# Patient Record
Sex: FEMALE | Race: WHITE | NOT HISPANIC OR LATINO | Employment: FULL TIME | URBAN - METROPOLITAN AREA
[De-identification: names, ages, dates, MRNs, and addresses within clinical notes are randomized per-mention and may not be internally consistent; named-entity substitution may affect disease eponyms.]

---

## 2023-01-16 ENCOUNTER — LAB REQUISITION (OUTPATIENT)
Dept: LAB | Facility: CLINIC | Age: 33
End: 2023-01-16

## 2023-01-16 DIAGNOSIS — E55.9 VITAMIN D DEFICIENCY, UNSPECIFIED: ICD-10-CM

## 2023-01-16 PROCEDURE — 82306 VITAMIN D 25 HYDROXY: CPT | Performed by: NURSE PRACTITIONER

## 2023-01-17 LAB — DEPRECATED CALCIDIOL+CALCIFEROL SERPL-MC: 35 UG/L (ref 20–75)

## 2023-07-17 ASSESSMENT — SLEEP AND FATIGUE QUESTIONNAIRES
HOW LIKELY ARE YOU TO NOD OFF OR FALL ASLEEP WHEN YOU ARE A PASSENGER IN A CAR FOR AN HOUR WITHOUT A BREAK: WOULD NEVER DOZE
HOW LIKELY ARE YOU TO NOD OFF OR FALL ASLEEP WHILE LYING DOWN TO REST IN THE AFTERNOON WHEN CIRCUMSTANCES PERMIT: WOULD NEVER DOZE
HOW LIKELY ARE YOU TO NOD OFF OR FALL ASLEEP WHILE SITTING QUIETLY AFTER LUNCH WITHOUT ALCOHOL: WOULD NEVER DOZE
HOW LIKELY ARE YOU TO NOD OFF OR FALL ASLEEP WHILE SITTING INACTIVE IN A PUBLIC PLACE: WOULD NEVER DOZE
HOW LIKELY ARE YOU TO NOD OFF OR FALL ASLEEP WHILE SITTING AND READING: SLIGHT CHANCE OF DOZING
HOW LIKELY ARE YOU TO NOD OFF OR FALL ASLEEP WHILE WATCHING TV: WOULD NEVER DOZE
HOW LIKELY ARE YOU TO NOD OFF OR FALL ASLEEP IN A CAR, WHILE STOPPED FOR A FEW MINUTES IN TRAFFIC: WOULD NEVER DOZE
HOW LIKELY ARE YOU TO NOD OFF OR FALL ASLEEP WHILE SITTING AND TALKING TO SOMEONE: WOULD NEVER DOZE

## 2023-07-18 ENCOUNTER — OFFICE VISIT (OUTPATIENT)
Dept: SLEEP MEDICINE | Facility: CLINIC | Age: 33
End: 2023-07-18
Payer: COMMERCIAL

## 2023-07-18 VITALS
HEART RATE: 84 BPM | RESPIRATION RATE: 16 BRPM | DIASTOLIC BLOOD PRESSURE: 68 MMHG | OXYGEN SATURATION: 98 % | SYSTOLIC BLOOD PRESSURE: 109 MMHG | WEIGHT: 157 LBS | HEIGHT: 66 IN | BODY MASS INDEX: 25.23 KG/M2

## 2023-07-18 DIAGNOSIS — F51.04 CHRONIC INSOMNIA: ICD-10-CM

## 2023-07-18 DIAGNOSIS — F41.9 ANXIETY AND DEPRESSION: ICD-10-CM

## 2023-07-18 DIAGNOSIS — R53.83 FATIGUE, UNSPECIFIED TYPE: ICD-10-CM

## 2023-07-18 DIAGNOSIS — G47.9 SLEEP DISTURBANCE: Primary | ICD-10-CM

## 2023-07-18 DIAGNOSIS — F32.A ANXIETY AND DEPRESSION: ICD-10-CM

## 2023-07-18 DIAGNOSIS — G47.21 CIRCADIAN RHYTHM SLEEP DISORDER, DELAYED SLEEP PHASE TYPE: ICD-10-CM

## 2023-07-18 PROCEDURE — 99205 OFFICE O/P NEW HI 60 MIN: CPT | Performed by: INTERNAL MEDICINE

## 2023-07-18 NOTE — PROGRESS NOTES
Outpatient Sleep Medicine Consultation:      Name: Vibha Kingston MRN# [id number]   Age: 32 year old YOB: 1990     Date of Consultation: July 18, 2023  Consultation is requested by: No referring provider defined for this encounter. No ref. provider found  Primary care provider: No Ref-Primary, Physician       Reason for Sleep Consult:     Vibha Kingston is sent by No ref. provider found for a sleep consultation regarding difficulty with sleep and waking up multiple times through the night and difficulty waking up in the morning.    Patient s Reason for visit  Vibha Kingston main reason for visit: I have a very difficult time getting to sleep, i wake many times theoughout the night and then have a very hard time getting up in the morning  Patient states problem(s) started: The last 3-5 years, it has been getting worse  Vibha Kingston's goals for this visit: Figure out what is going on           Assessment and Plan:     Chronic Insomnia -multifactorial.  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  Sleep hygiene: Encouraged to follow good sleep hygiene measures including avoiding using phone or other electronic devices in bed and avoiding alcohol within 2-3 hours before bed.  Psychophysiologic insomnia: We discussed cognitive behavioral therapy for insomnia.  Patient was not interested in obtaining the referral to sleep psychology for CBT-I but was willing to consider online CBT-I and  Information regarding online CBT-I was provided as summary.  Anxiety/depression:  She denies suicidal ideation or thoughts of harming others. We discussed the association of insomnia with anxiety and depression. Recommended the patient to establish continuity of care with a  primary care provider and follow-up with PCP for  management of anxiety and  depression.  Delayed sleep phase: We arrived at a goal bedtime of 11:45PM and a goal wake up time of 7:45 AM. Recommended to follow regular sleep schedule, optimizing the duration and circadian timing of sleep and aim at obtaining at least 7 to 8 hours per night and avoid sleep deprivation.  We discussed minimizing exposure to bright lights at least a couple hours before the goal bedtime.    Patient was instructed to avoid mind stimulating activities/screen time or use of electronics at least an hour before bed.   Recommended over-the-counter melatonin 1 mg to be taken by mouth at 8 PM(pt's habitual sleep time reported as 1AM). Recommended exposure to bright light, using a  bright light box of 10,000 Lux intensity with exposure to bright light for 30 to 60 minutes soon after awakening.   She was instructed to communicate with me via MyChart or schedule follow-up if the insomnia does not improve.    Possible JOSH:  Even though the patient denies any reports of snoring or observed apneas during sleep, she  reports frequent nocturnal awakenings and occasional morning headaches with nonrestorative sleep and fatigue. HST/ Polysomnography reviewed.  Will obtain PSG to evaluate for possible JOSH.  Home sleep apnea testing has not been considered due to low probability for JOSH based on STOP-BANG score of 1 out of 8.  Obstructive sleep apnea and consequences of untreated sleep apnea were reviewed.  Information provided regarding treatment options for JOSH.  Plan to communicate the test results with patient via video visit in approximately 14 days after PSG is completed.    Encouraged to follow healthy diet and regular exercise.    Patient was strongly advised to avoid driving, operating any heavy machinery or other hazardous situations while drowsy or sleepy.  Patient was counseled on the importance of driving while alert, to pull over if drowsy, or nap before getting into the vehicle if sleepy.     Orders Placed This Encounter  "  Procedures     Comprehensive Sleep Study       Summary Counseling:    CBT-I Reviewed  Stimulus control measures Reviewed  Sleep Testing Reviewed  Obstructive Sleep Apnea Reviewed    Medical Decision-making:   Educational materials provided in instructions    CC: No ref. provider found     The above note was dictated using voice recognition software. Although reviewed after completion, some word and grammatical error may remain . Please contact the author for any clarifications.     \" Total time spent was 60 minutes for this appointment on this date of service which include time spent before, during and after the visit for chart review, patient care, counseling and coordination of care including documentation.\"      Daniel Toro MD  [facility]  [address], [address], [place] [address]            History of Present Illness:     Past Sleep Evaluations:none    Patient reports that she has had difficulty falling asleep and difficulty waking up in the morning for several years, but it has worsened in the last 3 years since she started working from home during the  pandemic.  Since she works from home, she has been getting out of bed about 15 minutes before the work start time, which is 8 AM.  Previously her work start time used to be 9AM,  but the work start time changed to 8AM about 2.5 years ago.  She reports  that her thinking has not been clear and that the lack of sleep has been affecting her mood. She gets frustrated easily, has been irritable, anxious and sad some times.  She denies suicidal ideations or thoughts of harming others.    SLEEP-WAKE SCHEDULE:     Work/School Days: Patient goes to school/work: Yes   Usually gets into bed at Between 10 and midnight  Takes patient about 1 to 2 hours. Sometimes longer if i cant get my mind to shut off to fall asleep. Sit in bed staring at the wall-anxiety/depression may also affect sleep  Has trouble falling " asleep Every night   Wakes up in the middle of the night 2 to 3 times  Wakes up due to External stimuli (bed partner, pets, noise, etc);Use the bathroom  She has trouble falling back asleep Not diffult actually.   It usually takes 20 minutes or so to get back to sleep  Patient is usually up at 7:45  Uses alarm: Yes set for 645 AM but she keeps snoozing until 745AM    Weekends/Non-work Days/All Other Days:  Usually gets into bed at Between 10 and midnight   Takes patient about Usually about 1-2 hours again to fall asleep  Patient is usually up at 9:30-10ish  Uses alarm: Yes. Alarm still set at 645AM but she turn  it off and wakes up spontaneously around 930-10AM  She reports her sleep is better when she is away from home.  She reports nonrestorative sleep and fatigue, but denies excessive daytime sleepiness  She feels a little more rested upon awakening from sleep on weekends compared to work days.      Sleep Need  Patient gets  6 mike hours sleep on average   Patient thinks she needs about 7 to 8 hour sleep    Vibha MADI MaryKingston prefers to sleep in this position(s): Side   Patient states they do the following activities in bed: Read;Use phone.    Naps  Patient takes a purposeful nap Never times a week and naps are usually Na in duration  She feels better after a nap: No  She dozes off unintentionally Never days per week  Patient has had a driving accident or near-miss due to sleepiness/drowsiness: No      SLEEP DISRUPTIONS:    Breathing/Snoring  Patient snores:No  Other people complain about her snoring: No  Patient has been told she stops breathing in her sleep:No   Gasping/Choking: No  She has issues with the following:  occasional morning headaches    Movement:  Patient gets pain, discomfort, with an urge to move:  No  It happens when she is resting:  No  It happens more at night:  No  Patient has been told she kicks her legs at night:  No     Behaviors in Sleep:  Vihba Kingston has experienced the following  behaviors while sleeping: Recurring Nightmares;See or hear things that are not really there upon awakening or just falling asleep   Nightmares: had in the past, but not recently  Reports visual hallucination occasionally as she is falling asleep  She has experienced sudden muscle weakness during the day: No   She denies sleep walking, sleep talking, sleep eating or dream enactment behavior.  She denies cataplexy/sleep paralysis    Is there anything else you would like your sleep provider to know:        CAFFEINE AND OTHER SUBSTANCES:    Patient consumes caffeinated beverages per day:  1 to 2 cups of coffee  Last caffeine use is usually: I try to make sure its before noon.  List of any prescribed or over the counter stimulants that patient takes:    List of any prescribed or over the counter sleep medication patient takes:    List of previous sleep medications that patient has tried:  melatonin not helpful  Patient drinks alcohol to help them sleep: No  Patient drinks alcohol near bedtime: Yes sometimes    Family History:  Patient has a family member been diagnosed with a sleep disorder: No          SCALES:    EPWORTH SLEEPINESS SCALE         7/17/2023     9:41 PM    Montgomery Sleepiness Scale ( GLORIA Singleton  2920-3027<br>ESS - [place]/English - Final version - 21 Nov 07 - [facility] [employer] [place].)   Sitting and reading Slight chance of dozing   Watching TV Would never doze   Sitting, inactive in a public place (e.g. a theatre or a meeting) Would never doze   As a passenger in a car for an hour without a break Would never doze   Lying down to rest in the afternoon when circumstances permit Would never doze   Sitting and talking to someone Would never doze   Sitting quietly after a lunch without alcohol Would never doze   In a car, while stopped for a few minutes in traffic Would never doze   Montgomery Score (MC) 1   Montgomery Score (Sleep) 1         INSOMNIA SEVERITY INDEX (THOMAS)          7/17/2023     9:22 PM   Insomnia Severity  "Index (THOMAS)   Difficulty falling asleep 3   Difficulty staying asleep 2   Problems waking up too early 0   How SATISFIED/DISSATISFIED are you with your CURRENT sleep pattern? 4   How NOTICEABLE to others do you think your sleep problem is in terms of impairing the quality of your life? 3   How WORRIED/DISTRESSED are you about your current sleep problem? 3   To what extent do you consider your sleep problem to INTERFERE with your daily functioning (e.g. daytime fatigue, mood, ability to function at work/daily chores, concentration, memory, mood, etc.) CURRENTLY? 4   THOMAS Total Score 19       Guidelines for Scoring/Interpretation:  Total score categories:  0-7 = No clinically significant insomnia   8-14 = Subthreshold insomnia   15-21 = Clinical insomnia (moderate severity)  22-28 = Clinical insomnia (severe)  Used via courtesy of www.Visionarityth.va.gov with permission from Ben Murguia PhD., [facility]      STOP BAN out of 8 (tiredness)        2023     7:57 AM   STOP BANG Questionnaire (  2008, the American Society of Anesthesiologists, Inc[employer]   Neck Cir (cm) Clinic: 33 cm   B/P Clinic: 109/68   BMI Clinic: 25.34         GAD7         No data to display                  CAGE-AID         No data to display                CAGE-AID reprinted with permission from the [place] Medical Journal, MOMO Mackenzie and JENNIFER Gaston, \"Conjoint screening questionnaires for alcohol and drug abuse\" [place] Medical Journal 94: 135-140, .      PATIENT HEALTH QUESTIONNAIRE-9 (PHQ - 9)         No data to display                Developed by Tom Zepeda, Yesika Murillo, Al Humphreys and colleagues, with an educational gt from [employer] No permission required to reproduce, translate, display or distribute.        Allergies:    No Known Allergies    Medications:    No current outpatient medications on file.       Problem List:  There are no problems to display for this " patient.       Past Medical/Surgical History:  No past medical history on file.  No past surgical history on file.    Social History:  Social History     Socioeconomic History     Marital status:      Spouse name: Not on file     Number of children: Not on file     Years of education: Not on file     Highest education level: Not on file   Occupational History     Not on file   Tobacco Use     Smoking status: Never     Smokeless tobacco: Never   Vaping Use     Vaping Use: Never used   Substance and Sexual Activity     Alcohol use: Not on file     Drug use: Not on file     Sexual activity: Not on file   Other Topics Concern     Not on file   Social History Narrative     Not on file     Social Determinants of Health     Financial Resource Strain: Not on file   Food Insecurity: Not on file   Transportation Needs: Not on file   Physical Activity: Not on file   Stress: Not on file   Social Connections: Not on file   Intimate Partner Violence: Not on file   Housing Stability: Not on file       Family History:  No family history on file.    Review of Systems:  A complete review of systems reviewed by me is negative with the exeption of what has been mentioned in the history of present illness.  In the last TWO WEEKS have you experienced any of the following symptoms?  Fevers: No  Night Sweats: Yes  Weight Gain: No  Pain at Night: No  Double Vision: No  Changes in Vision: No  Difficulty Breathing through Nose: No  Sore Throat in Morning: No  Dry Mouth in the Morning: No  Shortness of Breath Lying Flat: No  Shortness of Breath With Activity: No  Awakening with Shortness of Breath: No  Increased Cough: No  Heart Racing at Night: No  Swelling in Feet or Legs: No  Diarrhea at Night: No  Heartburn at Night: No  Urinating More than Once at Night: No  Losing Control of Urine at Night: No  Joint Pains at Night: Yes  Headaches in Morning: No  Weakness in Arms or Legs: No  Depressed Mood: Yes; denies suicidal ideation or  "thoughts of harming others  Anxiety: Yes      Physical Examination:  Vitals: /68   Pulse 84   Resp 16   Ht 1.676 m (5' 6\")   Wt 71.2 kg (157 lb)   SpO2 98%   BMI 25.34 kg/m    BMI= Body mass index is 25.34 kg/m .    Neck Cir (cm): 33 cm    General: No apparent distress, appropriately groomed  Head: Normocephalic, atraumatic  Nose, mouth, throat: Slightly hypertrophied inferior nasal turbinate in the right nostril, but patent airflow through both nares.  Oropharynx : Mallampati Class: III.  Tonsillar Stage: 2  visible at pillars.  Neck:Circumference: 33 cms  Chest: No cough, no audible wheezing, able to talk in full sentences.  Clear to auscultation bilaterally with no rales or rhonchi  Cardiovascular: regular S1, S2; no gallops or murmurs  Psych: coherent speech, normal rate and volume, able to articulate logical thoughts, able   to abstract reason, no tangential thoughts, no hallucinations   or delusions Her affect is normal  Neuro:  Mental status: Alert and  Oriented X 3  Speech: normal            Data: All pertinent previous laboratory data reviewed     No results for input(s): NA, POTASSIUM, CHLORIDE, CO2, ANIONGAP, GLC, BUN, CR, RACHAEL in the last 40088 hours.    No results for input(s): WBC, RBC, HGB, HCT, MCV, MCH, MCHC, RDW, PLT in the last 42373 hours.    No results for input(s): PROTTOTAL, ALBUMIN, BILITOTAL, ALKPHOS, AST, ALT, BILIDIRECT in the last 42682 hours.    No results found for: TSH    No results found for: UAMP, UBARB, BENZODIAZEUR, UCANN, UCOC, OPIT, UPCP    No results found for: IRONSAT, [id number], RICKI    No results found for: PH, PHARTERIAL, PO2, [id number], SAT, PCO2, HCO3, BASEEXCESS, MORGAN, BEB    Echocardiology: No results found for this or any previous visit (from the past 4320 hour(s)).    Chest x-ray: No results found for this or any previous visit from the past 365 days.      Chest CT: No results found for this or any previous visit from the past 365 days.      PFT: Most Recent " Breeze Pulmonary Function Testing: No results found        Daniel Toro MD 7/18/2023

## 2023-07-18 NOTE — PATIENT INSTRUCTIONS
Your BMI is Body mass index is 25.34 kg/m .    What is BMI?  Body mass index (BMI) is one way to tell whether you are at a healthy weight, overweight, or obese. It measures your weight in relation to your height.  A BMI of 18.5 to 24.9 is in the healthy range. A person with a BMI of 25 to 29.9 is considered overweight, and someone with a BMI of 30 or greater is considered obese.  Another way to find out if you are at risk for health problems caused by overweight and obesity is to measure your waist. If you are a woman and your waist is more than 35 inches, or if you are a man and your waist is more than 40 inches, your risk of disease may be higher.  More than two-thirds of American adults are considered overweight or obese. Being overweight or obese increases the risk for further weight gain.  Excess weight may lead to heart disease and diabetes. Creating and following plans for healthy eating and physical activity may help you improve your health.    Methods for maintaining or losing weight.  Weight control is part of healthy lifestyle and includes exercise, emotional health, and healthy eating habits.  Careful eating habits lifelong is the mainstay of weight control.  Though there are significant health benefits from weight loss, long-term weight loss with diet alone may be very difficult to achieve- studies show long-term success with dietary management in less than 10% of people. Attaining a healthy weight may be especially difficult to achieve in those with severe obesity. In some cases, medications, devices and surgical management might be considered.    What can you do?  If you are overweight or obese and are interested in methods for weight loss, you should discuss this with your provider. In addition, we recommend that you review healthy life styles and methods for weight loss available through the National Institutes of Health patient information sites:    http://win.niddk.nih.gov/publications/index.htm      Recommended following a regular sleep-wake pattern every day, arrived at a goal bedtime of 11:45 PM and a goal wake time of 7:45 AM every day of the week. We discussed minimizing exposure to bright lights at least a couple hours before the goal bedtime.  Patient was instructed to avoid mind stimulating activities/screen time or use of electronics at least an hour before bed. Recommended over-the-counter melatonin 1 mg to be taken by mouth at 8 PM. Recommended  exposure to bright light , using a  bright light box of 10,000 Lux intensity with exposure to bright light for 30 to 60 minutes soon after awakening.   Please do not read or use phone in bed.  Please avoid alcohol within 2-3 hours before bed  Please establish continuity of care with a  primary care provider(PCP) and follow up with PCP for management of anxiety/depression.      Insomnia and Behavioral Sleep Medicine Program    The [facility] Insomnia and Behavioral Sleep Medicine Program provides non-drug treatment for sleep problems including:    Cognitive-behavioral Therapies for Insomnia (CBT-I)  Management of Shift-work and Jet Lag  Management of Delayed, Advanced and Irregular Circadian Rhythm Sleep Disorders  Imagery Rehearsal Therapy (IRT) for Nightmare Disorder  PAP Therapy Desensitization    You have been referred for consultation with a sleep psychologist who specializes in behavioral sleep medicine and treatment of insomnia.  The [facility] Insomnia and Behavioral Sleep Medicine Program offers individualized telehealth services through our [facility] Sleep Centers and online CBT-I.    Preparing for your Consultation    You will need to keep a Sleep Diary for at least a week prior to your visit. Complete the sleep diary each day first thing after you get up by answering a few key questions about your sleep using our convenient mobile filomena or paper sleep diary.  Your answers  should be based on your recall of the past 24 hours.  Avoid watching the clock or recording data during the night.     Insomnia  Bc    The Insomnia  mobile bc  is a convenient way to keep track of your sleep prior to your sleep consultation.  Simply download the free bc on your Apple or Android phone and record your information each morning.  The bc includes training, self-assessment, and sleep schedule recommendations.  Prior to your consultation we recommend you use only the sleep diary function. You can e-mail yourself a copy of your sleep diary data by going to the Settings section and using the [place] User Data function.  During your consultation your provider will review the data with you.          Syncronex Sleep Diary    You can also track your sleep using the Sync[employer]x paper sleep diary.  You can upload your sleep diary and send it via a [employer] message, fax it to [phone], or have it with you at the time of your consultation.            CBT-I:  Frequently Asked Questions    What is CBT-I?    Cognitive Behavioral Therapy for Insomnia, also known as CBT-I, is a highly effective non-drug treatment for insomnia. The American College of Physicians recommends CBT-I as the first treatment for chronic insomnia.  Research has shown CBT-I to be safer and more effective long term than sleeping pills.    What does CBT-I involve?     CBT-I targets behaviors that lead to chronic insomnia:  Habits that weaken the bed as a cue for sleep  Habits that weaken your body's sleep drive and sleep/wake clock   Unhelpful sleep thoughts that increase sleep-related worry and arousal.    The process involves 3-6 telehealth visits that guide you to implement proven strategies to get a better night's sleep.    People often see improvement in their sleep within a few weeks. Research shows if you keep practicing the skills you learn your sleep is likely to continue to improve 6-12 months after  treatment.    Does this program prescribe or manage sleep medication?    No.  Your prescribing provider is responsible to assist you in managing your sleep medications.  Some people choose to stop using sleep medication prior to or during CBT-I.  Our program can work with your prescribing provider to help reduce or eliminate use of sleep medications.     Getting Started Today!    If you haven't already done so, we recommend you consider making the following changes to your sleep habits prior to your sleep consultation:     Reduce your consumption of caffeine and alcohol.  Both can disrupt sleep and make strengthening your sleep more difficult.  Specifically:    - Avoid caffeine within 6 hours of bedtime   - No more than 3 caffeinated beverages per day (e.g. 8 oz. cup coffee or 12 oz. cup soda)            - No alcohol within 3 hours of bedtime    Make sure your bedroom is quiet, comfortable and dark.  Noise, light and an uncomfortable sleep space can harm your sleep.      Keep the same sleep schedule 7 days a week.unless you do shift work.      Online CBT-I     If you want to get started today, research indicates that online CBT-I can be effective for some individuals. These programs requires comfort with filomena-based or online learning.  However, digital CBT-I programs are not for everyone.  Contraindications include:    Seizure disorders,   Bipolar disorder,   Unstable medical or mental health conditions,   Frailty or risk of falling  Pregnancy    You should consult a sleep specialist before using these resources if you have:    Sleep Apnea  Restless Leg Syndrome  Sleep Walking  REM behavior disorder  Night Terrors  Excessive Daytime Sleepiness  Are engaged in shift work  Use prescription sleep medication    Our Online CBT-I program    If your sleep provider recommends online CBT-I for you , the cost for an entire 6-week program is $40.    To get started, copy and paste the link below which will take you to the  landing page to register:                           [email or web address]               If you wish to complete the online CBT-I program but do not plan to follow-up with a sleep provider, you are set to begin the program.    If you are planning to work with an [facility] sleep provider, there are a couple of extra steps you can take to share your sleep data with your sleep provider.  To share sleep log data, go to the left side navigation and click on the  share sleep log  button:         You will be taken to the page below where you will enter  the provider code HSTYLE into the box.          Once you press the locate button, the information for  [employer] will pop up as below.  By pressing the Submit button your data will be sent to our  secure [facility] sleep program portal for review by your sleep provider. You will only need to do this step once.                                  Self-help Workbooks for Insomnia    If you have found self-help books useful in the past, you may want to consider reading one of the following books prior to your consultation:    Say Emilie to Insomnia: The Six-Week, Drug-Free Program Developed at [employer].  Jonatan Knight MD. Available in paperback, Sabas, and audiobook.    Overcoming Insomnia: A Cognitive-Behavioral Therapy Approach, Workbook.  Shon Babin, PhD  and Carmen Winston, PhD.  Available in paperback and Sabas.    Quiet Your Mind and Get to Sleep: Solutions to Insomnia for Those with Depression, Anxiety, or Chronic Pain.  Cony Purcell, PhD and Carmen Winston, PhD.  Available in paperback and Sabas              MY TREATMENT INFORMATION FOR SLEEP APNEA-  Vibha Kingston    DOCTOR : Daniel Toro MD    Am I having a sleep study at a sleep center?  --->Due to normal delays, you will be contacted within 2-4 weeks to schedule    Frequently asked questions:  1. What is Obstructive Sleep Apnea (JOSH)? JOSH is the  "most common type of sleep apnea. Apnea means, \"without breath.\"  Apnea is most often caused by narrowing or collapse of the upper airway as muscles relax during sleep.   Almost everyone has occasional apneas. Most people with sleep apnea have had brief interruptions at night frequently for many years.  The severity of sleep apnea is related to how frequent and severe the events are.   2. What are the consequences of JOSH? Symptoms include: feeling sleepy during the day, snoring loudly, gasping or stopping of breathing, trouble sleeping, and occasionally morning headaches or heartburn at night.  Sleepiness can be serious and even increase the risk of falling asleep while driving. Other health consequences may include development of high blood pressure and other cardiovascular disease in persons who are susceptible. Untreated JOSH  can contribute to heart disease, stroke and diabetes.   3. What are the treatment options? In most situations, sleep apnea is a lifelong disease that must be managed with daily therapy. Medications are not effective for sleep apnea and surgery is generally not considered until other therapies have been tried. Your treatment is your choice . Continuous Positive Airway (CPAP) works right away and is the therapy that is effective in nearly everyone. An oral device to hold your jaw forward is usually the next most reliable option. Other options include postioning devices (to keep you off your back), weight loss, and surgery including a tongue pacing device. There is more detail about some of these options below.  4. Are my sleep studies covered by insurance? Although we will request verification of coverage, we advise you also check in advance of the study to ensure there is coverage.    Important tips for those choosing CPAP and similar devices  For new devices, sign up for device FILOMENA to monitor your device for your followup visits  We encourage you to utilize the [employer] filomena or website " ([employer] ([email or web address].com) ) to monitor your therapy progress and share the data with your healthcare team when you discuss your sleep apnea.                                                    Know your equipment:  CPAP is continuous positive airway pressure that prevents obstructive sleep apnea by keeping the throat from collapsing while you are sleeping. In most cases, the device is  smart  and can slowly self-adjusts if your throat collapses and keeps a record every day of how well you are treated-this information is available to you and your care team.  BPAP is bilevel positive airway pressure that keeps your throat open and also assists each breath with a pressure boost to maintain adequate breathing.  Special kinds of BPAP are used in patients who have inadequate breathing from lung or heart disease. In most cases, the device is  smart  and can slowly self-adjusts to assist breathing. Like CPAP, the device keeps a record of how well you are treated.  Your mask is your connection to the device. You get to choose what feels most comfortable and the staff will help to make sure if fits. Here: are some examples of the different masks that are available:       Key points to remember on your journey with sleep apnea:  Sleep study.  PAP devices often need to be adjusted during a sleep study to show that they are effective and adjusted right.  Good tips to remember: Try wearing just the mask during a quiet time during the day so your body adapts to wearing it. A humidifier is recommended for comfort in most cases to prevent drying of your nose and throat. Allergy medication from your provider may help you if you are having nasal congestion.  Getting settled-in. It takes more than one night for most of us to get used to wearing a mask. Try wearing just the mask during a quiet time during the day so your body adapts to wearing it. A humidifier is recommended for comfort in most cases. Our team will work with you carefully  on the first day and will be in contact within 4 days and again at 2 and 4 weeks for advice and remote device adjustments. Your therapy is evaluated by the device each day.   Use it every night. The more you are able to sleep naturally for 7-8 hours, the more likely you will have good sleep and to prevent health risks or symptoms from sleep apnea. Even if you use it 4 hours it helps. Occasionally all of us are unable to use a medical therapy, in sleep apnea, it is not dangerous to miss one night.   Communicate. Call our skilled team on the number provided on the first day if your visit for problems that make it difficult to wear the device. Over 2 out of 3 patients can learn to wear the device long-term with help from our team. Remember to call our team or your sleep providers if you are unable to wear the device as we may have other solutions for those who cannot adapt to mask CPAP therapy. It is recommended that you sleep your sleep provider within the first 3 months and yearly after that if you are not having problems.   Use it for your health. We encourage use of CPAP masks during daytime quiet periods to allow your face and brain to adapt to the sensation of CPAP so that it will be a more natural sensation to awaken to at night or during naps. This can be very useful during the first few weeks or months of adapting to CPAP though it does not help medically to wear CPAP during wakefulness and  should not be used as a strategy just to meet guidelines.  Take care of your equipment. Make sure you clean your mask and tubing using directions every day and that your filter and mask are replaced as recommended or if they are not working.     BESIDES CPAP, WHAT OTHER THERAPIES ARE THERE?    Positioning Device  Positioning devices are generally used when sleep apnea is mild and only occurs on your back.This example shows a pillow that straps around the waist. It may be appropriate for those whose sleep study shows milder  sleep apnea that occurs primarily when lying flat on one's back. Preliminary studies have shown benefit but effectiveness at home may need to be verified by a home sleep test. These devices are generally not covered by medical insurance.  Examples of devices that maintain sleeping on the back to prevent snoring and mild sleep apnea.    Belt type body positioner  http://[employer].KOEZY/    Electronic reminder  http://nightshifttherapy.com/            Oral Appliance  What is oral appliance therapy?  An oral appliance device fits on your teeth at night like a retainer used after having braces. The device is made by a specialized dentist and requires several visits over 1-2 months before a manufactured device is made to fit your teeth and is adjusted to prevent your sleep apnea. Once an oral device is working properly, snoring should be improved. A home sleep test may be recommended at that time if to determine whether the sleep apnea is adequately treated.       Some things to remember:  -Oral devices are often, but not always, covered by your medical insurance. Be sure to check with your insurance provider.   -If you are referred for oral therapy, you will be given a list of specialized dentists to consider or you may choose to visit the Web site of the American Academy of Dental Sleep Medicine  -Oral devices are less likely to work if you have severe sleep apnea or are extremely overweight.     More detailed information  An oral appliance is a small acrylic device that fits over the upper and lower teeth  (similar to a retainer or a mouth guard). This device slightly moves jaw forward, which moves the base of the tongue forward, opens the airway, improves breathing for effective treat snoring and obstructive sleep apnea in perhaps 7 out of 10 people .  The best working devices are custom-made by a dental device  after a mold is made of the teeth 1, 2, 3.  When is an oral appliance indicated?  Oral appliance  therapy is recommended as a first-line treatment for patients with primary snoring, mild sleep apnea, and for patients with moderate sleep apnea who prefer appliance therapy to use of CPAP4, 5. Severity of sleep apnea is determined by sleep testing and is based on the number of respiratory events per hour of sleep.   How successful is oral appliance therapy?  The success rate of oral appliance therapy in patients with mild sleep apnea is 75-80% while in patients with moderate sleep apnea it is 50-70%. The chance of success in patients with severe sleep apnea is 40-50%. The research also shows that oral appliances have a beneficial effect on the cardiovascular health of JOSH patients at the same magnitude as CPAP therapy7.  Oral appliances should be a second-line treatment in cases of severe sleep apnea, but if not completely successful then a combination therapy utilizing CPAP plus oral appliance therapy may be effective. Oral appliances tend to be effective in a broad range of patients although studies show that the patients who have the highest success are females, younger patients, those with milder disease, and less severe obesity. 3, 6.   Finding a dentist that practices dental sleep medicine  Specific training is available through the American Academy of Dental Sleep Medicine for dentists interested in working in the field of sleep. To find a dentist who is educated in the field of sleep and the use of oral appliances, near you, visit the Web site of the American Academy of Dental Sleep Medicine.    References  1. Luca et al. Objectively measured vs self-reported compliance during oral appliance therapy for sleep-disordered breathing. Chest 2013; 144(5): 7718-3912.  2. Shirley et al. Objective measurement of compliance during oral appliance therapy for sleep-disordered breathing. Thorax 2013; 68(1): 91-96.  3. Alfredo et al. Mandibular advancement devices in 620 men and women with JOSH and snoring:  tolerability and predictors of treatment success. Chest 2004; 125: 5770-2987.  4. Jose et al. Oral appliances for snoring and JOSH: a review. Sleep 2006; 29: 244-262.  5. Marco et al. Oral appliance treatment for JOSH: an update. J Clin Sleep Med 2014; 10(2): 215-227.  6. Arsen et al. Predictors of OSAH treatment outcome. J Dent Res 2007; 86: 0814-7864.      Weight Loss:    Weight loss is a long-term strategy that may improve sleep apnea in some patients.    Weight management is a personal decision and the decision should be based on your interest and the potential benefits.  If you are interested in exploring weight loss strategies, the following discussion covers the impact on weight loss on sleep apnea and the approaches that may be successful.    Being overweight does not necessarily mean you will have health consequences.  Those who have BMI over 35 or over 27 with existing medical conditions carries greater risk.   Weight loss decreases severity of sleep apnea in most people with obesity. For those with mild obesity who have developed snoring with weight gain, even 15-30 pound weight loss can improve and occasionally eliminate sleep apnea.  Structured and life-long dietary and health habits are necessary to lose weight and keep healthier weight levels.     Though there may be significant health benefits from weight loss, long-term weight loss is very difficult to achieve- studies show success with dietary management in less than 10% of people. In addition, substantial weight loss may require years of dietary control and may be difficult if patients have severe obesity. In these cases, surgical management may be considered.  Finally, older individuals who have tolerated obesity without health complications may be less likely to benefit from weight loss strategies.      [unfilled]    Surgery:    Surgery for obstructive sleep apnea is considered generally only when other therapies fail to work.  Surgery may be discussed with you if you are having a difficult time tolerating CPAP and or when there is an abnormal structure that requires surgical correction.  Nose and throat surgeries often enlarge the airway to prevent collapse.  Most of these surgeries create pain for 1-2 weeks and up to half of the most common surgeries are not effective throughout life.  You should carefully discuss the benefits and drawbacks to surgery with your sleep provider and surgeon to determine if it is the best solution for you.   More information  Surgery for JOSH is directed at areas that are responsible for narrowing or complete obstruction of the airway during sleep.  There are a wide range of procedures available to enlarge and/or stabilize the airway to prevent blockage of breathing in the three major areas where it can occur: the palate, tongue, and nasal regions.  Successful surgical treatment depends on the accurate identification of the factors responsible for obstructive sleep apnea in each person.  A personalized approach is required because there is no single treatment that works well for everyone.  Because of anatomic variation, consultation with an examination by a sleep surgeon is a critical first step in determining what surgical options are best for each patient.  In some cases, examination during sedation may be recommended in order to guide the selection of procedures.  Patients will be counseled about risks and benefits as well as the typical recovery course after surgery. Surgery is typically not a cure for a person s JOSH.  However, surgery will often significantly improve one s JOSH severity (termed  success rate ).  Even in the absence of a cure, surgery will decrease the cardiovascular risk associated with OSA7; improve overall quality of life8 (sleepiness, functionality, sleep quality, etc).      Palate Procedures:  Patients with JOSH often have narrowing of their airway in the region of their tonsils and  uvula.  The goals of palate procedures are to widen the airway in this region as well as to help the tissues resist collapse.  Modern palate procedure techniques focus on tissue conservation and soft tissue rearrangement, rather than tissue removal.  Often the uvula is preserved in this procedure. Residual sleep apnea is common in patient after pharyngoplasty with an average reduction in sleep apnea events of 33%2.      Tongue Procedures:  ExamWhile patients are awake, the muscles that surround the throat are active and keep this region open for breathing. These muscles relax during sleep, allowing the tongue and other structures to collapse and block breathing.  There are several different tongue procedures available.  Selection of a tongue base procedure depends on characteristics seen on physical exam.  Generally, procedures are aimed at removing bulky tissues in this area or preventing the back of the tongue from falling back during sleep.  Success rates for tongue surgery range from 50-62%3.    Hypoglossal Nerve Stimulation:  Hypoglossal nerve stimulation has recently received approval from the [place] Food and Drug Administration for the treatment of obstructive sleep apnea.  This is based on research showing that the system was safe and effective in treating sleep apnea6.  Results showed that the median AHI score decreased 68%, from 29.3 to 9.0. This therapy uses an implant system that senses breathing patterns and delivers mild stimulation to airway muscles, which keeps the airway open during sleep.  The system consists of three fully implanted components: a small generator (similar in size to a pacemaker), a breathing sensor, and a stimulation lead.  Using a small handheld remote, a patient turns the therapy on before bed and off upon awakening.    Candidates for this device must be greater than 18 years of age, have moderate to severe JOSH (AHI between 15-65), BMI less than 35, have tried CPAP/oral  appliance for at least 8 weeks without success, and have appropriate upper airway anatomy (determined by a sleep endoscopy performed by Dr. Christiano Vaca).    Hypoglossal Nerve Stimulation Pathway:    The sleep surgeon s office will work with the patient through the insurance prior-authorization process (including communications and appeals).    Nasal Procedures:  Nasal obstruction can interfere with nasal breathing during the day and night.  Studies have shown that relief of nasal obstruction can improve the ability of some patients to tolerate positive airway pressure therapy for obstructive sleep apnea1.  Treatment options include medications such as nasal saline, topical corticosteroid and antihistamine sprays, and oral medications such as antihistamines or decongestants. Non-surgical treatments can include external nasal dilators for selected patients. If these are not successful by themselves, surgery can improve the nasal airway either alone or in combination with these other options.      Combination Procedures:  Combination of surgical procedures and other treatments may be recommended, particularly if patients have more than one area of narrowing or persistent positional disease.  The success rate of combination surgery ranges from 66-80%2,3.    References  David SY. The Role of the Nose in Snoring and Obstructive Sleep Apnoea: An Update.  Eur Arch Otorhinolaryngol. 2011; 268: 1365-73.   Capmary ellen SM; Jackie JA; Niru JR; Pallanch JF; Britni MB; Song SG; Kyaw GREENE. Surgical modifications of the upper airway for obstructive sleep apnea in adults: a systematic review and meta-analysis. SLEEP 2010;33(10):5291-5484. [employer]. Hypopharyngeal surgery in obstructive sleep apnea: an evidence-based medicine review.  Arch Otolaryngol Head Neck Surg. 2006 Feb;132(2):206-13.  Ethan YH1, Lynn Y, Theron CRAIG. The efficacy of anatomically based multilevel surgery for obstructive sleep apnea. Otolaryngol Head Neck Surg.  2003 Oct;129(4):327-35.  Kezirian E, Goldberg A. Hypopharyngeal Surgery in Obstructive Sleep Apnea: An Evidence-Based Medicine Review. Arch Otolaryngol Head Neck Surg. 2006 Feb;132(2):206-13.  Lj ROSE et al. Upper-Airway Stimulation for Obstructive Sleep Apnea.  N Engl J Med. 2014 Jan 9;370(2):139-49.  Gabrielle Y et al. Increased Incidence of Cardiovascular Disease in Middle-aged Men with Obstructive Sleep Apnea. Am J Respir Crit Care Med; 2002 166: 159-165  Sahni EM et al. Studying Life Effects and Effectiveness of Palatopharyngoplasty (SLEEP) study: Subjective Outcomes of Isolated Uvulopalatopharyngoplasty. Otolaryngol Head Neck Surg. 2011; 144: 623-631.        WHAT IF I ONLY HAVE SNORING?    Mandibular advancement devices, lateral sleep positioning, long-term weight loss and treatment of nasal allergies have been shown to improve snoring.  Exercising tongue muscles with a game ([email or web address]://[email or web address]/soundly-[email or web address]-snoring/[email or web address]) or stimulating the tongue during the day with a device (https://doi.org/10.3390/wqk09583688) have improved snoring in some individuals.    Remember to Drive Safe... Drive Alive     Sleep health profoundly affects your health, mood, and your safety.  Thirty three percent of the population (one in three of us) is not getting enough sleep and many have a sleep disorder. Not getting enough sleep or having an untreated / undertreated sleep condition may make us sleepy without even knowing it. In fact, our driving could be dramatically impaired due to our sleep health. As your provider, here are some things I would like you to know about driving:     Here are some warning signs for impairment and dangerous drowsy driving:              -Having been awake more than 16 hours               -Looking tired               -Eyelid drooping              -Head nodding (it could be too late at this point)              -Driving for more than 30 minutes     Some things you could do to make  the driving safer if you are experiencing some drowsiness:              -Stop driving and rest              -Call for transportation              -Make sure your sleep disorder is adequately treated     Some things that have been shown NOT to work when experiencing drowsiness while driving:              -Turning on the radio              -Opening windows              -Eating any  distracting  /  entertaining  foods (e.g., sunflower seeds, candy, or any other)              -Talking on the phone      Your decision may not only impact your life, but also the life of others. Please, remember to drive safe for yourself and all of us.

## 2023-08-13 ENCOUNTER — HEALTH MAINTENANCE LETTER (OUTPATIENT)
Age: 33
End: 2023-08-13

## 2023-08-31 ASSESSMENT — SLEEP AND FATIGUE QUESTIONNAIRES
HOW LIKELY ARE YOU TO NOD OFF OR FALL ASLEEP WHILE LYING DOWN TO REST IN THE AFTERNOON WHEN CIRCUMSTANCES PERMIT: WOULD NEVER DOZE
HOW LIKELY ARE YOU TO NOD OFF OR FALL ASLEEP IN A CAR, WHILE STOPPED FOR A FEW MINUTES IN TRAFFIC: WOULD NEVER DOZE
HOW LIKELY ARE YOU TO NOD OFF OR FALL ASLEEP WHEN YOU ARE A PASSENGER IN A CAR FOR AN HOUR WITHOUT A BREAK: WOULD NEVER DOZE
HOW LIKELY ARE YOU TO NOD OFF OR FALL ASLEEP WHILE SITTING AND TALKING TO SOMEONE: WOULD NEVER DOZE
HOW LIKELY ARE YOU TO NOD OFF OR FALL ASLEEP WHILE SITTING INACTIVE IN A PUBLIC PLACE: WOULD NEVER DOZE
HOW LIKELY ARE YOU TO NOD OFF OR FALL ASLEEP WHILE SITTING AND READING: SLIGHT CHANCE OF DOZING
HOW LIKELY ARE YOU TO NOD OFF OR FALL ASLEEP WHILE WATCHING TV: WOULD NEVER DOZE
HOW LIKELY ARE YOU TO NOD OFF OR FALL ASLEEP WHILE SITTING QUIETLY AFTER LUNCH WITHOUT ALCOHOL: WOULD NEVER DOZE

## 2023-09-07 ENCOUNTER — THERAPY VISIT (OUTPATIENT)
Dept: SLEEP MEDICINE | Facility: CLINIC | Age: 33
End: 2023-09-07
Payer: COMMERCIAL

## 2023-09-07 ENCOUNTER — DOCUMENTATION ONLY (OUTPATIENT)
Dept: SLEEP MEDICINE | Facility: CLINIC | Age: 33
End: 2023-09-07

## 2023-09-07 DIAGNOSIS — G47.9 SLEEP DISTURBANCE: ICD-10-CM

## 2023-09-07 PROCEDURE — 95810 POLYSOM 6/> YRS 4/> PARAM: CPT | Performed by: INTERNAL MEDICINE

## 2023-09-08 NOTE — PROGRESS NOTES
Diagnostic PSG completed per provider order.  Patient did not meet criteria for PAP therapy per provider order.

## 2023-09-08 NOTE — PATIENT INSTRUCTIONS
[facility]    1. Your sleep study will be reviewed by a sleep physician within the next few days.     2. Please follow up in the sleep clinic as scheduled, or, make an appointment with your sleep provider to be seen within two weeks to discuss the results of the sleep study.    3. If you have any questions or problems with your treatment plan, please contact your sleep clinic provider at [phone] to further manage your condition.    4. Please review your attached medication list, and, at your follow-up appointment advise your sleep clinic provider about any changes.    5. Go to [email or web address] for more information about your sleep problems.    KELLIE MCPHERSON, RPSGT  September 8, 2023

## 2023-09-17 NOTE — PROCEDURES
"         SLEEP STUDY INTERPRETATION  DIAGNOSTIC POLYSOMNOGRAPHY REPORT      Patient: EMILEE ORTA  YOB: 1990  Study Date: 9/7/2023  MRN: [id number]  Referring Provider: Self  Ordering Provider: Niko Toro MD    Indications for Polysomnography: The patient is a 33-year-old Female who is 5' 6\" and weighs 157.0 lbs. Her BMI is 25.5, Lookout sleepiness scale 1/24 and neck circumference is 33 cm. A diagnostic polysomnogram was performed to evaluate for sleep apnea /hypoxemia.    Polysomnogram Data: A full night polysomnogram recorded the standard physiologic parameters including EEG, EOG, EMG, ECG, nasal and oral airflow. Respiratory parameters of chest and abdominal movements were recorded with respiratory inductance plethysmography. Oxygen saturation was recorded by pulse oximetry. Hypopnea scoring rule used: 1B 4%.    Sleep Architecture: Sleep architecture was remarkable for prolonged initial sleep latency, increased wake safter sleep onset, and sleep fragmentation (due to spontaneous arousals) with reduced sleep efficiency.  All sleep stages were seen.   The total recording time of the polysomnogram was 536.5 minutes. The total sleep time was 416.0 minutes. Sleep latency was increased at 48.5 minutes without the use of a sleep aid.  REM latency was 79.5 minutes. Arousal index was increased at 35.5 arousals per hour. Sleep efficiency was decreased at 77.5%. Wake after sleep onset was 72.0 minutes. The patient spent 14.4% of total sleep time in Stage N1, 44.7% in Stage N2, 18.3% in Stage N3, and 22.6% in REM. Time in REM supine was 50.5 minutes.    Respiration: There was no evidence of clinically significant sleep related breathing disorder  Events ? The polysomnogram revealed a presence of - obstructive, 4 centrals, and - mixed apneas resulting in an apnea index of 0.6 events per hour. There were 3 obstructive hypopneas and - central hypopneas resulting in an obstructive hypopnea index of " 0.4 and central hypopnea index of - events per hour. The combined apnea/hypopnea index was 1.0 events per hour (central apnea/hypopnea index was 0.6 events per hour). The REM AHI was 0.6 events per hour. The supine AHI was 1.7 events per hour. The RERA index was 0.6 events per hour.  The RDI was 1.6 events per hour.  Snoring - was reported as absent.  Respiratory rate and pattern - was notable for normal respiratory rate and pattern.  Sustained Sleep Associated Hypoventilation - Transcutaneous carbon dioxide monitoring was not used, however significant hypoventilation was not suggested by oximetry.  Sleep Associated Hypoxemia - (Greater than 5 minutes O2 sat at or below 88%) was not present. Baseline oxygen saturation was 95.4%. Lowest oxygen saturation was 91.0%. Time spent less than or equal to 88% was 0 minutes. Time spent less than or equal to 89% was 0 minutes.    Movement Activity: There were no significant periodic limb movements or abnormal sleep-related behaviors during the study.  Periodic Limb Activity - There were 6 PLMs during the entire study. The PLM index was 0.9 movements per hour. The PLM Arousal Index was - per hour.  REM EMG Activity - Excessive transient/sustained muscle activity was not present.  Nocturnal Behavior - Abnormal sleep related behaviors were not noted during/arising out of NREM / REM sleep.   Bruxism - Not apparent.    Cardiac Summary: Normal sinus rhythm was noted.  The average pulse rate was 58.7 bpm. The minimum pulse rate was 46.0 bpm while the maximum pulse rate was 103.0 bpm.  Arrhythmias were not noted.    Assessment:   Sleep architecture was remarkable for prolonged initial sleep latency, increased wake safter sleep onset, and sleep fragmentation with reduced sleep efficiency.  All sleep stages were seen.   There was no evidence of clinically significant sleep related breathing disorder  There were no significant periodic limb movements or abnormal sleep-related behaviors  during the study.  Normal sinus rhythm was noted.    Recommendations:  Suggest obtaining cognitive behavioral therapy for insomnia.   Advice regarding the risks of drowsy driving.  Suggest optimizing sleep schedule and avoiding sleep deprivation.  Weight management (if BMI > 30).  Pharmacologic therapy should be used for management of restless legs syndrome only if present and clinically indicated and not based on the presence of periodic limb movements alone.    Diagnostic Codes:   Repetitive Intrusions Into Sleep F51.8    9/7/2023 [facility] Sleep Study (157.0 lbs) - AHI 1.0, RDI 1.6, Supine AHI 1.7, REM AHI 0.6, Low O2 91.0%, Time Spent ?88% 0 minutes / Time Spent ?89% 0 minutes.     _____________________________________   Electronically Signed By: (Yenni Toro MD), 9/17/23

## 2023-09-18 LAB — SLPCOMP: NORMAL

## 2023-10-05 ASSESSMENT — ENCOUNTER SYMPTOMS
DYSURIA: 0
FEVER: 0
SORE THROAT: 0
EYE PAIN: 0
PARESTHESIAS: 0
JOINT SWELLING: 0
DIARRHEA: 0
HEMATURIA: 0
ARTHRALGIAS: 0
FREQUENCY: 0
CHILLS: 0
DIZZINESS: 0
ABDOMINAL PAIN: 0
SHORTNESS OF BREATH: 0
HEMATOCHEZIA: 0
CONSTIPATION: 0
WEAKNESS: 0
NAUSEA: 0
PALPITATIONS: 0
HEADACHES: 1
NERVOUS/ANXIOUS: 1
HEARTBURN: 0
BREAST MASS: 0
COUGH: 0
MYALGIAS: 0

## 2023-10-09 ENCOUNTER — OFFICE VISIT (OUTPATIENT)
Dept: FAMILY MEDICINE | Facility: CLINIC | Age: 33
End: 2023-10-09
Payer: COMMERCIAL

## 2023-10-09 VITALS
WEIGHT: 159.2 LBS | HEIGHT: 66 IN | RESPIRATION RATE: 18 BRPM | OXYGEN SATURATION: 96 % | DIASTOLIC BLOOD PRESSURE: 80 MMHG | TEMPERATURE: 98.3 F | HEART RATE: 75 BPM | SYSTOLIC BLOOD PRESSURE: 100 MMHG | BODY MASS INDEX: 25.58 KG/M2

## 2023-10-09 DIAGNOSIS — Z00.00 ROUTINE GENERAL MEDICAL EXAMINATION AT A HEALTH CARE FACILITY: Primary | ICD-10-CM

## 2023-10-09 DIAGNOSIS — Z13.220 SCREENING FOR HYPERLIPIDEMIA: ICD-10-CM

## 2023-10-09 DIAGNOSIS — N94.9 CERVICAL MOTION TENDERNESS: ICD-10-CM

## 2023-10-09 DIAGNOSIS — B96.89 BV (BACTERIAL VAGINOSIS): ICD-10-CM

## 2023-10-09 DIAGNOSIS — N76.0 BV (BACTERIAL VAGINOSIS): ICD-10-CM

## 2023-10-09 DIAGNOSIS — B37.31 CANDIDIASIS OF VAGINA: ICD-10-CM

## 2023-10-09 DIAGNOSIS — Z12.4 CERVICAL CANCER SCREENING: ICD-10-CM

## 2023-10-09 LAB
CLUE CELLS: PRESENT
TRICHOMONAS, WET PREP: ABNORMAL
WBC'S/HIGH POWER FIELD, WET PREP: ABNORMAL
YEAST, WET PREP: PRESENT

## 2023-10-09 PROCEDURE — 87624 HPV HI-RISK TYP POOLED RSLT: CPT | Performed by: NURSE PRACTITIONER

## 2023-10-09 PROCEDURE — G0145 SCR C/V CYTO,THINLAYER,RESCR: HCPCS | Performed by: NURSE PRACTITIONER

## 2023-10-09 PROCEDURE — 99385 PREV VISIT NEW AGE 18-39: CPT | Performed by: NURSE PRACTITIONER

## 2023-10-09 PROCEDURE — 87210 SMEAR WET MOUNT SALINE/INK: CPT | Performed by: NURSE PRACTITIONER

## 2023-10-09 PROCEDURE — 99213 OFFICE O/P EST LOW 20 MIN: CPT | Mod: 25 | Performed by: NURSE PRACTITIONER

## 2023-10-09 ASSESSMENT — ENCOUNTER SYMPTOMS
DIZZINESS: 0
NAUSEA: 0
SHORTNESS OF BREATH: 0
PARESTHESIAS: 0
PALPITATIONS: 0
CHILLS: 0
DIARRHEA: 0
DYSURIA: 0
CONSTIPATION: 0
COUGH: 0
HEMATOCHEZIA: 0
BREAST MASS: 0
ARTHRALGIAS: 0
JOINT SWELLING: 0
MYALGIAS: 0
EYE PAIN: 0
NERVOUS/ANXIOUS: 1
ABDOMINAL PAIN: 0
WEAKNESS: 0
HEMATURIA: 0
HEADACHES: 1
SORE THROAT: 0
HEARTBURN: 0
FEVER: 0
FREQUENCY: 0

## 2023-10-09 NOTE — PROGRESS NOTES
SUBJECTIVE:   CC: Vibha is an 33 year old who presents for preventive health visit.       10/9/2023     4:43 PM   Additional Questions   Roomed by lc-lpn   Accompanied by shawn         10/9/2023     4:43 PM   Patient Reported Additional Medications   Patient reports taking the following new medications na       Healthy Habits:     Getting at least 3 servings of Calcium per day:  Yes    Bi-annual eye exam:  NO    Dental care twice a year:  NO    Sleep apnea or symptoms of sleep apnea:  None    Diet:  Regular (no restrictions)    Frequency of exercise:  None    Taking medications regularly:  Yes    Medication side effects:  Not applicable and None    Additional concerns today:  No    Gynecologist in [facility]  Did have an PAP in [employer] -  had a colopo - normal. Overdue and repeat PAP normal 2021.     Has had mole on back and skin tags removed - normal. (2-3 years ago)       Had a sleep study - ? Of insomnia    CBT - insomnia - referral in place.     DAD with prostate cancer and mom with Breast cancer in     Tired all the time - ? Insomnia - trying to get her into a rhythm - be in bed ONLY 8 hours - working hard on this - stay in bed longer than necessary - doesn't feel like she gets enough sleep. She feels like she wakes up a lot (woke up 9 times)     Stopped coffee.    Period irregular - taking a supplement OTC suggested by [facility].      - not currently sexually active  - has pain with coitus, minimal lubrication, denies hx of asexuality   Not on birth control currently.     Today's PHQ-2 Score:       10/8/2023     6:13 PM   PHQ-2 (  [employer])   Q1: Little interest or pleasure in doing things 1   Q2: Feeling down, depressed or hopeless 1   PHQ-2 Score 2   Q1: Little interest or pleasure in doing things Several days   Q2: Feeling down, depressed or hopeless Several days   PHQ-2 Score 2             Have you ever done Advance Care Planning? (For example, a Health Directive, POLST, or a discussion  with a medical provider or your loved ones about your wishes): No, advance care planning information given to patient to review.  Patient plans to discuss their wishes with loved ones or provider.      Social History     Tobacco Use    Smoking status: Never    Smokeless tobacco: Never   Substance Use Topics    Alcohol use: Yes             10/5/2023     7:50 PM   Alcohol Use   Prescreen: >3 drinks/day or >7 drinks/week? No     Reviewed orders with patient.  Reviewed health maintenance and updated orders accordingly - Yes      Breast Cancer Screening:    FHS-7:       10/5/2023     7:53 PM   Breast CA Risk Assessment (FHS-7)   Did any of your first-degree relatives have breast or ovarian cancer? Yes   Did any of your relatives have bilateral breast cancer? Unknown   Did any man in your family have breast cancer? No   Did any woman in your family have breast and ovarian cancer? Yes   Did any woman in your family have breast cancer before age 50 y? No   Do you have 2 or more relatives with breast and/or ovarian cancer? No   Do you have 2 or more relatives with breast and/or bowel cancer? No         Pertinent mammograms are reviewed under the imaging tab.    History of abnormal Pap smear: YES - CASS 2/3 on biopsy - PAP/HPV co-testing at 12, 24 months.  If two negative results repeat co-testing in 3 years, if negative then routine screening.     Reviewed and updated as needed this visit by clinical staff   Tobacco  Allergies  Meds  Problems  Med Hx  Surg Hx  Fam Hx          Reviewed and updated as needed this visit by Provider   Tobacco  Allergies  Meds  Problems  Med Hx  Surg Hx  Fam Hx             Review of Systems   Constitutional:  Negative for chills and fever.   HENT:  Negative for congestion, ear pain, hearing loss and sore throat.    Eyes:  Negative for pain and visual disturbance.   Respiratory:  Negative for cough and shortness of breath.    Cardiovascular:  Negative for chest pain, palpitations and  "peripheral edema.   Gastrointestinal:  Negative for abdominal pain, constipation, diarrhea, heartburn, hematochezia and nausea.   Breasts:  Negative for tenderness, breast mass and discharge.   Genitourinary:  Negative for dysuria, frequency, genital sores, hematuria, pelvic pain, urgency, vaginal bleeding and vaginal discharge.   Musculoskeletal:  Negative for arthralgias, joint swelling and myalgias.   Skin:  Negative for rash.   Neurological:  Positive for headaches. Negative for dizziness, weakness and paresthesias.   Psychiatric/Behavioral:  Positive for mood changes. The patient is nervous/anxious.           OBJECTIVE:   /80 (BP Location: Right arm, Patient Position: Sitting, Cuff Size: Adult Regular)   Pulse 75   Temp 98.3  F (36.8  C) (Oral)   Resp 18   Ht 1.676 m (5' 6\")   Wt 72.2 kg (159 lb 3.2 oz)   LMP 09/30/2023 (Approximate)   SpO2 96%   BMI 25.70 kg/m    Physical Exam  GENERAL: healthy, alert and no distress  EYES: Eyes grossly normal to inspection, PERRL and conjunctivae and sclerae normal  HENT: ear canals and TM's normal, nose and mouth without ulcers or lesions  NECK: no adenopathy, no asymmetry, masses, or scars and thyroid normal to palpation  RESP: lungs clear to auscultation - no rales, rhonchi or wheezes  BREAST: self breast exam taught   CV: regular rate and rhythm, normal S1 S2, no S3 or S4, no murmur, click or rub, no peripheral edema and peripheral pulses strong  ABDOMEN: soft, nontender, no hepatosplenomegaly, no masses and bowel sounds normal  MS: no gross musculoskeletal defects noted, no edema  SKIN: no suspicious lesions or rashes  NEURO: Normal strength and tone, mentation intact and speech normal  PSYCH: mentation appears normal, affect normal/bright  : unremarkable external genitalia, white thick discharge, cervical tenderness, malodorous,       ASSESSMENT/PLAN:   Vibha was seen today for physical.    Diagnoses and all orders for this visit:    Routine general " medical examination at a health care facility  -     Basic metabolic panel  (Ca, Cl, CO2, Creat, Gluc, K, Na, BUN); Future  We discussed healthy lifestyle, nutrition, cardiovascular risk reduction, self care, safety, sunscreen, and timing of cancer screening.  Health maintenance screening and immunizations reviewed with the patient.  Follow up yearly for the annual physical.    HIGHLY RECOMMEND PT parents get tested for BRCA gene given first degree fam hx of cancers associated with these genes.     Cervical cancer screening  -     Pap Screen with HPV - recommended age 30 - 65 years  Tolerated well - one normal pap seen on documentation from 11/2021 (cotesting), colpo was at age 22 years old at [facility]. No abnormal since this visit. If normal today repeat in 3 years and then 5 years.     Screening for hyperlipidemia  -     Lipid Profile (Chol, Trig, HDL, LDL calc); Future  -     Basic metabolic panel  (Ca, Cl, CO2, Creat, Gluc, K, Na, BUN); Future  Will get fasting labs another day since not fasting today    Cervical motion tenderness  -     Wet prep - Clinic Collect  -     metroNIDAZOLE (FLAGYL) 500 MG tablet; Take 1 tablet (500 mg) by mouth 2 times daily for 7 days  Pt addiment not high risk for STI  Will check wet prep for BV/candida.   Addendum: positive will do oral BV treatment first and candida treatment after eddy.   Info given on pt information  No ETOH discussed in Mychart while on abx.     BV (bacterial vaginosis)  -     metroNIDAZOLE (FLAGYL) 500 MG tablet; Take 1 tablet (500 mg) by mouth 2 times daily for 7 days  See above    Candidiasis of vagina  -     fluconazole (DIFLUCAN) 150 MG tablet; Take 1 tablet (150 mg) by mouth once for 1 dose  See above    Other orders  -     REVIEW OF HEALTH MAINTENANCE PROTOCOL ORDERS  -     Cancel: INFLUENZA VACCINE IM > 6 MONTHS VALENT IIV4 (AFLURIA/FLUZONE)  -     PRIMARY CARE FOLLOW-UP SCHEDULING; Future      Pt to follow up with me if continues to be fatigued -  "consider thyroid testing, anemia testing and Mental health screening.       Patient has been advised of split billing requirements and indicates understanding: Yes      COUNSELING:  Reviewed preventive health counseling, as reflected in patient instructions       Regular exercise       Healthy diet/nutrition       Contraception      BMI:   Estimated body mass index is 25.7 kg/m  as calculated from the following:    Height as of this encounter: 1.676 m (5' 6\").    Weight as of this encounter: 72.2 kg (159 lb 3.2 oz).   Weight management plan: Discussed healthy diet and exercise guidelines      She reports that she has never smoked. She has never used smokeless tobacco.          AYLA Randolph CNP  [facility]  "

## 2023-10-10 ENCOUNTER — TELEPHONE (OUTPATIENT)
Dept: OTHER | Age: 33
End: 2023-10-10

## 2023-10-10 RX ORDER — FLUCONAZOLE 150 MG/1
150 TABLET ORAL ONCE
Qty: 1 TABLET | Refills: 0 | Status: SHIPPED | OUTPATIENT
Start: 2023-10-10 | End: 2023-10-10

## 2023-10-10 RX ORDER — METRONIDAZOLE 500 MG/1
500 TABLET ORAL 2 TIMES DAILY
Qty: 14 TABLET | Refills: 0 | Status: SHIPPED | OUTPATIENT
Start: 2023-10-10 | End: 2023-10-17

## 2023-10-10 NOTE — TELEPHONE ENCOUNTER
TCB please relay to any available RN    ----- Message from AYLA Randolph CNP sent at 10/10/2023  1:41 AM CDT -----  Please call patient:   The wet prep sample does show clue cells, which confirms you have bacterial vaginosis. As we discussed, this is an overgrowth of the normal bacteria that live in the vaginal area; it is not a sexually transmitted infection.For this, I will send a prescription to your pharmacy for metronidazole 500mg twice daily for 7 days. Do not drink alcohol with this.    Once that has completed, two days later, please take 1 dose of diflucan to treat the current yeast infection.    These infections can be the cause of decreased lubrication and pain with sex.     Please reach out to me for a visit if your symptoms return or do not resolve after treatment.     Please let me know if you have any questions or concerns.    Thank you for trusting us with your care. It was a pleasure seeing you.  AYLA Randolph CNP on 10/10/2023 at 1:40 AM

## 2023-10-10 NOTE — RESULT ENCOUNTER NOTE
Please call patient:   The wet prep sample does show clue cells, which confirms you have bacterial vaginosis. As we discussed, this is an overgrowth of the normal bacteria that live in the vaginal area; it is not a sexually transmitted infection.For this, I will send a prescription to your pharmacy for metronidazole 500mg twice daily for 7 days. Do not drink alcohol with this.    Once that has completed, two days later, please take 1 dose of diflucan to treat the current yeast infection.    These infections can be the cause of decreased lubrication and pain with sex.     Please reach out to me for a visit if your symptoms return or do not resolve after treatment.     Please let me know if you have any questions or concerns.    Thank you for trusting us with your care. It was a pleasure seeing you.  AYLA Randolph CNP on 10/10/2023 at 1:40 AM

## 2023-10-11 LAB
BKR LAB AP GYN ADEQUACY: NORMAL
BKR LAB AP GYN INTERPRETATION: NORMAL
BKR LAB AP HPV REFLEX: NORMAL
BKR LAB AP LMP: NORMAL
BKR LAB AP PREVIOUS ABNORMAL: NORMAL
PATH REPORT.COMMENTS IMP SPEC: NORMAL
PATH REPORT.COMMENTS IMP SPEC: NORMAL
PATH REPORT.RELEVANT HX SPEC: NORMAL

## 2023-10-13 LAB
HUMAN PAPILLOMA VIRUS 16 DNA: NEGATIVE
HUMAN PAPILLOMA VIRUS 18 DNA: NEGATIVE
HUMAN PAPILLOMA VIRUS FINAL DIAGNOSIS: NORMAL
HUMAN PAPILLOMA VIRUS OTHER HR: NEGATIVE

## 2023-10-16 PROBLEM — R87.619 ABNORMAL PAP SMEAR OF CERVIX: Status: ACTIVE | Noted: 2023-10-16

## 2023-10-23 ENCOUNTER — MYC MEDICAL ADVICE (OUTPATIENT)
Dept: SLEEP MEDICINE | Facility: CLINIC | Age: 33
End: 2023-10-23

## 2023-10-23 ENCOUNTER — TELEPHONE (OUTPATIENT)
Dept: SLEEP MEDICINE | Facility: CLINIC | Age: 33
End: 2023-10-23

## 2023-10-23 DIAGNOSIS — F51.04 CHRONIC INSOMNIA: Primary | ICD-10-CM

## 2023-10-23 NOTE — TELEPHONE ENCOUNTER
Patient calling to schedule CBT. Referral needed. [facility] with requested pended referral sent 10/16/23. Patient is following up.     Routing to provider as FER MARQUEZ RN  [facility]

## 2023-10-23 NOTE — TELEPHONE ENCOUNTER
Reason for Call:  Other call back    Detailed comments: Pt trying to sched [facility] Therapy insomnia but no referral or order. Says Dr Pope would have placed the order around end of September. Previously called 10/13 but no info found.    Phone Number Patient can be reached at: Cell number on file:    Telephone Information:   Mobile [phone]       Best Time: ASAP    Can we leave a detailed message on this number? YES    Call taken on 10/23/2023 at 12:40 PM by Laura Kanavel

## 2023-11-01 ENCOUNTER — LAB (OUTPATIENT)
Dept: LAB | Facility: CLINIC | Age: 33
End: 2023-11-01
Payer: COMMERCIAL

## 2023-11-01 DIAGNOSIS — Z13.220 SCREENING FOR HYPERLIPIDEMIA: ICD-10-CM

## 2023-11-01 DIAGNOSIS — Z00.00 ROUTINE GENERAL MEDICAL EXAMINATION AT A HEALTH CARE FACILITY: ICD-10-CM

## 2023-11-01 LAB
ANION GAP SERPL CALCULATED.3IONS-SCNC: 8 MMOL/L (ref 7–15)
BUN SERPL-MCNC: 10.8 MG/DL (ref 6–20)
CALCIUM SERPL-MCNC: 9.7 MG/DL (ref 8.6–10)
CHLORIDE SERPL-SCNC: 102 MMOL/L (ref 98–107)
CHOLEST SERPL-MCNC: 193 MG/DL
CREAT SERPL-MCNC: 0.76 MG/DL (ref 0.51–0.95)
DEPRECATED HCO3 PLAS-SCNC: 28 MMOL/L (ref 22–29)
EGFRCR SERPLBLD CKD-EPI 2021: >90 ML/MIN/1.73M2
GLUCOSE SERPL-MCNC: 92 MG/DL (ref 70–99)
HDLC SERPL-MCNC: 63 MG/DL
LDLC SERPL CALC-MCNC: 120 MG/DL
NONHDLC SERPL-MCNC: 130 MG/DL
POTASSIUM SERPL-SCNC: 4.1 MMOL/L (ref 3.4–5.3)
SODIUM SERPL-SCNC: 138 MMOL/L (ref 135–145)
TRIGL SERPL-MCNC: 51 MG/DL

## 2023-11-01 PROCEDURE — 36415 COLL VENOUS BLD VENIPUNCTURE: CPT

## 2023-11-01 PROCEDURE — 80061 LIPID PANEL: CPT

## 2023-11-01 PROCEDURE — 80048 BASIC METABOLIC PNL TOTAL CA: CPT

## 2023-11-02 NOTE — RESULT ENCOUNTER NOTE
Your basic metabolic panel shows your kidney function and electrolytes (sodium and potassium) were normal.    Your cholesterol testing is mildly elevated though you are still at very low risk for a cardiovascular event. This is good news and means we do not need to consider a statin medication at this time. Cholesterol levels can be maintained with lifestyle modifications to lower cardiovascular disease risk. This includes eating a heart-healthy plant forward diet with more emphasis on vegetables, fruits & whole grains, regular aerobic exercises (30min-1hr daily), maintenance of desirable body weight and avoidance of tobacco products.

## 2023-12-21 NOTE — PROGRESS NOTES
"SUBJECTIVE:  Vibha Kingston is 34 yo  who presents for evaluation of breast lump. She is new to [facility] and [facility] care. The mass was first noted last year and has not changed over time, but she has been more aware since October. The mass is accompanied by pain, described as tender only with palpation; notes no cyclic change. First noticed over a year ago; was seen at [facility] and told to continue to monitor. PCP advised to follow-up if asymmetrical. No imaging done previously.     Reports:  No change in general appearance of breast. No asymmetry.  No skin changes, nipple inversion, nipple discharge  No trauma to the breast, no history of breast mass/biopsy.    Current contraception: abstinence    Full review of medical/surgical/OB/family history completed by this writer. Mother had breast cancer at age 60 and sister had ovarian cancer age 40. No cancer in extended family that she is aware of.    Has been trying to cut back on caffeine and using couple times per week. Minimal alcohol use. Does not use tobacco.     OBJECTIVE:  /72 (BP Location: Left arm, Patient Position: Sitting, Cuff Size: Adult Regular)   Pulse 76   Ht 1.676 m (5' 6\")   Wt 74.2 kg (163 lb 8 oz)   LMP 2023   Breastfeeding No   BMI 26.39 kg/m    Exam:  Constitutional: healthy, alert, and no distress  Head: Normocephalic. No masses, lesions, tenderness or abnormalities  Cardiovascular: No edema or JVD.  Respiratory: unlabored breathing  Breasts: breast examined both sitting and supine. No skin changes or axillary nodes, no discrete masses or tenderness. No visible asymmetry. Nipples everted and no discharge noted. Left breast significantly more fibrous than right.   : Deferred  Musculoskeletal: extremities normal- no gross deformities noted, gait normal, and normal muscle tone  Skin: no suspicious lesions or rashes  Psychiatric: mentation appears normal and affect normal/bright  Hematologic/Lymphatic/Immunologic: Normal " cervical lymph nodes      ASSESSMENT:  Encounter Diagnosis   Name Primary?    Painful lumpy left breast Yes         PLAN:  Reviewed that exam feels consistent with fibrous breat tissue. Due to asymmetry noted and family history of breast/ovarian cancer in first degree relatives, imaging is warranted at this time. Order placed for diagnostic mammo. Advised to limit caffeine.     Total time spent on the date of this encounter doing: chart review, review of test results, patient visit, the physical exam, education, counseling, developing this plan of care, and documenting =    35 minutes; new patient

## 2023-12-22 ENCOUNTER — OFFICE VISIT (OUTPATIENT)
Dept: MIDWIFE SERVICES | Facility: CLINIC | Age: 33
End: 2023-12-22
Payer: COMMERCIAL

## 2023-12-22 VITALS
BODY MASS INDEX: 26.28 KG/M2 | WEIGHT: 163.5 LBS | HEART RATE: 76 BPM | DIASTOLIC BLOOD PRESSURE: 72 MMHG | HEIGHT: 66 IN | SYSTOLIC BLOOD PRESSURE: 102 MMHG

## 2023-12-22 DIAGNOSIS — N64.4 PAINFUL LUMPY LEFT BREAST: Primary | ICD-10-CM

## 2023-12-22 DIAGNOSIS — N63.20 PAINFUL LUMPY LEFT BREAST: Primary | ICD-10-CM

## 2023-12-22 PROCEDURE — 99203 OFFICE O/P NEW LOW 30 MIN: CPT | Performed by: ADVANCED PRACTICE MIDWIFE

## 2024-01-09 ENCOUNTER — HOSPITAL ENCOUNTER (OUTPATIENT)
Dept: MAMMOGRAPHY | Facility: CLINIC | Age: 34
Discharge: HOME | End: 2024-01-09
Attending: ADVANCED PRACTICE MIDWIFE
Payer: COMMERCIAL

## 2024-01-09 DIAGNOSIS — N64.4 PAINFUL LUMPY LEFT BREAST: ICD-10-CM

## 2024-01-09 DIAGNOSIS — N63.20 PAINFUL LUMPY LEFT BREAST: ICD-10-CM

## 2024-01-09 PROCEDURE — 77062 BREAST TOMOSYNTHESIS BI: CPT

## 2024-01-09 PROCEDURE — 76642 ULTRASOUND BREAST LIMITED: CPT | Mod: LT

## 2024-01-09 NOTE — LETTER
Vibha Kingston  [address] [place] [address]            January 9, 2024  Date of Exam: 1/9/24    Dear Vibha:    Thank you for your recent visit.    Breast Imaging Result: Based on your recent breast imaging, you have a suspicious area that usually requires a biopsy, at which time a small tissue sample would be taken from your breast.      Breast Density: Your breast imaging shows that you have dense breast tissue. This means you have a slightly higher risk of getting breast cancer. It also means your breast imaging will be harder to read, but it doesn't mean that breast imaging isn't useful. In fact, yearly breast imaging is even more important for individuals at higher risk. Additional testing may be warranted depending on your overall risk.    If you have already made these arrangements, please disregard this letter.    A report of your breast imaging results was sent to: [facility]    Your breast imaging will become part of your medical file here at [facility] for at least 10 years. You are responsible for informing any new health care team or breast imaging facility of the date and location of this examination.    We appreciate the opportunity to participate in your health care.    Sincerely,  Bebe Albert MD   [facility] [place]

## 2024-01-09 NOTE — PROGRESS NOTES
Radiologist, Dr Bebe Albert, recommends left breast ultrasound guided needle biopsy.     While patient was at [facility], I scheduled pt for breast biopsy appt on Thursday, 1/11/24, arrival at 715am for 7:30am appt, at [facility], [address] , [address], [place], [place]. [phone].     Dr Albert also requested pt be scheduled with breast surgeon, so pt is scheduled 1/23/24 at 1:15pm for  consult with Dr Aleena Real, at [facility].      I reviewed the procedure and the written pre and post breast biopsy pt handouts with patient and gave patient the written pre and post biopsy patient handouts to take home.     Pt verbalizes understanding of procedure and appt location, date, and time. Calls welcomed.    Adelita Larson, RN, BSN, [facility]  [facility]

## 2024-01-10 ENCOUNTER — TELEPHONE (OUTPATIENT)
Dept: MIDWIFE SERVICES | Facility: CLINIC | Age: 34
End: 2024-01-10
Payer: COMMERCIAL

## 2024-01-10 NOTE — TELEPHONE ENCOUNTER
[facility] Call Center    Phone Message    May a detailed message be left on voicemail: no     Reason for Call: Other: Adelita RN at [facility], called to notify the pt's order for a breast biopsy needs to be signed before her appointment tomorrow 1/11/2024 at 7:15am. Please call Adelita at [phone] to discuss.     Action Taken: Other: [facility]    Travel Screening: Not Applicable

## 2024-01-11 ENCOUNTER — HOSPITAL ENCOUNTER (OUTPATIENT)
Dept: MAMMOGRAPHY | Facility: CLINIC | Age: 34
Discharge: HOME | End: 2024-01-11
Attending: ADVANCED PRACTICE MIDWIFE
Payer: COMMERCIAL

## 2024-01-11 DIAGNOSIS — N63.20 PAINFUL LUMPY LEFT BREAST: ICD-10-CM

## 2024-01-11 DIAGNOSIS — N64.4 PAINFUL LUMPY LEFT BREAST: ICD-10-CM

## 2024-01-11 PROCEDURE — 88305 TISSUE EXAM BY PATHOLOGIST: CPT | Mod: TC | Performed by: ADVANCED PRACTICE MIDWIFE

## 2024-01-11 PROCEDURE — 88305 TISSUE EXAM BY PATHOLOGIST: CPT | Mod: 26 | Performed by: PATHOLOGY

## 2024-01-11 PROCEDURE — 999N000065 MA POST PROCEDURE LEFT

## 2024-01-11 PROCEDURE — 250N000009 HC RX 250: Performed by: ADVANCED PRACTICE MIDWIFE

## 2024-01-11 PROCEDURE — 272N000713 US BREAST BIOPSY CORE NEEDLE LEFT

## 2024-01-11 RX ADMIN — LIDOCAINE HYDROCHLORIDE 10 ML: 10 INJECTION, SOLUTION INFILTRATION; PERINEURAL at 07:46

## 2024-01-11 NOTE — PROGRESS NOTES
Vibha arrived at [facility] accompanied by her . I escorted pt and  to the Breast [place] consult room. Pt was identified using full name and . Wristband is on pt wrist. Pt was able to state which procedure and correct side breast biopsy was occurring today. I reviewed the consent form with the pt and pt was given the consent form to review before signing.     I reviewed post breast biopsy care. Pt acknowledged understanding of post biopsy care. Pt was given written post breast biopsy care handout to take home.    I explained results are expected in 3-5 business days and Breast Center RN will call pt at number pt provided today. Pt has active MyChart, therefore, I explained pathology result alert may occur and reach pt before RN can call to discuss results, and it is up to pt to decide to view results or wait for RN call. Pt verbalizes understanding.    Pt and  had no questions or concerns.     I escorted pt to the changing room and pt changed into gown.  will wait in Breast Center lobby. Pt placed personal belongings in a locker and pt has the copeland. I escorted pt to US room and pt sat on chair. I offered pt the aroma therapy of Calming Blend oil and a warm blanket, and pt accepted both. I notified [employer], Theron Headley, pt was ready and waiting in US room.     Adelita Larson, RN, BSN, CBCN  [facility]

## 2024-01-15 ENCOUNTER — TELEPHONE (OUTPATIENT)
Dept: MAMMOGRAPHY | Facility: CLINIC | Age: 34
End: 2024-01-15
Payer: COMMERCIAL

## 2024-01-15 LAB
PATH REPORT.COMMENTS IMP SPEC: ABNORMAL
PATH REPORT.COMMENTS IMP SPEC: YES
PATH REPORT.FINAL DX SPEC: ABNORMAL
PATH REPORT.GROSS SPEC: ABNORMAL
PATH REPORT.MICROSCOPIC SPEC OTHER STN: ABNORMAL
PATH REPORT.RELEVANT HX SPEC: ABNORMAL
PHOTO IMAGE: ABNORMAL

## 2024-01-15 NOTE — TELEPHONE ENCOUNTER
I telephoned patient, confirming pt identity using name and , to inform patient of malignant pathology from 24, left breast biopsy performed at [facility]. We discussed breast anatomy, pathology findings, and next steps. Patient's questions were answered to the best of my ability.     Patient is scheduled for consult with Breast Surgeon, Dr Aleena Real on Tuesday, 24, arriving at 1:00pm for 1:15pm appt, at [facility]. Patient verbalized understanding of appointment details, location, and visitor policy of up to two people being allowed to accompany pt to appt.     Pt states she has bruising from breast biopsy and is tender if she presses on biopsy site, but states she is healing well and has no concerns.     I provided emotional support and encouragement. Pt verbalized understanding of information given and acceptance of plan. Calls welcomed.    I will CC this note to ordering provider, AYLA Uriostegui, and PCP, AYLA Randolph.    Adelita Larson, RN, BSN, CBCN  [facility]

## 2024-01-15 NOTE — TELEPHONE ENCOUNTER
Pt had left a message today at the Breast [place]  requesting I call her about getting an appt sooner with Dr Aleena Real than 1/23/24.     I called pt back and offered her an appt with Dr Real on 1/22/24 at [facility] or with Dr Arslan Patel on Thursday, 1/18/24, at [facility]. Pt declined those appts and stated she preferred to see Dr Real and at [facility] since the appt with Dr Real is only one day sooner.     No appt change was made from original appt date of 1/23/24 at 1:15pm at [facility].     Adelita Larson, RN, BSN, CBCN  [facility]

## 2024-01-15 NOTE — Clinical Note
Pt has been notified of malignant results and has breast surgical consult appt scheduled. See my telephone encounter for details. Thank you. ~JENNIFER Ureña

## 2024-01-22 NOTE — PROGRESS NOTES
History:  This is a 33 year old female who I'm asked to see by Dr. Elizondo for evaluation of a left breast cancer.  She presents with her , Keenan, and mother, Abby.  This was picked up by the patient about 1 year ago.  She had noticed some extra lumpiness to the medial aspect of her left breast.  She had been seen by a couple of different providers who gave her some reassurance.  More recently it was recommended that she undergo a diagnostic work-up.  A large area of microcalcifications was seen within the left medial breast.  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  Receptor status was deferred to surgical excision.    Past medical history:  Denies    Past surgical history:  None    Medications:  None    Allergies:  No Known Allergies    Social History:  Reports that she has never smoked. She has never been exposed to tobacco smoke. She has never used smokeless tobacco. She reports current alcohol use - occasional. She reports current drug use. Drug: Marijuana.    Family History:  She has a strong family history of cancer.  Mother breast cancer and kidney cancer.  Father had prostate cancer.  Sister cervical cancer.  Maternal grandfather bladder cancer.  Maternal grandmother kidney cancer.  Maternal uncle passed away of colon cancer at 55.  No one has had genetic testing.    Review of systems:  General ROS: No complaints or constitutional symptoms  Skin: No complaints or symptoms   Hematologic/Lymphatic: No symptoms or complaints  Psychiatric: No symptoms or complaints  Endocrine: No excessive fatigue, no hypermetabolic symptoms reported  Respiratory ROS: No cough, shortness of breath, or wheezing  Cardiovascular ROS: No chest pain or dyspnea on exertion  Breast ROS: Lumpiness to left breast.  Denies skin changes or nipple discharge.  Gastrointestinal ROS: No abdominal pain, nausea, diarrhea, or constipation  Musculoskeletal ROS: No recent injuries reported  Neurological  "ROS: No focal neurologic defects reported.      Physical exam:  Ht 1.664 m (5' 5.5\")   Wt 73.1 kg (161 lb 3.2 oz)   LMP 12/05/2023   BMI 26.42 kg/m    General: Alert, cooperative, appears stated age   Skin: Skin color, texture, turgor normal, no rashes or lesions   Lymphatic: No obvious adenopathy, no swelling   Eyes: No scleral icterus, pupils equal  HENT: No traumatic injury to the head or face, no gross abnormalities  Lungs: Normal respiratory effort, breath sounds equal bilaterally  Heart: Regular rate and rhythm  Breasts: Ecchymosis to the upper inner breast.  There is a palpable well-defined difference between the left upper inner breast and the right side.  This palpable fullness encompasses the entire upper inner quadrant and measures about 6 cm.  Abdomen: Soft, non-distended and non-tender to palpation  Neurologic: Grossly intact    Imaging:  Pertinent images personally reviewed by myself and discussed with the patient.    Radiology reports:  EXAM: MA DIAGNOSTIC BILATERAL W/ ISAAC, ULTRASOUND BREAST LEFT LIMITED 1-3 QUADRANTS  LOCATION: [facility]  DATE: 1/9/2024     INDICATION: 33-year-old woman with left medial breast lump  COMPARISON: None. Baseline.     MAMMOGRAPHIC FINDINGS: Bilateral full-field digital diagnostic mammograms performed. The breasts are extremely dense, which lowers the sensitivity of mammography. Images evaluated with the assistance of CAD. Breast tomosynthesis was used in interpretation. There is a large segmental area of pleomorphic and amorphous calcifications left 9:00 breast from posterior to subareolar depth, with some calcifications also present within the nipple, area measuring 10 x 5 cm. There is associated focal asymmetry. There is no radiographic evidence of right breast malignancy.     ULTRASOUND FINDINGS: Ultrasound targeted to the palpable lump indicated by the patient and guided by manual palpation reveals a large ill-defined hypoechoic area " with associated calcifications, discrete measurable mass centered at 9:00 3 cm from nipple measuring approximately 3.4 x 2.2 x 2.0 cm, extent of involvement better demonstrated by mammography. Sonographic survey of the left axilla is within normal limits.                                                                    IMPRESSION:   Highly suspicious left breast calcifications and associated mass for which ultrasound-guided biopsy is recommended.     ACR BI-RADS Category 5: Highly Suggestive of Malignancy.    My interpretation:  Large area of microcalcifications within the left breast    Pathology:  BREAST, LEFT, 9:00, 3 CM FN, ULTRASOUND-GUIDED CORE BIOPSIES OF MASS:  DUCTAL CARCINOMA IN SITU (DCIS):  NUCLEAR thGthRthAthDthEth:th th4th PATTERNS: CRIBRIFORM AND PAPILLARY WITH COMEDONECROSIS  ADDITIONAL FINDINGS: STROMAL FIBROSIS AND MILD CHRONIC INFLAMMATION  ER/AL IMMUNOHISTOCHEMISTRY IS DEFERRED TO EXCISIONAL SPECIMEN    IMPRESSION:  Left breast ductal carcinoma in situ    PLAN:   Discussed the surgical options for treatment of breast cancer which generally are a lumpectomy (partial mastectomy) combined with radiation versus a mastectomy.  Explained that the survival benefit is the same for both.  The difference is in local recurrence risk.  The patient is a poor candidate for a lumpectomy.  The cancer pretty much takes up the entire inner aspect of her left breast.  Discussed SLN biopsy.  The procedure and rationale were explained.  I would recommend doing this with a large grade 3 area of DCIS.  Discussed that at this point we do not know yet whether or not she will need chemotherapy and we may not know until we get all of the results of surgery back.  Sometimes the need for chemotherapy is dependent upon an Oncotype score.  If the tumor is estrogen receptor positive, she will be a candidate for endocrine therapy.    After our discussion, all questions were answered to satisfaction.  With her young age of cancer diagnosis, she  would qualify for genetic testing.  She is interested in pursuing this.  The genetic testing would help her to make a surgical decision.  Therefore an order has been placed for the breast actionable panel and consent has been obtained.  I will give her a call once I have the genetic testing results.  In the meantime I would also like to obtain a breast MRI.  This can further evaluate the right breast and ensure that there are no issues.  She is currently contemplating and hoping for a unilateral mastectomy.  If any of her workup comes back positive for further abnormalities, then she would prefer a double mastectomy.  In order to obtain a placeholder.  Preoperative orders have been placed for a bilateral mastectomy with left sentinel lymph node biopsy under general anesthesia.  If needed, this could be changed to a left mastectomy with sentinel lymph node biopsy.  She understands that she would be discharged home the same day with a drain.  The risks and benefits of surgery were explained.  Also talked about expected recovery time.  She would then follow-up with myself about 2 weeks after surgery to review final pathology and next steps.    I will call her with every step of our workup.  We will ultimately plan on mastectomy once the genetic testing and breast MRI results are in.    60 minutes was spent in chart prep, discussion, coordination of care and documentation.    Aleena Real DO  General Surgeon  [facility]  Breast Center [address] [address]  Office: [phone]  Employed by - [facility]

## 2024-01-23 ENCOUNTER — OFFICE VISIT (OUTPATIENT)
Dept: SURGERY | Facility: HOSPITAL | Age: 34
End: 2024-01-23
Attending: NURSE PRACTITIONER
Payer: COMMERCIAL

## 2024-01-23 ENCOUNTER — PATIENT OUTREACH (OUTPATIENT)
Dept: ONCOLOGY | Facility: CLINIC | Age: 34
End: 2024-01-23

## 2024-01-23 ENCOUNTER — LAB (OUTPATIENT)
Dept: INFUSION THERAPY | Facility: HOSPITAL | Age: 34
End: 2024-01-23
Attending: INTERNAL MEDICINE
Payer: COMMERCIAL

## 2024-01-23 VITALS — WEIGHT: 161.2 LBS | BODY MASS INDEX: 25.91 KG/M2 | HEIGHT: 66 IN

## 2024-01-23 DIAGNOSIS — D05.12 NEOPLASM OF LEFT BREAST, PRIMARY TUMOR STAGING CATEGORY TIS: DUCTAL CARCINOMA IN SITU (DCIS): Primary | ICD-10-CM

## 2024-01-23 DIAGNOSIS — D05.12 NEOPLASM OF LEFT BREAST, PRIMARY TUMOR STAGING CATEGORY TIS: DUCTAL CARCINOMA IN SITU (DCIS): ICD-10-CM

## 2024-01-23 LAB
INTERPRETATION: NORMAL
INTERPRETATION: NORMAL
LAB PDF RESULT: NORMAL
LAB PDF RESULT: NORMAL
SIGNIFICANT RESULTS: NORMAL
SIGNIFICANT RESULTS: NORMAL
SPECIMEN DESCRIPTION: NORMAL
SPECIMEN DESCRIPTION: NORMAL
TEST DETAILS, MDL: NORMAL
TEST DETAILS, MDL: NORMAL

## 2024-01-23 PROCEDURE — 99205 OFFICE O/P NEW HI 60 MIN: CPT | Performed by: SURGERY

## 2024-01-23 PROCEDURE — 99213 OFFICE O/P EST LOW 20 MIN: CPT | Performed by: SURGERY

## 2024-01-23 RX ORDER — LORAZEPAM 1 MG/1
1 TABLET ORAL
Qty: 2 TABLET | Refills: 0 | Status: SHIPPED | OUTPATIENT
Start: 2024-01-23 | End: 2024-04-01

## 2024-01-23 RX ORDER — CEFAZOLIN SODIUM/WATER 2 G/20 ML
2 SYRINGE (ML) INTRAVENOUS
Status: CANCELLED | OUTPATIENT
Start: 2024-03-05

## 2024-01-23 RX ORDER — FLUCONAZOLE 150 MG/1
150 TABLET ORAL ONCE
COMMUNITY
Start: 2023-10-10 | End: 2024-01-23

## 2024-01-23 ASSESSMENT — PAIN SCALES - GENERAL: PAINLEVEL: NO PAIN (0)

## 2024-01-23 NOTE — PROGRESS NOTES
Writer received referral to Cancer Risk Management/Genetic Counseling.    Referred for:     breast cancer      Per referring note: The genetic testing would help her to make a surgical decision.  Therefore an order has been placed for the breast actionable panel and consent has been obtained.     Referral reviewed for appropriate plan, and sent to New Patient Scheduling ([phone]) for completion.    Rochelle Field, RN, BSN  Oncology New Patient Nurse Navigator   [facility]  [phone]

## 2024-01-23 NOTE — LETTER
1/23/2024         RE: Vibha Kingston  [address] [place] [address]        Dear Colleague,    Thank you for referring your patient, Vibha Kingston, to the [facility] [place]. Please see a copy of my visit note below.    History:  This is a 33 year old female who I'm asked to see by Dr. Elizondo for evaluation of a left breast cancer.  She presents with her , Keenan, and mother, Abby.  This was picked up by the patient about 1 year ago.  She had noticed some extra lumpiness to the medial aspect of her left breast.  She had been seen by a couple of different providers who gave her some reassurance.  More recently it was recommended that she undergo a diagnostic work-up.  A large area of microcalcifications was seen within the left medial breast.  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  Receptor status was deferred to surgical excision.    Past medical history:  Denies    Past surgical history:  None    Medications:  None    Allergies:  No Known Allergies    Social History:  Reports that she has never smoked. She has never been exposed to tobacco smoke. She has never used smokeless tobacco. She reports current alcohol use - occasional. She reports current drug use. Drug: Marijuana.    Family History:  She has a strong family history of cancer.  Mother breast cancer and kidney cancer.  Father had prostate cancer.  Sister cervical cancer.  Maternal grandfather bladder cancer.  Maternal grandmother kidney cancer.  Maternal uncle passed away of colon cancer at 55.  No one has had genetic testing.    Review of systems:  General ROS: No complaints or constitutional symptoms  Skin: No complaints or symptoms   Hematologic/Lymphatic: No symptoms or complaints  Psychiatric: No symptoms or complaints  Endocrine: No excessive fatigue, no hypermetabolic symptoms reported  Respiratory ROS: No cough, shortness of breath, or wheezing  Cardiovascular ROS: No  "chest pain or dyspnea on exertion  Breast ROS: Lumpiness to left breast.  Denies skin changes or nipple discharge.  Gastrointestinal ROS: No abdominal pain, nausea, diarrhea, or constipation  Musculoskeletal ROS: No recent injuries reported  Neurological ROS: No focal neurologic defects reported.      Physical exam:  Ht 1.664 m (5' 5.5\")   Wt 73.1 kg (161 lb 3.2 oz)   LMP 12/05/2023   BMI 26.42 kg/m    General: Alert, cooperative, appears stated age   Skin: Skin color, texture, turgor normal, no rashes or lesions   Lymphatic: No obvious adenopathy, no swelling   Eyes: No scleral icterus, pupils equal  HENT: No traumatic injury to the head or face, no gross abnormalities  Lungs: Normal respiratory effort, breath sounds equal bilaterally  Heart: Regular rate and rhythm  Breasts: Ecchymosis to the upper inner breast.  There is a palpable well-defined difference between the left upper inner breast and the right side.  This palpable fullness encompasses the entire upper inner quadrant and measures about 6 cm.  Abdomen: Soft, non-distended and non-tender to palpation  Neurologic: Grossly intact    Imaging:  Pertinent images personally reviewed by myself and discussed with the patient.    Radiology reports:  EXAM: MA DIAGNOSTIC BILATERAL W/ ISAAC, ULTRASOUND BREAST LEFT LIMITED 1-3 QUADRANTS  LOCATION: [facility]  DATE: 1/9/2024     INDICATION: 33-year-old woman with left medial breast lump  COMPARISON: None. Baseline.     MAMMOGRAPHIC FINDINGS: Bilateral full-field digital diagnostic mammograms performed. The breasts are extremely dense, which lowers the sensitivity of mammography. Images evaluated with the assistance of CAD. Breast tomosynthesis was used in interpretation. There is a large segmental area of pleomorphic and amorphous calcifications left 9:00 breast from posterior to subareolar depth, with some calcifications also present within the nipple, area measuring 10 x 5 cm. There is " associated focal asymmetry. There is no radiographic evidence of right breast malignancy.     ULTRASOUND FINDINGS: Ultrasound targeted to the palpable lump indicated by the patient and guided by manual palpation reveals a large ill-defined hypoechoic area with associated calcifications, discrete measurable mass centered at 9:00 3 cm from nipple measuring approximately 3.4 x 2.2 x 2.0 cm, extent of involvement better demonstrated by mammography. Sonographic survey of the left axilla is within normal limits.                                                                    IMPRESSION:   Highly suspicious left breast calcifications and associated mass for which ultrasound-guided biopsy is recommended.     ACR BI-RADS Category 5: Highly Suggestive of Malignancy.    My interpretation:  Large area of microcalcifications within the left breast    Pathology:  BREAST, LEFT, 9:00, 3 CM FN, ULTRASOUND-GUIDED CORE BIOPSIES OF MASS:  DUCTAL CARCINOMA IN SITU (DCIS):  NUCLEAR thGthRthAthDthEth:th th4th PATTERNS: CRIBRIFORM AND PAPILLARY WITH COMEDONECROSIS  ADDITIONAL FINDINGS: STROMAL FIBROSIS AND MILD CHRONIC INFLAMMATION  ER/MN IMMUNOHISTOCHEMISTRY IS DEFERRED TO EXCISIONAL SPECIMEN    IMPRESSION:  Left breast ductal carcinoma in situ    PLAN:   Discussed the surgical options for treatment of breast cancer which generally are a lumpectomy (partial mastectomy) combined with radiation versus a mastectomy.  Explained that the survival benefit is the same for both.  The difference is in local recurrence risk.  The patient is a poor candidate for a lumpectomy.  The cancer pretty much takes up the entire inner aspect of her left breast.  Discussed SLN biopsy.  The procedure and rationale were explained.  I would recommend doing this with a large grade 3 area of DCIS.  Discussed that at this point we do not know yet whether or not she will need chemotherapy and we may not know until we get all of the results of surgery back.  Sometimes the need for  chemotherapy is dependent upon an Oncotype score.  If the tumor is estrogen receptor positive, she will be a candidate for endocrine therapy.    After our discussion, all questions were answered to satisfaction.  With her young age of cancer diagnosis, she would qualify for genetic testing.  She is interested in pursuing this.  The genetic testing would help her to make a surgical decision.  Therefore an order has been placed for the breast actionable panel and consent has been obtained.  I will give her a call once I have the genetic testing results.  In the meantime I would also like to obtain a breast MRI.  This can further evaluate the right breast and ensure that there are no issues.  She is currently contemplating and hoping for a unilateral mastectomy.  If any of her workup comes back positive for further abnormalities, then she would prefer a double mastectomy.  In order to obtain a placeholder.  Preoperative orders have been placed for a bilateral mastectomy with left sentinel lymph node biopsy under general anesthesia.  If needed, this could be changed to a left mastectomy with sentinel lymph node biopsy.  She understands that she would be discharged home the same day with a drain.  The risks and benefits of surgery were explained.  Also talked about expected recovery time.  She would then follow-up with myself about 2 weeks after surgery to review final pathology and next steps.    I will call her with every step of our workup.  We will ultimately plan on mastectomy once the genetic testing and breast MRI results are in.    60 minutes was spent in chart prep, discussion, coordination of care and documentation.    Aleena Real DO  General Surgeon  [facility]  Breast [address] [address]  Office: [phone]  Employed by - [facility]        Again, thank you for allowing me to participate in the care of your  patient.        Sincerely,        Aleena Real, DO

## 2024-01-23 NOTE — NURSING NOTE
"Oncology Rooming Note    January 23, 2024 12:59 PM   Vibha Kingston is a 33 year old female who presents for:    Chief Complaint   Patient presents with    New Patient     Breast Cancer-  Neoplasm of L Breast      Initial Vitals: Ht 1.664 m (5' 5.5\")   Wt 73.1 kg (161 lb 3.2 oz)   LMP 12/05/2023   BMI 26.42 kg/m   Estimated body mass index is 26.42 kg/m  as calculated from the following:    Height as of this encounter: 1.664 m (5' 5.5\").    Weight as of this encounter: 73.1 kg (161 lb 3.2 oz). Body surface area is 1.84 meters squared.  No Pain (0) Comment: Data Unavailable   Patient's last menstrual period was 12/05/2023.  Allergies reviewed: Yes  Medications reviewed: Yes    Medications: Medication refills not needed today.  Pharmacy name entered into [employer]: [id number] - [place], [place] - [address]    Frailty Screening:   Is the patient here for a new oncology consult visit in cancer care? 1. Yes. Over the past month, have you experienced difficulty or required a caregiver to assist with:   1. Balance, walking or general mobility (including any falls)? NO  2. Completion of self-care tasks such as bathing, dressing, toileting, grooming/hygiene?  NO  3. Concentration or memory that affects your daily life?  NO       Clinical concerns: New PT Breast Cancer      Aria Cobb LPN             "

## 2024-01-24 ENCOUNTER — TELEPHONE (OUTPATIENT)
Dept: SURGERY | Facility: CLINIC | Age: 34
End: 2024-01-24
Payer: COMMERCIAL

## 2024-01-24 NOTE — TELEPHONE ENCOUNTER
I spoke with Vibha this morning and she's scheduled for surgery with Dr. Real on 03.19.24 at [facility]. We went over details and I let her know I will send a confirmation letter to her MyChart. She's in agreement with the plan.

## 2024-01-25 ENCOUNTER — LAB (OUTPATIENT)
Dept: LAB | Facility: CLINIC | Age: 34
End: 2024-01-25
Payer: COMMERCIAL

## 2024-01-25 DIAGNOSIS — D05.12 NEOPLASM OF LEFT BREAST, PRIMARY TUMOR STAGING CATEGORY TIS: DUCTAL CARCINOMA IN SITU (DCIS): Primary | ICD-10-CM

## 2024-01-25 LAB — INTERPRETATION: NORMAL

## 2024-01-25 PROCEDURE — 81162 BRCA1&2 GEN FULL SEQ DUP/DEL: CPT | Performed by: SURGERY

## 2024-01-25 PROCEDURE — 36415 COLL VENOUS BLD VENIPUNCTURE: CPT

## 2024-01-25 PROCEDURE — G0452 MOLECULAR PATHOLOGY INTERPR: HCPCS | Mod: 26 | Performed by: PATHOLOGY

## 2024-02-04 ENCOUNTER — HOSPITAL ENCOUNTER (OUTPATIENT)
Dept: MRI IMAGING | Facility: HOSPITAL | Age: 34
Discharge: HOME | End: 2024-02-04
Attending: SURGERY | Admitting: SURGERY
Payer: COMMERCIAL

## 2024-02-04 DIAGNOSIS — D05.12 NEOPLASM OF LEFT BREAST, PRIMARY TUMOR STAGING CATEGORY TIS: DUCTAL CARCINOMA IN SITU (DCIS): ICD-10-CM

## 2024-02-04 PROCEDURE — 255N000002 HC RX 255 OP 636: Performed by: SURGERY

## 2024-02-04 PROCEDURE — A9585 GADOBUTROL INJECTION: HCPCS | Performed by: SURGERY

## 2024-02-04 PROCEDURE — 77049 MRI BREAST C-+ W/CAD BI: CPT

## 2024-02-04 RX ORDER — GADOBUTROL 604.72 MG/ML
7 INJECTION INTRAVENOUS ONCE
Status: COMPLETED | OUTPATIENT
Start: 2024-02-04 | End: 2024-02-04

## 2024-02-04 RX ADMIN — GADOBUTROL 7 ML: 604.72 INJECTION INTRAVENOUS at 13:38

## 2024-02-16 ENCOUNTER — OFFICE VISIT (OUTPATIENT)
Dept: FAMILY MEDICINE | Facility: CLINIC | Age: 34
End: 2024-02-16
Payer: COMMERCIAL

## 2024-02-16 VITALS
DIASTOLIC BLOOD PRESSURE: 60 MMHG | OXYGEN SATURATION: 98 % | HEIGHT: 66 IN | HEART RATE: 85 BPM | TEMPERATURE: 98.6 F | SYSTOLIC BLOOD PRESSURE: 100 MMHG | RESPIRATION RATE: 18 BRPM | BODY MASS INDEX: 26.61 KG/M2 | WEIGHT: 165.6 LBS

## 2024-02-16 DIAGNOSIS — Z01.818 PREOP GENERAL PHYSICAL EXAM: Primary | ICD-10-CM

## 2024-02-16 DIAGNOSIS — D05.12 DUCTAL CARCINOMA IN SITU (DCIS) OF LEFT BREAST: ICD-10-CM

## 2024-02-16 LAB
ANION GAP SERPL CALCULATED.3IONS-SCNC: 7 MMOL/L (ref 7–15)
BUN SERPL-MCNC: 12.3 MG/DL (ref 6–20)
CALCIUM SERPL-MCNC: 9.8 MG/DL (ref 8.6–10)
CHLORIDE SERPL-SCNC: 102 MMOL/L (ref 98–107)
CREAT SERPL-MCNC: 0.81 MG/DL (ref 0.51–0.95)
DEPRECATED HCO3 PLAS-SCNC: 28 MMOL/L (ref 22–29)
EGFRCR SERPLBLD CKD-EPI 2021: >90 ML/MIN/1.73M2
ERYTHROCYTE [DISTWIDTH] IN BLOOD BY AUTOMATED COUNT: 11.4 % (ref 10–15)
GLUCOSE SERPL-MCNC: 85 MG/DL (ref 70–99)
HCG UR QL: NEGATIVE
HCT VFR BLD AUTO: 43 % (ref 35–47)
HGB BLD-MCNC: 14.9 G/DL (ref 11.7–15.7)
MCH RBC QN AUTO: 31 PG (ref 26.5–33)
MCHC RBC AUTO-ENTMCNC: 34.7 G/DL (ref 31.5–36.5)
MCV RBC AUTO: 89 FL (ref 78–100)
PLATELET # BLD AUTO: 229 10E3/UL (ref 150–450)
POTASSIUM SERPL-SCNC: 4.5 MMOL/L (ref 3.4–5.3)
RBC # BLD AUTO: 4.81 10E6/UL (ref 3.8–5.2)
SODIUM SERPL-SCNC: 137 MMOL/L (ref 135–145)
WBC # BLD AUTO: 4.8 10E3/UL (ref 4–11)

## 2024-02-16 PROCEDURE — 81025 URINE PREGNANCY TEST: CPT | Performed by: NURSE PRACTITIONER

## 2024-02-16 PROCEDURE — 80048 BASIC METABOLIC PNL TOTAL CA: CPT | Performed by: NURSE PRACTITIONER

## 2024-02-16 PROCEDURE — 85027 COMPLETE CBC AUTOMATED: CPT | Performed by: NURSE PRACTITIONER

## 2024-02-16 PROCEDURE — 99214 OFFICE O/P EST MOD 30 MIN: CPT | Performed by: NURSE PRACTITIONER

## 2024-02-16 PROCEDURE — 36415 COLL VENOUS BLD VENIPUNCTURE: CPT | Performed by: NURSE PRACTITIONER

## 2024-02-16 NOTE — PATIENT INSTRUCTIONS
Preparing for Your Surgery  Getting started  A nurse will call you to review your health history and instructions. They will give you an arrival time based on your scheduled surgery time. Please be ready to share:  Your doctor's clinic name and phone number  Your medical, surgical, and anesthesia history  A list of allergies and sensitivities  A list of medicines, including herbal treatments and over-the-counter drugs  Whether the patient has a legal guardian (ask how to send us the papers in advance)  Please tell us if you're pregnant--or if there's any chance you might be pregnant. Some surgeries may injure a fetus (unborn baby), so they require a pregnancy test. Surgeries that are safe for a fetus don't always need a test, and you can choose whether to have one.   If you have a child who's having surgery, please ask for a copy of Preparing for Your Child's Surgery.    Preparing for surgery  Within 10 to 30 days of surgery: Have a pre-op exam (sometimes called an H&P, or History and Physical). This can be done at a clinic or pre-operative center.  If you're having a , you may not need this exam. Talk to your care team.  At your pre-op exam, talk to your care team about all medicines you take. If you need to stop any medicines before surgery, ask when to start taking them again.  We do this for your safety. Many medicines can make you bleed too much during surgery. Some change how well surgery (anesthesia) drugs work.  Call your insurance company to let them know you're having surgery. (If you don't have insurance, call [phone].)  Call your clinic if there's any change in your health. This includes signs of a cold or flu (sore throat, runny nose, cough, rash, fever). It also includes a scrape or scratch near the surgery site.  If you have questions on the day of surgery, call your hospital or surgery center.  Eating and drinking guidelines  For your safety: Unless your surgeon tells you otherwise,  follow the guidelines below.  Eat and drink as usual until 8 hours before you arrive for surgery. After that, no food or milk.  Drink clear liquids until 2 hours before you arrive. These are liquids you can see through, like water, Gatorade, and Propel Water. They also include plain black coffee and tea (no cream or milk), candy, and breath mints. You can spit out gum when you arrive.  If you drink alcohol: Stop drinking it the night before surgery.  If your care team tells you to take medicine on the morning of surgery, it's okay to take it with a sip of water.  Preventing infection  Shower or bathe the night before and morning of your surgery. Follow the instructions your clinic gave you. (If no instructions, use regular soap.)  Don't shave or clip hair near your surgery site. We'll remove the hair if needed.  Don't smoke or vape the morning of surgery. You may chew nicotine gum up to 2 hours before surgery. A nicotine patch is okay.  Note: Some surgeries require you to completely quit smoking and nicotine. Check with your surgeon.  Your care team will make every effort to keep you safe from infection. We will:  Clean our hands often with soap and water (or an alcohol-based hand rub).  Clean the skin at your surgery site with a special soap that kills germs.  Give you a special gown to keep you warm. (Cold raises the risk of infection.)  Wear special hair covers, masks, gowns and gloves during surgery.  Give antibiotic medicine, if prescribed. Not all surgeries need antibiotics.  What to bring on the day of surgery  Photo ID and insurance card  Copy of your health care directive, if you have one  Glasses and hearing aids (bring cases)  You can't wear contacts during surgery  Inhaler and eye drops, if you use them (tell us about these when you arrive)  CPAP machine or breathing device, if you use them  A few personal items, if spending the night  If you have . . .  A pacemaker, ICD (cardiac defibrillator) or other  implant: Bring the ID card.  An implanted stimulator: Bring the remote control.  A legal guardian: Bring a copy of the certified (court-stamped) guardianship papers.  Please remove any jewelry, including body piercings. Leave jewelry and other valuables at home.  If you're going home the day of surgery  You must have a responsible adult drive you home. They should stay with you overnight as well.  If you don't have someone to stay with you, and you aren't safe to go home alone, we may keep you overnight. Insurance often won't pay for this.  After surgery  If it's hard to control your pain or you need more pain medicine, please call your surgeon's office.  Questions?   If you have any questions for your care team, list them here: _________________________________________________________________________________________________________________________________________________________________________ ____________________________________ ____________________________________ ____________________________________  For informational purposes only. Not to replace the advice of your health care provider. Copyright   2003, 2019 [facility]. All rights reserved. Clinically reviewed by Camilla Rodriguez MD. SMARTworks 404687 - REV 12/22.

## 2024-02-16 NOTE — H&P (VIEW-ONLY)
"Preoperative Evaluation  [facility]  [address] [address]  Phone: [phone]  Fax: [phone]  Primary Provider: Paolo Beck  Pre-op Performing Provider: PAOLO BECK  Feb 16, 2024       Vibha is a 33 year old, presenting for the following:  Pre-Op Exam (Pre-op physical on 03/05/2024 at [facility] for bilateral mastectomy.)        2/16/2024     8:34 AM   Additional Questions   Roomed by LC-LPN   Accompanied by N/A     Surgical Information  Surgery/Procedure: Baliteral mastectomy  Surgery Location: [facility]  Surgeon: Dr. Aleena Real  Surgery Date: 03/05/2024  Time of Surgery: 7\"20 AM  Where patient plans to recover: At home with family  Fax number for surgical facility: N/A    Assessment & Plan     The proposed surgical procedure is considered INTERMEDIATE risk.    Preop general physical exam  Ductal carcinoma in situ (DCIS) of left breast  This is a 33-year-old female with recent diagnosis of ductal carcinoma in situ of the left breast diagnosed January 2024, no other past medical history.  The cancer pretty much takes up the entire inner aspect of her left breast. surgeon to do this with a large grade 3 area of DCIS.  At this point is unknown if she would be a chemo candidate until after the procedure. if the tumor is estrogen receptor positive, she will be a candidate for endocrine therapy.   -Agree with recommendation for planned procedure.  Procedure is listed as bilateral mastectomy and the patient today does say she only wants the left side  -Briefly discussed self-care  -Patient to take melatonin as needed for sleep  -Patient to follow-up with me if any incidences of depression unit for season  -Discussed that she may take lorazepam on the day of the procedure if she is feeling anxiety    - CBC with platelets  - Basic metabolic panel  (Ca, Cl, CO2, Creat, Gluc, K, Na, BUN)  - HCG qualitative urine                - No identified additional " risk factors other than previously addressed    Antiplatelet or Anticoagulation Medication Instructions   - Patient is on no antiplatelet or anticoagulation medications.    Additional Medication Instructions  Patient is on no additional chronic medications    Recommendation  APPROVAL GIVEN to proceed with proposed procedure, without further diagnostic evaluation.          Subjective       HPI related to upcoming procedure:   This is a 33 year old female with no significant past medical history, presented to nurse midwife 12/22/24 due to breast changes in the left breast.   This was picked up by the patient about 1 year ago.  She had noticed some extra lumpiness to the medial aspect of her left breast.  She had been seen by a couple of different providers ([facility]) who gave her some reassurance.  More recently it was recommended that she undergo a diagnostic work-up.  A large area of microcalcifications was seen within the left medial breast.  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  Receptor status was deferred to surgical excision.     Only time under anesthesia was for a dental procedure - has no complications related to anesthesia.     Feeling will today.           2/9/2024     5:39 PM   Preop Questions   1. Have you ever had a heart attack or stroke? No   2. Have you ever had surgery on your heart or blood vessels, such as a stent placement, a coronary artery bypass, or surgery on an artery in your head, neck, heart, or legs? No   3. Do you have chest pain with activity? No   4. Do you have a history of  heart failure? No   5. Do you currently have a cold, bronchitis or symptoms of other infection? No   6. Do you have a cough, shortness of breath, or wheezing? No   7. Do you or anyone in your family have previous history of blood clots? No   8. Do you or does anyone in your family have a serious bleeding problem such as prolonged bleeding following surgeries or cuts? No  "  9. Have you ever had problems with anemia or been told to take iron pills? No   10. Have you had any abnormal blood loss such as black, tarry or bloody stools, or abnormal vaginal bleeding? No   11. Have you ever had a blood transfusion? No   12. Are you willing to have a blood transfusion if it is medically needed before, during, or after your surgery? Yes   13. Have you or any of your relatives ever had problems with anesthesia? No   14. Do you have sleep apnea, excessive snoring or daytime drowsiness? No   15. Do you have any artifical heart valves or other implanted medical devices like a pacemaker, defibrillator, or continuous glucose monitor? No   16. Do you have artificial joints? No   17. Are you allergic to latex? No   18. Is there any chance that you may be pregnant? No       Health Care Directive  Patient does not have a Health Care Directive or Living Will: Discussed advance care planning with patient; information given to patient to review.    Preoperative Review of    reviewed - controlled substances reflected in medication list.          Patient Active Problem List    Diagnosis Date Noted    Ductal carcinoma in situ (DCIS) of left breast 02/16/2024     Priority: Medium    Abnormal Pap smear of cervix 10/16/2023     Priority: Medium     10/09/23 NIL Pap, Neg HR HPV Plan cotest in 3 years per office visit note \"one normal pap seen on documentation from 11/2021 (cotesting), colpo was at age 22 years old at [facility]. No abnormal since this visit. If normal today repeat in 3 years and then 5 years.\"         Past Medical History:   Diagnosis Date    Cancer (H) 1/15/24    Breast cancer     Past Surgical History:   Procedure Laterality Date    BIOPSY      A mole a few years back and an abnormal pap in [facility]     Current Outpatient Medications   Medication Sig Dispense Refill    LORazepam (ATIVAN) 1 MG tablet Take 1 tablet (1 mg) by mouth once as needed for anxiety 2 tablet 0       No Known " "Allergies     Social History     Tobacco Use    Smoking status: Former     Packs/day: 0.00     Years: 0.00     Additional pack years: 0.00     Total pack years: 0.00     Types: Cigarettes, Cigars, Hookah     Passive exposure: Never    Smokeless tobacco: Never    Tobacco comments:     Social, never created a habit   Substance Use Topics    Alcohol use: Yes     Comment: 2 drinks per week       History   Drug Use    Types: Marijuana         Review of Systems    Review of Systems  Constitutional, neuro, ENT, endocrine, pulmonary, cardiac, gastrointestinal, genitourinary, musculoskeletal, integument and psychiatric systems are negative, except as otherwise noted.  Objective    /60 (BP Location: Right arm, Patient Position: Sitting, Cuff Size: Adult Regular)   Pulse 85   Temp 98.6  F (37  C) (Oral)   Resp 18   Ht 1.664 m (5' 5.5\")   Wt 75.1 kg (165 lb 9.6 oz)   LMP 01/03/2024 (Approximate)   SpO2 98%   BMI 27.14 kg/m     Estimated body mass index is 27.14 kg/m  as calculated from the following:    Height as of this encounter: 1.664 m (5' 5.5\").    Weight as of this encounter: 75.1 kg (165 lb 9.6 oz).  Physical Exam  GENERAL: alert and no distress  EYES: Eyes grossly normal to inspection, PERRL and conjunctivae and sclerae normal  HENT: ear canals and TM's normal, nose and mouth without ulcers or lesions  NECK: no adenopathy, no asymmetry, masses, or scars  RESP: lungs clear to auscultation - no rales, rhonchi or wheezes  CV: regular rate and rhythm, normal S1 S2, no S3 or S4, no murmur, click or rub, no peripheral edema  ABDOMEN: soft, nontender, no hepatosplenomegaly, no masses and bowel sounds normal  MS: no gross musculoskeletal defects noted, no edema  SKIN: no suspicious lesions or rashes  NEURO: Normal strength and tone, mentation intact and speech normal  PSYCH: mentation appears sad    Recent Labs   Lab Test 11/01/23  1620      POTASSIUM 4.1   CR 0.76        Diagnostics  Results for orders " placed or performed in visit on 02/16/24   CBC with platelets     Status: Normal   Result Value Ref Range    WBC Count 4.8 4.0 - 11.0 10e3/uL    RBC Count 4.81 3.80 - 5.20 10e6/uL    Hemoglobin 14.9 11.7 - 15.7 g/dL    Hematocrit 43.0 35.0 - 47.0 %    MCV 89 78 - 100 fL    MCH 31.0 26.5 - 33.0 pg    MCHC 34.7 31.5 - 36.5 g/dL    RDW 11.4 10.0 - 15.0 %    Platelet Count 229 150 - 450 10e3/uL   HCG qualitative urine     Status: Normal   Result Value Ref Range    hCG Urine Qualitative Negative Negative         Revised Cardiac Risk Index (RCRI)  The patient has the following serious cardiovascular risks for perioperative complications:   - No serious cardiac risks = 0 points     RCRI Interpretation: 0 points: Class I (very low risk - 0.4% complication rate)         Signed Electronically by: AYLA Randolph CNP  Copy of this evaluation report is provided to requesting physician.

## 2024-02-16 NOTE — PROGRESS NOTES
"Preoperative Evaluation  [facility]  [address] [address]  Phone: [phone]  Fax: [phone]  Primary Provider: Paolo Beck  Pre-op Performing Provider: PAOLO BECK  Feb 16, 2024       Vibha is a 33 year old, presenting for the following:  Pre-Op Exam (Pre-op physical on 03/05/2024 at [facility] for bilateral mastectomy.)        2/16/2024     8:34 AM   Additional Questions   Roomed by LC-LPN   Accompanied by N/A     Surgical Information  Surgery/Procedure: Baliteral mastectomy  Surgery Location: [facility]  Surgeon: Dr. Aleena Real  Surgery Date: 03/05/2024  Time of Surgery: 7\"20 AM  Where patient plans to recover: At home with family  Fax number for surgical facility: N/A    Assessment & Plan     The proposed surgical procedure is considered INTERMEDIATE risk.    Preop general physical exam  Ductal carcinoma in situ (DCIS) of left breast  This is a 33-year-old female with recent diagnosis of ductal carcinoma in situ of the left breast diagnosed January 2024, no other past medical history.  The cancer pretty much takes up the entire inner aspect of her left breast. surgeon to do this with a large grade 3 area of DCIS.  At this point is unknown if she would be a chemo candidate until after the procedure. if the tumor is estrogen receptor positive, she will be a candidate for endocrine therapy.   -Agree with recommendation for planned procedure.  Procedure is listed as bilateral mastectomy and the patient today does say she only wants the left side  -Briefly discussed self-care  -Patient to take melatonin as needed for sleep  -Patient to follow-up with me if any incidences of depression unit for season  -Discussed that she may take lorazepam on the day of the procedure if she is feeling anxiety    - CBC with platelets  - Basic metabolic panel  (Ca, Cl, CO2, Creat, Gluc, K, Na, BUN)  - HCG qualitative urine                - No identified additional " risk factors other than previously addressed    Antiplatelet or Anticoagulation Medication Instructions   - Patient is on no antiplatelet or anticoagulation medications.    Additional Medication Instructions  Patient is on no additional chronic medications    Recommendation  APPROVAL GIVEN to proceed with proposed procedure, without further diagnostic evaluation.          Subjective       HPI related to upcoming procedure:   This is a 33 year old female with no significant past medical history, presented to nurse midwife 12/22/24 due to breast changes in the left breast.   This was picked up by the patient about 1 year ago.  She had noticed some extra lumpiness to the medial aspect of her left breast.  She had been seen by a couple of different providers ([facility]) who gave her some reassurance.  More recently it was recommended that she undergo a diagnostic work-up.  A large area of microcalcifications was seen within the left medial breast.  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  Receptor status was deferred to surgical excision.     Only time under anesthesia was for a dental procedure - has no complications related to anesthesia.     Feeling will today.           2/9/2024     5:39 PM   Preop Questions   1. Have you ever had a heart attack or stroke? No   2. Have you ever had surgery on your heart or blood vessels, such as a stent placement, a coronary artery bypass, or surgery on an artery in your head, neck, heart, or legs? No   3. Do you have chest pain with activity? No   4. Do you have a history of  heart failure? No   5. Do you currently have a cold, bronchitis or symptoms of other infection? No   6. Do you have a cough, shortness of breath, or wheezing? No   7. Do you or anyone in your family have previous history of blood clots? No   8. Do you or does anyone in your family have a serious bleeding problem such as prolonged bleeding following surgeries or cuts? No  "  9. Have you ever had problems with anemia or been told to take iron pills? No   10. Have you had any abnormal blood loss such as black, tarry or bloody stools, or abnormal vaginal bleeding? No   11. Have you ever had a blood transfusion? No   12. Are you willing to have a blood transfusion if it is medically needed before, during, or after your surgery? Yes   13. Have you or any of your relatives ever had problems with anesthesia? No   14. Do you have sleep apnea, excessive snoring or daytime drowsiness? No   15. Do you have any artifical heart valves or other implanted medical devices like a pacemaker, defibrillator, or continuous glucose monitor? No   16. Do you have artificial joints? No   17. Are you allergic to latex? No   18. Is there any chance that you may be pregnant? No       Health Care Directive  Patient does not have a Health Care Directive or Living Will: Discussed advance care planning with patient; information given to patient to review.    Preoperative Review of    reviewed - controlled substances reflected in medication list.          Patient Active Problem List    Diagnosis Date Noted    Ductal carcinoma in situ (DCIS) of left breast 02/16/2024     Priority: Medium    Abnormal Pap smear of cervix 10/16/2023     Priority: Medium     10/09/23 NIL Pap, Neg HR HPV Plan cotest in 3 years per office visit note \"one normal pap seen on documentation from 11/2021 (cotesting), colpo was at age 22 years old at [facility]. No abnormal since this visit. If normal today repeat in 3 years and then 5 years.\"         Past Medical History:   Diagnosis Date    Cancer (H) 1/15/24    Breast cancer     Past Surgical History:   Procedure Laterality Date    BIOPSY      A mole a few years back and an abnormal pap in [facility]     Current Outpatient Medications   Medication Sig Dispense Refill    LORazepam (ATIVAN) 1 MG tablet Take 1 tablet (1 mg) by mouth once as needed for anxiety 2 tablet 0       No Known " "Allergies     Social History     Tobacco Use    Smoking status: Former     Packs/day: 0.00     Years: 0.00     Additional pack years: 0.00     Total pack years: 0.00     Types: Cigarettes, Cigars, Hookah     Passive exposure: Never    Smokeless tobacco: Never    Tobacco comments:     Social, never created a habit   Substance Use Topics    Alcohol use: Yes     Comment: 2 drinks per week       History   Drug Use    Types: Marijuana         Review of Systems    Review of Systems  Constitutional, neuro, ENT, endocrine, pulmonary, cardiac, gastrointestinal, genitourinary, musculoskeletal, integument and psychiatric systems are negative, except as otherwise noted.  Objective    /60 (BP Location: Right arm, Patient Position: Sitting, Cuff Size: Adult Regular)   Pulse 85   Temp 98.6  F (37  C) (Oral)   Resp 18   Ht 1.664 m (5' 5.5\")   Wt 75.1 kg (165 lb 9.6 oz)   LMP 01/03/2024 (Approximate)   SpO2 98%   BMI 27.14 kg/m     Estimated body mass index is 27.14 kg/m  as calculated from the following:    Height as of this encounter: 1.664 m (5' 5.5\").    Weight as of this encounter: 75.1 kg (165 lb 9.6 oz).  Physical Exam  GENERAL: alert and no distress  EYES: Eyes grossly normal to inspection, PERRL and conjunctivae and sclerae normal  HENT: ear canals and TM's normal, nose and mouth without ulcers or lesions  NECK: no adenopathy, no asymmetry, masses, or scars  RESP: lungs clear to auscultation - no rales, rhonchi or wheezes  CV: regular rate and rhythm, normal S1 S2, no S3 or S4, no murmur, click or rub, no peripheral edema  ABDOMEN: soft, nontender, no hepatosplenomegaly, no masses and bowel sounds normal  MS: no gross musculoskeletal defects noted, no edema  SKIN: no suspicious lesions or rashes  NEURO: Normal strength and tone, mentation intact and speech normal  PSYCH: mentation appears sad    Recent Labs   Lab Test 11/01/23  1620      POTASSIUM 4.1   CR 0.76        Diagnostics  Results for orders " placed or performed in visit on 02/16/24   CBC with platelets     Status: Normal   Result Value Ref Range    WBC Count 4.8 4.0 - 11.0 10e3/uL    RBC Count 4.81 3.80 - 5.20 10e6/uL    Hemoglobin 14.9 11.7 - 15.7 g/dL    Hematocrit 43.0 35.0 - 47.0 %    MCV 89 78 - 100 fL    MCH 31.0 26.5 - 33.0 pg    MCHC 34.7 31.5 - 36.5 g/dL    RDW 11.4 10.0 - 15.0 %    Platelet Count 229 150 - 450 10e3/uL   HCG qualitative urine     Status: Normal   Result Value Ref Range    hCG Urine Qualitative Negative Negative         Revised Cardiac Risk Index (RCRI)  The patient has the following serious cardiovascular risks for perioperative complications:   - No serious cardiac risks = 0 points     RCRI Interpretation: 0 points: Class I (very low risk - 0.4% complication rate)         Signed Electronically by: AYLA Randolph CNP  Copy of this evaluation report is provided to requesting physician.

## 2024-02-20 ENCOUNTER — PREP FOR PROCEDURE (OUTPATIENT)
Dept: SURGERY | Facility: CLINIC | Age: 34
End: 2024-02-20
Payer: COMMERCIAL

## 2024-02-20 NOTE — RESULT ENCOUNTER NOTE
Your CBC (complete blood count) which looks at your hemoglobin and other blood cell types looks good- no concerns for anemia or infection.    Your basic metabolic panel shows your kidney function and electrolytes (sodium and potassium) were normal.    Not pregnant at this time    CLEARED FOR SURGICAL PROCEDURE

## 2024-02-22 ENCOUNTER — MYC MEDICAL ADVICE (OUTPATIENT)
Dept: FAMILY MEDICINE | Facility: CLINIC | Age: 34
End: 2024-02-22
Payer: COMMERCIAL

## 2024-02-22 DIAGNOSIS — F43.21 ADJUSTMENT DISORDER WITH DEPRESSED MOOD: Primary | ICD-10-CM

## 2024-02-26 PROBLEM — F43.21 ADJUSTMENT DISORDER WITH DEPRESSED MOOD: Status: ACTIVE | Noted: 2024-02-26

## 2024-02-26 ASSESSMENT — PATIENT HEALTH QUESTIONNAIRE - PHQ9
10. IF YOU CHECKED OFF ANY PROBLEMS, HOW DIFFICULT HAVE THESE PROBLEMS MADE IT FOR YOU TO DO YOUR WORK, TAKE CARE OF THINGS AT HOME, OR GET ALONG WITH OTHER PEOPLE: SOMEWHAT DIFFICULT
SUM OF ALL RESPONSES TO PHQ QUESTIONS 1-9: 15
SUM OF ALL RESPONSES TO PHQ QUESTIONS 1-9: 15

## 2024-02-26 ASSESSMENT — ANXIETY QUESTIONNAIRES
5. BEING SO RESTLESS THAT IT IS HARD TO SIT STILL: SEVERAL DAYS
6. BECOMING EASILY ANNOYED OR IRRITABLE: MORE THAN HALF THE DAYS
2. NOT BEING ABLE TO STOP OR CONTROL WORRYING: MORE THAN HALF THE DAYS
4. TROUBLE RELAXING: MORE THAN HALF THE DAYS
1. FEELING NERVOUS, ANXIOUS, OR ON EDGE: MORE THAN HALF THE DAYS
GAD7 TOTAL SCORE: 13
GAD7 TOTAL SCORE: 13
8. IF YOU CHECKED OFF ANY PROBLEMS, HOW DIFFICULT HAVE THESE MADE IT FOR YOU TO DO YOUR WORK, TAKE CARE OF THINGS AT HOME, OR GET ALONG WITH OTHER PEOPLE?: SOMEWHAT DIFFICULT
3. WORRYING TOO MUCH ABOUT DIFFERENT THINGS: MORE THAN HALF THE DAYS
7. FEELING AFRAID AS IF SOMETHING AWFUL MIGHT HAPPEN: MORE THAN HALF THE DAYS
GAD7 TOTAL SCORE: 13
IF YOU CHECKED OFF ANY PROBLEMS ON THIS QUESTIONNAIRE, HOW DIFFICULT HAVE THESE PROBLEMS MADE IT FOR YOU TO DO YOUR WORK, TAKE CARE OF THINGS AT HOME, OR GET ALONG WITH OTHER PEOPLE: SOMEWHAT DIFFICULT
7. FEELING AFRAID AS IF SOMETHING AWFUL MIGHT HAPPEN: MORE THAN HALF THE DAYS

## 2024-03-04 ENCOUNTER — ANESTHESIA EVENT (OUTPATIENT)
Dept: SURGERY | Facility: CLINIC | Age: 34
End: 2024-03-04
Payer: COMMERCIAL

## 2024-03-05 ENCOUNTER — ANESTHESIA (OUTPATIENT)
Dept: SURGERY | Facility: CLINIC | Age: 34
End: 2024-03-05
Payer: COMMERCIAL

## 2024-03-05 ENCOUNTER — HOSPITAL ENCOUNTER (OUTPATIENT)
Facility: CLINIC | Age: 34
Discharge: HOME | End: 2024-03-05
Attending: SURGERY | Admitting: SURGERY
Payer: COMMERCIAL

## 2024-03-05 ENCOUNTER — HOSPITAL ENCOUNTER (OUTPATIENT)
Dept: NUCLEAR MEDICINE | Facility: CLINIC | Age: 34
Discharge: HOME | End: 2024-03-05
Attending: SURGERY | Admitting: SURGERY
Payer: COMMERCIAL

## 2024-03-05 VITALS
TEMPERATURE: 97.4 F | HEART RATE: 63 BPM | DIASTOLIC BLOOD PRESSURE: 80 MMHG | BODY MASS INDEX: 26.66 KG/M2 | OXYGEN SATURATION: 95 % | SYSTOLIC BLOOD PRESSURE: 100 MMHG | HEIGHT: 66 IN | WEIGHT: 165.9 LBS | RESPIRATION RATE: 12 BRPM

## 2024-03-05 DIAGNOSIS — G89.18 POSTOPERATIVE PAIN: Primary | ICD-10-CM

## 2024-03-05 DIAGNOSIS — N64.89 ACQUIRED BREAST DEFORMITY: ICD-10-CM

## 2024-03-05 DIAGNOSIS — D05.12 NEOPLASM OF LEFT BREAST, PRIMARY TUMOR STAGING CATEGORY TIS: DUCTAL CARCINOMA IN SITU (DCIS): ICD-10-CM

## 2024-03-05 PROCEDURE — 272N000001 HC OR GENERAL SUPPLY STERILE: Performed by: SURGERY

## 2024-03-05 PROCEDURE — 250N000011 HC RX IP 250 OP 636: Performed by: ANESTHESIOLOGY

## 2024-03-05 PROCEDURE — 19303 MAST SIMPLE COMPLETE: CPT | Mod: LT | Performed by: SURGERY

## 2024-03-05 PROCEDURE — 710N000010 HC RECOVERY PHASE 1, LEVEL 2, PER MIN: Performed by: SURGERY

## 2024-03-05 PROCEDURE — 999N000141 HC STATISTIC PRE-PROCEDURE NURSING ASSESSMENT: Performed by: SURGERY

## 2024-03-05 PROCEDURE — 88360 TUMOR IMMUNOHISTOCHEM/MANUAL: CPT | Mod: 26 | Performed by: PATHOLOGY

## 2024-03-05 PROCEDURE — A9520 TC99 TILMANOCEPT DIAG 0.5MCI: HCPCS | Performed by: SURGERY

## 2024-03-05 PROCEDURE — 250N000013 HC RX MED GY IP 250 OP 250 PS 637: Performed by: ANESTHESIOLOGY

## 2024-03-05 PROCEDURE — 710N000012 HC RECOVERY PHASE 2, PER MINUTE: Performed by: SURGERY

## 2024-03-05 PROCEDURE — 343N000001 HC RX 343: Performed by: SURGERY

## 2024-03-05 PROCEDURE — 250N000009 HC RX 250: Performed by: ANESTHESIOLOGY

## 2024-03-05 PROCEDURE — 88341 IMHCHEM/IMCYTCHM EA ADD ANTB: CPT | Mod: 26 | Performed by: PATHOLOGY

## 2024-03-05 PROCEDURE — 250N000011 HC RX IP 250 OP 636: Performed by: SURGERY

## 2024-03-05 PROCEDURE — 250N000011 HC RX IP 250 OP 636: Performed by: NURSE ANESTHETIST, CERTIFIED REGISTERED

## 2024-03-05 PROCEDURE — 88342 IMHCHEM/IMCYTCHM 1ST ANTB: CPT | Mod: TC | Performed by: SURGERY

## 2024-03-05 PROCEDURE — 38525 BIOPSY/REMOVAL LYMPH NODES: CPT | Mod: 51 | Performed by: SURGERY

## 2024-03-05 PROCEDURE — 258N000003 HC RX IP 258 OP 636: Performed by: ANESTHESIOLOGY

## 2024-03-05 PROCEDURE — 360N000076 HC SURGERY LEVEL 3, PER MIN: Performed by: SURGERY

## 2024-03-05 PROCEDURE — 88342 IMHCHEM/IMCYTCHM 1ST ANTB: CPT | Mod: 26 | Performed by: PATHOLOGY

## 2024-03-05 PROCEDURE — 88307 TISSUE EXAM BY PATHOLOGIST: CPT | Mod: 26 | Performed by: PATHOLOGY

## 2024-03-05 PROCEDURE — 38792 RA TRACER ID OF SENTINL NODE: CPT

## 2024-03-05 PROCEDURE — 370N000017 HC ANESTHESIA TECHNICAL FEE, PER MIN: Performed by: SURGERY

## 2024-03-05 RX ORDER — ONDANSETRON 4 MG/1
4 TABLET, ORALLY DISINTEGRATING ORAL EVERY 30 MIN PRN
Status: DISCONTINUED | OUTPATIENT
Start: 2024-03-05 | End: 2024-03-05 | Stop reason: HOSPADM

## 2024-03-05 RX ORDER — FENTANYL CITRATE 50 UG/ML
25 INJECTION, SOLUTION INTRAMUSCULAR; INTRAVENOUS EVERY 5 MIN PRN
Status: DISCONTINUED | OUTPATIENT
Start: 2024-03-05 | End: 2024-03-05 | Stop reason: HOSPADM

## 2024-03-05 RX ORDER — CEFAZOLIN SODIUM/WATER 2 G/20 ML
2 SYRINGE (ML) INTRAVENOUS
Status: COMPLETED | OUTPATIENT
Start: 2024-03-05 | End: 2024-03-05

## 2024-03-05 RX ORDER — SCOLOPAMINE TRANSDERMAL SYSTEM 1 MG/1
1 PATCH, EXTENDED RELEASE TRANSDERMAL ONCE
Status: DISCONTINUED | OUTPATIENT
Start: 2024-03-05 | End: 2024-03-05 | Stop reason: HOSPADM

## 2024-03-05 RX ORDER — DEXAMETHASONE SODIUM PHOSPHATE 10 MG/ML
INJECTION, SOLUTION INTRAMUSCULAR; INTRAVENOUS PRN
Status: DISCONTINUED | OUTPATIENT
Start: 2024-03-05 | End: 2024-03-05

## 2024-03-05 RX ORDER — ACETAMINOPHEN 325 MG/1
975 TABLET ORAL ONCE
Status: COMPLETED | OUTPATIENT
Start: 2024-03-05 | End: 2024-03-05

## 2024-03-05 RX ORDER — FENTANYL CITRATE 50 UG/ML
INJECTION, SOLUTION INTRAMUSCULAR; INTRAVENOUS PRN
Status: DISCONTINUED | OUTPATIENT
Start: 2024-03-05 | End: 2024-03-05

## 2024-03-05 RX ORDER — HYDROMORPHONE HCL IN WATER/PF 6 MG/30 ML
0.4 PATIENT CONTROLLED ANALGESIA SYRINGE INTRAVENOUS EVERY 5 MIN PRN
Status: DISCONTINUED | OUTPATIENT
Start: 2024-03-05 | End: 2024-03-05 | Stop reason: HOSPADM

## 2024-03-05 RX ORDER — HYDROMORPHONE HCL IN WATER/PF 6 MG/30 ML
0.2 PATIENT CONTROLLED ANALGESIA SYRINGE INTRAVENOUS EVERY 5 MIN PRN
Status: DISCONTINUED | OUTPATIENT
Start: 2024-03-05 | End: 2024-03-05 | Stop reason: HOSPADM

## 2024-03-05 RX ORDER — OXYCODONE HYDROCHLORIDE 5 MG/1
10 TABLET ORAL EVERY 4 HOURS PRN
Status: DISCONTINUED | OUTPATIENT
Start: 2024-03-05 | End: 2024-03-05 | Stop reason: HOSPADM

## 2024-03-05 RX ORDER — ONDANSETRON 2 MG/ML
INJECTION INTRAMUSCULAR; INTRAVENOUS PRN
Status: DISCONTINUED | OUTPATIENT
Start: 2024-03-05 | End: 2024-03-05

## 2024-03-05 RX ORDER — OXYCODONE HYDROCHLORIDE 5 MG/1
5 TABLET ORAL EVERY 4 HOURS PRN
Status: DISCONTINUED | OUTPATIENT
Start: 2024-03-05 | End: 2024-03-05 | Stop reason: HOSPADM

## 2024-03-05 RX ORDER — NALOXONE HYDROCHLORIDE 0.4 MG/ML
0.1 INJECTION, SOLUTION INTRAMUSCULAR; INTRAVENOUS; SUBCUTANEOUS
Status: DISCONTINUED | OUTPATIENT
Start: 2024-03-05 | End: 2024-03-05 | Stop reason: HOSPADM

## 2024-03-05 RX ORDER — GUAIFENESIN/EPHEDRINE HCL 200-12.5MG
100 TABLET ORAL AT BEDTIME
COMMUNITY
End: 2024-09-12

## 2024-03-05 RX ORDER — FENTANYL CITRATE 50 UG/ML
50 INJECTION, SOLUTION INTRAMUSCULAR; INTRAVENOUS EVERY 5 MIN PRN
Status: DISCONTINUED | OUTPATIENT
Start: 2024-03-05 | End: 2024-03-05 | Stop reason: HOSPADM

## 2024-03-05 RX ORDER — SODIUM CHLORIDE, SODIUM LACTATE, POTASSIUM CHLORIDE, CALCIUM CHLORIDE 600; 310; 30; 20 MG/100ML; MG/100ML; MG/100ML; MG/100ML
INJECTION, SOLUTION INTRAVENOUS CONTINUOUS
Status: DISCONTINUED | OUTPATIENT
Start: 2024-03-05 | End: 2024-03-05 | Stop reason: HOSPADM

## 2024-03-05 RX ORDER — LANOLIN ALCOHOL/MO/W.PET/CERES
3 CREAM (GRAM) TOPICAL
COMMUNITY

## 2024-03-05 RX ORDER — PROPOFOL 10 MG/ML
INJECTION, EMULSION INTRAVENOUS CONTINUOUS PRN
Status: DISCONTINUED | OUTPATIENT
Start: 2024-03-05 | End: 2024-03-05

## 2024-03-05 RX ORDER — FENTANYL CITRATE 50 UG/ML
25 INJECTION, SOLUTION INTRAMUSCULAR; INTRAVENOUS
Status: DISCONTINUED | OUTPATIENT
Start: 2024-03-05 | End: 2024-03-05 | Stop reason: HOSPADM

## 2024-03-05 RX ORDER — MAGNESIUM SULFATE 4 G/50ML
4 INJECTION INTRAVENOUS ONCE
Status: COMPLETED | OUTPATIENT
Start: 2024-03-05 | End: 2024-03-05

## 2024-03-05 RX ORDER — ONDANSETRON 2 MG/ML
4 INJECTION INTRAMUSCULAR; INTRAVENOUS EVERY 30 MIN PRN
Status: DISCONTINUED | OUTPATIENT
Start: 2024-03-05 | End: 2024-03-05 | Stop reason: HOSPADM

## 2024-03-05 RX ORDER — LIDOCAINE HYDROCHLORIDE 10 MG/ML
INJECTION, SOLUTION INFILTRATION; PERINEURAL PRN
Status: DISCONTINUED | OUTPATIENT
Start: 2024-03-05 | End: 2024-03-05

## 2024-03-05 RX ORDER — LIDOCAINE 40 MG/G
CREAM TOPICAL
Status: DISCONTINUED | OUTPATIENT
Start: 2024-03-05 | End: 2024-03-05 | Stop reason: HOSPADM

## 2024-03-05 RX ORDER — PROPOFOL 10 MG/ML
INJECTION, EMULSION INTRAVENOUS
Status: DISCONTINUED
Start: 2024-03-05 | End: 2024-03-05 | Stop reason: HOSPADM

## 2024-03-05 RX ORDER — PROPOFOL 10 MG/ML
INJECTION, EMULSION INTRAVENOUS PRN
Status: DISCONTINUED | OUTPATIENT
Start: 2024-03-05 | End: 2024-03-05

## 2024-03-05 RX ORDER — TRAMADOL HYDROCHLORIDE 50 MG/1
50 TABLET ORAL EVERY 6 HOURS PRN
Qty: 10 TABLET | Refills: 0 | Status: SHIPPED | OUTPATIENT
Start: 2024-03-05 | End: 2024-03-08

## 2024-03-05 RX ADMIN — PROPOFOL 130 MG: 10 INJECTION, EMULSION INTRAVENOUS at 07:24

## 2024-03-05 RX ADMIN — FENTANYL CITRATE 50 MCG: 50 INJECTION INTRAMUSCULAR; INTRAVENOUS at 07:24

## 2024-03-05 RX ADMIN — SODIUM CHLORIDE, POTASSIUM CHLORIDE, SODIUM LACTATE AND CALCIUM CHLORIDE: 600; 310; 30; 20 INJECTION, SOLUTION INTRAVENOUS at 08:22

## 2024-03-05 RX ADMIN — MIDAZOLAM 2 MG: 1 INJECTION INTRAMUSCULAR; INTRAVENOUS at 07:16

## 2024-03-05 RX ADMIN — HYDROMORPHONE HYDROCHLORIDE 0.5 MG: 1 INJECTION, SOLUTION INTRAMUSCULAR; INTRAVENOUS; SUBCUTANEOUS at 08:30

## 2024-03-05 RX ADMIN — OXYCODONE 5 MG: 5 TABLET ORAL at 09:49

## 2024-03-05 RX ADMIN — DEXAMETHASONE SODIUM PHOSPHATE 10 MG: 10 INJECTION, SOLUTION INTRAMUSCULAR; INTRAVENOUS at 07:24

## 2024-03-05 RX ADMIN — FENTANYL CITRATE 50 MCG: 50 INJECTION INTRAMUSCULAR; INTRAVENOUS at 07:38

## 2024-03-05 RX ADMIN — MAGNESIUM SULFATE HEPTAHYDRATE 4 G: 4 INJECTION, SOLUTION INTRAVENOUS at 06:28

## 2024-03-05 RX ADMIN — HYDROMORPHONE HYDROCHLORIDE 0.25 MG: 1 INJECTION, SOLUTION INTRAMUSCULAR; INTRAVENOUS; SUBCUTANEOUS at 08:16

## 2024-03-05 RX ADMIN — PROPOFOL 70 MG: 10 INJECTION, EMULSION INTRAVENOUS at 07:38

## 2024-03-05 RX ADMIN — SCOPALAMINE 1 PATCH: 1 PATCH, EXTENDED RELEASE TRANSDERMAL at 06:42

## 2024-03-05 RX ADMIN — ONDANSETRON 4 MG: 2 INJECTION INTRAMUSCULAR; INTRAVENOUS at 07:40

## 2024-03-05 RX ADMIN — FENTANYL CITRATE 25 MCG: 50 INJECTION, SOLUTION INTRAMUSCULAR; INTRAVENOUS at 09:03

## 2024-03-05 RX ADMIN — LIDOCAINE HYDROCHLORIDE 40 MG: 10 INJECTION, SOLUTION INFILTRATION; PERINEURAL at 07:24

## 2024-03-05 RX ADMIN — ACETAMINOPHEN 975 MG: 325 TABLET ORAL at 06:16

## 2024-03-05 RX ADMIN — TILMANOCEPT 0.52 MILLICURIE: KIT at 06:36

## 2024-03-05 RX ADMIN — HYDROMORPHONE HYDROCHLORIDE 0.25 MG: 1 INJECTION, SOLUTION INTRAMUSCULAR; INTRAVENOUS; SUBCUTANEOUS at 08:20

## 2024-03-05 RX ADMIN — SODIUM CHLORIDE, POTASSIUM CHLORIDE, SODIUM LACTATE AND CALCIUM CHLORIDE 1000 ML: 600; 310; 30; 20 INJECTION, SOLUTION INTRAVENOUS at 06:29

## 2024-03-05 RX ADMIN — PROPOFOL 200 MCG/KG/MIN: 10 INJECTION, EMULSION INTRAVENOUS at 07:26

## 2024-03-05 RX ADMIN — Medication 2 G: at 07:15

## 2024-03-05 RX ADMIN — FENTANYL CITRATE 25 MCG: 50 INJECTION, SOLUTION INTRAMUSCULAR; INTRAVENOUS at 09:08

## 2024-03-05 ASSESSMENT — ACTIVITIES OF DAILY LIVING (ADL)
ADLS_ACUITY_SCORE: 20

## 2024-03-05 NOTE — PHARMACY-ADMISSION MEDICATION HISTORY
Pharmacist Admission Medication History    Admission medication history is complete. The information provided in this note is only as accurate as the sources available at the time of the update.    Information Source(s): Patient and CareEverywhere/SureScripts via in-person    Pertinent Information:         Allergies reviewed with patient and updates made in EHR: no    Medication History Completed By: Gina Sadler RPH 3/5/2024 7:37 AM    PTA Med List   Medication Sig Last Dose    5-Hydroxytryptophan (5-HTP) 100 MG CAPS Take 100 mg by mouth at bedtime 3/3/2024    LORazepam (ATIVAN) 1 MG tablet Take 1 tablet (1 mg) by mouth once as needed for anxiety 3/5/2024    melatonin 3 MG tablet Take 3 mg by mouth nightly as needed for sleep 3/3/2024

## 2024-03-05 NOTE — DISCHARGE INSTRUCTIONS
Surgical Drain Care: Care Instructions  Your Care Instructions     After a surgery, fluid may collect inside your body in the surgical area. This makes an infection or other problems more likely. A surgical drain allows the fluid to flow out.  The doctor puts a thin, flexible rubber tube into the area of your body where the fluid is likely to collect. The rubber tube carries the fluid outside your body.  The most common type of surgical drain carries the fluid into a collection bulb that you empty. This is called a Sundar-Mcrae (SINDI) drain. The drain uses suction created by the bulb to pull the fluid from your body into the bulb. The rubber tube will probably be held in place by one or two stitches in your skin. The bulb will probably be attached with a safety pin to your clothes or near the bandage so that it doesn't flip around or pull on the stitches.  Another type of drain is called a Penrose drain. This type of drain doesn't have a bulb. Instead, the end of the tube is open. That allows the fluid to drain onto a dressing taped to your skin. The drain may be kept in place next to your skin with a stitch or a safety pin in the tube.  When you first get the drain, the fluid will be bloody. It will change color from red to pink to a light yellow or clear as the wound heals and the fluid starts to go away.  Your doctor may give you information on when you no longer need the drain and when it will be removed.  Follow-up care is a key part of your treatment and safety. Be sure to make and go to all appointments, and call your doctor if you are having problems. It's also a good idea to know your test results and keep a list of the medicines you take.  How do you empty the bulb of a Sundar-Mcrae drain?  Follow any instructions your doctor gives you. How often you empty the bulb depends on how much fluid is draining. Empty the bulb when it is half full.  Wash your hands with soap and water.  Take the plug out of the  "bulb.  Empty the bulb.   If your doctor asks you to measure the fluid, empty the fluid into a measuring cup, and write down the color and how much you collected. Your doctor will want to know this information.  Clean the plug with alcohol.  Squeeze the bulb until it is flat.   This removes all the air from the bulb. You may need to put the bulb on a table or a counter to flatten it.  Keep the bulb flat, and put the plug in.   The bulb should stay flat after you put the plug back in. This creates the suction that pulls the fluid into the bulb.  Empty the fluid into the toilet.  Wash your hands.  How do you change the dressing around your surgical drain?  You may have a dressing (bandage). The dressing is often made of gauze pads held on with tape. Your doctor will tell you how often to change it.  Wash your hands with soap and water.  Take off the dressing from around the drain.  Clean the drain site and the skin around it with soap and water.   Use gauze or a cotton swab.  When the site is dry, put on a new dressing.   The way your dressing is put on depends on what kind of drain you have. You will get instructions for your type of drain.  Wash your hands again with soap and water.  Your doctor may ask you to keep track of your dressing changes. Write down the time of day and the amount and color of the fluid on the dressing.  How do you help prevent clogs in your surgical drain?  Squeezing or \"milking\" the tube of your surgical drain can help prevent clogs so that it drains correctly. Your doctor will tell you when you need to do this. In general, you do this when:  You see a clot in the tube that prevents fluid from draining. The clot may look like a dark, stringy lining.  You see fluid leaking around the tube where it goes into the skin.  Follow these steps for milking the tube.  Use one hand to hold and pinch the tube where it leaves the skin.  With the thumb and first finger of your other hand, pinch the tube " "just below where you're holding it.  Slowly and firmly push your thumb and first finger down the tubing toward the end of the tube.  Repeat this as many times as needed to move the clot.  If you have a Sundar-Mcrae (SINDI) drain, the clot should move down the tube and into the bulb. If you have a Penrose drain, the clot should move into the dressing.  When should you call for help?   Call your doctor now or seek immediate medical care if:    You have signs of infection, such as:  Increased pain, swelling, warmth, or redness around the area.  Red streaks leading from the area.  Pus draining from the area.  A fever.     You see a sudden change in the color or smell of the drainage.     The tube is coming loose where it leaves your skin.   Watch closely for changes in your health, and be sure to contact your doctor if:    You see a lot of fluid around the drain.     You cannot remove a clot from the tube by milking the tube.   Where can you learn more?  Go to https://[email or web address].[employer].net/patiented  Enter K117 in the search box to learn more about \"Surgical Drain Care: Care Instructions.\"  Current as of: November 29, 2022               Content Version: 13.8    5313-4938 [employer].   Care instructions adapted under license by your healthcare professional. If you have questions about a medical condition or this instruction, always ask your healthcare professional. [employer] disclaims any warranty or liability for your use of this information.      "

## 2024-03-05 NOTE — INTERVAL H&P NOTE
Patient seen in preop.  Denies new medical history since he was last seen.  [place] lymph node injection completed by nuclear medicine.  All questions answered regarding procedure.  Consent obtained.  To the OR for left mastectomy with sentinel lymph node biopsy.    Aleena Real DO  General Surgeon  [facility]  Breast [address] [address]  Office: [phone]  Employed by - [employer]

## 2024-03-05 NOTE — ANESTHESIA PROCEDURE NOTES
Airway       Patient location during procedure: OR  Staff -        Anesthesiologist:  Jabier Snyder MD       CRNA: Peyton Valdez APRN CRNA       Performed By: CRNA  Consent for Airway        Urgency: elective  Indications and Patient Condition       Indications for airway management: bernie-procedural       Induction type:intravenous       Mask difficulty assessment: 1 - vent by mask    Final Airway Details       Final airway type: supraglottic airway    Supraglottic Airway Details        Type: LMA       Brand: Ambu AuraGain       LMA size: 4    Post intubation assessment        Placement verified by: capnometry, equal breath sounds and chest rise        Number of attempts at approach: 1       Number of other approaches attempted: 0       Secured with: tape       Ease of procedure: easy       Dentition: Intact and Unchanged

## 2024-03-05 NOTE — ANESTHESIA POSTPROCEDURE EVALUATION
Patient: Vibha Kingston    Procedure: Procedure(s):  LEFT MASTECTOMY  WITH LEFT SENTINEL LYMPH NODE BIOPSY       Anesthesia Type:  General    Note:  Disposition: Outpatient   Postop Pain Control: Uneventful            Sign Out: Well controlled pain   PONV: No   Neuro/Psych: Uneventful            Sign Out: Acceptable/Baseline neuro status   Airway/Respiratory: Uneventful            Sign Out: Acceptable/Baseline resp. status   CV/Hemodynamics: Uneventful            Sign Out: Acceptable CV status; No obvious hypovolemia; No obvious fluid overload   Other NRE: NONE   DID A NON-ROUTINE EVENT OCCUR? No           Last vitals:  Vitals Value Taken Time   /65 03/05/24 0911   Temp 36.2  C (97.2  F) 03/05/24 0842   Pulse 81 03/05/24 0914   Resp 15 03/05/24 0914   SpO2 92 % 03/05/24 0914   Vitals shown include unfiled device data.    Electronically Signed By: GONZALES PINO MD  March 5, 2024  11:54 AM

## 2024-03-05 NOTE — OP NOTE
Name:  Vibha A Kingston  PCP:  Zully Bloom  Procedure Date:  3/5/2024      LEFT MASTECTOMY WITH SENTINEL LYMPH NODE BIOPSY       Pre-Procedure Diagnosis:  Neoplasm of left breast, primary tumor staging category Tis: ductal carcinoma in situ (DCIS)    Post-Procedure Diagnosis:    Neoplasm of left breast, primary tumor staging category Tis: ductal carcinoma in situ (DCIS)     Surgeon:  Aleena Real DO    Assist:  Cony Brooks PA-C    Anesthesia Type:    GET    Estimated Blood Loss:   20 mL    Specimens:    Left breast  Left breast sentinel lymph node       Drains:   15 F Danny    Complications:    None apparent    [place] Node Biopsy for Breast Cancer - Left  Operation performed with curative intent Yes   Tracer(s) used to identify sentinel nodes in the upfront surgery (non-neoadjuvant) setting Radioactive tracer   Tracer(s) used to identify sentinel nodes in the neoadjuvant setting N/A   All nodes (colored or non-colored) present at the end of a dye-filled lymphatic channel were removed N/A   All significantly radioactive nodes were removed Yes   All palpably suspicious nodes were removed N/A   Biopsy-proven positive nodes marked with clips prior to chemotherapy were identified and removed N/A       Indication for procedure:  This is a 33-year-old female who had noticed changes to her left breast over this last year.  She ultimately went for mammogram and ultrasound.  She was found to have a large area of microcalcifications.  These represented high-grade ductal carcinoma in situ.  Due to the size of her carcinoma, it was recommended that she undergo mastectomy.  She has elected for unilateral mastectomy without reconstruction.    Operative Report:    The patient was properly identified and brought to the operating suite where she was placed in the supine position.  General anesthesia and perioperative antibiotics were administered.  Preoperatively the patient was injected with Lymphoseek by nuclear medicine  for her sentinel lymph node identification.  The patient was prepped and draped in a sterile fashion.  An s-shaped incision encompassing the left nipple was made and skin flaps raised superiorly to the clavicle, posteriorly to the pectoralis major, medial to the sternum, inferior to the rectus sheath and laterally to the lateral chest wall.  The skin was removed to allow for a flat tension-free closure.  The breast tissue was swept from the chest wall using electrocautery.  It was marked superiorly for orientation.  Hemostasis was assured within the wound.  The clavipectoral fascia was incised and the axilla was palpated for abnormalities.  No abnormal lymph nodes were palpated.  Using the gamma probe I identified two sentinel lymph nodes, with a count of 2800.  These were removed with electrocautery and sent for permanent sectioning.  There was no significant remaining gamma activity.  A 15F Danny drain was brought out through a separate stab incision and secured to the skin with 2-0 Nylon.  The  incision was then closed with interrupted 2-0 Vicryl followed by a running subcuticular 4-0 Monocryl and skin glue.      Cony Brooks PA-C was present during the case to assist with retraction and exposure.    Disposition:  The patient tolerated the procedure well.  After drain teaching in the recovery room, it is anticipated that she will be discharged home.    Aleena Real DO  General Surgeon  [address] [address]  Office: [phone]  Employed by - [employer]

## 2024-03-05 NOTE — ANESTHESIA PREPROCEDURE EVALUATION
Anesthesia Pre-Procedure Evaluation    Patient: Vibha Kingston   MRN: [id number] : 1990        Procedure : Procedure(s):  LEFT MASTECTOMY  WITH LEFT SENTINEL LYMPH NODE BIOPSY          Past Medical History:   Diagnosis Date    Cancer (H) 1/15/24    Breast cancer      Past Surgical History:   Procedure Laterality Date    BIOPSY      A mole a few years back and an abnormal pap in college      No Known Allergies   Social History     Tobacco Use    Smoking status: Former     Packs/day: 0.00     Years: 0.00     Additional pack years: 0.00     Total pack years: 0.00     Types: Cigarettes, Cigars, Hookah     Passive exposure: Never    Smokeless tobacco: Never    Tobacco comments:     Social, never created a habit   Substance Use Topics    Alcohol use: Yes     Comment: 2 drinks per week      Wt Readings from Last 1 Encounters:   24 75.3 kg (165 lb 14.4 oz)        Anesthesia Evaluation   Pt has not had prior anesthetic         ROS/MED HX  ENT/Pulmonary:  - neg pulmonary ROS     Neurologic:  - neg neurologic ROS     Cardiovascular:  - neg cardiovascular ROS     METS/Exercise Tolerance:     Hematologic:       Musculoskeletal:  - neg musculoskeletal ROS     GI/Hepatic:  - neg GI/hepatic ROS     Renal/Genitourinary:  - neg Renal ROS     Endo:  - neg endo ROS     Psychiatric/Substance Use:  - neg psychiatric ROS     Infectious Disease:       Malignancy:   (+) Malignancy,     Other:            Physical Exam    Airway        Mallampati: II       Respiratory Devices and Support         Dental       (+) Minor Abnormalities - some fillings, tiny chips      Cardiovascular   cardiovascular exam normal          Pulmonary   pulmonary exam normal                OUTSIDE LABS:  CBC:   Lab Results   Component Value Date    WBC 4.8 2024    HGB 14.9 2024    HCT 43.0 2024     2024     BMP:   Lab Results   Component Value Date     2024     2023    POTASSIUM 4.5 2024     "POTASSIUM 4.1 11/01/2023    CHLORIDE 102 02/16/2024    CHLORIDE 102 11/01/2023    CO2 28 02/16/2024    CO2 28 11/01/2023    BUN 12.3 02/16/2024    BUN 10.8 11/01/2023    CR 0.81 02/16/2024    CR 0.76 11/01/2023    GLC 85 02/16/2024    GLC 92 11/01/2023     COAGS: No results found for: \"PTT\", \"INR\", \"FIBR\"  POC:   Lab Results   Component Value Date    HCG Negative 02/16/2024     HEPATIC: No results found for: \"ALBUMIN\", \"PROTTOTAL\", \"ALT\", \"AST\", \"GGT\", \"ALKPHOS\", \"BILITOTAL\", \"BILIDIRECT\", \"JOON\"  OTHER:   Lab Results   Component Value Date    RACHAEL 9.8 02/16/2024       Anesthesia Plan    ASA Status:  2    NPO Status:  NPO Appropriate    Anesthesia Type: General.     - Airway: LMA   Induction: Intravenous.           Consents    Anesthesia Plan(s) and associated risks, benefits, and realistic alternatives discussed. Questions answered and patient/representative(s) expressed understanding.     - Discussed:     - Discussed with:  Patient            Postoperative Care    Pain management: Multi-modal analgesia.   PONV prophylaxis: Ondansetron (or other 5HT-3), Scopolamine patch, Dexamethasone or Solumedrol     Comments:               GONZALES PINO MD    I have reviewed the pertinent notes and labs in the chart from the past 30 days and (re)examined the patient.  Any updates or changes from those notes are reflected in this note.              # Overweight: Estimated body mass index is 27.19 kg/m  as calculated from the following:    Height as of this encounter: 1.664 m (5' 5.5\").    Weight as of this encounter: 75.3 kg (165 lb 14.4 oz).      "

## 2024-03-05 NOTE — ANESTHESIA CARE TRANSFER NOTE
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    Anesthesia Type:   General     Note:    Oropharynx: oropharynx clear of all foreign objects and spontaneously breathing  Level of Consciousness: awake  Oxygen Supplementation: face mask  Level of Supplemental Oxygen (L/min / FiO2): 8  Independent Airway: airway patency satisfactory and stable  Dentition: dentition unchanged  Vital Signs Stable: post-procedure vital signs reviewed and stable  Report to RN Given: handoff report given  Patient transferred to: PACU    Handoff Report: Identifed the Patient, Identified the Reponsible Provider, Reviewed the pertinent medical history, Discussed the surgical course, Reviewed Intra-OP anesthesia mangement and issues during anesthesia, Set expectations for post-procedure period and Allowed opportunity for questions and acknowledgement of understanding      Vitals:  Vitals Value Taken Time   BP 97/55 03/05/24 0842   Temp 36.2  C (97.2  F) 03/05/24 0842   Pulse 65 03/05/24 0843   Resp 11 03/05/24 0843   SpO2 98 % 03/05/24 0843   Vitals shown include unfiled device data.    Electronically Signed By: AYLA Mead CRNA  March 5, 2024  8:44 AM

## 2024-03-06 ENCOUNTER — TELEPHONE (OUTPATIENT)
Dept: SURGERY | Facility: CLINIC | Age: 34
End: 2024-03-06
Payer: COMMERCIAL

## 2024-03-06 NOTE — TELEPHONE ENCOUNTER
Called Vibha to see how she is doing POD #1 unilateral mastectomy with Dr. Real.  Vibha said she is doing well.  She said she did sleep on and off last night.  Not having much pain.  Her drain is working well, had 40 ml out yesterday.  Asked for a RTW note for Monday, 3-11-24.  She works from home, only on the phone.  Wishes to go back for a full day as on Monday, 3-11-24.  Told her will create the RTW letter, but if it is too much, she should let me know and we can update the letter, pushing out the date.  Told her most people take 2-4 weeks off having had the surgery she did. Vibha has a post op appointment with Dr. Real. Told her if her drain is ready to come out sooner, to call and I can remove it for her.  Support provided, invited calls.

## 2024-03-07 DIAGNOSIS — C50.912 INVASIVE DUCTAL CARCINOMA OF BREAST, LEFT (H): Primary | ICD-10-CM

## 2024-03-08 ENCOUNTER — PATIENT OUTREACH (OUTPATIENT)
Dept: ONCOLOGY | Facility: CLINIC | Age: 34
End: 2024-03-08
Payer: COMMERCIAL

## 2024-03-08 NOTE — PROGRESS NOTES
New Patient Oncology Nurse Navigator Note     Referring provider: Dr. Aleena Real     Referring Clinic/Organization: [facility]     Referred to (specialty:) Medical Oncology and Radiation Oncology      Date Referral Received: March 7, 2024     Evaluation for:  C50.912 (ICD-10-CM) - Invasive ductal carcinoma of breast, left (H)     Clinical History (per Nurse review of records provided):      Vibha had bilateral diagnostic mammograms and left breast ultrasound on 1/9/24. This was her baseline breast imaging.   On mammogram there is a large segmental area of pleomorphic and amorphous calcifications left 9:00 breast from posterior to subareolar depth, with some calcifications also present within the nipple, area measuring 10 x 5 cm. There is associated focal   asymmetry. There is no radiographic evidence of right breast malignancy.  Ultrasound targeted to the palpable lump indicated by the patient and guided by manual palpation reveals a large ill-defined hypoechoic area with associated calcifications, discrete measurable mass centered at 9:00 3 cm from nipple   measuring approximately 3.4 x 2.2 x 2.0 cm, extent of involvement better demonstrated by mammography. Sonographic survey of the left axilla is within normal limits.    1/11/24 - [id number]  BREAST, LEFT, 9:00, 3 CM FN, ULTRASOUND-GUIDED CORE BIOPSIES OF MASS:  1. DUCTAL CARCINOMA IN SITU (DCIS):  a. NUCLEAR thGthRthAthDthEth:th th4th b. PATTERNS: CRIBRIFORM AND PAPILLARY WITH COMEDONECROSIS  2. ADDITIONAL FINDINGS: STROMAL FIBROSIS AND MILD CHRONIC INFLAMMATION  3. ER/NM IMMUNOHISTOCHEMISTRY IS DEFERRED TO EXCISIONAL SPECIMEN    She met with Dr. Real and proceeded unilateral mastectomy without reconstruction.    3/5/24 [id number]  A) LEFT BREAST SENTINEL LYMPH NODE:-  - 1 OF 2 LYMPH NODES POSITIVE FOR METASTATIC CARCINOMA  - LARGEST CONTINUOUS METASTATIC DEPOSIT IS 5 MM IN GREATEST DIMENSION  - NEGATIVE FOR EXTRANODAL TUMOR EXTENSION  - NORMAL  ADIPOSE TISSUE  B) LEFT BREAST, SIMPLE MASTECTOMY, WITH NEOPLASTIC LESION:  - SEE FULL SYNOPTIC REPORT BELOW  - SUMMARY OF SYNOPSIS:  - INVASIVE DUCTAL CARCINOMA  - HISTOLOGIC GRADE (MARISOL HISTOLOGIC SCORE):  - TUBULAR DIFFERENTIATION SCORE 3  - NUCLEAR PLEOMORPHISM SCORE 3  - MITOTIC RATE SCORE 2  - OVERALL GRADE 3 (TOTAL SCORE 8/9)  - INVASIVE CARCINOMA IS UNIFOCAL, AND 8 MM IN GREATEST DIMENSION  - DUCTAL CARCINOMA IN SITU (DCIS): EXTENSIVE INTRADUCTAL COMPONENT (EIC) PRESENT  (DCIS PRESENT IN 14 OF 25 BLOCKS)  - SOLID AND COMEDO PATTERNS  - HIGH NUCLEAR GRADE (GRADE 3)  - EXTENSIVE COMEDO NECROSIS  - FOCAL LYMPHOVASCULAR INVASION PRESENT (IN 1 BLOCK ONLY)  - MICROCALCIFICATIONS PRESENT IN DCIS  - ALL MARGINS NEGATIVE FOR INVASIVE CARCINOMA AND DCIS; CLOSEST MARGIN  IS POSTERIOR, 5 MM FROM INVASIVE CARCINOMA AND DCIS  - 1 OF 2 SENTINEL LYMPH NODES POSITIVE FOR METASTATIC CARCINOMA (SEE SPECIMEN A)  - ADDITIONAL FINDINGS: FLORID ADENOSIS; FIBROADENOMATOSIS; PREVIOUS CORE BIOPSY SITE  IDENTIFIED (NO BIOPSY CLIP RETRIEVED)  - TUMOR IS STAGE pT1b AND pN1a (sn)  - ADDITIONAL STUDIES:  - ESTROGEN RECEPTORS NEGATIVE IN INVASIVE CARCINOMA (NO NUCLEAR STAINING);  POSITIVE IN DCIS (80% OF TUMOR NUCLEI STAIN STRONGLY)  - PROGESTERONE RECEPTORS NEGATIVE IN INVASIVE CARCINOMA (NO NUCLEAR STAINING);  POSITIVE IN DCIS (30% OF TUMOR NUCLEI STAIN STRONGLY OR MODERATELY)  - HER2/JULIA RECEPTORS POSITIVE FOR OVEREXPRESSION (3+ MEMBRANE STAINING)    3/8 12:40 - Telephoned and spoke with Vibha. Explained my role and purpose for my call. We will proceed with medical oncology consult for now, but there will also be a radiation oncology at a later date. Writer received referral, reviewed for appropriate plan, and call warm transferred to New Patient Scheduling for completion.    3/20 - Patient has met with Dr. Adams who has recommended adjuvant chemotherapy with docetaxel/carboplatin/trastuzumab/pertuzumab every 3 weeks x 6 cycles  followed by adjuvant trastuzumab/pertuzumab to complete 1 year.     Records Location: See Bookmarked material

## 2024-03-11 ENCOUNTER — ALLIED HEALTH/NURSE VISIT (OUTPATIENT)
Dept: SURGERY | Facility: CLINIC | Age: 34
End: 2024-03-11
Attending: SURGERY
Payer: COMMERCIAL

## 2024-03-11 DIAGNOSIS — D05.12 NEOPLASM OF LEFT BREAST, PRIMARY TUMOR STAGING CATEGORY TIS: DUCTAL CARCINOMA IN SITU (DCIS): Primary | ICD-10-CM

## 2024-03-11 NOTE — NURSING NOTE
Vibha presents to [facility] [place] today for RN drain removal.  Patient states she has had 20 ml or less in a 24 hour period for at least 2 consecutive days.  RN assessment and EMR update.  Drain removed without difficulty.  Patient tolerated well.  Applied 4x4 . Told her to change as needed if it becomes wet, otherwise leave it on for 24 hours then shower as usual and cover as needed.  Reviewed ROM exercises.  Reviewed follow up plan and signs and symptoms of infection and seroma.  Support provided, invited calls.

## 2024-03-12 LAB
PATH REPORT.COMMENTS IMP SPEC: ABNORMAL
PATH REPORT.COMMENTS IMP SPEC: YES
PATH REPORT.FINAL DX SPEC: ABNORMAL
PATH REPORT.GROSS SPEC: ABNORMAL
PATH REPORT.MICROSCOPIC SPEC OTHER STN: ABNORMAL
PATH REPORT.RELEVANT HX SPEC: ABNORMAL
PATHOLOGY SYNOPTIC REPORT: ABNORMAL
PHOTO IMAGE: ABNORMAL

## 2024-03-13 DIAGNOSIS — C50.412 MALIGNANT NEOPLASM OF UPPER-OUTER QUADRANT OF LEFT BREAST IN FEMALE, ESTROGEN RECEPTOR NEGATIVE (H): Primary | ICD-10-CM

## 2024-03-13 DIAGNOSIS — Z17.1 MALIGNANT NEOPLASM OF UPPER-OUTER QUADRANT OF LEFT BREAST IN FEMALE, ESTROGEN RECEPTOR NEGATIVE (H): Primary | ICD-10-CM

## 2024-03-13 PROCEDURE — 99358 PROLONG SERVICE W/O CONTACT: CPT | Performed by: INTERNAL MEDICINE

## 2024-03-13 NOTE — PROGRESS NOTES
3/13/2024    35 minutes were spent non- face-to-face on date of service 3/13/2024 and extensive record review regarding the patient's complex medical history, Second Look pathology with our pathologist, updating final pathology report prior to the upcoming visit 3/14/2024 for management of her recent diagnosis of stage IIa invasive ductal carcinoma of the left breast, grade 3, ER negative, NV negative, HER2 positive.     Rosalind Adams MD

## 2024-03-14 ENCOUNTER — PATIENT OUTREACH (OUTPATIENT)
Dept: ONCOLOGY | Facility: HOSPITAL | Age: 34
End: 2024-03-14
Payer: COMMERCIAL

## 2024-03-14 ENCOUNTER — PRE VISIT (OUTPATIENT)
Dept: ONCOLOGY | Facility: HOSPITAL | Age: 34
End: 2024-03-14
Payer: COMMERCIAL

## 2024-03-14 ENCOUNTER — ONCOLOGY VISIT (OUTPATIENT)
Dept: ONCOLOGY | Facility: HOSPITAL | Age: 34
End: 2024-03-14
Attending: INTERNAL MEDICINE
Payer: COMMERCIAL

## 2024-03-14 VITALS
HEIGHT: 66 IN | OXYGEN SATURATION: 100 % | DIASTOLIC BLOOD PRESSURE: 73 MMHG | SYSTOLIC BLOOD PRESSURE: 105 MMHG | WEIGHT: 164 LBS | BODY MASS INDEX: 26.36 KG/M2 | HEART RATE: 67 BPM | RESPIRATION RATE: 18 BRPM | TEMPERATURE: 98 F

## 2024-03-14 DIAGNOSIS — C50.412 MALIGNANT NEOPLASM OF UPPER-OUTER QUADRANT OF LEFT BREAST IN FEMALE, ESTROGEN RECEPTOR NEGATIVE (H): Primary | ICD-10-CM

## 2024-03-14 DIAGNOSIS — C50.912 INVASIVE DUCTAL CARCINOMA OF BREAST, LEFT (H): ICD-10-CM

## 2024-03-14 DIAGNOSIS — Z79.899 ENCOUNTER FOR MONITORING CARDIOTOXIC DRUG THERAPY: ICD-10-CM

## 2024-03-14 DIAGNOSIS — Z17.1 MALIGNANT NEOPLASM OF UPPER-OUTER QUADRANT OF LEFT BREAST IN FEMALE, ESTROGEN RECEPTOR NEGATIVE (H): Primary | ICD-10-CM

## 2024-03-14 DIAGNOSIS — Z51.11 ENCOUNTER FOR ANTINEOPLASTIC CHEMOTHERAPY: ICD-10-CM

## 2024-03-14 DIAGNOSIS — Z51.81 ENCOUNTER FOR MONITORING CARDIOTOXIC DRUG THERAPY: ICD-10-CM

## 2024-03-14 DIAGNOSIS — Z31.84 ENCOUNTER FOR FERTILITY PRESERVATION PROCEDURE PRIOR TO CANCER THERAPY: ICD-10-CM

## 2024-03-14 PROCEDURE — 99213 OFFICE O/P EST LOW 20 MIN: CPT | Performed by: INTERNAL MEDICINE

## 2024-03-14 PROCEDURE — 99205 OFFICE O/P NEW HI 60 MIN: CPT | Performed by: INTERNAL MEDICINE

## 2024-03-14 PROCEDURE — G2211 COMPLEX E/M VISIT ADD ON: HCPCS | Performed by: INTERNAL MEDICINE

## 2024-03-14 PROCEDURE — 99417 PROLNG OP E/M EACH 15 MIN: CPT | Performed by: INTERNAL MEDICINE

## 2024-03-14 RX ORDER — PROCHLORPERAZINE MALEATE 10 MG
10 TABLET ORAL EVERY 6 HOURS PRN
Qty: 30 TABLET | Refills: 2 | Status: SHIPPED | OUTPATIENT
Start: 2024-03-20 | End: 2024-09-12

## 2024-03-14 RX ORDER — DEXAMETHASONE 4 MG/1
8 TABLET ORAL 2 TIMES DAILY WITH MEALS
Qty: 6 TABLET | Refills: 5 | Status: SHIPPED | OUTPATIENT
Start: 2024-03-20 | End: 2024-09-12

## 2024-03-14 RX ORDER — ONDANSETRON 8 MG/1
8 TABLET, FILM COATED ORAL EVERY 8 HOURS PRN
Qty: 30 TABLET | Refills: 2 | Status: SHIPPED | OUTPATIENT
Start: 2024-03-20 | End: 2024-09-19

## 2024-03-14 ASSESSMENT — PAIN SCALES - GENERAL: PAINLEVEL: MILD PAIN (3)

## 2024-03-14 NOTE — PROGRESS NOTES
[facility]: Cancer Care Initial Note                                    Discussion with Patient:                                                      Introduced self to patient and , Keenan, and described role as RNCC for Dr. Adams.     Assessment:                                                      Initial  Current living arrangement:: I live in a private home with spouse  Type of residence:: Private home - no stairs  Informal Support system:: Spouse;Family  Equipment Currently Used at Home: none  Bed or wheelchair confined:: No  Mobility Status: Independent  Transportation means:: Regular car;Family  Referrals Placed: None  Advanced Care Plans/Directives on file:: No    Patient had LEFT MASTECTOMY WITH SENTINEL LYMPH NODE BIOPSY  on 3/5/24. Drains removed on 3/11/24.    Intervention/Education provided during outreach:                                                       Provided patient with contact information for Cancer Care Triage and instructed how to call during clinic hours and after hours. Patient verbalized understanding.    Faxed referral for fertility preservation procedure prior to cancer therapy, face sheet, and copy of insurance cared to [facility] ([facility] to [phone] at 1501. Right Fax confirmed sent.    Patient to follow up as scheduled at next appt    Signature:  Christie Agee RN

## 2024-03-14 NOTE — LETTER
3/14/2024         RE: Vibha Kingston  [address] [place] [address]        Dear Colleague,    Thank you for referring your patient, Vibha Kingston, to the [facility]. Please see a copy of my visit note below.    [facility] Hematology and Oncology Consult Note    Patient: Vibha Kingston  MRN: [id number]  Date of Service: Mar 14, 2024       Reason for Visit    Chief Complaint   Patient presents with     Oncology Clinic Visit     New patient consult: Malignant neoplasm of upper-outer quadrant of left breast in female, estrogen receptor negative, Ductal carcinoma in situ (DCIS) of left breast         Assessment/Plan    ECOG Performance 0 - Independent    #. Stage IIA (pT1c pN1a M0) invasive ductal carcinoma of the left breast, grade 3, ER negative, SD negative, HER2 positive  #.  DCIS of the left breast, ER positive, SD positive     I reviewed her complex clinical course, her biopsy results and final pathology result of the left breast mastectomy.  Although initial biopsy only identified DCIS, she had more extensive disease at the time of surgery.  I discussed that with the current pathological staging and molecular features, I recommended adjuvant chemotherapy with docetaxel/carboplatin/trastuzumab/pertuzumab every 3 weeks x 6 cycles followed by adjuvant trastuzumab/pertuzumab to complete 1 year.  I reviewed the chemotherapy schedule and side effects.  She agreed to proceed.   Requested echocardiogram to obtain baseline cardiac assessment prior to chemotherapy.   I will consider adjuvant radiation therapy after completion of chemotherapy.   She will be a candidate for adjuvant endocrine therapy for ER positive DCIS of the left breast with remaining right breast.    She desires fertility preservation.  Gynecology referral was placed.   She will have a port placement by Dr. Real.   Follow-up labs and provider visit after port placement and completion of fertility  preservation, although excessive delay is discouraged..    The longitudinal plan of care for the diagnosis(es)/condition(s) as documented were addressed during this visit. Due to the added complexity in care, I will continue to support Vibha in the subsequent management and with ongoing continuity of care.    Encounter Diagnoses:    Problem List Items Addressed This Visit          Oncology Diagnoses    Malignant neoplasm of upper-outer quadrant of left breast in female, estrogen receptor negative (H) - Primary    Relevant Medications    prochlorperazine (COMPAZINE) 10 MG tablet (Start on 3/20/2024)    ondansetron (ZOFRAN) 8 MG tablet (Start on 3/20/2024)    dexAMETHasone (DECADRON) 4 MG tablet (Start on 3/20/2024)    Other Relevant Orders    Infusion Appointment Request       Other    Encounter for antineoplastic chemotherapy    Relevant Medications    prochlorperazine (COMPAZINE) 10 MG tablet (Start on 3/20/2024)    ondansetron (ZOFRAN) 8 MG tablet (Start on 3/20/2024)    dexAMETHasone (DECADRON) 4 MG tablet (Start on 3/20/2024)    Other Relevant Orders    Infusion Appointment Request     Other Visit Diagnoses       Invasive ductal carcinoma of breast, left (H)        Encounter for monitoring cardiotoxic drug therapy        Relevant Orders    Echocardiogram Complete    Encounter for fertility preservation procedure prior to cancer therapy        Relevant Orders    Ob/Gyn  Referral            ______________________________________________________________________________    Staging History   Cancer Staging   Malignant neoplasm of upper-outer quadrant of left breast in female, estrogen receptor negative (H)  Staging form: Breast, AJCC 8th Edition  - Pathologic stage from 3/13/2024: Stage IIA (pT1c, pN1a(sn), cM0, G3, ER-, [place]-, HER2+) - Signed by Rosalind Adams MD on 3/13/2024        History    Ms. Vibha Kingston is a very pleasant 33 year old female presented today accompanied by her  for further  management of recent diagnosis of     2024- self palpated left medial breast mass   - diagnostic mammogram and ultrasound showed 3.4 x 2.2 x 2 cm large ill-defined hypoechoic area with associated calcification at 9:00 3 cm from the nipple.  Sonographic survey of left axilla is within normal limits.    2024-ultrasound-guided core needle biopsy showed DCIS, grade 3, cribriform and papillary with comedonecrosis.  ER/NM was deferred to excisional specimen.    2024-breast actionable panel was negative including INGRID, BARD1, BRCA1, BRCA2, CDH1, CHEK2, NF1, PALB2, PTEN, STK11, TP53.    2024-MRI breast showed large segmental area of non-mass enhancement medial left breast measuring 10 cm from the posterior to subareolar depth corresponding to the known DCIS.  Mildly prominent bilateral internal mammary lymph node technically within normal limit.    3/5/2024-left breast simple mastectomy and left axillary sentinel lymph node biopsy   Invasive ductal carcinoma, grade 3   Multifocal, 4 foci vary from 1.3 mm to 11 mm in greatest dimension.   Margins were negative, closest margin less than 1 mm anterior/superior   DCIS, EIC present, solid and comedo pattern, grade 3, extensive comedonecrosis.  Focal lymphovascular invasion present.    sentinel lymph node was positive for metastatic carcinoma.   [id number]   ER negative in invasive carcinoma, positive and DCIS (80% of the tumor nuclei stain strongly)   NM negative and invasive cancer, positive in DCIS (30% of the tumor nuclei stain strongly of moderately)   HER2/aj positive for overexpression (3+ membrane staining)    She is recovering from surgery. No concerns today.    . LMP 3/2/2024, regular. Not on BCPs.  She works in customer service.   She does not smoke cigarettes, but uses marijuana once in a while. She rarely drinks alcohol.  Family history is significant for prostate cancer in father, pancreatic cancer in maternal uncle, kidney cancer and breast  cancer in mother, kidney cancer in maternal GM and colon cancer on maternal uncle.    Review of systems.  Apart from describing in history, the remainder of comprehensive ROS was negative.    Past History    Past Medical History:   Diagnosis Date     Cancer (H) 1/15/24    Breast cancer     Encounter for antineoplastic chemotherapy 3/14/2024     Past Surgical History:   Procedure Laterality Date     BIOPSY      A mole a few years back and an abnormal pap in college     BIOPSY NODE SENTINEL Left 3/5/2024    Procedure: WITH LEFT SENTINEL LYMPH NODE BIOPSY;  Surgeon: Aleena Real DO;  Location: [facility]     MASTECTOMY SIMPLE Left 3/5/2024    Procedure: LEFT MASTECTOMY;  Surgeon: Aleena Real DO;  Location: [facility]     Family History   Problem Relation Age of Onset     Hyperlipidemia Mother      Breast Cancer Mother 60        treated and in remission     Depression Mother      Hypertension Father      Hyperlipidemia Father      Prostate Cancer Father 65     Other Cancer Sister 40        ovarian cyst - cancerous - removed one ovary and hystro     Depression Sister      Anxiety Disorder Sister      Cervical Cancer Sister      Other Cancer Maternal Grandfather      Cerebrovascular Disease Other      Other Cancer Other      Other Cancer Maternal Grandmother      Social History     Socioeconomic History     Marital status:      Highest education level: Bachelor's degree (e.g., BA, AB, BS)   Tobacco Use     Smoking status: Former     Packs/day: 0.00     Years: 0.00     Additional pack years: 0.00     Total pack years: 0.00     Types: Cigarettes, Cigars, Hookah     Passive exposure: Never     Smokeless tobacco: Never     Tobacco comments:     Social, never created a habit   Vaping Use     Vaping Use: Never used   Substance and Sexual Activity     Alcohol use: Yes     Comment: 2 drinks per week     Drug use: Yes     Types: Marijuana     Sexual activity: Not Currently     Partners: Male     Birth  "control/protection: Abstinence   Other Topics Concern     Parent/sibling w/ CABG, MI or angioplasty before 65F 55M? No     Social Determinants of Health     Financial Resource Strain: Low Risk  (10/5/2023)    Financial Resource Strain      Within the past 12 months, have you or your family members you live with been unable to get utilities (heat, electricity) when it was really needed?: No   Food Insecurity: Low Risk  (10/5/2023)    Food Insecurity      Within the past 12 months, did you worry that your food would run out before you got money to buy more?: No      Within the past 12 months, did the food you bought just not last and you didn t have money to get more?: No   Transportation Needs: Low Risk  (10/5/2023)    Transportation Needs      Within the past 12 months, has lack of transportation kept you from medical appointments, getting your medicines, non-medical meetings or appointments, work, or from getting things that you need?: No   Interpersonal Safety: Low Risk  (12/22/2023)    Interpersonal Safety      Do you feel physically and emotionally safe where you currently live?: Yes      Within the past 12 months, have you been hit, slapped, kicked or otherwise physically hurt by someone?: No      Within the past 12 months, have you been humiliated or emotionally abused in other ways by your partner or ex-partner?: No   Housing Stability: Low Risk  (10/5/2023)    Housing Stability      Do you have housing? : Yes      Are you worried about losing your housing?: No       Allergies    No Known Allergies    Physical Exam    /73   Pulse 67   Temp 98  F (36.7  C) (Tympanic)   Resp 18   Ht 1.664 m (5' 5.51\")   Wt 74.4 kg (164 lb)   LMP 01/03/2024 (Approximate)   SpO2 100%   BMI 26.87 kg/m      General: alert, awake, not in acute distress  HEENT: Head: Normal, normocephalic, atraumatic.  Eye: Normal external eye, conjunctiva, lids cornea, GABRIELA.  Nose: Normal external nose, mucus membranes and " "septum.  Pharynx: Normal buccal mucosa. Normal pharynx.  Neck / Thyroid: Supple, no masses, nodes, nodules or enlargement.  Lymphatics: No abnormally enlarged lymph nodes.  Chest: Normal chest wall and respirations. Clear to auscultation.  Heart: S1 S2 RRR, no murmur.   Abdomen: abdomen is soft without significant tenderness, masses, organomegaly or guarding  Extremities: normal strength, tone, and muscle mass  Skin: normal. no rash or abnormalities  CNS: non focal.    Lab Results    No results found for this or any previous visit (from the past 168 hour(s)).    Imaging Results    NM Lymphoscintigraphy Injection only    Result Date: 3/5/2024  INDICATION: Breast Carcinoma. Pre-operative identification of the left sentinel lymph node. PROCEDURE: Informed consent was obtained from the patient. The skin was prepped with ChloraPrep. 519 uCi 99mTc Lymphoseek injected in Lt upper, outer, periareolar by Alda Mayfield at 636 am was injected subdermally along the upper outer aspect of the areolar margin. The patient tolerated this well.  The procedure was performed by MONA MAYFIELD, on 3/5/2024, at 6:30 AM.    IMPRESSION: [place] lymph node injection.      65 minutes spent on the date of the encounter doing chart review, history and exam, documentation, communication of the treatment plan with the care team and further activities as noted above.    Signed by: Rosalind Adams MD     Oncology Rooming Note    March 14, 2024 10:57 AM   Vibha Kingston is a 33 year old female who presents for:    Chief Complaint   Patient presents with     Oncology Clinic Visit     New patient consult: Malignant neoplasm of upper-outer quadrant of left breast in female, estrogen receptor negative, Ductal carcinoma in situ (DCIS) of left breast     Initial Vitals: /73   Pulse 67   Temp 98  F (36.7  C) (Tympanic)   Resp 18   Ht 1.664 m (5' 5.51\")   Wt 74.4 kg (164 lb)   LMP 01/03/2024 (Approximate)   SpO2 100%   BMI 26.87 kg/m   " "Estimated body mass index is 26.87 kg/m  as calculated from the following:    Height as of this encounter: 1.664 m (5' 5.51\").    Weight as of this encounter: 74.4 kg (164 lb). Body surface area is 1.85 meters squared.  Mild Pain (3) Comment: Data Unavailable   Patient's last menstrual period was 01/03/2024 (approximate).  Allergies reviewed: Yes  Medications reviewed: Yes    Medications: Medication refills not needed today.  Pharmacy name entered into [employer]: [facility] #[id number] - [place], [place] - [address]    Frailty Screening:   Is the patient here for a new oncology consult visit in cancer care? 1. Yes. Over the past month, have you experienced difficulty or required a caregiver to assist with:   1. Balance, walking or general mobility (including any falls)? NO  2. Completion of self-care tasks such as bathing, dressing, toileting, grooming/hygiene?  NO  3. Concentration or memory that affects your daily life?  NO       Clinical concerns:  NEW ONCOLOGY CONSULT.      Nyla Salazar MA                Again, thank you for allowing me to participate in the care of your patient.        Sincerely,        Rosalind Adams MD  "

## 2024-03-14 NOTE — PROGRESS NOTES
[facility] Hematology and Oncology Consult Note    Patient: Vibha Kingston  MRN: [id number]  Date of Service: Mar 14, 2024       Reason for Visit    Chief Complaint   Patient presents with    Oncology Clinic Visit     New patient consult: Malignant neoplasm of upper-outer quadrant of left breast in female, estrogen receptor negative, Ductal carcinoma in situ (DCIS) of left breast         Assessment/Plan    ECOG Performance 0 - Independent    #. Stage IIA (pT1c pN1a M0) invasive ductal carcinoma of the left breast, grade 3, ER negative, VA negative, HER2 positive  #.  DCIS of the left breast, ER positive, VA positive     I reviewed her complex clinical course, her biopsy results and final pathology result of the left breast mastectomy.  Although initial biopsy only identified DCIS, she had more extensive disease at the time of surgery.  I discussed that with the current pathological staging and molecular features, I recommended adjuvant chemotherapy with docetaxel/carboplatin/trastuzumab/pertuzumab every 3 weeks x 6 cycles followed by adjuvant trastuzumab/pertuzumab to complete 1 year.  I reviewed the chemotherapy schedule and side effects.  She agreed to proceed.   Requested echocardiogram to obtain baseline cardiac assessment prior to chemotherapy.   I will consider adjuvant radiation therapy after completion of chemotherapy.   She will be a candidate for adjuvant endocrine therapy for ER positive DCIS of the left breast with remaining right breast.    She desires fertility preservation.  Gynecology referral was placed.   She will have a port placement by Dr. Real.   Follow-up labs and provider visit after port placement and completion of fertility preservation, although excessive delay is discouraged..    The longitudinal plan of care for the diagnosis(es)/condition(s) as documented were addressed during this visit. Due to the added complexity in care, I will continue to support Vibha in the subsequent  management and with ongoing continuity of care.    Encounter Diagnoses:    Problem List Items Addressed This Visit          Oncology Diagnoses    Malignant neoplasm of upper-outer quadrant of left breast in female, estrogen receptor negative (H) - Primary    Relevant Medications    prochlorperazine (COMPAZINE) 10 MG tablet (Start on 3/20/2024)    ondansetron (ZOFRAN) 8 MG tablet (Start on 3/20/2024)    dexAMETHasone (DECADRON) 4 MG tablet (Start on 3/20/2024)    Other Relevant Orders    Infusion Appointment Request       Other    Encounter for antineoplastic chemotherapy    Relevant Medications    prochlorperazine (COMPAZINE) 10 MG tablet (Start on 3/20/2024)    ondansetron (ZOFRAN) 8 MG tablet (Start on 3/20/2024)    dexAMETHasone (DECADRON) 4 MG tablet (Start on 3/20/2024)    Other Relevant Orders    Infusion Appointment Request     Other Visit Diagnoses       Invasive ductal carcinoma of breast, left (H)        Encounter for monitoring cardiotoxic drug therapy        Relevant Orders    Echocardiogram Complete    Encounter for fertility preservation procedure prior to cancer therapy        Relevant Orders    Ob/Gyn  Referral            ______________________________________________________________________________    Staging History   Cancer Staging   Malignant neoplasm of upper-outer quadrant of left breast in female, estrogen receptor negative (H)  Staging form: Breast, AJCC 8th Edition  - Pathologic stage from 3/13/2024: Stage IIA (pT1c, pN1a(sn), cM0, G3, ER-, [place]-, HER2+) - Signed by Rosalind Adams MD on 3/13/2024        History    Ms. Vibha Kingston is a very pleasant 33 year old female presented today accompanied by her  for further management of recent diagnosis of     1/9/2024- self palpated left medial breast mass   - diagnostic mammogram and ultrasound showed 3.4 x 2.2 x 2 cm large ill-defined hypoechoic area with associated calcification at 9:00 3 cm from the nipple.  Sonographic  survey of left axilla is within normal limits.    2024-ultrasound-guided core needle biopsy showed DCIS, grade 3, cribriform and papillary with comedonecrosis.  ER/CA was deferred to excisional specimen.    2024-breast actionable panel was negative including INGRID, BARD1, BRCA1, BRCA2, CDH1, CHEK2, NF1, PALB2, PTEN, STK11, TP53.    2024-MRI breast showed large segmental area of non-mass enhancement medial left breast measuring 10 cm from the posterior to subareolar depth corresponding to the known DCIS.  Mildly prominent bilateral internal mammary lymph node technically within normal limit.    3/5/2024-left breast simple mastectomy and left axillary sentinel lymph node biopsy   Invasive ductal carcinoma, grade 3   Multifocal, 4 foci vary from 1.3 mm to 11 mm in greatest dimension.   Margins were negative, closest margin less than 1 mm anterior/superior   DCIS, EIC present, solid and comedo pattern, grade 3, extensive comedonecrosis.  Focal lymphovascular invasion present.    sentinel lymph node was positive for metastatic carcinoma.   [id number]   ER negative in invasive carcinoma, positive and DCIS (80% of the tumor nuclei stain strongly)   CA negative and invasive cancer, positive in DCIS (30% of the tumor nuclei stain strongly of moderately)   HER2/aj positive for overexpression (3+ membrane staining)    She is recovering from surgery. No concerns today.    . LMP 3/2/2024, regular. Not on BCPs.  She works in customer service.   She does not smoke cigarettes, but uses marijuana once in a while. She rarely drinks alcohol.  Family history is significant for prostate cancer in father, pancreatic cancer in maternal uncle, kidney cancer and breast cancer in mother, kidney cancer in maternal GM and colon cancer on maternal uncle.    Review of systems.  Apart from describing in history, the remainder of comprehensive ROS was negative.    Past History    Past Medical History:   Diagnosis Date    Cancer  (H) 1/15/24    Breast cancer    Encounter for antineoplastic chemotherapy 3/14/2024     Past Surgical History:   Procedure Laterality Date    BIOPSY      A mole a few years back and an abnormal pap in college    BIOPSY NODE SENTINEL Left 3/5/2024    Procedure: WITH LEFT SENTINEL LYMPH NODE BIOPSY;  Surgeon: Aleena Real DO;  Location: [facility]    MASTECTOMY SIMPLE Left 3/5/2024    Procedure: LEFT MASTECTOMY;  Surgeon: Aleena Real DO;  Location: [facility]     Family History   Problem Relation Age of Onset    Hyperlipidemia Mother     Breast Cancer Mother 60        treated and in remission    Depression Mother     Hypertension Father     Hyperlipidemia Father     Prostate Cancer Father 65    Other Cancer Sister 40        ovarian cyst - cancerous - removed one ovary and hystro    Depression Sister     Anxiety Disorder Sister     Cervical Cancer Sister     Other Cancer Maternal Grandfather     Cerebrovascular Disease Other     Other Cancer Other     Other Cancer Maternal Grandmother      Social History     Socioeconomic History    Marital status:     Highest education level: Bachelor's degree (e.g., BA, AB, BS)   Tobacco Use    Smoking status: Former     Packs/day: 0.00     Years: 0.00     Additional pack years: 0.00     Total pack years: 0.00     Types: Cigarettes, Cigars, Hookah     Passive exposure: Never    Smokeless tobacco: Never    Tobacco comments:     Social, never created a habit   Vaping Use    Vaping Use: Never used   Substance and Sexual Activity    Alcohol use: Yes     Comment: 2 drinks per week    Drug use: Yes     Types: Marijuana    Sexual activity: Not Currently     Partners: Male     Birth control/protection: Abstinence   Other Topics Concern    Parent/sibling w/ CABG, MI or angioplasty before 65F 55M? No     Social Determinants of Health     Financial Resource Strain: Low Risk  (10/5/2023)    Financial Resource Strain     Within the past 12 months, have you or your  "family members you live with been unable to get utilities (heat, electricity) when it was really needed?: No   Food Insecurity: Low Risk  (10/5/2023)    Food Insecurity     Within the past 12 months, did you worry that your food would run out before you got money to buy more?: No     Within the past 12 months, did the food you bought just not last and you didn t have money to get more?: No   Transportation Needs: Low Risk  (10/5/2023)    Transportation Needs     Within the past 12 months, has lack of transportation kept you from medical appointments, getting your medicines, non-medical meetings or appointments, work, or from getting things that you need?: No   Interpersonal Safety: Low Risk  (12/22/2023)    Interpersonal Safety     Do you feel physically and emotionally safe where you currently live?: Yes     Within the past 12 months, have you been hit, slapped, kicked or otherwise physically hurt by someone?: No     Within the past 12 months, have you been humiliated or emotionally abused in other ways by your partner or ex-partner?: No   Housing Stability: Low Risk  (10/5/2023)    Housing Stability     Do you have housing? : Yes     Are you worried about losing your housing?: No       Allergies    No Known Allergies    Physical Exam    /73   Pulse 67   Temp 98  F (36.7  C) (Tympanic)   Resp 18   Ht 1.664 m (5' 5.51\")   Wt 74.4 kg (164 lb)   LMP 01/03/2024 (Approximate)   SpO2 100%   BMI 26.87 kg/m      General: alert, awake, not in acute distress  HEENT: Head: Normal, normocephalic, atraumatic.  Eye: Normal external eye, conjunctiva, lids cornea, GABRIELA.  Nose: Normal external nose, mucus membranes and septum.  Pharynx: Normal buccal mucosa. Normal pharynx.  Neck / Thyroid: Supple, no masses, nodes, nodules or enlargement.  Lymphatics: No abnormally enlarged lymph nodes.  Chest: Normal chest wall and respirations. Clear to auscultation.  Heart: S1 S2 RRR, no murmur.   Abdomen: abdomen is soft without " significant tenderness, masses, organomegaly or guarding  Extremities: normal strength, tone, and muscle mass  Skin: normal. no rash or abnormalities  CNS: non focal.    Lab Results    No results found for this or any previous visit (from the past 168 hour(s)).    Imaging Results    NM Lymphoscintigraphy Injection only    Result Date: 3/5/2024  INDICATION: Breast Carcinoma. Pre-operative identification of the left sentinel lymph node. PROCEDURE: Informed consent was obtained from the patient. The skin was prepped with ChloraPrep. 519 uCi 99mTc Lymphoseek injected in Lt upper, outer, periareolar by Alda Mayfield at 636 am was injected subdermally along the upper outer aspect of the areolar margin. The patient tolerated this well.  The procedure was performed by MONA MAYFIELD, on 3/5/2024, at 6:30 AM.    IMPRESSION: [place] lymph node injection.      65 minutes spent on the date of the encounter doing chart review, history and exam, documentation, communication of the treatment plan with the care team and further activities as noted above.    Signed by: Rosalind Adams MD

## 2024-03-14 NOTE — PROGRESS NOTES
"Oncology Rooming Note    March 14, 2024 10:57 AM   Vibha Kingston is a 33 year old female who presents for:    Chief Complaint   Patient presents with    Oncology Clinic Visit     New patient consult: Malignant neoplasm of upper-outer quadrant of left breast in female, estrogen receptor negative, Ductal carcinoma in situ (DCIS) of left breast     Initial Vitals: /73   Pulse 67   Temp 98  F (36.7  C) (Tympanic)   Resp 18   Ht 1.664 m (5' 5.51\")   Wt 74.4 kg (164 lb)   LMP 01/03/2024 (Approximate)   SpO2 100%   BMI 26.87 kg/m   Estimated body mass index is 26.87 kg/m  as calculated from the following:    Height as of this encounter: 1.664 m (5' 5.51\").    Weight as of this encounter: 74.4 kg (164 lb). Body surface area is 1.85 meters squared.  Mild Pain (3) Comment: Data Unavailable   Patient's last menstrual period was 01/03/2024 (approximate).  Allergies reviewed: Yes  Medications reviewed: Yes    Medications: Medication refills not needed today.  Pharmacy name entered into [employer]: [id number] - [place], [place] - [address] [place] [address]    Frailty Screening:   Is the patient here for a new oncology consult visit in cancer care? 1. Yes. Over the past month, have you experienced difficulty or required a caregiver to assist with:   1. Balance, walking or general mobility (including any falls)? NO  2. Completion of self-care tasks such as bathing, dressing, toileting, grooming/hygiene?  NO  3. Concentration or memory that affects your daily life?  NO       Clinical concerns:  NEW ONCOLOGY CONSULT.      Nyla Salazar MA              "

## 2024-03-15 ENCOUNTER — PATIENT OUTREACH (OUTPATIENT)
Dept: ONCOLOGY | Facility: HOSPITAL | Age: 34
End: 2024-03-15
Payer: COMMERCIAL

## 2024-03-15 NOTE — PROGRESS NOTES
[facility]: Cancer Care                                                                                          Called [facility] ([facility] at [phone] option #5. Talked with Ibis and she confirmed referral received and she contacted patient to fill out new patient paperwork. She said she will watch for this to be completed.    Signature:  Christie Agee RN

## 2024-03-18 NOTE — PROGRESS NOTES
History:  Vibha Kingston is s/p left mastectomy with SLN biopsy on March 5.  She is doing ok.  Met with Dr. Adams last week to discuss systemic therapies.  Has an echo planned and needs to follow-up with [facility].  She feels more soreness in the left axilla.  She has pretty much only been taking Tylenol for discomfort.    Physical exam:  BREAST: Glue removed from the incision.  No signs of infection, swelling, or hematoma.    Pathology:  A) LEFT BREAST SENTINEL LYMPH NODE:-        -  1 OF 2 LYMPH NODES POSITIVE FOR METASTATIC CARCINOMA        -  LARGEST CONTINUOUS METASTATIC DEPOSIT IS 5 MM IN GREATEST DIMENSION        -  NEGATIVE FOR EXTRANODAL TUMOR EXTENSION        -  NORMAL ADIPOSE TISSUE     B) LEFT BREAST, SIMPLE MASTECTOMY, WITH NEOPLASTIC LESION:        -  SEE FULL SYNOPTIC REPORT BELOW        -  SUMMARY OF SYNOPSIS:              -  INVASIVE DUCTAL CARCINOMA              -  HISTOLOGIC GRADE (MARISOL HISTOLOGIC SCORE):                    -  TUBULAR DIFFERENTIATION SCORE 3                    -  NUCLEAR PLEOMORPHISM SCORE 3                    -  MITOTIC RATE SCORE 2                    -  OVERALL GRADE 3 (TOTAL SCORE 8/9)              -  INVASIVE CARCINOMA IS UNIFOCAL, AND 8 MM IN GREATEST DIMENSION              -  DUCTAL CARCINOMA IN SITU (DCIS): EXTENSIVE INTRADUCTAL COMPONENT (EIC) PRESENT                      (DCIS PRESENT IN 14 OF 25 BLOCKS)                    -  SOLID AND COMEDO PATTERNS                    -  HIGH NUCLEAR GRADE (GRADE 3)                    -  EXTENSIVE COMEDO NECROSIS              -  FOCAL LYMPHOVASCULAR INVASION PRESENT (IN 1 BLOCK ONLY)              -  MICROCALCIFICATIONS PRESENT IN DCIS              -  ALL MARGINS NEGATIVE FOR INVASIVE CARCINOMA AND DCIS; CLOSEST MARGIN                      IS POSTERIOR, 5 MM FROM INVASIVE CARCINOMA AND DCIS              -  1 OF 2 SENTINEL LYMPH NODES POSITIVE FOR METASTATIC CARCINOMA (SEE SPECIMEN A)              -  ADDITIONAL FINDINGS: FLORID ADENOSIS;  FIBROADENOMATOSIS; PREVIOUS CORE BIOPSY SITE                      IDENTIFIED (NO BIOPSY CLIP RETRIEVED)              -  TUMOR IS STAGE pT1b AND pN1a (sn)                                                -  ADDITIONAL STUDIES:                    -  ESTROGEN RECEPTORS NEGATIVE IN INVASIVE CARCINOMA (NO NUCLEAR STAINING);                            POSITIVE IN DCIS (80% OF TUMOR NUCLEI STAIN STRONGLY)                    -  PROGESTERONE RECEPTORS NEGATIVE IN INVASIVE CARCINOMA (NO NUCLEAR STAINING);                            POSITIVE IN DCIS (30% OF TUMOR NUCLEI STAIN STRONGLY OR MODERATELY)                    -  HER2/JULIA RECEPTORS POSITIVE FOR OVEREXPRESSION (3+ MEMBRANE STAINING)    B) LEFT BREAST, SIMPLE MASTECTOMY:        -  INVASIVE CARCINOMA IS MULTIFOCAL; FOUR FOCI VARY FROM 1.3 MM TO 11 MM                IN GREATEST INDIVIDUAL DIMENSIONS        -  CLOSEST SURGICAL MARGIN IS ANTERIOR/SUPERIOR, LESS THAN 1 MM                FROM INVASIVE CARCINOMA AND DCIS        -  TUMOR IS STAGE pT1c(m) AND pN1a(sn)    ASSESSMENT:  Vibha Kingston is s/p left mastectomy for what turned out to be a multifocal invasive ductal carcinoma    - Grade III, mT1, N1, ER/AK/HER2+  --> pathologic prognostic stage I    PLAN:  Michelle removed  Pathology was discussed  Referral have already been placed for medical and radiation oncology  A referral to prosthetics was placed on the day of surgery.  Start yearly mammograms on the right  150 minutes of aerobic exercise/week with 2 days of strength training is recommended     - This can improve survival and decrease recurrence risk  She plans on starting systemic therapy for her HER2 positivity.  Preoperative orders have been placed for port placement.  My  will work with her to pick a date.  Return to the breast surgery clinic in 1 year, or earlier as needed    Aleena Real DO  General Surgeon  [facility]  Breast [place] [address]   [place] [address]  Office: [phone]  Employed by [facility]

## 2024-03-19 ENCOUNTER — OFFICE VISIT (OUTPATIENT)
Dept: SURGERY | Facility: HOSPITAL | Age: 34
End: 2024-03-19
Attending: SURGERY
Payer: COMMERCIAL

## 2024-03-19 VITALS — BODY MASS INDEX: 26.23 KG/M2 | HEIGHT: 66 IN | WEIGHT: 163.2 LBS

## 2024-03-19 DIAGNOSIS — C50.912 INVASIVE DUCTAL CARCINOMA OF BREAST, FEMALE, LEFT (H): Primary | ICD-10-CM

## 2024-03-19 PROCEDURE — 99024 POSTOP FOLLOW-UP VISIT: CPT | Performed by: SURGERY

## 2024-03-19 PROCEDURE — 99213 OFFICE O/P EST LOW 20 MIN: CPT | Performed by: SURGERY

## 2024-03-19 RX ORDER — ACETAMINOPHEN 500 MG
1000 TABLET ORAL EVERY 8 HOURS PRN
COMMUNITY

## 2024-03-19 RX ORDER — CEFAZOLIN SODIUM 2 G/100ML
2 INJECTION, SOLUTION INTRAVENOUS
Status: CANCELLED | OUTPATIENT
Start: 2024-03-19

## 2024-03-19 ASSESSMENT — PAIN SCALES - GENERAL: PAINLEVEL: MILD PAIN (3)

## 2024-03-19 NOTE — LETTER
3/19/2024         RE: Vibha Kingston  [address] [place] [address]        Dear Colleague,    Thank you for referring your patient, Vibha Kingston, to the [facility]. Please see a copy of my visit note below.    History:  Vibha Kingston is s/p left mastectomy with SLN biopsy on March 5.  She is doing ok.  Met with Dr. Adams last week to discuss systemic therapies.  Has an echo planned and needs to follow-up with [facility].  She feels more soreness in the left axilla.  She has pretty much only been taking Tylenol for discomfort.    Physical exam:  BREAST: Glue removed from the incision.  No signs of infection, swelling, or hematoma.    Pathology:  A) LEFT BREAST SENTINEL LYMPH NODE:-        -  1 OF 2 LYMPH NODES POSITIVE FOR METASTATIC CARCINOMA        -  LARGEST CONTINUOUS METASTATIC DEPOSIT IS 5 MM IN GREATEST DIMENSION        -  NEGATIVE FOR EXTRANODAL TUMOR EXTENSION        -  NORMAL ADIPOSE TISSUE     B) LEFT BREAST, SIMPLE MASTECTOMY, WITH NEOPLASTIC LESION:        -  SEE FULL SYNOPTIC REPORT BELOW        -  SUMMARY OF SYNOPSIS:              -  INVASIVE DUCTAL CARCINOMA              -  HISTOLOGIC GRADE (MARISOL HISTOLOGIC SCORE):                    -  TUBULAR DIFFERENTIATION SCORE 3                    -  NUCLEAR PLEOMORPHISM SCORE 3                    -  MITOTIC RATE SCORE 2                    -  OVERALL GRADE 3 (TOTAL SCORE 8/9)              -  INVASIVE CARCINOMA IS UNIFOCAL, AND 8 MM IN GREATEST DIMENSION              -  DUCTAL CARCINOMA IN SITU (DCIS): EXTENSIVE INTRADUCTAL COMPONENT (EIC) PRESENT                      (DCIS PRESENT IN 14 OF 25 BLOCKS)                    -  SOLID AND COMEDO PATTERNS                    -  HIGH NUCLEAR GRADE (GRADE 3)                    -  EXTENSIVE COMEDO NECROSIS              -  FOCAL LYMPHOVASCULAR INVASION PRESENT (IN 1 BLOCK ONLY)              -  MICROCALCIFICATIONS PRESENT IN DCIS              -  ALL MARGINS NEGATIVE FOR INVASIVE  CARCINOMA AND DCIS; CLOSEST MARGIN                      IS POSTERIOR, 5 MM FROM INVASIVE CARCINOMA AND DCIS              -  1 OF 2 SENTINEL LYMPH NODES POSITIVE FOR METASTATIC CARCINOMA (SEE SPECIMEN A)              -  ADDITIONAL FINDINGS: FLORID ADENOSIS; FIBROADENOMATOSIS; PREVIOUS CORE BIOPSY SITE                      IDENTIFIED (NO BIOPSY CLIP RETRIEVED)              -  TUMOR IS STAGE pT1b AND pN1a (sn)                                                -  ADDITIONAL STUDIES:                    -  ESTROGEN RECEPTORS NEGATIVE IN INVASIVE CARCINOMA (NO NUCLEAR STAINING);                            POSITIVE IN DCIS (80% OF TUMOR NUCLEI STAIN STRONGLY)                    -  PROGESTERONE RECEPTORS NEGATIVE IN INVASIVE CARCINOMA (NO NUCLEAR STAINING);                            POSITIVE IN DCIS (30% OF TUMOR NUCLEI STAIN STRONGLY OR MODERATELY)                    -  HER2/JULIA RECEPTORS POSITIVE FOR OVEREXPRESSION (3+ MEMBRANE STAINING)    B) LEFT BREAST, SIMPLE MASTECTOMY:        -  INVASIVE CARCINOMA IS MULTIFOCAL; FOUR FOCI VARY FROM 1.3 MM TO 11 MM                IN GREATEST INDIVIDUAL DIMENSIONS        -  CLOSEST SURGICAL MARGIN IS ANTERIOR/SUPERIOR, LESS THAN 1 MM                FROM INVASIVE CARCINOMA AND DCIS        -  TUMOR IS STAGE pT1c(m) AND pN1a(sn)    ASSESSMENT:  Vibha Kingston is s/p left mastectomy for what turned out to be a multifocal invasive ductal carcinoma    - Grade III, mT1, N1, ER/MS/HER2+  --> pathologic prognostic stage I    PLAN:  Michelle removed  Pathology was discussed  Referral have already been placed for medical and radiation oncology  A referral to prosthetics was placed on the day of surgery.  Start yearly mammograms on the right  150 minutes of aerobic exercise/week with 2 days of strength training is recommended     - This can improve survival and decrease recurrence risk  She plans on starting systemic therapy for her HER2 positivity.  Preoperative orders have been placed for port  "placement.  My  will work with her to pick a date.  Return to the breast surgery clinic in 1 year, or earlier as needed    Aleena Real DO  General Surgeon  [facility]  Breast [address] [address]  Office: [phone]  Employed by - [employer]      Oncology Rooming Note    March 19, 2024 2:31 PM   Vibha Kingston is a 33 year old female who presents for:    Chief Complaint   Patient presents with     Consult     Invasive ductal carcinoma of breast, female, left     Initial Vitals: Ht 1.664 m (5' 5.51\")   Wt 74 kg (163 lb 3.2 oz)   LMP 01/03/2024 (Approximate)   BMI 26.74 kg/m   Estimated body mass index is 26.74 kg/m  as calculated from the following:    Height as of this encounter: 1.664 m (5' 5.51\").    Weight as of this encounter: 74 kg (163 lb 3.2 oz). Body surface area is 1.85 meters squared.  Mild Pain (3) Comment: Data Unavailable   Patient's last menstrual period was 01/03/2024 (approximate).  Allergies reviewed: Yes  Medications reviewed: Yes    Medications: Medication refills not needed today.  Pharmacy name entered into [employer]: [facility]/PH[facility]ACY #[id number] [facility] [address]    Frailty Screening:   Is the patient here for a new oncology consult visit in cancer care? 2. No      Clinical concerns: Left armpit tenderness and tightness per pt.       Dina Comer MA                Again, thank you for allowing me to participate in the care of your patient.        Sincerely,        Aleena Real DO  "

## 2024-03-19 NOTE — PROGRESS NOTES
"Oncology Rooming Note    March 19, 2024 2:31 PM   Vibha Kingston is a 33 year old female who presents for:    Chief Complaint   Patient presents with    Consult     Invasive ductal carcinoma of breast, female, left     Initial Vitals: Ht 1.664 m (5' 5.51\")   Wt 74 kg (163 lb 3.2 oz)   LMP 01/03/2024 (Approximate)   BMI 26.74 kg/m   Estimated body mass index is 26.74 kg/m  as calculated from the following:    Height as of this encounter: 1.664 m (5' 5.51\").    Weight as of this encounter: 74 kg (163 lb 3.2 oz). Body surface area is 1.85 meters squared.  Mild Pain (3) Comment: Data Unavailable   Patient's last menstrual period was 01/03/2024 (approximate).  Allergies reviewed: Yes  Medications reviewed: Yes    Medications: Medication refills not needed today.  Pharmacy name entered into [employer]: [id number] - [place], [place] - [address] [facility]    Frailty Screening:   Is the patient here for a new oncology consult visit in cancer care? 2. No      Clinical concerns: Left armpit tenderness and tightness per pt.       Dina Comer MA              "

## 2024-03-20 ENCOUNTER — PATIENT OUTREACH (OUTPATIENT)
Dept: ONCOLOGY | Facility: HOSPITAL | Age: 34
End: 2024-03-20
Payer: COMMERCIAL

## 2024-03-20 PROBLEM — C50.912 INVASIVE DUCTAL CARCINOMA OF BREAST, FEMALE, LEFT (H): Status: ACTIVE | Noted: 2024-03-19

## 2024-03-20 NOTE — PROGRESS NOTES
[facility]: Cancer Care                                                                                          Vibha called to request that office notes be faxed to [facility] ([facility]  for her upcoming consult  Office notes for Dr. Adams and Dr. Real faxed to [facility] , fax number [phone]  as continuation of care for referral that was placed for [facility]. Fax confirmed by fax right on 3/20/24 at 0004.    Signature:  Chikis Escalera RN

## 2024-03-22 ENCOUNTER — HOSPITAL ENCOUNTER (OUTPATIENT)
Dept: CARDIOLOGY | Facility: CLINIC | Age: 34
Discharge: HOME | End: 2024-03-22
Attending: INTERNAL MEDICINE | Admitting: INTERNAL MEDICINE
Payer: COMMERCIAL

## 2024-03-22 DIAGNOSIS — Z51.81 ENCOUNTER FOR MONITORING CARDIOTOXIC DRUG THERAPY: ICD-10-CM

## 2024-03-22 DIAGNOSIS — Z79.899 ENCOUNTER FOR MONITORING CARDIOTOXIC DRUG THERAPY: ICD-10-CM

## 2024-03-22 LAB — LVEF ECHO: NORMAL

## 2024-03-22 PROCEDURE — 93306 TTE W/DOPPLER COMPLETE: CPT

## 2024-03-22 PROCEDURE — 93306 TTE W/DOPPLER COMPLETE: CPT | Mod: 26 | Performed by: STUDENT IN AN ORGANIZED HEALTH CARE EDUCATION/TRAINING PROGRAM

## 2024-03-22 PROCEDURE — 93356 MYOCRD STRAIN IMG SPCKL TRCK: CPT | Performed by: STUDENT IN AN ORGANIZED HEALTH CARE EDUCATION/TRAINING PROGRAM

## 2024-03-29 ENCOUNTER — MYC MEDICAL ADVICE (OUTPATIENT)
Dept: ONCOLOGY | Facility: HOSPITAL | Age: 34
End: 2024-03-29
Payer: COMMERCIAL

## 2024-03-29 ENCOUNTER — PATIENT OUTREACH (OUTPATIENT)
Dept: ONCOLOGY | Facility: HOSPITAL | Age: 34
End: 2024-03-29
Payer: COMMERCIAL

## 2024-03-29 DIAGNOSIS — Z51.11 ENCOUNTER FOR ANTINEOPLASTIC CHEMOTHERAPY: Primary | ICD-10-CM

## 2024-03-29 DIAGNOSIS — Z17.1 MALIGNANT NEOPLASM OF UPPER-OUTER QUADRANT OF LEFT BREAST IN FEMALE, ESTROGEN RECEPTOR NEGATIVE (H): ICD-10-CM

## 2024-03-29 DIAGNOSIS — C50.412 MALIGNANT NEOPLASM OF UPPER-OUTER QUADRANT OF LEFT BREAST IN FEMALE, ESTROGEN RECEPTOR NEGATIVE (H): ICD-10-CM

## 2024-03-29 RX ORDER — LIDOCAINE/PRILOCAINE 2.5 %-2.5%
CREAM (GRAM) TOPICAL
Qty: 30 G | Refills: 2 | Status: SHIPPED | OUTPATIENT
Start: 2024-03-29

## 2024-03-29 NOTE — PROGRESS NOTES
[facility]: Cancer Care                                                                                          Patient called and left message on Cancer Care Triage line requesting call back.    Called patient back. Patient reports she has FMLA paperwork that needs to be completed.  Told her she can send in mychart attachment, fax, or drop off to us.  She said she will send as Mychart attachment.  She said we can fax paperwork to her employer or send back to her.  If faxed, send copy to patient in mail.      Patient reports is scheduled to start the process for egg preservation tonight.  She said they are planning on retrieval of the eggs 4/11, 4/12.   She said she will be freezing her eggs.    Patient scheduled for port placement on 4/19/24. Patient requesting EMLA Rx be sent to [facility]. Reviewed how and when to apply EMLA cream before port access. Patient verbalized understanding.  Patient reports she is scheduled for trip to [place] 5/29-6/5. She said they are flying.  She is wondering how she might be feeling.  Reviewed her treatment schedule with her. Told her she may not be feeling the greatest on her vacation, if her schedule is kept as easy.  Told her patient's tend to not feel good the week or so after treatment and tend to start feeling better the week before she is due for her next treatment.  Recommended she move up her first treatment by a week.  Patient in agreement to this.  Told patient will call her about a week before her first treatment to do chemo education.  Patient instructed to call sooner if has further questions, concerns, symptoms. Patient verbalized understanding.    Transferred call to Sandeep Willson. Patient rescheduled for labs, Dr. Adams, infusion to 4/26/24.    Follow-up call to patient in ~1 month.    Message sent to Dr. Adams for EMLA Rx.    Signature:  Christie Agee, RN

## 2024-04-01 ENCOUNTER — OFFICE VISIT (OUTPATIENT)
Dept: FAMILY MEDICINE | Facility: CLINIC | Age: 34
End: 2024-04-01
Payer: COMMERCIAL

## 2024-04-01 VITALS
WEIGHT: 163 LBS | SYSTOLIC BLOOD PRESSURE: 106 MMHG | HEIGHT: 65 IN | RESPIRATION RATE: 16 BRPM | BODY MASS INDEX: 27.16 KG/M2 | OXYGEN SATURATION: 97 % | HEART RATE: 73 BPM | DIASTOLIC BLOOD PRESSURE: 68 MMHG | TEMPERATURE: 97.8 F

## 2024-04-01 DIAGNOSIS — D05.12 DUCTAL CARCINOMA IN SITU (DCIS) OF LEFT BREAST: ICD-10-CM

## 2024-04-01 DIAGNOSIS — Z01.818 PREOP GENERAL PHYSICAL EXAM: Primary | ICD-10-CM

## 2024-04-01 DIAGNOSIS — Z31.62 VISIT FOR FERTILITY PRESERVATION COUNSELING PRIOR TO CANCER THERAPY: ICD-10-CM

## 2024-04-01 DIAGNOSIS — D05.12 NEOPLASM OF LEFT BREAST, PRIMARY TUMOR STAGING CATEGORY TIS: DUCTAL CARCINOMA IN SITU (DCIS): ICD-10-CM

## 2024-04-01 DIAGNOSIS — Z90.12 HISTORY OF UNILATERAL MODIFIED RADICAL MASTECTOMY, LEFT: ICD-10-CM

## 2024-04-01 PROCEDURE — 90677 PCV20 VACCINE IM: CPT | Performed by: NURSE PRACTITIONER

## 2024-04-01 PROCEDURE — 90471 IMMUNIZATION ADMIN: CPT | Performed by: NURSE PRACTITIONER

## 2024-04-01 PROCEDURE — 99214 OFFICE O/P EST MOD 30 MIN: CPT | Mod: 25 | Performed by: NURSE PRACTITIONER

## 2024-04-01 RX ORDER — LORAZEPAM 1 MG/1
1 TABLET ORAL
Qty: 10 TABLET | Refills: 0 | Status: SHIPPED | OUTPATIENT
Start: 2024-04-01 | End: 2024-06-28

## 2024-04-01 NOTE — TELEPHONE ENCOUNTER
[place] [place] forms are complete and faxed to [phone] with Right Fax confirmation. Patient will be informed via [employer].  Patient will start chemo end of Apirl and will get treatments every 21 days, she will be able to take time off for all appointments and if she does not fell well.  Danyell CHE CMA

## 2024-04-01 NOTE — H&P (VIEW-ONLY)
Preoperative Evaluation  [facility]  [address] [address]  Phone: [phone]  Fax: [phone]  Primary Provider: Zully Beck  Pre-op Performing Provider: ZULLY BECK  Apr 1, 2024       Vibha is a 33 year old, presenting for the following:  Pre-Op Exam (Pre-op has been change to 04/11 for freezing of patient eggs )        4/1/2024     8:39 AM   Additional Questions   Roomed by DAY-LPN   Accompanied by NONE     Surgical Information  Surgery/Procedure: FREEZING OF EGGS  [facility]  Surgeon: Linda Villa  Surgery Date: 04/11/  Time of Surgery: TBD  Where patient plans to recover: At home with family  Fax number for surgical facility: [phone]          Assessment & Plan     The proposed surgical procedure is considered LOW risk.    Preop general physical exam  Visit for fertility preservation counseling prior to cancer therapy  This is a 33-year-old female with past medical history significant for DCIS of the left breast status post left breast mastectomy.  She is here for preop physical exam clearance for egg retrieval planned for April through our [facility].  After today's visit physical exam and review of her medical record she is cleared for this procedure.  No labs need to be obtained.  Patient was instructed to not take any dexamethasone which is on her medication list until recommended by her chemo team before chemotherapy.  Fertility provider and anesthesiologist to be aware that patient is not actively taking this medication at the time of her egg retrieval.    See below visit note and attached snapshot of patient's full medical history, allergies, medication list    Anesthesiologist to be aware o f scope patch pre anesthesia to assist with prevention of N/V      Neoplasm of left breast, primary tumor staging category Tis: ductal carcinoma in situ (DCIS)  Ductal carcinoma in situ (DCIS) of  left breast  History of unilateral modified radical mastectomy, left  - LORazepam (ATIVAN) 1 MG tablet  Dispense: 10 tablet; Refill: 0     She occasionally does get anxiety before procedures and some imaging studies for which she does take Ativan as needed.  I will refill these today, patient instructed that she may take this before her procedure if needed for anxiety but should let anesthesiologist know     - No identified additional risk factors other than previously addressed    Antiplatelet or Anticoagulation Medication Instructions   - Patient is on no antiplatelet or anticoagulation medications.    Additional Medication Instructions  Patient is on no additional chronic medications    Recommendation  APPROVAL GIVEN to proceed with proposed procedure, without further diagnostic evaluation.          Subjective   HPI related to upcoming procedure:   Followed for fertility preservation before chemo by [facility] -She has been working quickly with them.    The egg retrieval will be on 4/11/24.  Has been followed with ultrasound up to this oint and taking medications as prescribes.     Denies current symptoms of chest pain, palpitations, shortness of breath, nausea vomiting diarrhea, dizziness, weakness, muscle aches, fatigue.      She does have some feelings of agitation and depression though these have been improving since taking 5-HT Daily and being followed by a therapist.  She occasionally does get anxiety before procedures and some imaging studies for which she does take Ativan as needed.  She has since run out of these    DCIS -   S/p Mastectomy L side 12/2023 - healing well - following up with surgeon  Plan to Chemo starting 4/26/24 - will start dexamethasone the day before. Lido cream will be just before the infusion.   She will have a prot/central line placed 4/19/24         3/28/2024     5:04 PM   Preop Questions   1. Have you ever had a heart attack or stroke? No   2. Have you ever had surgery on your heart or  blood vessels, such as a stent placement, a coronary artery bypass, or surgery on an artery in your head, neck, heart, or legs? No   3. Do you have chest pain with activity? No   4. Do you have a history of  heart failure? No   5. Do you currently have a cold, bronchitis or symptoms of other infection? No   6. Do you have a cough, shortness of breath, or wheezing? No   7. Do you or anyone in your family have previous history of blood clots? No   8. Do you or does anyone in your family have a serious bleeding problem such as prolonged bleeding following surgeries or cuts? No   9. Have you ever had problems with anemia or been told to take iron pills? No   10. Have you had any abnormal blood loss such as black, tarry or bloody stools, or abnormal vaginal bleeding? No   11. Have you ever had a blood transfusion? No   12. Are you willing to have a blood transfusion if it is medically needed before, during, or after your surgery? Yes   13. Have you or any of your relatives ever had problems with anesthesia? No   14. Do you have sleep apnea, excessive snoring or daytime drowsiness? No   15. Do you have any artifical heart valves or other implanted medical devices like a pacemaker, defibrillator, or continuous glucose monitor? No   16. Do you have artificial joints? No   17. Are you allergic to latex? No   18. Is there any chance that you may be pregnant? No       Health Care Directive  Patient does not have a Health Care Directive or Living Will: Discussed advance care planning with patient; information given to patient to review.  Filled it out before surgery.     Preoperative Review of    reviewed - controlled substances prescribed by other outside provider(s).  She is no longer taking tramadol.       03/05/2024 03/05/2024 1 Tramadol Hcl 50 Mg Tablet 10.00 2 As Zil [id number] Gra (8197) 0/0 50.00 [place]   01/23/2024 01/23/2024 1 Lorazepam 1 Mg Tablet 2.00 2 As Zil [id number] Gra (8                 Patient  "Active Problem List    Diagnosis Date Noted    History of unilateral modified radical mastectomy, left 04/01/2024     Priority: Medium    Visit for fertility preservation counseling prior to cancer therapy 04/01/2024     Priority: Medium    Invasive ductal carcinoma of breast, female, left (H) 03/19/2024     Priority: Medium    Encounter for antineoplastic chemotherapy 03/14/2024     Priority: Medium    Malignant neoplasm of upper-outer quadrant of left breast in female, estrogen receptor negative (H) 03/13/2024     Priority: Medium    Adjustment disorder with depressed mood 02/26/2024     Priority: Medium    Ductal carcinoma in situ (DCIS) of left breast 02/16/2024     Priority: Medium     Diagnosed January 2024  Left breast total mastectomy March 5, 2024      Abnormal Pap smear of cervix 10/16/2023     Priority: Medium     10/09/23 NIL Pap, Neg HR HPV Plan cotest in 3 years per office visit note \"one normal pap seen on documentation from 11/2021 (cotesting), colpo was at age 22 years old at [facility]. No abnormal since this visit. If normal today repeat in 3 years and then 5 years.\"         Past Medical History:   Diagnosis Date    Cancer (H) 1/15/24    Breast cancer    Encounter for antineoplastic chemotherapy 3/14/2024     Past Surgical History:   Procedure Laterality Date    BIOPSY      A mole a few years back and an abnormal pap in [facility]    BIOPSY NODE SENTINEL Left 3/5/2024    Procedure: WITH LEFT SENTINEL LYMPH NODE BIOPSY;  Surgeon: Aleena Real DO;  Location: [facility] Main OR    MASTECTOMY SIMPLE Left 3/5/2024    Procedure: LEFT MASTECTOMY;  Surgeon: Aleena Real DO;  Location: [facility] Main OR     Current Outpatient Medications   Medication Sig Dispense Refill    5-Hydroxytryptophan (5-HTP) 100 MG CAPS Take 100 mg by mouth at bedtime      acetaminophen (TYLENOL) 500 MG tablet Take 1,000 mg by mouth every 8 hours as needed for mild pain, other or pain      dexAMETHasone (DECADRON) 4 MG " "tablet Take 2 tablets (8 mg) by mouth 2 times daily (with meals) Start evening of Docetaxel infusion and continue for a total of 3 doses. 6 tablet 5    lidocaine-prilocaine (EMLA) 2.5-2.5 % external cream Apply to port site 30-60 minutes before port access 30 g 2    LORazepam (ATIVAN) 1 MG tablet Take 1 tablet (1 mg) by mouth once as needed for anxiety 10 tablet 0    melatonin 3 MG tablet Take 3 mg by mouth nightly as needed for sleep      ondansetron (ZOFRAN) 8 MG tablet Take 1 tablet (8 mg) by mouth every 8 hours as needed for nausea (vomiting) 30 tablet 2    prochlorperazine (COMPAZINE) 10 MG tablet Take 1 tablet (10 mg) by mouth every 6 hours as needed for nausea or vomiting 30 tablet 2       No Known Allergies     Social History     Tobacco Use    Smoking status: Former     Packs/day: 0.00     Years: 0.00     Additional pack years: 0.00     Total pack years: 0.00     Types: Cigarettes, Cigars, Hookah     Passive exposure: Never    Smokeless tobacco: Never    Tobacco comments:     Social, never created a habit   Substance Use Topics    Alcohol use: Yes     Comment: 2 drinks per week       History   Drug Use    Types: Marijuana         Review of Systems    Review of Systems  Constitutional, HEENT, cardiovascular, pulmonary, gi and gu systems are negative, except as otherwise noted.    Objective    /68 (BP Location: Right arm, Patient Position: Sitting, Cuff Size: Adult Small)   Pulse 73   Temp 97.8  F (36.6  C) (Oral)   Resp 16   Ht 1.651 m (5' 5\")   Wt 73.9 kg (163 lb)   LMP 04/01/2024 (Approximate)   SpO2 97%   BMI 27.12 kg/m     Estimated body mass index is 27.12 kg/m  as calculated from the following:    Height as of this encounter: 1.651 m (5' 5\").    Weight as of this encounter: 73.9 kg (163 lb).    Physical Exam  GENERAL: alert and no distress  EYES: Eyes grossly normal to inspection, PERRL and conjunctivae and sclerae normal  HENT: ear canals and TM's normal, nose and mouth without ulcers or " lesions  NECK: no adenopathy, no asymmetry, masses, or scars  RESP: lungs clear to auscultation - no rales, rhonchi or wheezes  CV: regular rate and rhythm, normal S1 S2, no S3 or S4, no murmur, click or rub, no peripheral edema  CHEST: s/p Left breast mastectomy - scar well approximated - healing well - no swelling, erythema, reduced sensation.   ABDOMEN: soft, nontender, no hepatosplenomegaly, no masses and bowel sounds normal  MS: no gross musculoskeletal defects noted, no edema  SKIN: no suspicious lesions or rashes  NEURO: Normal strength and tone, mentation intact and speech normal  PSYCH: mentation appears normal, affect normal/bright    Recent Labs   Lab Test 02/16/24  0947 11/01/23  1620   HGB 14.9  --      --     138   POTASSIUM 4.5 4.1   CR 0.81 0.76        Diagnostics  No labs were ordered during this visit.   No EKG required, no history of coronary heart disease, significant arrhythmia, peripheral arterial disease or other structural heart disease.    Revised Cardiac Risk Index (RCRI)  The patient has the following serious cardiovascular risks for perioperative complications:   - No serious cardiac risks = 0 points     RCRI Interpretation: 0 points: Class I (very low risk - 0.4% complication rate)         Signed Electronically by: AYLA Randolph CNP  Copy of this evaluation report is provided to requesting physician.

## 2024-04-01 NOTE — PROGRESS NOTES
Preoperative Evaluation  [facility]  [address] [address]  Phone: [phone]  Fax: [phone]  Primary Provider: Zully Beck  Pre-op Performing Provider: ZULLY BECK  Apr 1, 2024       Vibha is a 33 year old, presenting for the following:  Pre-Op Exam (Pre-op has been change to 04/11 for freezing of patient eggs )        4/1/2024     8:39 AM   Additional Questions   Roomed by DAY-LPN   Accompanied by NONE     Surgical Information  Surgery/Procedure: FREEZING OF EGGS  [facility]  Surgeon: Linda Villa  Surgery Date: 04/11/  Time of Surgery: TBD  Where patient plans to recover: At home with family  Fax number for surgical facility: [phone]          Assessment & Plan     The proposed surgical procedure is considered LOW risk.    Preop general physical exam  Visit for fertility preservation counseling prior to cancer therapy  This is a 33-year-old female with past medical history significant for DCIS of the left breast status post left breast mastectomy.  She is here for preop physical exam clearance for egg retrieval planned for April through our [facility].  After today's visit physical exam and review of her medical record she is cleared for this procedure.  No labs need to be obtained.  Patient was instructed to not take any dexamethasone which is on her medication list until recommended by her chemo team before chemotherapy.  Fertility provider and anesthesiologist to be aware that patient is not actively taking this medication at the time of her egg retrieval.    See below visit note and attached snapshot of patient's full medical history, allergies, medication list    Anesthesiologist to be aware o f scope patch pre anesthesia to assist with prevention of N/V      Neoplasm of left breast, primary tumor staging category Tis: ductal carcinoma in situ (DCIS)  Ductal carcinoma in situ (DCIS) of  left breast  History of unilateral modified radical mastectomy, left  - LORazepam (ATIVAN) 1 MG tablet  Dispense: 10 tablet; Refill: 0     She occasionally does get anxiety before procedures and some imaging studies for which she does take Ativan as needed.  I will refill these today, patient instructed that she may take this before her procedure if needed for anxiety but should let anesthesiologist know     - No identified additional risk factors other than previously addressed    Antiplatelet or Anticoagulation Medication Instructions   - Patient is on no antiplatelet or anticoagulation medications.    Additional Medication Instructions  Patient is on no additional chronic medications    Recommendation  APPROVAL GIVEN to proceed with proposed procedure, without further diagnostic evaluation.          Subjective   HPI related to upcoming procedure:   Followed for fertility preservation before chemo by [facility] -She has been working quickly with them.    The egg retrieval will be on 4/11/24.  Has been followed with ultrasound up to this oint and taking medications as prescribes.     Denies current symptoms of chest pain, palpitations, shortness of breath, nausea vomiting diarrhea, dizziness, weakness, muscle aches, fatigue.      She does have some feelings of agitation and depression though these have been improving since taking 5-HT Daily and being followed by a therapist.  She occasionally does get anxiety before procedures and some imaging studies for which she does take Ativan as needed.  She has since run out of these    DCIS -   S/p Mastectomy L side 12/2023 - healing well - following up with surgeon  Plan to Chemo starting 4/26/24 - will start dexamethasone the day before. Lido cream will be just before the infusion.   She will have a prot/central line placed 4/19/24         3/28/2024     5:04 PM   Preop Questions   1. Have you ever had a heart attack or stroke? No   2. Have you ever had surgery on your heart or  blood vessels, such as a stent placement, a coronary artery bypass, or surgery on an artery in your head, neck, heart, or legs? No   3. Do you have chest pain with activity? No   4. Do you have a history of  heart failure? No   5. Do you currently have a cold, bronchitis or symptoms of other infection? No   6. Do you have a cough, shortness of breath, or wheezing? No   7. Do you or anyone in your family have previous history of blood clots? No   8. Do you or does anyone in your family have a serious bleeding problem such as prolonged bleeding following surgeries or cuts? No   9. Have you ever had problems with anemia or been told to take iron pills? No   10. Have you had any abnormal blood loss such as black, tarry or bloody stools, or abnormal vaginal bleeding? No   11. Have you ever had a blood transfusion? No   12. Are you willing to have a blood transfusion if it is medically needed before, during, or after your surgery? Yes   13. Have you or any of your relatives ever had problems with anesthesia? No   14. Do you have sleep apnea, excessive snoring or daytime drowsiness? No   15. Do you have any artifical heart valves or other implanted medical devices like a pacemaker, defibrillator, or continuous glucose monitor? No   16. Do you have artificial joints? No   17. Are you allergic to latex? No   18. Is there any chance that you may be pregnant? No       Health Care Directive  Patient does not have a Health Care Directive or Living Will: Discussed advance care planning with patient; information given to patient to review.  Filled it out before surgery.     Preoperative Review of    reviewed - controlled substances prescribed by other outside provider(s).  She is no longer taking tramadol.       03/05/2024 03/05/2024 1 Tramadol Hcl 50 Mg Tablet 10.00 2 As Zil [id number] Gra (8197) 0/0 50.00 [place]   01/23/2024 01/23/2024 1 Lorazepam 1 Mg Tablet 2.00 2 As Zil [id number] Gra (8                 Patient  "Active Problem List    Diagnosis Date Noted    History of unilateral modified radical mastectomy, left 04/01/2024     Priority: Medium    Visit for fertility preservation counseling prior to cancer therapy 04/01/2024     Priority: Medium    Invasive ductal carcinoma of breast, female, left (H) 03/19/2024     Priority: Medium    Encounter for antineoplastic chemotherapy 03/14/2024     Priority: Medium    Malignant neoplasm of upper-outer quadrant of left breast in female, estrogen receptor negative (H) 03/13/2024     Priority: Medium    Adjustment disorder with depressed mood 02/26/2024     Priority: Medium    Ductal carcinoma in situ (DCIS) of left breast 02/16/2024     Priority: Medium     Diagnosed January 2024  Left breast total mastectomy March 5, 2024      Abnormal Pap smear of cervix 10/16/2023     Priority: Medium     10/09/23 NIL Pap, Neg HR HPV Plan cotest in 3 years per office visit note \"one normal pap seen on documentation from 11/2021 (cotesting), colpo was at age 22 years old at [facility]. No abnormal since this visit. If normal today repeat in 3 years and then 5 years.\"         Past Medical History:   Diagnosis Date    Cancer (H) 1/15/24    Breast cancer    Encounter for antineoplastic chemotherapy 3/14/2024     Past Surgical History:   Procedure Laterality Date    BIOPSY      A mole a few years back and an abnormal pap in [facility]    BIOPSY NODE SENTINEL Left 3/5/2024    Procedure: WITH LEFT SENTINEL LYMPH NODE BIOPSY;  Surgeon: Aleena Real DO;  Location: [facility] Main OR    MASTECTOMY SIMPLE Left 3/5/2024    Procedure: LEFT MASTECTOMY;  Surgeon: Aleena Real DO;  Location: [facility] Main OR     Current Outpatient Medications   Medication Sig Dispense Refill    5-Hydroxytryptophan (5-HTP) 100 MG CAPS Take 100 mg by mouth at bedtime      acetaminophen (TYLENOL) 500 MG tablet Take 1,000 mg by mouth every 8 hours as needed for mild pain, other or pain      dexAMETHasone (DECADRON) 4 MG " "tablet Take 2 tablets (8 mg) by mouth 2 times daily (with meals) Start evening of Docetaxel infusion and continue for a total of 3 doses. 6 tablet 5    lidocaine-prilocaine (EMLA) 2.5-2.5 % external cream Apply to port site 30-60 minutes before port access 30 g 2    LORazepam (ATIVAN) 1 MG tablet Take 1 tablet (1 mg) by mouth once as needed for anxiety 10 tablet 0    melatonin 3 MG tablet Take 3 mg by mouth nightly as needed for sleep      ondansetron (ZOFRAN) 8 MG tablet Take 1 tablet (8 mg) by mouth every 8 hours as needed for nausea (vomiting) 30 tablet 2    prochlorperazine (COMPAZINE) 10 MG tablet Take 1 tablet (10 mg) by mouth every 6 hours as needed for nausea or vomiting 30 tablet 2       No Known Allergies     Social History     Tobacco Use    Smoking status: Former     Packs/day: 0.00     Years: 0.00     Additional pack years: 0.00     Total pack years: 0.00     Types: Cigarettes, Cigars, Hookah     Passive exposure: Never    Smokeless tobacco: Never    Tobacco comments:     Social, never created a habit   Substance Use Topics    Alcohol use: Yes     Comment: 2 drinks per week       History   Drug Use    Types: Marijuana         Review of Systems    Review of Systems  Constitutional, HEENT, cardiovascular, pulmonary, gi and gu systems are negative, except as otherwise noted.    Objective    /68 (BP Location: Right arm, Patient Position: Sitting, Cuff Size: Adult Small)   Pulse 73   Temp 97.8  F (36.6  C) (Oral)   Resp 16   Ht 1.651 m (5' 5\")   Wt 73.9 kg (163 lb)   LMP 04/01/2024 (Approximate)   SpO2 97%   BMI 27.12 kg/m     Estimated body mass index is 27.12 kg/m  as calculated from the following:    Height as of this encounter: 1.651 m (5' 5\").    Weight as of this encounter: 73.9 kg (163 lb).    Physical Exam  GENERAL: alert and no distress  EYES: Eyes grossly normal to inspection, PERRL and conjunctivae and sclerae normal  HENT: ear canals and TM's normal, nose and mouth without ulcers or " lesions  NECK: no adenopathy, no asymmetry, masses, or scars  RESP: lungs clear to auscultation - no rales, rhonchi or wheezes  CV: regular rate and rhythm, normal S1 S2, no S3 or S4, no murmur, click or rub, no peripheral edema  CHEST: s/p Left breast mastectomy - scar well approximated - healing well - no swelling, erythema, reduced sensation.   ABDOMEN: soft, nontender, no hepatosplenomegaly, no masses and bowel sounds normal  MS: no gross musculoskeletal defects noted, no edema  SKIN: no suspicious lesions or rashes  NEURO: Normal strength and tone, mentation intact and speech normal  PSYCH: mentation appears normal, affect normal/bright    Recent Labs   Lab Test 02/16/24  0947 11/01/23  1620   HGB 14.9  --      --     138   POTASSIUM 4.5 4.1   CR 0.81 0.76        Diagnostics  No labs were ordered during this visit.   No EKG required, no history of coronary heart disease, significant arrhythmia, peripheral arterial disease or other structural heart disease.    Revised Cardiac Risk Index (RCRI)  The patient has the following serious cardiovascular risks for perioperative complications:   - No serious cardiac risks = 0 points     RCRI Interpretation: 0 points: Class I (very low risk - 0.4% complication rate)         Signed Electronically by: AYLA Randolph CNP  Copy of this evaluation report is provided to requesting physician.

## 2024-04-01 NOTE — PATIENT INSTRUCTIONS
Preparing for Your Surgery  Getting started  A nurse will call you to review your health history and instructions. They will give you an arrival time based on your scheduled surgery time. Please be ready to share:  Your doctor's clinic name and phone number  Your medical, surgical, and anesthesia history  A list of allergies and sensitivities  A list of medicines, including herbal treatments and over-the-counter drugs  Whether the patient has a legal guardian (ask how to send us the papers in advance)  Please tell us if you're pregnant--or if there's any chance you might be pregnant. Some surgeries may injure a fetus (unborn baby), so they require a pregnancy test. Surgeries that are safe for a fetus don't always need a test, and you can choose whether to have one.   If you have a child who's having surgery, please ask for a copy of Preparing for Your Child's Surgery.    Preparing for surgery  Within 10 to 30 days of surgery: Have a pre-op exam (sometimes called an H&P, or History and Physical). This can be done at a clinic or pre-operative center.  If you're having a , you may not need this exam. Talk to your care team.  At your pre-op exam, talk to your care team about all medicines you take. If you need to stop any medicines before surgery, ask when to start taking them again.  We do this for your safety. Many medicines can make you bleed too much during surgery. Some change how well surgery (anesthesia) drugs work.  Call your insurance company to let them know you're having surgery. (If you don't have insurance, call [phone].)  Call your clinic if there's any change in your health. This includes signs of a cold or flu (sore throat, runny nose, cough, rash, fever). It also includes a scrape or scratch near the surgery site.  If you have questions on the day of surgery, call your hospital or surgery center.  Eating and drinking guidelines  For your safety: Unless your surgeon tells you otherwise,  follow the guidelines below.  Eat and drink as usual until 8 hours before you arrive for surgery. After that, no food or milk.  Drink clear liquids until 2 hours before you arrive. These are liquids you can see through, like water, Gatorade, and Propel Water. They also include plain black coffee and tea (no cream or milk), candy, and breath mints. You can spit out gum when you arrive.  If you drink alcohol: Stop drinking it the night before surgery.  If your care team tells you to take medicine on the morning of surgery, it's okay to take it with a sip of water.  Preventing infection  Shower or bathe the night before and morning of your surgery. Follow the instructions your clinic gave you. (If no instructions, use regular soap.)  Don't shave or clip hair near your surgery site. We'll remove the hair if needed.  Don't smoke or vape the morning of surgery. You may chew nicotine gum up to 2 hours before surgery. A nicotine patch is okay.  Note: Some surgeries require you to completely quit smoking and nicotine. Check with your surgeon.  Your care team will make every effort to keep you safe from infection. We will:  Clean our hands often with soap and water (or an alcohol-based hand rub).  Clean the skin at your surgery site with a special soap that kills germs.  Give you a special gown to keep you warm. (Cold raises the risk of infection.)  Wear special hair covers, masks, gowns and gloves during surgery.  Give antibiotic medicine, if prescribed. Not all surgeries need antibiotics.  What to bring on the day of surgery  Photo ID and insurance card  Copy of your health care directive, if you have one  Glasses and hearing aids (bring cases)  You can't wear contacts during surgery  Inhaler and eye drops, if you use them (tell us about these when you arrive)  CPAP machine or breathing device, if you use them  A few personal items, if spending the night  If you have . . .  A pacemaker, ICD (cardiac defibrillator) or other  implant: Bring the ID card.  An implanted stimulator: Bring the remote control.  A legal guardian: Bring a copy of the certified (court-stamped) guardianship papers.  Please remove any jewelry, including body piercings. Leave jewelry and other valuables at home.  If you're going home the day of surgery  You must have a responsible adult drive you home. They should stay with you overnight as well.  If you don't have someone to stay with you, and you aren't safe to go home alone, we may keep you overnight. Insurance often won't pay for this.  After surgery  If it's hard to control your pain or you need more pain medicine, please call your surgeon's office.  Questions?   If you have any questions for your care team, list them here: _________________________________________________________________________________________________________________________________________________________________________ ____________________________________ ____________________________________ ____________________________________  For informational purposes only. Not to replace the advice of your health care provider. Copyright   2003, 2019 [facility]. All rights reserved. Clinically reviewed by Camilla Rodriguez MD. SMARTworks 958990 - REV 12/22.    How to Take Your Medication Before Surgery  Dexamethasone until after the egg retrieval

## 2024-04-15 RX ORDER — FAMOTIDINE 20 MG
TABLET ORAL
COMMUNITY

## 2024-04-18 ENCOUNTER — ANESTHESIA EVENT (OUTPATIENT)
Dept: SURGERY | Facility: AMBULATORY SURGERY CENTER | Age: 34
End: 2024-04-18
Payer: COMMERCIAL

## 2024-04-18 ENCOUNTER — PATIENT OUTREACH (OUTPATIENT)
Dept: ONCOLOGY | Facility: HOSPITAL | Age: 34
End: 2024-04-18
Payer: COMMERCIAL

## 2024-04-18 NOTE — PROGRESS NOTES
[facility]: Cancer Care Plan of Care Education Note                                    Discussion with Patient:                                                      Called patient patient and left message calling to review upcoming treatemnt and to call back [phone].     Patient returned call. Chemo education completed.     Reviewed dosing instructions and side effects for:  Antiemetics: prochlorperazine (Compazine), ondansetron (Zofran)  Steroid use/side effect: dexamethasone    Patient scheduled for port placement on 4/19/24.  Instructed how and when to apply EMLA cream.    Patient reports above medications picked up from her pharmacy.    Assessment:                                                      Assessment completed with:: Patient    Plan of Care Education   Yearly learning assessment completed?: Yes (see Education tab)  Diagnosis:: Breast Cancer  Does patient understand diagnosis?: Yes  Tx plan/regimen:: DOCEtaxel / CARBOplatin / TRASTuzumab / Pertuzumab (TCHP) then TRASTuzumab / Pertuzumab  Does patient understand treatment plan/regimen?: Yes  Preparing for treatment:: Reviewed treatment preparation information with patient (vascular access, day of chemo, visitor policy, what to bring, etc.)  Vascular access education provided for:: Port  Side effect education:: Diarrhea/Constipation;Lab value monitoring (anemia, neutropenia, thrombocytopenia);Sexual health;Skin changes;DVT/PE;Urinary;Endocrine effects;Fatigue;Mouth sores;Hair loss;Mylosuppression;Immune-mediated effects;Infection;Nausea/Vomiting;Neuropathy;Infertility  Supportive services education provided for:: Cancer rehab;Nutrition;Support group;Social work;Palliative care;Oncology behavioral health  Plan of Care:: Lab appointment;MD follow-up appointment;Treatment schedule  When to call provider:: Bleeding;Increased shortness of breath;New/worsening pain;Uncontrolled nausea/vomiting;Uncontrolled diarrhea/constipation;Shaking  chills;Temperature >100.4F  Reasons for deferring treatment reviewed with patient:: Yes  Additional education provided for: : Steroid therapy    Evaluation of Learning  Patient Education Provided: Yes  Readiness:: Acceptance  Method:: Explanation  Response:: Verbalizes understanding    Patient instructed she needs to have a  to her treatments as she will be receiving benadryl as part of her treatment plan. Told her this medication can cause drowsiness so we don't want her driving after taking this medication.  Patient verbalized understanding and said her  will be coming with her to her first treatment. Patient wondering how many people she can have with her. Reviewed visitor policy (2 visitors in clinic and 1 visitor in Infusion).    Patient reports had egg preservation last week and they were able to retrieve 19 of her eggs.    Intervention/Education provided during outreach:                                                       Told patient will check with her when she is here for treatment on 4/26/24 and provide her with dexamethasone calendar. Told patient will then call and check in on her the following Mon or Tues to see how she is doing after her first treatment. She was instructed to call sooner with questions, symptoms, concerns. Patient verbalized understanding.    Hair loss information sent via [employer].    Follow up call in 1-2 weeks  Patient to follow up as scheduled at next appt  Confirmed patient has clinic and triage numbers    Signature:  Christie Agee RN

## 2024-04-19 ENCOUNTER — HOSPITAL ENCOUNTER (OUTPATIENT)
Facility: AMBULATORY SURGERY CENTER | Age: 34
Discharge: HOME | End: 2024-04-19
Attending: SURGERY
Payer: COMMERCIAL

## 2024-04-19 ENCOUNTER — ANESTHESIA (OUTPATIENT)
Dept: SURGERY | Facility: AMBULATORY SURGERY CENTER | Age: 34
End: 2024-04-19
Payer: COMMERCIAL

## 2024-04-19 VITALS
OXYGEN SATURATION: 96 % | SYSTOLIC BLOOD PRESSURE: 125 MMHG | WEIGHT: 163 LBS | HEART RATE: 103 BPM | HEIGHT: 65 IN | BODY MASS INDEX: 27.16 KG/M2 | TEMPERATURE: 97.7 F | RESPIRATION RATE: 16 BRPM | DIASTOLIC BLOOD PRESSURE: 66 MMHG

## 2024-04-19 DIAGNOSIS — C50.912 INVASIVE DUCTAL CARCINOMA OF BREAST, FEMALE, LEFT (H): ICD-10-CM

## 2024-04-19 PROCEDURE — 76937 US GUIDE VASCULAR ACCESS: CPT | Mod: 26 | Performed by: SURGERY

## 2024-04-19 PROCEDURE — 36561 INSERT TUNNELED CV CATH: CPT | Performed by: SURGERY

## 2024-04-19 PROCEDURE — 77001 FLUOROGUIDE FOR VEIN DEVICE: CPT | Mod: 26 | Performed by: SURGERY

## 2024-04-19 DEVICE — PORT, BIOFLO VORTEX, LOW PROFILE SINGLE LUMEN, 8FR H787CT80LPBDV10: Type: IMPLANTABLE DEVICE | Site: CHEST | Status: FUNCTIONAL

## 2024-04-19 RX ORDER — EPHEDRINE SULFATE 50 MG/ML
INJECTION, SOLUTION INTRAMUSCULAR; INTRAVENOUS; SUBCUTANEOUS PRN
Status: DISCONTINUED | OUTPATIENT
Start: 2024-04-19 | End: 2024-04-19

## 2024-04-19 RX ORDER — GLYCOPYRROLATE 0.2 MG/ML
INJECTION, SOLUTION INTRAMUSCULAR; INTRAVENOUS PRN
Status: DISCONTINUED | OUTPATIENT
Start: 2024-04-19 | End: 2024-04-19

## 2024-04-19 RX ORDER — OXYCODONE HYDROCHLORIDE 5 MG/1
5 TABLET ORAL
Status: DISCONTINUED | OUTPATIENT
Start: 2024-04-19 | End: 2024-04-20 | Stop reason: HOSPADM

## 2024-04-19 RX ORDER — ONDANSETRON 2 MG/ML
INJECTION INTRAMUSCULAR; INTRAVENOUS PRN
Status: DISCONTINUED | OUTPATIENT
Start: 2024-04-19 | End: 2024-04-19

## 2024-04-19 RX ORDER — ACETAMINOPHEN 325 MG/1
975 TABLET ORAL ONCE
Status: COMPLETED | OUTPATIENT
Start: 2024-04-19 | End: 2024-04-19

## 2024-04-19 RX ORDER — ONDANSETRON 2 MG/ML
4 INJECTION INTRAMUSCULAR; INTRAVENOUS EVERY 30 MIN PRN
Status: DISCONTINUED | OUTPATIENT
Start: 2024-04-19 | End: 2024-04-20 | Stop reason: HOSPADM

## 2024-04-19 RX ORDER — PROPOFOL 10 MG/ML
INJECTION, EMULSION INTRAVENOUS PRN
Status: DISCONTINUED | OUTPATIENT
Start: 2024-04-19 | End: 2024-04-19

## 2024-04-19 RX ORDER — PROPOFOL 10 MG/ML
INJECTION, EMULSION INTRAVENOUS CONTINUOUS PRN
Status: DISCONTINUED | OUTPATIENT
Start: 2024-04-19 | End: 2024-04-19

## 2024-04-19 RX ORDER — CEFAZOLIN SODIUM 2 G/100ML
2 INJECTION, SOLUTION INTRAVENOUS
Status: COMPLETED | OUTPATIENT
Start: 2024-04-19 | End: 2024-04-19

## 2024-04-19 RX ORDER — SODIUM CHLORIDE, SODIUM LACTATE, POTASSIUM CHLORIDE, CALCIUM CHLORIDE 600; 310; 30; 20 MG/100ML; MG/100ML; MG/100ML; MG/100ML
INJECTION, SOLUTION INTRAVENOUS CONTINUOUS
Status: DISCONTINUED | OUTPATIENT
Start: 2024-04-19 | End: 2024-04-20 | Stop reason: HOSPADM

## 2024-04-19 RX ORDER — LIDOCAINE 40 MG/G
CREAM TOPICAL
Status: DISCONTINUED | OUTPATIENT
Start: 2024-04-19 | End: 2024-04-20 | Stop reason: HOSPADM

## 2024-04-19 RX ORDER — FENTANYL CITRATE 50 UG/ML
INJECTION, SOLUTION INTRAMUSCULAR; INTRAVENOUS PRN
Status: DISCONTINUED | OUTPATIENT
Start: 2024-04-19 | End: 2024-04-19

## 2024-04-19 RX ORDER — LIDOCAINE HYDROCHLORIDE 20 MG/ML
INJECTION, SOLUTION INFILTRATION; PERINEURAL PRN
Status: DISCONTINUED | OUTPATIENT
Start: 2024-04-19 | End: 2024-04-19

## 2024-04-19 RX ORDER — DEXAMETHASONE SODIUM PHOSPHATE 4 MG/ML
INJECTION, SOLUTION INTRA-ARTICULAR; INTRALESIONAL; INTRAMUSCULAR; INTRAVENOUS; SOFT TISSUE PRN
Status: DISCONTINUED | OUTPATIENT
Start: 2024-04-19 | End: 2024-04-19

## 2024-04-19 RX ORDER — ONDANSETRON 4 MG/1
4 TABLET, ORALLY DISINTEGRATING ORAL EVERY 30 MIN PRN
Status: DISCONTINUED | OUTPATIENT
Start: 2024-04-19 | End: 2024-04-20 | Stop reason: HOSPADM

## 2024-04-19 RX ORDER — NALOXONE HYDROCHLORIDE 0.4 MG/ML
0.1 INJECTION, SOLUTION INTRAMUSCULAR; INTRAVENOUS; SUBCUTANEOUS
Status: DISCONTINUED | OUTPATIENT
Start: 2024-04-19 | End: 2024-04-20 | Stop reason: HOSPADM

## 2024-04-19 RX ORDER — OXYCODONE HYDROCHLORIDE 10 MG/1
10 TABLET ORAL
Status: DISCONTINUED | OUTPATIENT
Start: 2024-04-19 | End: 2024-04-20 | Stop reason: HOSPADM

## 2024-04-19 RX ADMIN — LIDOCAINE HYDROCHLORIDE 50 MG: 20 INJECTION, SOLUTION INFILTRATION; PERINEURAL at 09:05

## 2024-04-19 RX ADMIN — PROPOFOL 200 MCG/KG/MIN: 10 INJECTION, EMULSION INTRAVENOUS at 09:07

## 2024-04-19 RX ADMIN — SODIUM CHLORIDE, SODIUM LACTATE, POTASSIUM CHLORIDE, CALCIUM CHLORIDE: 600; 310; 30; 20 INJECTION, SOLUTION INTRAVENOUS at 08:51

## 2024-04-19 RX ADMIN — DEXAMETHASONE SODIUM PHOSPHATE 4 MG: 4 INJECTION, SOLUTION INTRA-ARTICULAR; INTRALESIONAL; INTRAMUSCULAR; INTRAVENOUS; SOFT TISSUE at 09:05

## 2024-04-19 RX ADMIN — ONDANSETRON 4 MG: 2 INJECTION INTRAMUSCULAR; INTRAVENOUS at 09:05

## 2024-04-19 RX ADMIN — PROPOFOL 30 MG: 10 INJECTION, EMULSION INTRAVENOUS at 09:07

## 2024-04-19 RX ADMIN — FENTANYL CITRATE 25 MCG: 50 INJECTION, SOLUTION INTRAMUSCULAR; INTRAVENOUS at 09:14

## 2024-04-19 RX ADMIN — ACETAMINOPHEN 975 MG: 325 TABLET ORAL at 08:51

## 2024-04-19 RX ADMIN — CEFAZOLIN SODIUM 2 G: 2 INJECTION, SOLUTION INTRAVENOUS at 09:05

## 2024-04-19 RX ADMIN — GLYCOPYRROLATE 0.2 MG: 0.2 INJECTION, SOLUTION INTRAMUSCULAR; INTRAVENOUS at 09:05

## 2024-04-19 RX ADMIN — EPHEDRINE SULFATE 10 MG: 50 INJECTION, SOLUTION INTRAMUSCULAR; INTRAVENOUS; SUBCUTANEOUS at 09:25

## 2024-04-19 NOTE — ANESTHESIA CARE TRANSFER NOTE
Patient: Vibha Kingston    Procedure: Procedure(s):  INSERTION, VASCULAR ACCESS PORT UNDER ULTRASOUND AND FLUOROSCOPIC GUIDANCE       Diagnosis: Invasive ductal carcinoma of breast, female, left (H) [C50.912]  Diagnosis Additional Information: No value filed.    Anesthesia Type:   MAC     Note:    Oropharynx: oropharynx clear of all foreign objects  Level of Consciousness: drowsy  Oxygen Supplementation: face mask  Level of Supplemental Oxygen (L/min / FiO2): 8  Independent Airway: airway patency satisfactory and stable  Dentition: dentition unchanged  Vital Signs Stable: post-procedure vital signs reviewed and stable  Report to RN Given: handoff report given  Patient transferred to: Phase II    Handoff Report: Identifed the Patient, Identified the Reponsible Provider, Reviewed the pertinent medical history, Discussed the surgical course, Reviewed Intra-OP anesthesia mangement and issues during anesthesia, Set expectations for post-procedure period and Allowed opportunity for questions and acknowledgement of understanding      Vitals:  Vitals Value Taken Time   /54 04/19/24 0943   Temp 97.7  F (36.5  C) 04/19/24 0943   Pulse 68    Resp 14 04/19/24 0943   SpO2 97 % 04/19/24 0943       Electronically Signed By: AYLA Steen CRNA  April 19, 2024  9:45 AM

## 2024-04-19 NOTE — DISCHARGE INSTRUCTIONS
If you have any questions or concerns regarding your procedure, please contact Dr. Aleena Real, her office number is [phone].    You received 975 mg of acetaminophen (Tylenol) at 8:51. Please do not take an additional dose of Tylenol until after 2:51 pm.    Do not exceed 4,000 mg of acetaminophen in a 24 hour period, keep in mind that acetaminophen can also be found in many over-the-counter cold medications as well as narcotics that may be given for pain.      Same-Day Surgery - Adult Discharge Orders & Instructions     For 24 hours after surgery    Get plenty of rest.  A responsible adult must stay with you for at least 24 hours after you leave the hospital.     Do not drive or use heavy equipment.  If you have weakness or tingling, don't drive or use heavy equipment until this feeling goes away.    Do not drink alcohol.    Avoid strenuous or risky activities.  Ask for help when climbing stairs.     You may feel lightheaded.  If so, sit for a few minutes before standing.  Have someone help you get up.     You may have a slight fever. Call the doctor if your fever is over 100 F (37.7 C) (taken under the tongue) or lasts longer than 24 hours.    You may have a dry mouth, a sore throat, muscle aches or trouble sleeping.  These should go away after 24 hours.    Do not make important or legal decisions.    Based on the surgery/procedure that you had today, we do not expect that you will have any problems.  However, we want you to know what to do if you have pain, nausea, bleeding, or infection:    To control pain:  Take medicines your physician has prescribed or or over-the counter medicine he or she advises.  Ice packs and periods of rest are often helpful.  For surgery on an arm or leg, raise it on a pillow to ease swelling.  If your pain is not managed with the above methods, contact your physician.    Copyright [employer], Licensed under [employer]    To control nausea:  Take anti-nausea  medicine approved by your physician.  Drink clear liquids such as apple juice, ginger ale, broth or 7-Up. Be sure to drink enough fluids.  Move to a regular diet as you feel able.  Rest may also help.    Bleeding:  You may see a little blood on your dressing, about the size of a quarter in the first 24 hours.  If you see this, there is no reason to be alarmed.  However, if this continues to increase in size, apply pressure if able, and notify your physician.    Copyright [employer], Licensed under [employer] International    Infection: Please contact your physician if you have any of the following signs:  redness, swelling, heat, increasing pain or foul-smelling drainage at your surgery site, fever or chills.    Call your doctor for any of the followin.  It has been over 8 to 10 hours since surgery and you are still not able to urinate (pass water).    2.  Headache for over 24 hours.    3.  Numbness, tingling or weakness in your legs the day after surgery (if you had spinal anesthesia).

## 2024-04-19 NOTE — PROGRESS NOTES
Offered patient pregnancy test.  Explained there are risks associated with anesthesia and pregnancy.  Patient verbalizes understanding, denies possibility of pregnancy and declines pregnancy test.    Zully Fernando RN on 4/19/2024 at 8:27 AM

## 2024-04-19 NOTE — ANESTHESIA PREPROCEDURE EVALUATION
Anesthesia Pre-Procedure Evaluation    Patient: Vibha Kingston   MRN: [id number] : 1990        Procedure : Procedure(s):  INSERTION, VASCULAR ACCESS PORT UNDER ULTRASOUND AND FLUOROSCOPIC GUIDANCE          Past Medical History:   Diagnosis Date    Cancer (H) 1/15/24    Breast cancer    Encounter for antineoplastic chemotherapy 2024      Past Surgical History:   Procedure Laterality Date    BIOPSY      A mole a few years back and an abnormal pap in [facility]    BIOPSY NODE SENTINEL Left 2024    Procedure: WITH LEFT SENTINEL LYMPH NODE BIOPSY;  Surgeon: Aleena Real DO;  Location: [facility] Main OR    FERTILITY SURGERY  04/10/2024    Egg retrieval    MASTECTOMY SIMPLE Left 2024    Procedure: LEFT MASTECTOMY;  Surgeon: Aleena Real DO;  Location: [facility] OR      No Known Allergies   Social History     Tobacco Use    Smoking status: Former     Current packs/day: 0.00     Types: Cigarettes, Cigars, Hookah     Passive exposure: Never    Smokeless tobacco: Never    Tobacco comments:     Social, never created a habit   Substance Use Topics    Alcohol use: Not Currently     Comment: None since 2024      Wt Readings from Last 1 Encounters:   04/15/24 73.9 kg (163 lb)        Anesthesia Evaluation   Pt has had prior anesthetic.     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      Other:            Physical Exam    Airway        Mallampati: I   TM distance: > 3 FB   Neck ROM: full   Mouth  "opening: > 3 cm    Respiratory Devices and Support         Dental     Comment: Good        Cardiovascular   cardiovascular exam normal          Pulmonary   pulmonary exam normal            OUTSIDE LABS:  CBC:   Lab Results   Component Value Date    WBC 4.8 02/16/2024    HGB 14.9 02/16/2024    HCT 43.0 02/16/2024     02/16/2024     BMP:   Lab Results   Component Value Date     02/16/2024     11/01/2023    POTASSIUM 4.5 02/16/2024    POTASSIUM 4.1 11/01/2023    CHLORIDE 102 02/16/2024    CHLORIDE 102 11/01/2023    CO2 28 02/16/2024    CO2 28 11/01/2023    BUN 12.3 02/16/2024    BUN 10.8 11/01/2023    CR 0.81 02/16/2024    CR 0.76 11/01/2023    GLC 85 02/16/2024    GLC 92 11/01/2023     COAGS: No results found for: \"PTT\", \"INR\", \"FIBR\"  POC:   Lab Results   Component Value Date    HCG Negative 02/16/2024     HEPATIC: No results found for: \"ALBUMIN\", \"PROTTOTAL\", \"ALT\", \"AST\", \"GGT\", \"ALKPHOS\", \"BILITOTAL\", \"BILIDIRECT\", \"JOON\"  OTHER:   Lab Results   Component Value Date    RACHAEL 9.8 02/16/2024       Anesthesia Plan    ASA Status:  3    NPO Status:  NPO Appropriate    Anesthesia Type: MAC.              Consents    Anesthesia Plan(s) and associated risks, benefits, and realistic alternatives discussed. Questions answered and patient/representative(s) expressed understanding.     - Discussed: Risks, Benefits and Alternatives for BOTH SEDATION and the PROCEDURE were discussed     - Discussed with:  Patient       - Patient is DNR/DNI Status: No          Postoperative Care    Pain management: Multi-modal analgesia.   PONV prophylaxis: Ondansetron (or other 5HT-3), Dexamethasone or Solumedrol     Comments:    Other Comments: FiO2 less than 0.30 during cautery    Pt declined UPT           Monique Hidalgo MD    I have reviewed the pertinent notes and labs in the chart from the past 30 days and (re)examined the patient.  Any updates or changes from those notes are reflected in this note.              # " "Overweight: Estimated body mass index is 27.12 kg/m  as calculated from the following:    Height as of this encounter: 1.651 m (5' 5\").    Weight as of this encounter: 73.9 kg (163 lb).      "

## 2024-04-19 NOTE — OP NOTE
Name:  Vibha BARRAZA Thee  PCP:  Zully Bloom  Procedure Date:  4/19/2024      RIGHT INTERNAL JUGULAR VEIN PORT PLACEMENT UNDER ULTRASOUND AND FLUOROSCOPIC GUIDANCE      Pre-Procedure Diagnosis:  Invasive ductal carcinoma of breast, female, left     Post-Procedure Diagnosis:    Invasive ductal carcinoma of breast, female, left     Surgeon:  Aleena Real DO    Anesthesia Type:    MAC    Estimated Blood Loss:   5 mL    Complications:    None apparent    Indication for procedure:  This is a 33-year-old female who was found to have a HER2 positive invasive ductal carcinoma involving the left breast after a mastectomy.  The neck step in her treatment plan is adjuvant chemotherapy.  It was recommended that a port be placed for central venous access.    Operative Report:    After informed consent was obtained, and the risks and benefits of procedure were discussed, patient was brought back to the operative suite and placed in supine position.  MAC anesthesia was provided by the anesthesia department.  The neck and upper chest was prepped and draped in the usual sterile fashion.  Perioperative antibiotics were administered and a timeout was performed.  Ultrasound was utilized to visualize the right jugular vein.  It was easily compressible and without thrombus.  Patient was placed in Trendelenburg position.  Local anesthetic was administered prior to incisions.  Under ultrasound guidance, the right jugular vein was cannulated with a multipurpose needle.  There was return of dark red nonpulsatile blood.  A guidewire was then passed under fluoroscopic guidance to the SVC.  Attention was then directed to the right anterior chest wall.  An incision was made and a pocket was created.  A port with an 8 Yoruba single-lumen was tunneled subcutaneously up to the guidewire from the pocket.  Under fluoroscopic guidance, an introducer and dilator were placed over the guidewire.  The dilator and wire were then removed and exchanged  for the catheter, which was cut to a length of 16 cm.  The introducer sheath was then removed leaving the catheter in place.  The catheter was visualized in the proper location with the use of fluoroscopy.  The port was accessed and aspirated with ease, and then flushed with heparinized saline.  The subcutaneous tissue was then closed utilizing 3-0 Vicryl.  4-0 Monocryl was utilized to close the dermis at both sites.  Both incisions were then covered with Dermabond.    Disposition:  The patient tolerated the procedure well, and was transferred to the post-anesthesia care unit in stable condition.    Aleena Real DO  General Surgeon  [facility]  Surgery [address] [address]  Office: [phone]  Employed by - [employer]

## 2024-04-19 NOTE — ANESTHESIA POSTPROCEDURE EVALUATION
Patient: Vibha Kingston    Procedure: Procedure(s):  INSERTION, VASCULAR ACCESS PORT UNDER ULTRASOUND AND FLUOROSCOPIC GUIDANCE       Anesthesia Type:  MAC    Note:  Disposition: Outpatient   Postop Pain Control: Uneventful            Sign Out: Well controlled pain   PONV: No   Neuro/Psych: Uneventful            Sign Out: Acceptable/Baseline neuro status   Airway/Respiratory: Uneventful            Sign Out: Acceptable/Baseline resp. status   CV/Hemodynamics: Uneventful            Sign Out: Acceptable CV status; No obvious hypovolemia; No obvious fluid overload   Other NRE: NONE   DID A NON-ROUTINE EVENT OCCUR? No           Last vitals:  Vitals Value Taken Time   /58 04/19/24 1000   Temp 97.7  F (36.5  C) 04/19/24 0943   Pulse 109 04/19/24 1009   Resp 14 04/19/24 0943   SpO2 94 % 04/19/24 1009   Vitals shown include unfiled device data.    Electronically Signed By: Monique Hidalgo MD  April 19, 2024  10:12 AM

## 2024-04-19 NOTE — INTERVAL H&P NOTE
Patient seen in preop with her .  Plans on starting chemotherapy in 1 week.  All questions answered regarding procedure.  Consent obtained.  To the OR for port placement.    Aleena Real DO  General Surgeon  [facility]  Breast [place] - [address] [address]  Office: [phone]  Employed by - [employer]         RTC in 2 weeks

## 2024-04-26 ENCOUNTER — INFUSION THERAPY VISIT (OUTPATIENT)
Dept: INFUSION THERAPY | Facility: HOSPITAL | Age: 34
End: 2024-04-26
Attending: INTERNAL MEDICINE
Payer: COMMERCIAL

## 2024-04-26 ENCOUNTER — ONCOLOGY VISIT (OUTPATIENT)
Dept: ONCOLOGY | Facility: HOSPITAL | Age: 34
End: 2024-04-26
Attending: INTERNAL MEDICINE
Payer: COMMERCIAL

## 2024-04-26 ENCOUNTER — PATIENT OUTREACH (OUTPATIENT)
Dept: ONCOLOGY | Facility: HOSPITAL | Age: 34
End: 2024-04-26

## 2024-04-26 VITALS
HEIGHT: 65 IN | SYSTOLIC BLOOD PRESSURE: 94 MMHG | TEMPERATURE: 97.6 F | DIASTOLIC BLOOD PRESSURE: 77 MMHG | RESPIRATION RATE: 16 BRPM | WEIGHT: 166.2 LBS | BODY MASS INDEX: 27.69 KG/M2 | OXYGEN SATURATION: 97 % | HEART RATE: 80 BPM

## 2024-04-26 DIAGNOSIS — Z17.1 MALIGNANT NEOPLASM OF UPPER-OUTER QUADRANT OF LEFT BREAST IN FEMALE, ESTROGEN RECEPTOR NEGATIVE (H): ICD-10-CM

## 2024-04-26 DIAGNOSIS — Z17.1 MALIGNANT NEOPLASM OF UPPER-OUTER QUADRANT OF LEFT BREAST IN FEMALE, ESTROGEN RECEPTOR NEGATIVE (H): Primary | ICD-10-CM

## 2024-04-26 DIAGNOSIS — Z51.11 ENCOUNTER FOR ANTINEOPLASTIC CHEMOTHERAPY: Primary | ICD-10-CM

## 2024-04-26 DIAGNOSIS — C50.412 MALIGNANT NEOPLASM OF UPPER-OUTER QUADRANT OF LEFT BREAST IN FEMALE, ESTROGEN RECEPTOR NEGATIVE (H): Primary | ICD-10-CM

## 2024-04-26 DIAGNOSIS — C50.412 MALIGNANT NEOPLASM OF UPPER-OUTER QUADRANT OF LEFT BREAST IN FEMALE, ESTROGEN RECEPTOR NEGATIVE (H): ICD-10-CM

## 2024-04-26 LAB
ALBUMIN SERPL BCG-MCNC: 4.2 G/DL (ref 3.5–5.2)
ALP SERPL-CCNC: 46 U/L (ref 40–150)
ALT SERPL W P-5'-P-CCNC: 5 U/L (ref 0–50)
ANION GAP SERPL CALCULATED.3IONS-SCNC: 8 MMOL/L (ref 7–15)
AST SERPL W P-5'-P-CCNC: 14 U/L (ref 0–45)
BASOPHILS # BLD AUTO: 0 10E3/UL (ref 0–0.2)
BASOPHILS NFR BLD AUTO: 1 %
BILIRUB SERPL-MCNC: 0.3 MG/DL
BUN SERPL-MCNC: 17.6 MG/DL (ref 6–20)
CALCIUM SERPL-MCNC: 9.2 MG/DL (ref 8.6–10)
CHLORIDE SERPL-SCNC: 106 MMOL/L (ref 98–107)
CREAT SERPL-MCNC: 0.77 MG/DL (ref 0.51–0.95)
DEPRECATED HCO3 PLAS-SCNC: 27 MMOL/L (ref 22–29)
EGFRCR SERPLBLD CKD-EPI 2021: >90 ML/MIN/1.73M2
EOSINOPHIL # BLD AUTO: 0 10E3/UL (ref 0–0.7)
EOSINOPHIL NFR BLD AUTO: 1 %
ERYTHROCYTE [DISTWIDTH] IN BLOOD BY AUTOMATED COUNT: 12 % (ref 10–15)
GLUCOSE SERPL-MCNC: 85 MG/DL (ref 70–99)
HCT VFR BLD AUTO: 40.6 % (ref 35–47)
HGB BLD-MCNC: 13.9 G/DL (ref 11.7–15.7)
IMM GRANULOCYTES # BLD: 0 10E3/UL
IMM GRANULOCYTES NFR BLD: 0 %
LYMPHOCYTES # BLD AUTO: 1.1 10E3/UL (ref 0.8–5.3)
LYMPHOCYTES NFR BLD AUTO: 24 %
MCH RBC QN AUTO: 30.2 PG (ref 26.5–33)
MCHC RBC AUTO-ENTMCNC: 34.2 G/DL (ref 31.5–36.5)
MCV RBC AUTO: 88 FL (ref 78–100)
MONOCYTES # BLD AUTO: 0.4 10E3/UL (ref 0–1.3)
MONOCYTES NFR BLD AUTO: 10 %
NEUTROPHILS # BLD AUTO: 2.8 10E3/UL (ref 1.6–8.3)
NEUTROPHILS NFR BLD AUTO: 64 %
NRBC # BLD AUTO: 0 10E3/UL
NRBC BLD AUTO-RTO: 0 /100
PLATELET # BLD AUTO: 267 10E3/UL (ref 150–450)
POTASSIUM SERPL-SCNC: 4.1 MMOL/L (ref 3.4–5.3)
PROT SERPL-MCNC: 7.1 G/DL (ref 6.4–8.3)
RBC # BLD AUTO: 4.61 10E6/UL (ref 3.8–5.2)
SODIUM SERPL-SCNC: 141 MMOL/L (ref 135–145)
WBC # BLD AUTO: 4.4 10E3/UL (ref 4–11)

## 2024-04-26 PROCEDURE — 250N000013 HC RX MED GY IP 250 OP 250 PS 637: Performed by: INTERNAL MEDICINE

## 2024-04-26 PROCEDURE — 85004 AUTOMATED DIFF WBC COUNT: CPT | Performed by: INTERNAL MEDICINE

## 2024-04-26 PROCEDURE — 258N000003 HC RX IP 258 OP 636: Performed by: INTERNAL MEDICINE

## 2024-04-26 PROCEDURE — 250N000011 HC RX IP 250 OP 636: Mod: JZ | Performed by: INTERNAL MEDICINE

## 2024-04-26 PROCEDURE — 99215 OFFICE O/P EST HI 40 MIN: CPT | Performed by: INTERNAL MEDICINE

## 2024-04-26 PROCEDURE — 99213 OFFICE O/P EST LOW 20 MIN: CPT | Mod: 25 | Performed by: INTERNAL MEDICINE

## 2024-04-26 PROCEDURE — 96417 CHEMO IV INFUS EACH ADDL SEQ: CPT

## 2024-04-26 PROCEDURE — 96413 CHEMO IV INFUSION 1 HR: CPT

## 2024-04-26 PROCEDURE — 96415 CHEMO IV INFUSION ADDL HR: CPT

## 2024-04-26 PROCEDURE — 96367 TX/PROPH/DG ADDL SEQ IV INF: CPT

## 2024-04-26 PROCEDURE — 36591 DRAW BLOOD OFF VENOUS DEVICE: CPT | Performed by: INTERNAL MEDICINE

## 2024-04-26 PROCEDURE — 82040 ASSAY OF SERUM ALBUMIN: CPT | Performed by: INTERNAL MEDICINE

## 2024-04-26 PROCEDURE — 96375 TX/PRO/DX INJ NEW DRUG ADDON: CPT

## 2024-04-26 PROCEDURE — G2211 COMPLEX E/M VISIT ADD ON: HCPCS | Performed by: INTERNAL MEDICINE

## 2024-04-26 RX ORDER — ALBUTEROL SULFATE 90 UG/1
1-2 AEROSOL, METERED RESPIRATORY (INHALATION)
Status: DISCONTINUED | OUTPATIENT
Start: 2024-04-26 | End: 2024-04-26 | Stop reason: HOSPADM

## 2024-04-26 RX ORDER — EPINEPHRINE 1 MG/ML
0.3 INJECTION, SOLUTION INTRAMUSCULAR; SUBCUTANEOUS EVERY 5 MIN PRN
Status: CANCELLED | OUTPATIENT
Start: 2024-04-26

## 2024-04-26 RX ORDER — MEPERIDINE HYDROCHLORIDE 50 MG/ML
25 INJECTION INTRAMUSCULAR; INTRAVENOUS; SUBCUTANEOUS EVERY 30 MIN PRN
Status: CANCELLED | OUTPATIENT
Start: 2024-04-26

## 2024-04-26 RX ORDER — PALONOSETRON 0.05 MG/ML
0.25 INJECTION, SOLUTION INTRAVENOUS ONCE
Status: CANCELLED | OUTPATIENT
Start: 2024-04-26

## 2024-04-26 RX ORDER — EPINEPHRINE 1 MG/ML
0.3 INJECTION, SOLUTION INTRAMUSCULAR; SUBCUTANEOUS EVERY 5 MIN PRN
Status: DISCONTINUED | OUTPATIENT
Start: 2024-04-26 | End: 2024-04-26 | Stop reason: HOSPADM

## 2024-04-26 RX ORDER — ACETAMINOPHEN 325 MG/1
650 TABLET ORAL ONCE
Qty: 2 TABLET | Refills: 0 | Status: COMPLETED | OUTPATIENT
Start: 2024-04-26 | End: 2024-04-26

## 2024-04-26 RX ORDER — ACETAMINOPHEN 325 MG/1
650 TABLET ORAL ONCE
Status: CANCELLED | OUTPATIENT
Start: 2024-04-26

## 2024-04-26 RX ORDER — LORAZEPAM 2 MG/ML
0.5 INJECTION INTRAMUSCULAR EVERY 4 HOURS PRN
Status: CANCELLED | OUTPATIENT
Start: 2024-04-26

## 2024-04-26 RX ORDER — METHYLPREDNISOLONE SODIUM SUCCINATE 125 MG/2ML
125 INJECTION, POWDER, LYOPHILIZED, FOR SOLUTION INTRAMUSCULAR; INTRAVENOUS
Status: DISCONTINUED | OUTPATIENT
Start: 2024-04-26 | End: 2024-04-26 | Stop reason: HOSPADM

## 2024-04-26 RX ORDER — DIPHENHYDRAMINE HCL 50 MG
50 CAPSULE ORAL ONCE
Status: CANCELLED | OUTPATIENT
Start: 2024-04-26

## 2024-04-26 RX ORDER — PALONOSETRON 0.05 MG/ML
0.25 INJECTION, SOLUTION INTRAVENOUS ONCE
Qty: 5 ML | Refills: 0 | Status: COMPLETED | OUTPATIENT
Start: 2024-04-26 | End: 2024-04-26

## 2024-04-26 RX ORDER — HEPARIN SODIUM (PORCINE) LOCK FLUSH IV SOLN 100 UNIT/ML 100 UNIT/ML
5 SOLUTION INTRAVENOUS
Status: DISCONTINUED | OUTPATIENT
Start: 2024-04-26 | End: 2024-04-26 | Stop reason: HOSPADM

## 2024-04-26 RX ORDER — ALBUTEROL SULFATE 90 UG/1
1-2 AEROSOL, METERED RESPIRATORY (INHALATION)
Status: CANCELLED
Start: 2024-04-26

## 2024-04-26 RX ORDER — HEPARIN SODIUM,PORCINE 10 UNIT/ML
5-20 VIAL (ML) INTRAVENOUS DAILY PRN
Status: CANCELLED | OUTPATIENT
Start: 2024-04-26

## 2024-04-26 RX ORDER — HEPARIN SODIUM (PORCINE) LOCK FLUSH IV SOLN 100 UNIT/ML 100 UNIT/ML
5 SOLUTION INTRAVENOUS
Status: CANCELLED | OUTPATIENT
Start: 2024-04-26

## 2024-04-26 RX ORDER — DIPHENHYDRAMINE HYDROCHLORIDE 50 MG/ML
50 INJECTION INTRAMUSCULAR; INTRAVENOUS
Status: DISCONTINUED | OUTPATIENT
Start: 2024-04-26 | End: 2024-04-26 | Stop reason: HOSPADM

## 2024-04-26 RX ORDER — METHYLPREDNISOLONE SODIUM SUCCINATE 125 MG/2ML
125 INJECTION, POWDER, LYOPHILIZED, FOR SOLUTION INTRAMUSCULAR; INTRAVENOUS
Status: CANCELLED
Start: 2024-04-26

## 2024-04-26 RX ORDER — MEPERIDINE HYDROCHLORIDE 50 MG/ML
25 INJECTION INTRAMUSCULAR; INTRAVENOUS; SUBCUTANEOUS EVERY 30 MIN PRN
Status: DISCONTINUED | OUTPATIENT
Start: 2024-04-26 | End: 2024-04-26 | Stop reason: HOSPADM

## 2024-04-26 RX ORDER — ALBUTEROL SULFATE 0.83 MG/ML
2.5 SOLUTION RESPIRATORY (INHALATION)
Status: CANCELLED | OUTPATIENT
Start: 2024-04-26

## 2024-04-26 RX ORDER — DIPHENHYDRAMINE HYDROCHLORIDE 50 MG/ML
50 INJECTION INTRAMUSCULAR; INTRAVENOUS
Status: CANCELLED
Start: 2024-04-26

## 2024-04-26 RX ORDER — ALBUTEROL SULFATE 0.83 MG/ML
2.5 SOLUTION RESPIRATORY (INHALATION)
Status: DISCONTINUED | OUTPATIENT
Start: 2024-04-26 | End: 2024-04-26 | Stop reason: HOSPADM

## 2024-04-26 RX ADMIN — PERTUZUMAB 840 MG: 30 INJECTION, SOLUTION, CONCENTRATE INTRAVENOUS at 11:37

## 2024-04-26 RX ADMIN — ACETAMINOPHEN 650 MG: 325 TABLET ORAL at 10:32

## 2024-04-26 RX ADMIN — DIPHENHYDRAMINE HYDROCHLORIDE 50 MG: 50 INJECTION, SOLUTION INTRAMUSCULAR; INTRAVENOUS at 11:14

## 2024-04-26 RX ADMIN — SODIUM CHLORIDE 250 ML: 9 INJECTION, SOLUTION INTRAVENOUS at 10:28

## 2024-04-26 RX ADMIN — CARBOPLATIN 790 MG: 10 INJECTION, SOLUTION INTRAVENOUS at 15:22

## 2024-04-26 RX ADMIN — HEPARIN 5 ML: 100 SYRINGE at 16:10

## 2024-04-26 RX ADMIN — PALONOSETRON 0.25 MG: 0.05 INJECTION, SOLUTION INTRAVENOUS at 10:29

## 2024-04-26 RX ADMIN — SODIUM CHLORIDE 600 MG: 9 INJECTION, SOLUTION INTRAVENOUS at 12:43

## 2024-04-26 RX ADMIN — FOSAPREPITANT: 150 INJECTION, POWDER, LYOPHILIZED, FOR SOLUTION INTRAVENOUS at 10:45

## 2024-04-26 RX ADMIN — DOCETAXEL 138 MG: 20 INJECTION, SOLUTION INTRAVENOUS at 14:20

## 2024-04-26 ASSESSMENT — PAIN SCALES - GENERAL: PAINLEVEL: NO PAIN (0)

## 2024-04-26 NOTE — LETTER
"    4/26/2024         RE: Vibha Kingston  [address] [place] [address]        Dear Colleague,    Thank you for referring your patient, Vibha Kingston, to the [facility]. Please see a copy of my visit note below.    Oncology Rooming Note    April 26, 2024 9:07 AM   Vibha Kingston is a 33 year old female who presents for:    Chief Complaint   Patient presents with     Oncology Clinic Visit     Visit for fertility preservation counseling prior to cancer therapy  Invasive ductal carcinoma of breast, female, left (H)  Malignant neoplasm of upper-outer quadrant of left breast in female, estrogen receptor negative (H)  Ductal carcinoma in situ (DCIS) of left breast       Initial Vitals: BP 94/77   Pulse 80   Temp 97.6  F (36.4  C)   Resp 16   Ht 1.651 m (5' 5\")   Wt 75.4 kg (166 lb 3.2 oz)   LMP 04/01/2024 (Approximate)   SpO2 97%   BMI 27.66 kg/m   Estimated body mass index is 27.66 kg/m  as calculated from the following:    Height as of this encounter: 1.651 m (5' 5\").    Weight as of this encounter: 75.4 kg (166 lb 3.2 oz). Body surface area is 1.86 meters squared.  No Pain (0) Comment: Data Unavailable   Patient's last menstrual period was 04/01/2024 (approximate).  Allergies reviewed: Yes  Medications reviewed: Yes    Medications: Medication refills not needed today.  Pharmacy name entered into [employer]: [id number] - [place], [place] - [address]    Frailty Screening:   Is the patient here for a new oncology consult visit in cancer care? 2. No      Clinical concerns: None       Aria Cobb LPN               [facility] Hematology and Oncology Progress Note    Patient: Vibha Kingston  MRN: [id number]  Date of Service: Apr 26, 2024         Reason for Visit    Chief Complaint   Patient presents with     Oncology Clinic Visit     Visit for fertility preservation counseling prior to cancer therapy  Invasive ductal carcinoma of breast, female, left " (H)  Malignant neoplasm of upper-outer quadrant of left breast in female, estrogen receptor negative (H)  Ductal carcinoma in situ (DCIS) of left breast         Assessment and Plan     Cancer Staging   Malignant neoplasm of upper-outer quadrant of left breast in female, estrogen receptor negative (H)  Staging form: Breast, AJCC 8th Edition  - Pathologic stage from 3/13/2024: Stage IIA (pT1c, pN1a(sn), cM0, G3, ER-, OR-, HER2+) - Signed by Rosalind Aadms MD on 3/13/2024      ECOG Performance    0 - Independent     Pain  Pain Score: No Pain (0)      #. Stage IIA (pT1c pN1a M0) invasive ductal carcinoma of the left breast, grade 3, ER negative, OR negative, HER2 positive  #.  DCIS of the left breast, ER positive, OR positive     She looks well.  Her echocardiogram is normal.  She is feeling ready to start chemotherapy today.  I reviewed her labs with CBC and CMP.  They are unremarkable.  I reviewed the chemotherapy side effects and typical management of common side effects related to this chemotherapy.  She is advised to call us sooner with any concerns regarding side effects management.   Recommended to proceed with cycle #1 adjuvant TCHP.   Follow-up labs and provider visit in 3 weeks prior to cycle #2.  I told her that she can call to our clinic with any concerns specifically excessive diarrhea, inadequate oral intake, nerve and bone pain etc.    The longitudinal plan of care for the diagnosis(es)/condition(s) as documented were addressed during this visit. Due to the added complexity in care, I will continue to support Vibha in the subsequent management and with ongoing continuity of care.      Encounter Diagnoses:    Problem List Items Addressed This Visit          Oncology Diagnoses    Malignant neoplasm of upper-outer quadrant of left breast in female, estrogen receptor negative (H)    Relevant Orders    Infusion Appointment Request       Other    Encounter for antineoplastic chemotherapy - Primary    Relevant  Orders    Infusion Appointment Request        CC: AYLA Randolph CNP   ______________________________________________________________________________  Oncologic history  1/9/2024- self palpated left medial breast mass   - diagnostic mammogram and ultrasound showed 3.4 x 2.2 x 2 cm large ill-defined hypoechoic area with associated calcification at 9:00 3 cm from the nipple.  Sonographic survey of left axilla is within normal limits.    1/11/2024-ultrasound-guided core needle biopsy showed DCIS, grade 3, cribriform and papillary with comedonecrosis.  ER/OK was deferred to excisional specimen.    1/25/2024-breast actionable panel was negative including INGRID, BARD1, BRCA1, BRCA2, CDH1, CHEK2, NF1, PALB2, PTEN, STK11, TP53.    2/4/2024-MRI breast showed large segmental area of non-mass enhancement medial left breast measuring 10 cm from the posterior to subareolar depth corresponding to the known DCIS.  Mildly prominent bilateral internal mammary lymph node technically within normal limit.    3/5/2024-left breast simple mastectomy and left axillary sentinel lymph node biopsy   Invasive ductal carcinoma, grade 3   Multifocal, 4 foci vary from 1.3 mm to 11 mm in greatest dimension.   Margins were negative, closest margin less than 1 mm anterior/superior   DCIS, EIC present, solid and comedo pattern, grade 3, extensive comedonecrosis.  Focal lymphovascular invasion present.   1/2 sentinel lymph node was positive for metastatic carcinoma.   [id number]   ER negative in invasive carcinoma, positive and DCIS (80% of the tumor nuclei stain strongly)   OK negative and invasive cancer, positive in DCIS (30% of the tumor nuclei stain strongly of moderately)   HER2/aj positive for overexpression (3+ membrane staining)    4/26/2024- adjuvant TCHP    History of Present Illness    Ms. Vibha Kingston presented today accompanied by her  prior to cycle #1 adjuvant TCHP.  She reported she has slight headache today since she did  "not get a good sleep last night.  She is worried about potential side effects of chemotherapy.  She does not feel sick.  She does not have fever or infection symptoms.    She had a great success with preservation of oocytes.    Review of systems  Apart from describing in HPI, the remainder of comprehensive ROS was negative.    Past History    Past Medical History:   Diagnosis Date     Cancer (H) 1/15/24    Breast cancer     Encounter for antineoplastic chemotherapy 03/14/2024       Past Surgical History:   Procedure Laterality Date     BIOPSY      A mole a few years back and an abnormal pap in [facility]     BIOPSY NODE SENTINEL Left 03/05/2024    Procedure: WITH LEFT SENTINEL LYMPH NODE BIOPSY;  Surgeon: Aleena Real DO;  Location: [facility] OR     FERTILITY SURGERY  04/10/2024    Egg retrieval     INSERT PORT VASCULAR ACCESS N/A 4/19/2024    Procedure: INSERTION, VASCULAR ACCESS PORT UNDER ULTRASOUND AND FLUOROSCOPIC GUIDANCE;  Surgeon: Aleena Real DO;  Location: [facility] OR     MASTECTOMY SIMPLE Left 03/05/2024    Procedure: LEFT MASTECTOMY;  Surgeon: Aleena Real DO;  Location: [facility]       Physical Exam    BP 94/77   Pulse 80   Temp 97.6  F (36.4  C)   Resp 16   Ht 1.651 m (5' 5\")   Wt 75.4 kg (166 lb 3.2 oz)   LMP 04/01/2024 (Approximate)   SpO2 97%   BMI 27.66 kg/m      General: alert, awake, not in acute distress  HEENT: Head: Normal, normocephalic, atraumatic.  Eye: Normal external eye, conjunctiva, lids cornea, GABRIELA.  Nose: Normal external nose, mucus membranes and septum.  Pharynx: Normal buccal mucosa. Normal pharynx.  Neck / Thyroid: Supple, no masses, nodes, nodules or enlargement.  Lymphatics: No abnormally enlarged lymph nodes.  Chest: Normal chest wall and respirations. Clear to auscultation.  Heart: S1 S2 RRR, no murmur.   Abdomen: abdomen is soft without significant tenderness, masses, organomegaly or guarding  Extremities: normal strength, tone, and muscle " mass  Skin: normal. no rash or abnormalities  CNS: non focal.    Lab Results    Recent Results (from the past 240 hour(s))   Comprehensive metabolic panel    Collection Time: 04/26/24  8:50 AM   Result Value Ref Range    Sodium 141 135 - 145 mmol/L    Potassium 4.1 3.4 - 5.3 mmol/L    Carbon Dioxide (CO2) 27 22 - 29 mmol/L    Anion Gap 8 7 - 15 mmol/L    Urea Nitrogen 17.6 6.0 - 20.0 mg/dL    Creatinine 0.77 0.51 - 0.95 mg/dL    GFR Estimate >90 >60 mL/min/1.73m2    Calcium 9.2 8.6 - 10.0 mg/dL    Chloride 106 98 - 107 mmol/L    Glucose 85 70 - 99 mg/dL    Alkaline Phosphatase 46 40 - 150 U/L    AST 14 0 - 45 U/L    ALT 5 0 - 50 U/L    Protein Total 7.1 6.4 - 8.3 g/dL    Albumin 4.2 3.5 - 5.2 g/dL    Bilirubin Total 0.3 <=1.2 mg/dL   CBC with platelets and differential    Collection Time: 04/26/24  8:50 AM   Result Value Ref Range    WBC Count 4.4 4.0 - 11.0 10e3/uL    RBC Count 4.61 3.80 - 5.20 10e6/uL    Hemoglobin 13.9 11.7 - 15.7 g/dL    Hematocrit 40.6 35.0 - 47.0 %    MCV 88 78 - 100 fL    MCH 30.2 26.5 - 33.0 pg    MCHC 34.2 31.5 - 36.5 g/dL    RDW 12.0 10.0 - 15.0 %    Platelet Count 267 150 - 450 10e3/uL    % Neutrophils 64 %    % Lymphocytes 24 %    % Monocytes 10 %    % Eosinophils 1 %    % Basophils 1 %    % Immature Granulocytes 0 %    NRBCs per 100 WBC 0 <1 /100    Absolute Neutrophils 2.8 1.6 - 8.3 10e3/uL    Absolute Lymphocytes 1.1 0.8 - 5.3 10e3/uL    Absolute Monocytes 0.4 0.0 - 1.3 10e3/uL    Absolute Eosinophils 0.0 0.0 - 0.7 10e3/uL    Absolute Basophils 0.0 0.0 - 0.2 10e3/uL    Absolute Immature Granulocytes 0.0 <=0.4 10e3/uL    Absolute NRBCs 0.0 10e3/uL         Imaging    No results found.    40 minutes spent on the date of the encounter doing chart review, history and exam, documentation, communication of the treatment plan with the care team and further activities as noted above.    Signed by: Rosalind Adams MD      Again, thank you for allowing me to participate in the care of your  patient.        Sincerely,        Rosalind Adams MD

## 2024-04-26 NOTE — PROGRESS NOTES
"Infusion Nursing Note:  Vibha Kingston presents today for C1 D1 Perjeta, Trazimera, Docetaxel, Carboplatin.    Patient seen by provider today: Yes:     Note: Pt arrives ambulatory, with , to [facility] Infusion after visit with . Discussed process of today's visit, and signs of possible reaction and when to notify nursing staff. Pt reports that she took lorazepam at home due to anxiousness; pt reports \"I think it's helping\". Pt has Chemotherapy information folder. Pt reports understanding of dexamethasone schedule.    Premedication: tylenol, aloxi, emend/dex, IV benadryl.    Intravenous Access:  Implanted Port.    Treatment Conditions:  Lab Results   Component Value Date    HGB 13.9 04/26/2024    WBC 4.4 04/26/2024    ANEUTAUTO 2.8 04/26/2024     04/26/2024        Lab Results   Component Value Date     04/26/2024    POTASSIUM 4.1 04/26/2024    CR 0.77 04/26/2024    RACHAEL 9.2 04/26/2024    BILITOTAL 0.3 04/26/2024    ALBUMIN 4.2 04/26/2024    ALT 5 04/26/2024    AST 14 04/26/2024     Results reviewed, labs MET treatment parameters, ok to proceed with treatment.    Post Infusion Assessment:  Patient tolerated infusion without incident.    Site patent and intact, free from redness, edema or discomfort.  No evidence of extravasations.  Access discontinued per protocol.     Discharge Plan:   Pt encouraged to call clinic/after hours # if she has any questions or concerns over the weekend.  Patient discharged in stable condition accompanied by: .  Departure Mode: Ambulatory.      Stephanie Ambrose RN    "

## 2024-04-26 NOTE — PROGRESS NOTES
[facility] Hematology and Oncology Progress Note    Patient: Vibha Kingston  MRN: [id number]  Date of Service: Apr 26, 2024         Reason for Visit    Chief Complaint   Patient presents with    Oncology Clinic Visit     Visit for fertility preservation counseling prior to cancer therapy  Invasive ductal carcinoma of breast, female, left (H)  Malignant neoplasm of upper-outer quadrant of left breast in female, estrogen receptor negative (H)  Ductal carcinoma in situ (DCIS) of left breast         Assessment and Plan     Cancer Staging   Malignant neoplasm of upper-outer quadrant of left breast in female, estrogen receptor negative (H)  Staging form: Breast, AJCC 8th Edition  - Pathologic stage from 3/13/2024: Stage IIA (pT1c, pN1a(sn), cM0, G3, ER-, WV-, HER2+) - Signed by Rosalind Adams MD on 3/13/2024      ECOG Performance    0 - Independent     Pain  Pain Score: No Pain (0)      #. Stage IIA (pT1c pN1a M0) invasive ductal carcinoma of the left breast, grade 3, ER negative, WV negative, HER2 positive  #.  DCIS of the left breast, ER positive, WV positive     She looks well.  Her echocardiogram is normal.  She is feeling ready to start chemotherapy today.  I reviewed her labs with CBC and CMP.  They are unremarkable.  I reviewed the chemotherapy side effects and typical management of common side effects related to this chemotherapy.  She is advised to call us sooner with any concerns regarding side effects management.   Recommended to proceed with cycle #1 adjuvant TCHP.   Follow-up labs and provider visit in 3 weeks prior to cycle #2.  I told her that she can call to our clinic with any concerns specifically excessive diarrhea, inadequate oral intake, nerve and bone pain etc.    The longitudinal plan of care for the diagnosis(es)/condition(s) as documented were addressed during this visit. Due to the added complexity in care, I will continue to support Vibha in the subsequent management and with ongoing  continuity of care.      Encounter Diagnoses:    Problem List Items Addressed This Visit          Oncology Diagnoses    Malignant neoplasm of upper-outer quadrant of left breast in female, estrogen receptor negative (H)    Relevant Orders    Infusion Appointment Request       Other    Encounter for antineoplastic chemotherapy - Primary    Relevant Orders    Infusion Appointment Request        CC: Zully JacAYLA hernandez CNP   ______________________________________________________________________________  Oncologic history  1/9/2024- self palpated left medial breast mass   - diagnostic mammogram and ultrasound showed 3.4 x 2.2 x 2 cm large ill-defined hypoechoic area with associated calcification at 9:00 3 cm from the nipple.  Sonographic survey of left axilla is within normal limits.    1/11/2024-ultrasound-guided core needle biopsy showed DCIS, grade 3, cribriform and papillary with comedonecrosis.  ER/CO was deferred to excisional specimen.    1/25/2024-breast actionable panel was negative including INGRID, BARD1, BRCA1, BRCA2, CDH1, CHEK2, NF1, PALB2, PTEN, STK11, TP53.    2/4/2024-MRI breast showed large segmental area of non-mass enhancement medial left breast measuring 10 cm from the posterior to subareolar depth corresponding to the known DCIS.  Mildly prominent bilateral internal mammary lymph node technically within normal limit.    3/5/2024-left breast simple mastectomy and left axillary sentinel lymph node biopsy   Invasive ductal carcinoma, grade 3   Multifocal, 4 foci vary from 1.3 mm to 11 mm in greatest dimension.   Margins were negative, closest margin less than 1 mm anterior/superior   DCIS, EIC present, solid and comedo pattern, grade 3, extensive comedonecrosis.  Focal lymphovascular invasion present.   1/2 sentinel lymph node was positive for metastatic carcinoma.   [id number]   ER negative in invasive carcinoma, positive and DCIS (80% of the tumor nuclei stain strongly)   CO negative and invasive  "cancer, positive in DCIS (30% of the tumor nuclei stain strongly of moderately)   HER2/aj positive for overexpression (3+ membrane staining)    4/26/2024- adjuvant TCHP    History of Present Illness    Ms. Vibha Kingston presented today accompanied by her  prior to cycle #1 adjuvant TCHP.  She reported she has slight headache today since she did not get a good sleep last night.  She is worried about potential side effects of chemotherapy.  She does not feel sick.  She does not have fever or infection symptoms.    She had a great success with preservation of oocytes.    Review of systems  Apart from describing in HPI, the remainder of comprehensive ROS was negative.    Past History    Past Medical History:   Diagnosis Date    Cancer (H) 1/15/24    Breast cancer    Encounter for antineoplastic chemotherapy 03/14/2024       Past Surgical History:   Procedure Laterality Date    BIOPSY      A mole a few years back and an abnormal pap in [facility]    BIOPSY NODE SENTINEL Left 03/05/2024    Procedure: WITH LEFT SENTINEL LYMPH NODE BIOPSY;  Surgeon: Aleena Real DO;  Location: [facility] OR    FERTILITY SURGERY  04/10/2024    Egg retrieval    INSERT PORT VASCULAR ACCESS N/A 4/19/2024    Procedure: INSERTION, VASCULAR ACCESS PORT UNDER ULTRASOUND AND FLUOROSCOPIC GUIDANCE;  Surgeon: Aleena Real DO;  Location: [facility] OR    MASTECTOMY SIMPLE Left 03/05/2024    Procedure: LEFT MASTECTOMY;  Surgeon: Aleena Real DO;  Location: [facility]       Physical Exam    BP 94/77   Pulse 80   Temp 97.6  F (36.4  C)   Resp 16   Ht 1.651 m (5' 5\")   Wt 75.4 kg (166 lb 3.2 oz)   LMP 04/01/2024 (Approximate)   SpO2 97%   BMI 27.66 kg/m      General: alert, awake, not in acute distress  HEENT: Head: Normal, normocephalic, atraumatic.  Eye: Normal external eye, conjunctiva, lids cornea, GABRIELA.  Nose: Normal external nose, mucus membranes and septum.  Pharynx: Normal buccal mucosa. Normal pharynx.  Neck " / Thyroid: Supple, no masses, nodes, nodules or enlargement.  Lymphatics: No abnormally enlarged lymph nodes.  Chest: Normal chest wall and respirations. Clear to auscultation.  Heart: S1 S2 RRR, no murmur.   Abdomen: abdomen is soft without significant tenderness, masses, organomegaly or guarding  Extremities: normal strength, tone, and muscle mass  Skin: normal. no rash or abnormalities  CNS: non focal.    Lab Results    Recent Results (from the past 240 hour(s))   Comprehensive metabolic panel    Collection Time: 04/26/24  8:50 AM   Result Value Ref Range    Sodium 141 135 - 145 mmol/L    Potassium 4.1 3.4 - 5.3 mmol/L    Carbon Dioxide (CO2) 27 22 - 29 mmol/L    Anion Gap 8 7 - 15 mmol/L    Urea Nitrogen 17.6 6.0 - 20.0 mg/dL    Creatinine 0.77 0.51 - 0.95 mg/dL    GFR Estimate >90 >60 mL/min/1.73m2    Calcium 9.2 8.6 - 10.0 mg/dL    Chloride 106 98 - 107 mmol/L    Glucose 85 70 - 99 mg/dL    Alkaline Phosphatase 46 40 - 150 U/L    AST 14 0 - 45 U/L    ALT 5 0 - 50 U/L    Protein Total 7.1 6.4 - 8.3 g/dL    Albumin 4.2 3.5 - 5.2 g/dL    Bilirubin Total 0.3 <=1.2 mg/dL   CBC with platelets and differential    Collection Time: 04/26/24  8:50 AM   Result Value Ref Range    WBC Count 4.4 4.0 - 11.0 10e3/uL    RBC Count 4.61 3.80 - 5.20 10e6/uL    Hemoglobin 13.9 11.7 - 15.7 g/dL    Hematocrit 40.6 35.0 - 47.0 %    MCV 88 78 - 100 fL    MCH 30.2 26.5 - 33.0 pg    MCHC 34.2 31.5 - 36.5 g/dL    RDW 12.0 10.0 - 15.0 %    Platelet Count 267 150 - 450 10e3/uL    % Neutrophils 64 %    % Lymphocytes 24 %    % Monocytes 10 %    % Eosinophils 1 %    % Basophils 1 %    % Immature Granulocytes 0 %    NRBCs per 100 WBC 0 <1 /100    Absolute Neutrophils 2.8 1.6 - 8.3 10e3/uL    Absolute Lymphocytes 1.1 0.8 - 5.3 10e3/uL    Absolute Monocytes 0.4 0.0 - 1.3 10e3/uL    Absolute Eosinophils 0.0 0.0 - 0.7 10e3/uL    Absolute Basophils 0.0 0.0 - 0.2 10e3/uL    Absolute Immature Granulocytes 0.0 <=0.4 10e3/uL    Absolute NRBCs 0.0  10e3/uL         Imaging    No results found.    40 minutes spent on the date of the encounter doing chart review, history and exam, documentation, communication of the treatment plan with the care team and further activities as noted above.    Signed by: Rosalind Adams MD

## 2024-04-26 NOTE — PROGRESS NOTES
[facility]: Cancer Care Follow-Up Note                                    Discussion with Patient:                                                      Checked in with patient while here for clinic visit with Dr. Adams. Patient accompanied by her , Keenan.     Steroid use/side effect: Dexamethasone.    Discussed [employer]. Patient reports Dr. Real gave her the information but she hasn't looked into it any further yet.    Dates of Treatment:                                                      Infusion given in last 28 days       Administered MAR Action Medication Dose Rate Visit    04/26/2024 11:37 New Bag pertuzumab (PERJETA) 840 mg in sodium chloride 0.9 % 303 mL infusion 840 mg 303 mL/hr Infusion Therapy Visit on 04/26/2024 in [facility]            Assessment:                                                      Day 1 Cycle 1:OP ONC Breast Cancer - DOCEtaxel / CARBOplatin / TRASTuzumab / Pertuzumab (TCHP)     Patient reports HA and anxiety this morning. She reports she took lorazepam but didn't take Tylenol because she didn't know if she should.  Told her she will be given Tylenol, as one of her premeds this morning.      Advanced Care Plans/Directives on file:: No  Advanced Care Plan/Directive Status: Considering Options   Health Care Directive information given.        Intervention/Education provided during outreach:                                                       Chemo folder with chemo drug information given. Reviewed contents of folder.    ANDREA referral offered. Patient agreed. Order placed. Told patient ANDREA Paz, off on Fridays and she will reach out to her next week. Patient verbalized understanding.    Provided patient with dexamethasone medication calendar and reviewed dosing instructions.  Told patient will call her on Monday to see how she is doing after treatment but to call sooner with questions, symptoms, concerns. Reviewed how to call  during clinic hours and after hours.  Patient and Keenan verbalized understanding to this information.      Follow up call in 1-2 weeks  Patient to follow up as scheduled at next appt  Confirmed patient has clinic and triage numbers    Signature:  Christie Agee RN

## 2024-04-26 NOTE — PROGRESS NOTES
"Oncology Rooming Note    April 26, 2024 9:07 AM   Vibha Kingston is a 33 year old female who presents for:    Chief Complaint   Patient presents with    Oncology Clinic Visit     Visit for fertility preservation counseling prior to cancer therapy  Invasive ductal carcinoma of breast, female, left (H)  Malignant neoplasm of upper-outer quadrant of left breast in female, estrogen receptor negative (H)  Ductal carcinoma in situ (DCIS) of left breast       Initial Vitals: BP 94/77   Pulse 80   Temp 97.6  F (36.4  C)   Resp 16   Ht 1.651 m (5' 5\")   Wt 75.4 kg (166 lb 3.2 oz)   LMP 04/01/2024 (Approximate)   SpO2 97%   BMI 27.66 kg/m   Estimated body mass index is 27.66 kg/m  as calculated from the following:    Height as of this encounter: 1.651 m (5' 5\").    Weight as of this encounter: 75.4 kg (166 lb 3.2 oz). Body surface area is 1.86 meters squared.  No Pain (0) Comment: Data Unavailable   Patient's last menstrual period was 04/01/2024 (approximate).  Allergies reviewed: Yes  Medications reviewed: Yes    Medications: Medication refills not needed today.  Pharmacy name entered into [employer]: [id number] [place], [place] - [address]    Frailty Screening:   Is the patient here for a new oncology consult visit in cancer care? 2. No      Clinical concerns: None       Aria Cobb LPN             "

## 2024-04-29 ENCOUNTER — PATIENT OUTREACH (OUTPATIENT)
Dept: ONCOLOGY | Facility: HOSPITAL | Age: 34
End: 2024-04-29
Payer: COMMERCIAL

## 2024-04-29 NOTE — PROGRESS NOTES
"[facility]: Cancer Care Follow-Up Note                                    Discussion with Patient:                                                      Called patient to see how doing after new treatment start on 4/26/24. Left message for patient to return call.    Patient returned call.     See below.    Dates of Treatment:                                                      Infusion given in last 28 days       Administered MAR Action Medication Dose Rate Visit    04/26/2024 11:37 New Bag pertuzumab (PERJETA) 840 mg in sodium chloride 0.9 % 303 mL infusion 840 mg 303 mL/hr Infusion Therapy Visit on 04/26/2024 in [facility]    04/26/2024 12:43 New Bag trastuzumab-qyyp (TRAZIMERA) 600 mg in sodium chloride 0.9 % 303.57 mL infusion 600 mg 202.4 mL/hr Infusion Therapy Visit on 04/26/2024 in [facility]    04/26/2024 14:20 New Bag DOCEtaxel (TAXOTERE) 138 mg in sodium chloride 0.9% in non-PVC container 281.9 mL infusion 138 mg 281.9 mL/hr Infusion Therapy Visit on 04/26/2024 in [facility]    04/26/2024 15:22 New Bag CARBOplatin 790 mg in sodium chloride 0.9 % 629 mL infusion 790 mg 1,258 mL/hr Infusion Therapy Visit on 04/26/2024 in [facility]            Assessment:                                                      Day 1 Cycle 1:  DOCEtaxel / CARBOplatin / TRASTuzumab / Pertuzumab (TCHP) on 4/26/24.    Patient reports \"going ok\". She said she did have to call Dr. Adams on Saturday due to flush after steroids and for clarification on her nausea meds. She said she wasn't feeling the best in between taking her steroids so wanted to be sure it was ok to take her antiemetics. She reports she felt better after taking them.     Patient denies fever. She reports eating and drinking well. Drinking a lot of water and urinating a lot because of that.  She reports \"regular\" BM on Sat and Sun. "     Intervention/Education provided during outreach:                                                       Instructed patient to monitor for symptoms and to call Cancer Care with questions, symptoms, concerns. Patient verbalized understanding.    Patient to follow up as scheduled at next appt    Signature:  Christie Agee RN

## 2024-04-30 ENCOUNTER — PATIENT OUTREACH (OUTPATIENT)
Dept: CARE COORDINATION | Facility: CLINIC | Age: 34
End: 2024-04-30
Payer: COMMERCIAL

## 2024-04-30 NOTE — TELEPHONE ENCOUNTER
Social Work - Intervention  [facility]  Data/Intervention:    Patient Name: Vibha Kingston Goes By: Vibha    /Age: 1990 (33 year old)     Visit Type: telephone  Referral Source: Dr. Adams  Reason for Referral: Resources/New Dx     Psychosocial Information/Concerns:  Vibha is a 33 year old with a new diagnosis of breast cancer. She is receiving her cancer care at [facility]. Vibha is  and has good family support. She shared that she has her good and bad days, but overall has been coping well. She is working as she is able. She doesn't have any financial concerns at this time, but may want resources in the future. No questions or concerns at time of call. She appreciated the outreach and will call as needs arise.      Intervention/Education/Resources Provided:  Provided education re  role  Provided information re grants for breast cancer patients   Assessed coping and support system  Provided active listening, validation of feelings and emotional support      Assessment/Plan:  Provided patient with contact information and availability.    EDD Knapp, [facility]  Adult Oncology Clinics  [place] (M,W), [place] (T) & [place] (Th)  *I am off Friday  Office: [phone]

## 2024-05-02 ENCOUNTER — TELEPHONE (OUTPATIENT)
Dept: ONCOLOGY | Facility: HOSPITAL | Age: 34
End: 2024-05-02
Payer: COMMERCIAL

## 2024-05-02 NOTE — TELEPHONE ENCOUNTER
Return call placed to patient, message left for her to return call to triage line. Per Dr. Adams patient should monitor the rash on her chest and back. Use a mild cleanser and keep clean and dry. For the vaginal and rectal irritation patient can use baby wipes and the use a barrier cream like Desitin. She should be gentle in wiping. If any symptoms are not improving or worsening she should call the clinic. Alternately, if patient would like to be seen she could see an NP in clinic tomorrow or next week for a mid cycle check.    Shasha Peters RN

## 2024-05-02 NOTE — TELEPHONE ENCOUNTER
"Patient calls with concerns regarding side effects from D1C1 of Docetaxel, carboplatin, trastuzumab and pertuzumab on 4/26. She reports that she noticed a rash on her chest and upper back today. The rash is \"pimple like\" with a few spots that look like a fleming. The rash is not itchy or painful. Patient denies any fever, she does not have the rash elsewhere on her body. Patient is also concerned about vaginal and rectal irritation. She reports that the areas are red and painful. She denies any swelling. Patient is not having any abnormal vaginal discharge or itching. She does report that she started having some vaginal bleeding today that is spotting-mild. Denies any urinary concerns, is having some looser stools a couple of times a day. Patient also reports having some irritation in her mouth, no sores or white patches. Feels like \"her tongue is burnt.\"    Message will be sent to Dr. Adams to advise on symptoms.    Shasha Peters RN  "

## 2024-05-06 NOTE — TELEPHONE ENCOUNTER
Call placed to patient to follow up on phone call from last week. Patient is given recommendations, reports that most symptoms are improving. Rash has not changed but is not worsening. Patient will plan on follow up on 5/17. If she has any concerns before then she will call the clinic.    Shasha Peters RN

## 2024-05-13 ENCOUNTER — NURSE TRIAGE (OUTPATIENT)
Dept: ONCOLOGY | Facility: HOSPITAL | Age: 34
End: 2024-05-13
Payer: COMMERCIAL

## 2024-05-13 NOTE — TELEPHONE ENCOUNTER
Patient calls with concerns regarding bowel movements. She reports that she will have constipation, then take a stool softener, then will go right back to diarrhea. This has been happening since she had treatment, D1C1 of Docetaxel, carboplatin, trastuzumab and pertuzumab on 4/26. Patient reports that the last couple of times she was constipated and passed some harder stool there was a little bit of blood on the toilet paper when she wiped. Today she noticed a little blood streaked on the stool. She denies any abdominal pain. Denies any blood clots or passing blood without a stool. Patient does have some rectal pain for a little while after passing a stool but this does subside. Patient does have a follow up in clinic on 5/17.    Patient is advised that bleeding can be evaluated in clinic on Friday as long as symptoms are not worsening. Symptoms to monitor for are discussed with patient. Message will be sent to Dr. Adams to advise on any recommendations to keep bowel regular.    Shasha Peters RN         Reason for Disposition   Rectal bleeding is minimal (e.g., blood just on toilet paper, a few drops in toilet bowl), and bleeding recurs 3 or more times using Care Advice    Additional Information   Negative: Passed out (i.e., fainted, collapsed and was not responding)   Negative: Shock suspected (e.g., cold/pale/clammy skin, too weak to stand, low BP, rapid pulse)   Negative: Vomiting red blood or black (coffee ground) material   Negative: Sounds like a life-threatening emergency to the triager   Negative: Diarrhea is main symptom   Negative: Rectal symptoms   Negative: SEVERE rectal bleeding (large blood clots; constant or on and off bleeding)   Negative: SEVERE dizziness (e.g., unable to stand, requires support to walk, feels like passing out now)   Negative: MODERATE rectal bleeding (small blood clots, passing blood without stool, or toilet water turns red) more than once a day   Negative: Bloody, black, or tarry  bowel movements  (Exception: Chronic-unchanged black-grey bowel movements and is taking iron pills or Pepto-Bismol.)   Negative: High-risk adult (e.g., prior surgery on aorta, abdominal aortic aneurysm)   Negative: Rectal foreign body (inserted or swallowed)   Negative: SEVERE abdominal pain (e.g., excruciating)   Negative: Constant abdominal pain lasting > 2 hours   Negative: Pale skin (pallor) of new-onset or worsening   Negative: Patient sounds very sick or weak to the triager   Negative: MODERATE rectal bleeding (small blood clots, passing blood without stool, or toilet water turns red)   Negative: Taking Coumadin (warfarin) or other strong blood thinner, or known bleeding disorder (e.g., thrombocytopenia)   Negative: Colonoscopy in past 72 hours   Negative: Known cirrhosis of the liver (or history of liver failure or ascites)   Negative: Patient wants to be seen   Negative: MILD rectal bleeding (more than just a few drops or streaks)   Negative: Cancer of rectum or intestines (colon)   Negative: Radiation therapy to lower abdomen or pelvis    Protocols used: Rectal Bleeding-A-OH

## 2024-05-14 NOTE — TELEPHONE ENCOUNTER
"[facility]: Cancer Care                                                                                          [id number] Called Vibha and AJ that writer was calling to follow up with additional recommendations from conversation she has yesterday with triage. Contact information was provided and return phone call requested.  1000 Spoke with Vibha, she relayed that she has alternated constipation and diarrhea after  taking stool softener for constipation after  D1C1 of Docetaxel, carboplatin, trastuzumab and pertuzumab on 4/26 . Relayed strategies for preventing constipation with drinking plenty of fluids (around 64 oz), eating higher fiber foods, engaging in activity, so that she can try avoid taking stool softeners, if possible. She relayed that she has been drinking adequate fluid and is being active, as well as eating full fiber foods. Inquired if she just took a stool softener or if  it was a combination pill that also had  a laxative. She read the bottle to writer and it was a combination pill of a laxative/stool softener, and she only took one tablet. Shared that if she is unable to manage her future constipation with the natural ways, as outlined above, she may want  to  start by just taking a stool softener in the future, instead of  softener/laxative, to see if this would be effective for her prior to trying laxative. She stated understanding. She understands that it is a balance with taking medication and the \"pendulum\" can swing to the other side when taking so she will need to monitor symptoms/dosing. She stated understanding. She can also discuss her symptoms more fully at clinic follow up visit on 5/17.    Signature:  Chikis Escalera RN   "

## 2024-05-16 NOTE — PROGRESS NOTES
[facility] Hematology and Oncology Outpatient Progress Note    Patient: Vibha Kingston  MRN: [id number]  Date of Service: May 17, 2024          Reason for Visit    Chief Complaint   Patient presents with    Oncology Clinic Visit     Malignant neoplasm of upper-outer quadrant of left breast in female, estrogen receptor negative         Cancer Staging   Malignant neoplasm of upper-outer quadrant of left breast in female, estrogen receptor negative (H)  Staging form: Breast, AJCC 8th Edition  - Pathologic stage from 3/13/2024: Stage IIA (pT1c, pN1a(sn), cM0, G3, ER-, MD-, HER2+) - Signed by Rosalind Adams MD on 3/13/2024      Primary Hematologist/Oncologist: Dr. Adams        Assessment/Plan  #. Stage IIA (pT1c pN1a M0) invasive ductal carcinoma of the left breast, grade 3, ER negative, MD negative, HER2 positive  #.  DCIS of the left breast, ER positive, MD positive  Vibha is doing well following her first cycle of TCHP. She had minimal side effects from treatment, including nausea, diarrhea/constipation, and a mild rash, all of which have normalized. We reviewed her labs including her CBC, and CMP which are all unremarkable.      Proceed with cycle #2 adjuvant TCHP.  Follow-up labs and provider visit in 3 weeks prior to cycle #3.    Instructed her to call to our clinic with any concerns specifically excessive diarrhea, inadequate oral intake, nerve and bone pain etc.     ______________________________________________________________________________    History of Present Illness/ Interval History    Ms. Vibha Kingston  is a 33 year old female patient of Dr. Adams who presents to the clinic today for follow up to breast cancer, and for consideration of next cycle of TCHP.     She tolerated the first cycle fairly well. She struggled with nausea a bit for the first week, but was able to use antiemetics both to prevent and relieve nausea.This got better the further away from treatment she got. She had a rash to  her chest and back, it was acne like that has since pretty much resolved. She has some tiny dry areas still.     Constipation/diarrhea alternation was a bit of a struggle, but she feels she has been able to stay on top of this. She has been incorporating metamucil now. Drinking fluids was tough, as her taste buds felt like sand paper. She struggled to enjoy water after the first few days, but this has slowly resolved after the first week.     Her hands were irritated from washing them so much. Dry nose for the second week, using saline which has been helpful. Noticed some flushing after steroids.      ECOG performance status   0- Fully active, without restriction        Pain  Pain Score: Mild Pain (3)  Pain Loc: Shoulder (for last couple days)    ROS  A 14 point review of systems was obtained.  Positive findings noted in the history.  The remainder of the review of system is otherwise negative.      Oncology History/Treatment  1/9/2024- self palpated left medial breast mass   - diagnostic mammogram and ultrasound showed 3.4 x 2.2 x 2 cm large ill-defined hypoechoic area with associated calcification at 9:00 3 cm from the nipple.  Sonographic survey of left axilla is within normal limits.    1/11/2024-ultrasound-guided core needle biopsy showed DCIS, grade 3, cribriform and papillary with comedonecrosis.  ER/[place] was deferred to excisional specimen.    1/25/2024-breast actionable panel was negative including INGRID, BARD1, BRCA1, BRCA2, CDH1, CHEK2, NF1, PALB2, PTEN, STK11, TP53.    2/4/2024-MRI breast showed large segmental area of non-mass enhancement medial left breast measuring 10 cm from the posterior to subareolar depth corresponding to the known DCIS.  Mildly prominent bilateral internal mammary lymph node technically within normal limit.    3/5/2024-left breast simple mastectomy and left axillary sentinel lymph node biopsy   Invasive ductal carcinoma, grade 3   Multifocal, 4 foci vary from 1.3 mm to 11 mm in  greatest dimension.   Margins were negative, closest margin less than 1 mm anterior/superior   DCIS, EIC present, solid and comedo pattern, grade 3, extensive comedonecrosis.  Focal lymphovascular invasion present.   1/2 sentinel lymph node was positive for metastatic carcinoma.   [id number]   ER negative in invasive carcinoma, positive and DCIS (80% of the tumor nuclei stain strongly)   CO negative and invasive cancer, positive in DCIS (30% of the tumor nuclei stain strongly of moderately)   HER2/aj positive for overexpression (3+ membrane staining)    4/26/2024- adjuvant [facility]      Physical Exam    GENERAL: Alert and oriented to time place and person. Seated comfortably. In no distress.  HEAD: Atraumatic and normocephalic. No alopecia.  EYES: SOFIA, EOMI. No erythema. No icterus.  ORAL CAVITY: Moist. No mucosal lesion or tonsillar enlargement.  NECK: supple. No thyroid enlargement.  LYMPH NODES: No palpable supraclavicular, cervical, axillary or inguinal lymphadenopathy.  CHEST: clear to auscultation bilaterally. Resonant to percussion throughout bilaterally. Symmetrical breath movements bilaterally.  CVS: RRR  ABDOMEN: Soft. Not tender. Not distended. No palpable hepatomegaly or splenomegaly. No other mass palpable. Bowel sounds present.  EXTREMITIES: Warm. No peripheral edema.  SKIN: no bruising or purpura. Few, scattered dry areas noted on left anterior chest.   NEURO: No gross deficit noted. Non-antalgic gait.      Lab Results    Recent Results (from the past 168 hour(s))   Comprehensive metabolic panel   Result Value Ref Range    Sodium 140 135 - 145 mmol/L    Potassium 3.9 3.4 - 5.3 mmol/L    Carbon Dioxide (CO2) 26 22 - 29 mmol/L    Anion Gap 8 7 - 15 mmol/L    Urea Nitrogen 11.5 6.0 - 20.0 mg/dL    Creatinine 0.79 0.51 - 0.95 mg/dL    GFR Estimate >90 >60 mL/min/1.73m2    Calcium 9.2 8.6 - 10.0 mg/dL    Chloride 106 98 - 107 mmol/L    Glucose 140 (H) 70 - 99 mg/dL    Alkaline Phosphatase 50 40 - 150 U/L     AST 13 0 - 45 U/L    ALT 8 0 - 50 U/L    Protein Total 6.7 6.4 - 8.3 g/dL    Albumin 4.2 3.5 - 5.2 g/dL    Bilirubin Total 0.3 <=1.2 mg/dL   CBC with platelets and differential   Result Value Ref Range    WBC Count 5.3 4.0 - 11.0 10e3/uL    RBC Count 4.30 3.80 - 5.20 10e6/uL    Hemoglobin 13.3 11.7 - 15.7 g/dL    Hematocrit 38.4 35.0 - 47.0 %    MCV 89 78 - 100 fL    MCH 30.9 26.5 - 33.0 pg    MCHC 34.6 31.5 - 36.5 g/dL    RDW 12.3 10.0 - 15.0 %    Platelet Count 175 150 - 450 10e3/uL    % Neutrophils 76 %    % Lymphocytes 17 %    % Monocytes 7 %    % Eosinophils 0 %    % Basophils 0 %    % Immature Granulocytes 0 %    NRBCs per 100 WBC 0 <1 /100    Absolute Neutrophils 4.0 1.6 - 8.3 10e3/uL    Absolute Lymphocytes 0.9 0.8 - 5.3 10e3/uL    Absolute Monocytes 0.4 0.0 - 1.3 10e3/uL    Absolute Eosinophils 0.0 0.0 - 0.7 10e3/uL    Absolute Basophils 0.0 0.0 - 0.2 10e3/uL    Absolute Immature Granulocytes 0.0 <=0.4 10e3/uL    Absolute NRBCs 0.0 10e3/uL     I reviewed the above labs today.  Imaging    No results found.      Billing  Total time 30 minutes, to include face to face visit, review of EMR, ordering, documentation and coordination of care on date of service   complexity modifier for longitudinal care.     Signed by: AYLA Mccormick CNP

## 2024-05-17 ENCOUNTER — INFUSION THERAPY VISIT (OUTPATIENT)
Dept: INFUSION THERAPY | Facility: HOSPITAL | Age: 34
End: 2024-05-17
Attending: INTERNAL MEDICINE
Payer: COMMERCIAL

## 2024-05-17 ENCOUNTER — PATIENT OUTREACH (OUTPATIENT)
Dept: ONCOLOGY | Facility: HOSPITAL | Age: 34
End: 2024-05-17

## 2024-05-17 ENCOUNTER — ONCOLOGY VISIT (OUTPATIENT)
Dept: ONCOLOGY | Facility: HOSPITAL | Age: 34
End: 2024-05-17
Attending: INTERNAL MEDICINE
Payer: COMMERCIAL

## 2024-05-17 VITALS
SYSTOLIC BLOOD PRESSURE: 114 MMHG | DIASTOLIC BLOOD PRESSURE: 73 MMHG | WEIGHT: 165.7 LBS | OXYGEN SATURATION: 97 % | BODY MASS INDEX: 27.61 KG/M2 | RESPIRATION RATE: 16 BRPM | HEIGHT: 65 IN | TEMPERATURE: 98.3 F | HEART RATE: 86 BPM

## 2024-05-17 DIAGNOSIS — C50.412 MALIGNANT NEOPLASM OF UPPER-OUTER QUADRANT OF LEFT BREAST IN FEMALE, ESTROGEN RECEPTOR NEGATIVE (H): Primary | ICD-10-CM

## 2024-05-17 DIAGNOSIS — Z51.11 ENCOUNTER FOR ANTINEOPLASTIC CHEMOTHERAPY: Primary | ICD-10-CM

## 2024-05-17 DIAGNOSIS — Z51.11 ENCOUNTER FOR ANTINEOPLASTIC CHEMOTHERAPY: ICD-10-CM

## 2024-05-17 DIAGNOSIS — Z17.1 MALIGNANT NEOPLASM OF UPPER-OUTER QUADRANT OF LEFT BREAST IN FEMALE, ESTROGEN RECEPTOR NEGATIVE (H): Primary | ICD-10-CM

## 2024-05-17 DIAGNOSIS — C50.412 MALIGNANT NEOPLASM OF UPPER-OUTER QUADRANT OF LEFT BREAST IN FEMALE, ESTROGEN RECEPTOR NEGATIVE (H): ICD-10-CM

## 2024-05-17 DIAGNOSIS — Z17.1 MALIGNANT NEOPLASM OF UPPER-OUTER QUADRANT OF LEFT BREAST IN FEMALE, ESTROGEN RECEPTOR NEGATIVE (H): ICD-10-CM

## 2024-05-17 LAB
ALBUMIN SERPL BCG-MCNC: 4.2 G/DL (ref 3.5–5.2)
ALP SERPL-CCNC: 50 U/L (ref 40–150)
ALT SERPL W P-5'-P-CCNC: 8 U/L (ref 0–50)
ANION GAP SERPL CALCULATED.3IONS-SCNC: 8 MMOL/L (ref 7–15)
AST SERPL W P-5'-P-CCNC: 13 U/L (ref 0–45)
BASOPHILS # BLD AUTO: 0 10E3/UL (ref 0–0.2)
BASOPHILS NFR BLD AUTO: 0 %
BILIRUB SERPL-MCNC: 0.3 MG/DL
BUN SERPL-MCNC: 11.5 MG/DL (ref 6–20)
CALCIUM SERPL-MCNC: 9.2 MG/DL (ref 8.6–10)
CHLORIDE SERPL-SCNC: 106 MMOL/L (ref 98–107)
CREAT SERPL-MCNC: 0.79 MG/DL (ref 0.51–0.95)
DEPRECATED HCO3 PLAS-SCNC: 26 MMOL/L (ref 22–29)
EGFRCR SERPLBLD CKD-EPI 2021: >90 ML/MIN/1.73M2
EOSINOPHIL # BLD AUTO: 0 10E3/UL (ref 0–0.7)
EOSINOPHIL NFR BLD AUTO: 0 %
ERYTHROCYTE [DISTWIDTH] IN BLOOD BY AUTOMATED COUNT: 12.3 % (ref 10–15)
GLUCOSE SERPL-MCNC: 140 MG/DL (ref 70–99)
HCT VFR BLD AUTO: 38.4 % (ref 35–47)
HGB BLD-MCNC: 13.3 G/DL (ref 11.7–15.7)
IMM GRANULOCYTES # BLD: 0 10E3/UL
IMM GRANULOCYTES NFR BLD: 0 %
LYMPHOCYTES # BLD AUTO: 0.9 10E3/UL (ref 0.8–5.3)
LYMPHOCYTES NFR BLD AUTO: 17 %
MCH RBC QN AUTO: 30.9 PG (ref 26.5–33)
MCHC RBC AUTO-ENTMCNC: 34.6 G/DL (ref 31.5–36.5)
MCV RBC AUTO: 89 FL (ref 78–100)
MONOCYTES # BLD AUTO: 0.4 10E3/UL (ref 0–1.3)
MONOCYTES NFR BLD AUTO: 7 %
NEUTROPHILS # BLD AUTO: 4 10E3/UL (ref 1.6–8.3)
NEUTROPHILS NFR BLD AUTO: 76 %
NRBC # BLD AUTO: 0 10E3/UL
NRBC BLD AUTO-RTO: 0 /100
PLATELET # BLD AUTO: 175 10E3/UL (ref 150–450)
POTASSIUM SERPL-SCNC: 3.9 MMOL/L (ref 3.4–5.3)
PROT SERPL-MCNC: 6.7 G/DL (ref 6.4–8.3)
RBC # BLD AUTO: 4.3 10E6/UL (ref 3.8–5.2)
SODIUM SERPL-SCNC: 140 MMOL/L (ref 135–145)
WBC # BLD AUTO: 5.3 10E3/UL (ref 4–11)

## 2024-05-17 PROCEDURE — 96413 CHEMO IV INFUSION 1 HR: CPT

## 2024-05-17 PROCEDURE — 82040 ASSAY OF SERUM ALBUMIN: CPT | Performed by: INTERNAL MEDICINE

## 2024-05-17 PROCEDURE — G2211 COMPLEX E/M VISIT ADD ON: HCPCS

## 2024-05-17 PROCEDURE — 36591 DRAW BLOOD OFF VENOUS DEVICE: CPT | Performed by: INTERNAL MEDICINE

## 2024-05-17 PROCEDURE — 96367 TX/PROPH/DG ADDL SEQ IV INF: CPT

## 2024-05-17 PROCEDURE — 250N000011 HC RX IP 250 OP 636

## 2024-05-17 PROCEDURE — 250N000013 HC RX MED GY IP 250 OP 250 PS 637

## 2024-05-17 PROCEDURE — 96417 CHEMO IV INFUS EACH ADDL SEQ: CPT

## 2024-05-17 PROCEDURE — 84155 ASSAY OF PROTEIN SERUM: CPT | Performed by: INTERNAL MEDICINE

## 2024-05-17 PROCEDURE — 96375 TX/PRO/DX INJ NEW DRUG ADDON: CPT

## 2024-05-17 PROCEDURE — 99214 OFFICE O/P EST MOD 30 MIN: CPT

## 2024-05-17 PROCEDURE — 85048 AUTOMATED LEUKOCYTE COUNT: CPT | Performed by: INTERNAL MEDICINE

## 2024-05-17 PROCEDURE — 99213 OFFICE O/P EST LOW 20 MIN: CPT

## 2024-05-17 PROCEDURE — 258N000003 HC RX IP 258 OP 636

## 2024-05-17 RX ORDER — DIPHENHYDRAMINE HCL 50 MG
50 CAPSULE ORAL
Status: COMPLETED | OUTPATIENT
Start: 2024-05-17 | End: 2024-05-17

## 2024-05-17 RX ORDER — EPINEPHRINE 1 MG/ML
0.3 INJECTION, SOLUTION INTRAMUSCULAR; SUBCUTANEOUS EVERY 5 MIN PRN
Status: DISCONTINUED | OUTPATIENT
Start: 2024-05-17 | End: 2024-05-17 | Stop reason: HOSPADM

## 2024-05-17 RX ORDER — PALONOSETRON 0.05 MG/ML
0.25 INJECTION, SOLUTION INTRAVENOUS ONCE
Qty: 5 ML | Refills: 0 | Status: COMPLETED | OUTPATIENT
Start: 2024-05-17 | End: 2024-05-17

## 2024-05-17 RX ORDER — HEPARIN SODIUM (PORCINE) LOCK FLUSH IV SOLN 100 UNIT/ML 100 UNIT/ML
5 SOLUTION INTRAVENOUS
Status: CANCELLED | OUTPATIENT
Start: 2024-05-17

## 2024-05-17 RX ORDER — MEPERIDINE HYDROCHLORIDE 50 MG/ML
25 INJECTION INTRAMUSCULAR; INTRAVENOUS; SUBCUTANEOUS EVERY 30 MIN PRN
Status: DISCONTINUED | OUTPATIENT
Start: 2024-05-17 | End: 2024-05-17 | Stop reason: HOSPADM

## 2024-05-17 RX ORDER — HEPARIN SODIUM (PORCINE) LOCK FLUSH IV SOLN 100 UNIT/ML 100 UNIT/ML
5 SOLUTION INTRAVENOUS
Status: DISCONTINUED | OUTPATIENT
Start: 2024-05-17 | End: 2024-05-17 | Stop reason: HOSPADM

## 2024-05-17 RX ORDER — ALBUTEROL SULFATE 90 UG/1
1-2 AEROSOL, METERED RESPIRATORY (INHALATION)
Status: DISCONTINUED | OUTPATIENT
Start: 2024-05-17 | End: 2024-05-17 | Stop reason: HOSPADM

## 2024-05-17 RX ORDER — VITAMIN E 268 MG
CAPSULE ORAL DAILY
COMMUNITY
End: 2024-09-12

## 2024-05-17 RX ORDER — METHYLPREDNISOLONE SODIUM SUCCINATE 125 MG/2ML
125 INJECTION, POWDER, LYOPHILIZED, FOR SOLUTION INTRAMUSCULAR; INTRAVENOUS
Status: CANCELLED
Start: 2024-05-17

## 2024-05-17 RX ORDER — ACETAMINOPHEN 325 MG/1
650 TABLET ORAL
Status: CANCELLED
Start: 2024-05-17

## 2024-05-17 RX ORDER — ACETAMINOPHEN 325 MG/1
650 TABLET ORAL
Status: DISCONTINUED | OUTPATIENT
Start: 2024-05-17 | End: 2024-05-17 | Stop reason: HOSPADM

## 2024-05-17 RX ORDER — METHYLPREDNISOLONE SODIUM SUCCINATE 125 MG/2ML
125 INJECTION, POWDER, LYOPHILIZED, FOR SOLUTION INTRAMUSCULAR; INTRAVENOUS
Status: DISCONTINUED | OUTPATIENT
Start: 2024-05-17 | End: 2024-05-17 | Stop reason: HOSPADM

## 2024-05-17 RX ORDER — MEPERIDINE HYDROCHLORIDE 50 MG/ML
25 INJECTION INTRAMUSCULAR; INTRAVENOUS; SUBCUTANEOUS EVERY 30 MIN PRN
Status: CANCELLED | OUTPATIENT
Start: 2024-05-17

## 2024-05-17 RX ORDER — HEPARIN SODIUM,PORCINE 10 UNIT/ML
5-20 VIAL (ML) INTRAVENOUS DAILY PRN
Status: CANCELLED | OUTPATIENT
Start: 2024-05-17

## 2024-05-17 RX ORDER — DIPHENHYDRAMINE HCL 50 MG
50 CAPSULE ORAL
Status: CANCELLED | OUTPATIENT
Start: 2024-05-17

## 2024-05-17 RX ORDER — EPINEPHRINE 1 MG/ML
0.3 INJECTION, SOLUTION INTRAMUSCULAR; SUBCUTANEOUS EVERY 5 MIN PRN
Status: CANCELLED | OUTPATIENT
Start: 2024-05-17

## 2024-05-17 RX ORDER — DIPHENHYDRAMINE HYDROCHLORIDE 50 MG/ML
50 INJECTION INTRAMUSCULAR; INTRAVENOUS
Status: CANCELLED
Start: 2024-05-17

## 2024-05-17 RX ORDER — ALBUTEROL SULFATE 0.83 MG/ML
2.5 SOLUTION RESPIRATORY (INHALATION)
Status: CANCELLED | OUTPATIENT
Start: 2024-05-17

## 2024-05-17 RX ORDER — ALBUTEROL SULFATE 90 UG/1
1-2 AEROSOL, METERED RESPIRATORY (INHALATION)
Status: CANCELLED
Start: 2024-05-17

## 2024-05-17 RX ORDER — ALBUTEROL SULFATE 0.83 MG/ML
2.5 SOLUTION RESPIRATORY (INHALATION)
Status: DISCONTINUED | OUTPATIENT
Start: 2024-05-17 | End: 2024-05-17 | Stop reason: HOSPADM

## 2024-05-17 RX ORDER — LORAZEPAM 2 MG/ML
0.5 INJECTION INTRAMUSCULAR EVERY 4 HOURS PRN
Status: CANCELLED | OUTPATIENT
Start: 2024-05-17

## 2024-05-17 RX ORDER — HEPARIN SODIUM,PORCINE 10 UNIT/ML
5-20 VIAL (ML) INTRAVENOUS DAILY PRN
Status: DISCONTINUED | OUTPATIENT
Start: 2024-05-17 | End: 2024-05-17 | Stop reason: HOSPADM

## 2024-05-17 RX ORDER — PALONOSETRON 0.05 MG/ML
0.25 INJECTION, SOLUTION INTRAVENOUS ONCE
Status: CANCELLED | OUTPATIENT
Start: 2024-05-17

## 2024-05-17 RX ORDER — DIPHENHYDRAMINE HYDROCHLORIDE 50 MG/ML
50 INJECTION INTRAMUSCULAR; INTRAVENOUS
Status: DISCONTINUED | OUTPATIENT
Start: 2024-05-17 | End: 2024-05-17 | Stop reason: HOSPADM

## 2024-05-17 RX ADMIN — PALONOSETRON 0.25 MG: 0.05 INJECTION, SOLUTION INTRAVENOUS at 09:30

## 2024-05-17 RX ADMIN — HEPARIN 5 ML: 100 SYRINGE at 13:29

## 2024-05-17 RX ADMIN — SODIUM CHLORIDE 775 MG: 900 INJECTION, SOLUTION INTRAVENOUS at 12:46

## 2024-05-17 RX ADMIN — DIPHENHYDRAMINE HYDROCHLORIDE 50 MG: 50 CAPSULE ORAL at 10:01

## 2024-05-17 RX ADMIN — DOCETAXEL 138 MG: 20 INJECTION, SOLUTION INTRAVENOUS at 11:37

## 2024-05-17 RX ADMIN — PERTUZUMAB 420 MG: 30 INJECTION, SOLUTION, CONCENTRATE INTRAVENOUS at 10:24

## 2024-05-17 RX ADMIN — SODIUM CHLORIDE 250 ML: 9 INJECTION, SOLUTION INTRAVENOUS at 09:30

## 2024-05-17 RX ADMIN — FOSAPREPITANT: 150 INJECTION, POWDER, LYOPHILIZED, FOR SOLUTION INTRAVENOUS at 09:57

## 2024-05-17 RX ADMIN — SODIUM CHLORIDE 450 MG: 9 INJECTION, SOLUTION INTRAVENOUS at 10:58

## 2024-05-17 ASSESSMENT — PAIN SCALES - GENERAL: PAINLEVEL: MILD PAIN (3)

## 2024-05-17 NOTE — LETTER
5/17/2024         RE: Vibha Kingston  [address] [place] [address]        Dear Colleague,    Thank you for referring your patient, Vibha Kingston, to the [facility] [place]. Please see a copy of my visit note below.    [facility] Hematology and Oncology Outpatient Progress Note    Patient: Vibha Kingston  MRN: [id number]  Date of Service: May 17, 2024          Reason for Visit    Chief Complaint   Patient presents with     Oncology Clinic Visit     Malignant neoplasm of upper-outer quadrant of left breast in female, estrogen receptor negative         Cancer Staging   Malignant neoplasm of upper-outer quadrant of left breast in female, estrogen receptor negative (H)  Staging form: Breast, AJCC 8th Edition  - Pathologic stage from 3/13/2024: Stage IIA (pT1c, pN1a(sn), cM0, G3, ER-, AZ-, HER2+) - Signed by Rosalind Adams MD on 3/13/2024      Primary Hematologist/Oncologist: Dr. Adams        Assessment/Plan  #. Stage IIA (pT1c pN1a M0) invasive ductal carcinoma of the left breast, grade 3, ER negative, AZ negative, HER2 positive  #.  DCIS of the left breast, ER positive, AZ positive  Vibha is doing well following her first cycle of TCHP. She had minimal side effects from treatment, including nausea, diarrhea/constipation, and a mild rash, all of which have normalized. We reviewed her labs including her CBC, and CMP which are all unremarkable.      Proceed with cycle #2 adjuvant TCHP.  Follow-up labs and provider visit in 3 weeks prior to cycle #3.    Instructed her to call to our clinic with any concerns specifically excessive diarrhea, inadequate oral intake, nerve and bone pain etc.     ______________________________________________________________________________    History of Present Illness/ Interval History    Ms. Vibha Kingston  is a 33 year old female patient of Dr. Adams who presents to the clinic today for follow up to breast cancer, and for consideration of next  cycle of TCHP.     She tolerated the first cycle fairly well. She struggled with nausea a bit for the first week, but was able to use antiemetics both to prevent and relieve nausea.This got better the further away from treatment she got. She had a rash to her chest and back, it was acne like that has since pretty much resolved. She has some tiny dry areas still.     Constipation/diarrhea alternation was a bit of a struggle, but she feels she has been able to stay on top of this. She has been incorporating metamucil now. Drinking fluids was tough, as her taste buds felt like sand paper. She struggled to enjoy water after the first few days, but this has slowly resolved after the first week.     Her hands were irritated from washing them so much. Dry nose for the second week, using saline which has been helpful. Noticed some flushing after steroids.      ECOG performance status   0- Fully active, without restriction        Pain  Pain Score: Mild Pain (3)  Pain Loc: Shoulder (for last couple days)    ROS  A 14 point review of systems was obtained.  Positive findings noted in the history.  The remainder of the review of system is otherwise negative.      Oncology History/Treatment  1/9/2024- self palpated left medial breast mass   - diagnostic mammogram and ultrasound showed 3.4 x 2.2 x 2 cm large ill-defined hypoechoic area with associated calcification at 9:00 3 cm from the nipple.  Sonographic survey of left axilla is within normal limits.    1/11/2024-ultrasound-guided core needle biopsy showed DCIS, grade 3, cribriform and papillary with comedonecrosis.  ER/AZ was deferred to excisional specimen.    1/25/2024-breast actionable panel was negative including INGRID, BARD1, BRCA1, BRCA2, CDH1, CHEK2, NF1, PALB2, PTEN, STK11, TP53.    2/4/2024-MRI breast showed large segmental area of non-mass enhancement medial left breast measuring 10 cm from the posterior to subareolar depth corresponding to the known DCIS.  Mildly  prominent bilateral internal mammary lymph node technically within normal limit.    3/5/2024-left breast simple mastectomy and left axillary sentinel lymph node biopsy   Invasive ductal carcinoma, grade 3   Multifocal, 4 foci vary from 1.3 mm to 11 mm in greatest dimension.   Margins were negative, closest margin less than 1 mm anterior/superior   DCIS, EIC present, solid and comedo pattern, grade 3, extensive comedonecrosis.  Focal lymphovascular invasion present.   1/2 sentinel lymph node was positive for metastatic carcinoma.   [id number]   ER negative in invasive carcinoma, positive and DCIS (80% of the tumor nuclei stain strongly)   MI negative and invasive cancer, positive in DCIS (30% of the tumor nuclei stain strongly of moderately)   HER2/aj positive for overexpression (3+ membrane staining)    4/26/2024- adjuvant TCHP      Physical Exam    GENERAL: Alert and oriented to time place and person. Seated comfortably. In no distress.  HEAD: Atraumatic and normocephalic. No alopecia.  EYES: SOFIA, EOMI. No erythema. No icterus.  ORAL CAVITY: Moist. No mucosal lesion or tonsillar enlargement.  NECK: supple. No thyroid enlargement.  LYMPH NODES: No palpable supraclavicular, cervical, axillary or inguinal lymphadenopathy.  CHEST: clear to auscultation bilaterally. Resonant to percussion throughout bilaterally. Symmetrical breath movements bilaterally.  CVS: RRR  ABDOMEN: Soft. Not tender. Not distended. No palpable hepatomegaly or splenomegaly. No other mass palpable. Bowel sounds present.  EXTREMITIES: Warm. No peripheral edema.  SKIN: no bruising or purpura. Few, scattered dry areas noted on left anterior chest.   NEURO: No gross deficit noted. Non-antalgic gait.      Lab Results    Recent Results (from the past 168 hour(s))   Comprehensive metabolic panel   Result Value Ref Range    Sodium 140 135 - 145 mmol/L    Potassium 3.9 3.4 - 5.3 mmol/L    Carbon Dioxide (CO2) 26 22 - 29 mmol/L    Anion Gap 8 7 - 15 mmol/L  "   Urea Nitrogen 11.5 6.0 - 20.0 mg/dL    Creatinine 0.79 0.51 - 0.95 mg/dL    GFR Estimate >90 >60 mL/min/1.73m2    Calcium 9.2 8.6 - 10.0 mg/dL    Chloride 106 98 - 107 mmol/L    Glucose 140 (H) 70 - 99 mg/dL    Alkaline Phosphatase 50 40 - 150 U/L    AST 13 0 - 45 U/L    ALT 8 0 - 50 U/L    Protein Total 6.7 6.4 - 8.3 g/dL    Albumin 4.2 3.5 - 5.2 g/dL    Bilirubin Total 0.3 <=1.2 mg/dL   CBC with platelets and differential   Result Value Ref Range    WBC Count 5.3 4.0 - 11.0 10e3/uL    RBC Count 4.30 3.80 - 5.20 10e6/uL    Hemoglobin 13.3 11.7 - 15.7 g/dL    Hematocrit 38.4 35.0 - 47.0 %    MCV 89 78 - 100 fL    MCH 30.9 26.5 - 33.0 pg    MCHC 34.6 31.5 - 36.5 g/dL    RDW 12.3 10.0 - 15.0 %    Platelet Count 175 150 - 450 10e3/uL    % Neutrophils 76 %    % Lymphocytes 17 %    % Monocytes 7 %    % Eosinophils 0 %    % Basophils 0 %    % Immature Granulocytes 0 %    NRBCs per 100 WBC 0 <1 /100    Absolute Neutrophils 4.0 1.6 - 8.3 10e3/uL    Absolute Lymphocytes 0.9 0.8 - 5.3 10e3/uL    Absolute Monocytes 0.4 0.0 - 1.3 10e3/uL    Absolute Eosinophils 0.0 0.0 - 0.7 10e3/uL    Absolute Basophils 0.0 0.0 - 0.2 10e3/uL    Absolute Immature Granulocytes 0.0 <=0.4 10e3/uL    Absolute NRBCs 0.0 10e3/uL     I reviewed the above labs today.  Imaging    No results found.      Billing  Total time 30 minutes, to include face to face visit, review of EMR, ordering, documentation and coordination of care on date of service   complexity modifier for longitudinal care.     Signed by: AYLA Mccormick CNP    Oncology Rooming Note    May 17, 2024 8:31 AM   Vibha BARRAZA Thee is a 33 year old female who presents for:    Chief Complaint   Patient presents with     Oncology Clinic Visit     Malignant neoplasm of upper-outer quadrant of left breast in female, estrogen receptor negative      Initial Vitals: /73   Pulse 86   Temp 98.3  F (36.8  C)   Resp 16   Ht 1.651 m (5' 5\")   Wt 75.2 kg (165 lb 11.2 oz)   LMP " "04/01/2024 (Approximate)   SpO2 97%   BMI 27.57 kg/m   Estimated body mass index is 27.57 kg/m  as calculated from the following:    Height as of this encounter: 1.651 m (5' 5\").    Weight as of this encounter: 75.2 kg (165 lb 11.2 oz). Body surface area is 1.86 meters squared.  Mild Pain (3) Comment: Data Unavailable   Patient's last menstrual period was 04/01/2024 (approximate).  Allergies reviewed: Yes  Medications reviewed: Yes    Medications: MEDICATION REFILLS NEEDED TODAY. Provider was notified.  Pharmacy name entered into [employer]: [id number] - [place], [place] - [address]    Frailty Screening:   Is the patient here for a new oncology consult visit in cancer care? 2. No      Clinical concerns:  second treatment      Danyell Lira                Again, thank you for allowing me to participate in the care of your patient.        Sincerely,        Mary Champion, AYLA CNP  "

## 2024-05-17 NOTE — PROGRESS NOTES
"Oncology Rooming Note    May 17, 2024 8:31 AM   Vibha Kingston is a 33 year old female who presents for:    Chief Complaint   Patient presents with    Oncology Clinic Visit     Malignant neoplasm of upper-outer quadrant of left breast in female, estrogen receptor negative      Initial Vitals: /73   Pulse 86   Temp 98.3  F (36.8  C)   Resp 16   Ht 1.651 m (5' 5\")   Wt 75.2 kg (165 lb 11.2 oz)   LMP 04/01/2024 (Approximate)   SpO2 97%   BMI 27.57 kg/m   Estimated body mass index is 27.57 kg/m  as calculated from the following:    Height as of this encounter: 1.651 m (5' 5\").    Weight as of this encounter: 75.2 kg (165 lb 11.2 oz). Body surface area is 1.86 meters squared.  Mild Pain (3) Comment: Data Unavailable   Patient's last menstrual period was 04/01/2024 (approximate).  Allergies reviewed: Yes  Medications reviewed: Yes    Medications: MEDICATION REFILLS NEEDED TODAY. Provider was notified.  Pharmacy name entered into [employer]: [id number] - [place], [place] - [address] [place] [address]    Frailty Screening:   Is the patient here for a new oncology consult visit in cancer care? 2. No      Clinical concerns:  second treatment      Danyell Lira              " Rituxan Counseling:  I discussed with the patient the risks of Rituxan infusions. Side effects can include infusion reactions, severe drug rashes including mucocutaneous reactions, reactivation of latent hepatitis and other infections and rarely progressive multifocal leukoencephalopathy.  All of the patient's questions and concerns were addressed.

## 2024-05-17 NOTE — PROGRESS NOTES
[facility]: Cancer Care Follow-Up Note                                    Discussion with Patient:                                                    Checked in with patient while in Infusion.  Patient accompanied by her , Keenan.  Med dominic     Steroid use/side effect: dexamethasone.    Dates of Treatment:                                                      Infusion given in last 28 days       Administered MAR Action Medication Dose Rate Visit    04/26/2024 11:37 New Bag pertuzumab (PERJETA) 840 mg in sodium chloride 0.9 % 303 mL infusion 840 mg 303 mL/hr Infusion Therapy Visit on 04/26/2024 in [facility]    04/26/2024 12:43 New Bag trastuzumab-qyyp (TRAZIMERA) 600 mg in sodium chloride 0.9 % 303.57 mL infusion 600 mg 202.4 mL/hr Infusion Therapy Visit on 04/26/2024 in [facility]    04/26/2024 14:20 New Bag DOCEtaxel (TAXOTERE) 138 mg in sodium chloride 0.9% in non-PVC container 281.9 mL infusion 138 mg 281.9 mL/hr Infusion Therapy Visit on 04/26/2024 in [facility]    04/26/2024 15:22 New Bag CARBOplatin 790 mg in sodium chloride 0.9 % 629 mL infusion 790 mg 1,258 mL/hr Infusion Therapy Visit on 04/26/2024 in [facility]            Assessment:                                                      Patient reports feeling better today. She reports increased fatigue the first week after her first treatment. She said she pushed through working but is going to be easier on herself for her remaining cycles and include more rest time and take time off of work if needed.    Intervention/Education provided during outreach:                                                       Provided patient with dexamethasone medication calendar for cycle #2. Reviewed dosing instructions and reminded her to take with food.    Patient to follow up as scheduled at next appt    Signature:  Christie Agee RN

## 2024-05-17 NOTE — PROGRESS NOTES
Infusion Nursing Note:  Vibha Kingston presents today for chemo and infusions.    Patient seen by provider today: Yes:     Note: Pt requested benedryl po as a premed.      Intravenous Access:  Implanted Port.    Treatment Conditions:  Results reviewed, labs MET treatment parameters, ok to proceed with treatment.      Post Infusion Assessment:  Patient tolerated infusion without incident.       Discharge Plan:   Patient discharged in stable condition accompanied by: demi.      Shabana TURNER RN

## 2024-06-07 ENCOUNTER — LAB (OUTPATIENT)
Dept: INFUSION THERAPY | Facility: HOSPITAL | Age: 34
End: 2024-06-07
Attending: INTERNAL MEDICINE
Payer: COMMERCIAL

## 2024-06-07 ENCOUNTER — ONCOLOGY VISIT (OUTPATIENT)
Dept: ONCOLOGY | Facility: HOSPITAL | Age: 34
End: 2024-06-07
Attending: INTERNAL MEDICINE
Payer: COMMERCIAL

## 2024-06-07 VITALS
RESPIRATION RATE: 18 BRPM | TEMPERATURE: 97.9 F | WEIGHT: 168.3 LBS | OXYGEN SATURATION: 98 % | BODY MASS INDEX: 28.01 KG/M2 | HEART RATE: 83 BPM | DIASTOLIC BLOOD PRESSURE: 78 MMHG | SYSTOLIC BLOOD PRESSURE: 109 MMHG

## 2024-06-07 VITALS — OXYGEN SATURATION: 99 % | HEART RATE: 69 BPM | DIASTOLIC BLOOD PRESSURE: 76 MMHG | SYSTOLIC BLOOD PRESSURE: 111 MMHG

## 2024-06-07 DIAGNOSIS — Z51.81 ENCOUNTER FOR MONITORING CARDIOTOXIC DRUG THERAPY: ICD-10-CM

## 2024-06-07 DIAGNOSIS — Z17.1 MALIGNANT NEOPLASM OF UPPER-OUTER QUADRANT OF LEFT BREAST IN FEMALE, ESTROGEN RECEPTOR NEGATIVE (H): Primary | ICD-10-CM

## 2024-06-07 DIAGNOSIS — Z17.1 MALIGNANT NEOPLASM OF UPPER-OUTER QUADRANT OF LEFT BREAST IN FEMALE, ESTROGEN RECEPTOR NEGATIVE (H): ICD-10-CM

## 2024-06-07 DIAGNOSIS — Z51.11 ENCOUNTER FOR ANTINEOPLASTIC CHEMOTHERAPY: Primary | ICD-10-CM

## 2024-06-07 DIAGNOSIS — Z79.899 ENCOUNTER FOR MONITORING CARDIOTOXIC DRUG THERAPY: ICD-10-CM

## 2024-06-07 DIAGNOSIS — Z51.11 ENCOUNTER FOR ANTINEOPLASTIC CHEMOTHERAPY: ICD-10-CM

## 2024-06-07 DIAGNOSIS — C50.412 MALIGNANT NEOPLASM OF UPPER-OUTER QUADRANT OF LEFT BREAST IN FEMALE, ESTROGEN RECEPTOR NEGATIVE (H): ICD-10-CM

## 2024-06-07 DIAGNOSIS — C50.412 MALIGNANT NEOPLASM OF UPPER-OUTER QUADRANT OF LEFT BREAST IN FEMALE, ESTROGEN RECEPTOR NEGATIVE (H): Primary | ICD-10-CM

## 2024-06-07 LAB
ALBUMIN SERPL BCG-MCNC: 4.3 G/DL (ref 3.5–5.2)
ALP SERPL-CCNC: 54 U/L (ref 40–150)
ALT SERPL W P-5'-P-CCNC: 11 U/L (ref 0–50)
ANION GAP SERPL CALCULATED.3IONS-SCNC: 10 MMOL/L (ref 7–15)
AST SERPL W P-5'-P-CCNC: 17 U/L (ref 0–45)
BASOPHILS # BLD AUTO: 0 10E3/UL (ref 0–0.2)
BASOPHILS NFR BLD AUTO: 1 %
BILIRUB SERPL-MCNC: 0.3 MG/DL
BUN SERPL-MCNC: 11.2 MG/DL (ref 6–20)
CALCIUM SERPL-MCNC: 9.4 MG/DL (ref 8.6–10)
CHLORIDE SERPL-SCNC: 105 MMOL/L (ref 98–107)
CREAT SERPL-MCNC: 0.73 MG/DL (ref 0.51–0.95)
DEPRECATED HCO3 PLAS-SCNC: 25 MMOL/L (ref 22–29)
EGFRCR SERPLBLD CKD-EPI 2021: >90 ML/MIN/1.73M2
EOSINOPHIL # BLD AUTO: 0 10E3/UL (ref 0–0.7)
EOSINOPHIL NFR BLD AUTO: 0 %
ERYTHROCYTE [DISTWIDTH] IN BLOOD BY AUTOMATED COUNT: 14.3 % (ref 10–15)
GLUCOSE SERPL-MCNC: 108 MG/DL (ref 70–99)
HCT VFR BLD AUTO: 35.5 % (ref 35–47)
HGB BLD-MCNC: 12.2 G/DL (ref 11.7–15.7)
IMM GRANULOCYTES # BLD: 0 10E3/UL
IMM GRANULOCYTES NFR BLD: 0 %
LYMPHOCYTES # BLD AUTO: 1.1 10E3/UL (ref 0.8–5.3)
LYMPHOCYTES NFR BLD AUTO: 25 %
MCH RBC QN AUTO: 30.7 PG (ref 26.5–33)
MCHC RBC AUTO-ENTMCNC: 34.4 G/DL (ref 31.5–36.5)
MCV RBC AUTO: 89 FL (ref 78–100)
MONOCYTES # BLD AUTO: 0.5 10E3/UL (ref 0–1.3)
MONOCYTES NFR BLD AUTO: 10 %
NEUTROPHILS # BLD AUTO: 2.9 10E3/UL (ref 1.6–8.3)
NEUTROPHILS NFR BLD AUTO: 65 %
NRBC # BLD AUTO: 0 10E3/UL
NRBC BLD AUTO-RTO: 0 /100
PLATELET # BLD AUTO: 177 10E3/UL (ref 150–450)
POTASSIUM SERPL-SCNC: 3.7 MMOL/L (ref 3.4–5.3)
PROT SERPL-MCNC: 6.6 G/DL (ref 6.4–8.3)
RBC # BLD AUTO: 3.97 10E6/UL (ref 3.8–5.2)
SODIUM SERPL-SCNC: 140 MMOL/L (ref 135–145)
WBC # BLD AUTO: 4.5 10E3/UL (ref 4–11)

## 2024-06-07 PROCEDURE — 250N000011 HC RX IP 250 OP 636: Mod: JZ

## 2024-06-07 PROCEDURE — 96413 CHEMO IV INFUSION 1 HR: CPT

## 2024-06-07 PROCEDURE — 250N000013 HC RX MED GY IP 250 OP 250 PS 637

## 2024-06-07 PROCEDURE — 258N000003 HC RX IP 258 OP 636: Mod: JZ

## 2024-06-07 PROCEDURE — 96367 TX/PROPH/DG ADDL SEQ IV INF: CPT

## 2024-06-07 PROCEDURE — 36591 DRAW BLOOD OFF VENOUS DEVICE: CPT

## 2024-06-07 PROCEDURE — 85025 COMPLETE CBC W/AUTO DIFF WBC: CPT

## 2024-06-07 PROCEDURE — 96375 TX/PRO/DX INJ NEW DRUG ADDON: CPT

## 2024-06-07 PROCEDURE — 96417 CHEMO IV INFUS EACH ADDL SEQ: CPT

## 2024-06-07 PROCEDURE — 80053 COMPREHEN METABOLIC PANEL: CPT

## 2024-06-07 PROCEDURE — 99214 OFFICE O/P EST MOD 30 MIN: CPT

## 2024-06-07 PROCEDURE — G2211 COMPLEX E/M VISIT ADD ON: HCPCS

## 2024-06-07 PROCEDURE — 99213 OFFICE O/P EST LOW 20 MIN: CPT | Mod: 25

## 2024-06-07 RX ORDER — ACETAMINOPHEN 325 MG/1
650 TABLET ORAL
Status: CANCELLED
Start: 2024-06-07

## 2024-06-07 RX ORDER — ALBUTEROL SULFATE 90 UG/1
1-2 AEROSOL, METERED RESPIRATORY (INHALATION)
Status: DISCONTINUED | OUTPATIENT
Start: 2024-06-07 | End: 2024-06-07 | Stop reason: HOSPADM

## 2024-06-07 RX ORDER — DIPHENHYDRAMINE HYDROCHLORIDE 50 MG/ML
50 INJECTION INTRAMUSCULAR; INTRAVENOUS
Status: DISCONTINUED | OUTPATIENT
Start: 2024-06-07 | End: 2024-06-07 | Stop reason: HOSPADM

## 2024-06-07 RX ORDER — MEPERIDINE HYDROCHLORIDE 50 MG/ML
25 INJECTION INTRAMUSCULAR; INTRAVENOUS; SUBCUTANEOUS EVERY 30 MIN PRN
Status: DISCONTINUED | OUTPATIENT
Start: 2024-06-07 | End: 2024-06-07 | Stop reason: HOSPADM

## 2024-06-07 RX ORDER — METHYLPREDNISOLONE SODIUM SUCCINATE 125 MG/2ML
125 INJECTION, POWDER, LYOPHILIZED, FOR SOLUTION INTRAMUSCULAR; INTRAVENOUS
Status: CANCELLED
Start: 2024-06-07

## 2024-06-07 RX ORDER — METHYLPREDNISOLONE SODIUM SUCCINATE 125 MG/2ML
125 INJECTION, POWDER, LYOPHILIZED, FOR SOLUTION INTRAMUSCULAR; INTRAVENOUS
Status: DISCONTINUED | OUTPATIENT
Start: 2024-06-07 | End: 2024-06-07 | Stop reason: HOSPADM

## 2024-06-07 RX ORDER — HEPARIN SODIUM,PORCINE 10 UNIT/ML
5-20 VIAL (ML) INTRAVENOUS DAILY PRN
Status: DISCONTINUED | OUTPATIENT
Start: 2024-06-07 | End: 2024-06-07 | Stop reason: HOSPADM

## 2024-06-07 RX ORDER — PALONOSETRON 0.05 MG/ML
0.25 INJECTION, SOLUTION INTRAVENOUS ONCE
Qty: 5 ML | Refills: 0 | Status: COMPLETED | OUTPATIENT
Start: 2024-06-07 | End: 2024-06-07

## 2024-06-07 RX ORDER — HEPARIN SODIUM (PORCINE) LOCK FLUSH IV SOLN 100 UNIT/ML 100 UNIT/ML
5 SOLUTION INTRAVENOUS
Status: DISCONTINUED | OUTPATIENT
Start: 2024-06-07 | End: 2024-06-07 | Stop reason: HOSPADM

## 2024-06-07 RX ORDER — DIPHENHYDRAMINE HCL 50 MG
50 CAPSULE ORAL
Status: COMPLETED | OUTPATIENT
Start: 2024-06-07 | End: 2024-06-07

## 2024-06-07 RX ORDER — LORAZEPAM 2 MG/ML
0.5 INJECTION INTRAMUSCULAR EVERY 4 HOURS PRN
Status: CANCELLED | OUTPATIENT
Start: 2024-06-07

## 2024-06-07 RX ORDER — HEPARIN SODIUM,PORCINE 10 UNIT/ML
5-20 VIAL (ML) INTRAVENOUS DAILY PRN
Status: CANCELLED | OUTPATIENT
Start: 2024-06-07

## 2024-06-07 RX ORDER — PALONOSETRON 0.05 MG/ML
0.25 INJECTION, SOLUTION INTRAVENOUS ONCE
Status: CANCELLED | OUTPATIENT
Start: 2024-06-07

## 2024-06-07 RX ORDER — EPINEPHRINE 1 MG/ML
0.3 INJECTION, SOLUTION INTRAMUSCULAR; SUBCUTANEOUS EVERY 5 MIN PRN
Status: DISCONTINUED | OUTPATIENT
Start: 2024-06-07 | End: 2024-06-07 | Stop reason: HOSPADM

## 2024-06-07 RX ORDER — ALBUTEROL SULFATE 0.83 MG/ML
2.5 SOLUTION RESPIRATORY (INHALATION)
Status: CANCELLED | OUTPATIENT
Start: 2024-06-07

## 2024-06-07 RX ORDER — ALBUTEROL SULFATE 90 UG/1
1-2 AEROSOL, METERED RESPIRATORY (INHALATION)
Status: CANCELLED
Start: 2024-06-07

## 2024-06-07 RX ORDER — DIPHENHYDRAMINE HYDROCHLORIDE 50 MG/ML
50 INJECTION INTRAMUSCULAR; INTRAVENOUS
Status: CANCELLED
Start: 2024-06-07

## 2024-06-07 RX ORDER — HEPARIN SODIUM (PORCINE) LOCK FLUSH IV SOLN 100 UNIT/ML 100 UNIT/ML
5 SOLUTION INTRAVENOUS
Status: CANCELLED | OUTPATIENT
Start: 2024-06-07

## 2024-06-07 RX ORDER — EPINEPHRINE 1 MG/ML
0.3 INJECTION, SOLUTION INTRAMUSCULAR; SUBCUTANEOUS EVERY 5 MIN PRN
Status: CANCELLED | OUTPATIENT
Start: 2024-06-07

## 2024-06-07 RX ORDER — MEPERIDINE HYDROCHLORIDE 50 MG/ML
25 INJECTION INTRAMUSCULAR; INTRAVENOUS; SUBCUTANEOUS EVERY 30 MIN PRN
Status: CANCELLED | OUTPATIENT
Start: 2024-06-07

## 2024-06-07 RX ORDER — ALBUTEROL SULFATE 0.83 MG/ML
2.5 SOLUTION RESPIRATORY (INHALATION)
Status: DISCONTINUED | OUTPATIENT
Start: 2024-06-07 | End: 2024-06-07 | Stop reason: HOSPADM

## 2024-06-07 RX ORDER — DIPHENHYDRAMINE HCL 50 MG
50 CAPSULE ORAL
Status: CANCELLED | OUTPATIENT
Start: 2024-06-07

## 2024-06-07 RX ADMIN — FOSAPREPITANT DIMEGLUMINE: 150 INJECTION, POWDER, LYOPHILIZED, FOR SOLUTION INTRAVENOUS at 09:50

## 2024-06-07 RX ADMIN — SODIUM CHLORIDE 830 MG: 900 INJECTION, SOLUTION INTRAVENOUS at 12:49

## 2024-06-07 RX ADMIN — PALONOSETRON 0.25 MG: 0.05 INJECTION, SOLUTION INTRAVENOUS at 09:46

## 2024-06-07 RX ADMIN — HEPARIN 5 ML: 100 SYRINGE at 13:39

## 2024-06-07 RX ADMIN — SODIUM CHLORIDE 250 ML: 9 INJECTION, SOLUTION INTRAVENOUS at 09:46

## 2024-06-07 RX ADMIN — DIPHENHYDRAMINE HYDROCHLORIDE 50 MG: 50 CAPSULE ORAL at 10:19

## 2024-06-07 RX ADMIN — SODIUM CHLORIDE 450 MG: 9 INJECTION, SOLUTION INTRAVENOUS at 10:57

## 2024-06-07 RX ADMIN — DOCETAXEL 138 MG: 20 INJECTION, SOLUTION INTRAVENOUS at 11:34

## 2024-06-07 RX ADMIN — PERTUZUMAB 420 MG: 30 INJECTION, SOLUTION, CONCENTRATE INTRAVENOUS at 10:22

## 2024-06-07 ASSESSMENT — PAIN SCALES - GENERAL: PAINLEVEL: NO PAIN (0)

## 2024-06-07 NOTE — PROGRESS NOTES
"Oncology Rooming Note    June 7, 2024 8:23 AM   Vibha Kingston is a 33 year old female who presents for:    Chief Complaint   Patient presents with    Oncology Clinic Visit     Visit for fertility preservation counseling prior to cancer therapy  Invasive ductal carcinoma of breast, female, left (H)  Malignant neoplasm of upper-outer quadrant of left breast in female, estrogen receptor negative (H)  Ductal carcinoma in situ (DCIS) of left breast       Initial Vitals: /78   Pulse 83   Temp 97.9  F (36.6  C)   Resp 18   Wt 76.3 kg (168 lb 4.8 oz)   SpO2 98%   BMI 28.01 kg/m   Estimated body mass index is 28.01 kg/m  as calculated from the following:    Height as of 5/17/24: 1.651 m (5' 5\").    Weight as of this encounter: 76.3 kg (168 lb 4.8 oz). Body surface area is 1.87 meters squared.  No Pain (0) Comment: Data Unavailable   No LMP recorded.  Allergies reviewed: Yes  Medications reviewed: Yes    Medications: Medication refills not needed today.  Pharmacy name entered into [employer]: [id number] - [place], [place] - [address]    Frailty Screening:   Is the patient here for a new oncology consult visit in cancer care? 2. No      Clinical concerns: Rachel Cobb LPN             "
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
## 2024-06-07 NOTE — LETTER
6/7/2024      Vibha Kingston  [address] [place] [address]      Dear Colleague,    Thank you for referring your patient, Vibha Kingston, to the [facility] [place]. Please see a copy of my visit note below.    [facility] Hematology and Oncology Outpatient Progress Note    Patient: Vibha Kingston  MRN: [id number]  Date of Service: Jun 7, 2024          Reason for Visit    Chief Complaint   Patient presents with     Oncology Clinic Visit     Visit for fertility preservation counseling prior to cancer therapy  Invasive ductal carcinoma of breast, female, left (H)  Malignant neoplasm of upper-outer quadrant of left breast in female, estrogen receptor negative (H)  Ductal carcinoma in situ (DCIS) of left breast          Cancer Staging   Malignant neoplasm of upper-outer quadrant of left breast in female, estrogen receptor negative (H)  Staging form: Breast, AJCC 8th Edition  - Pathologic stage from 3/13/2024: Stage IIA (pT1c, pN1a(sn), cM0, G3, ER-, NM-, HER2+) - Signed by Rosalind Adams MD on 3/13/2024      Primary Hematologist/Oncologist: Dr. Adams        Assessment/Plan  #. Stage IIA (pT1c pN1a M0) invasive ductal carcinoma of the left breast, grade 3, ER negative, NM negative, HER2 positive  #.  DCIS of the left breast, ER positive, NM positive  Vibha is doing well following her first 2 cycles of TCHP. She had minimal side effects from treatment, including nausea, diarrhea/constipation, and a mild rash. The nausea was a bit more intense this time around, she vomited twice. Using antiemetics appropriately. We reviewed her labs including her CBC, and CMP which are all unremarkable.      Proceed with cycle #3 adjuvant TCHP.  Follow-up labs and provider visit in 3 weeks prior to cycle #4.    Echocardiogram prior to next cycle.   Instructed her to call to our clinic with any concerns specifically excessive diarrhea, inadequate oral intake, nerve and bone pain etc.      ______________________________________________________________________________    History of Present Illness/ Interval History    Ms. Vibha Kingston  is a 33 year old female patient who presents for follow up to breast cancer and for consideration of next cycle of chemotherapy. She has tolerated the first two cycles well. She is feeling well. She has noticed side effects of nausea that seemed to be more intense this time around. She also noticed some heartburn. Denies trouble with diarrhea or constipation.  No new pain, or symptoms.       ECOG performance status   0- Fully active, without restriction      Pain  Pain Score: No Pain (0)    ROS  A 14 point review of systems was obtained.  Positive findings noted in the history.  The remainder of the review of system is otherwise negative.      Oncology History/Treatment  1/9/2024- self palpated left medial breast mass   - diagnostic mammogram and ultrasound showed 3.4 x 2.2 x 2 cm large ill-defined hypoechoic area with associated calcification at 9:00 3 cm from the nipple.  Sonographic survey of left axilla is within normal limits.    1/11/2024-ultrasound-guided core needle biopsy showed DCIS, grade 3, cribriform and papillary with comedonecrosis.  ER/UT was deferred to excisional specimen.    1/25/2024-breast actionable panel was negative including INGRID, BARD1, BRCA1, BRCA2, CDH1, CHEK2, NF1, PALB2, PTEN, STK11, TP53.    2/4/2024-MRI breast showed large segmental area of non-mass enhancement medial left breast measuring 10 cm from the posterior to subareolar depth corresponding to the known DCIS.  Mildly prominent bilateral internal mammary lymph node technically within normal limit.    3/5/2024-left breast simple mastectomy and left axillary sentinel lymph node biopsy   Invasive ductal carcinoma, grade 3   Multifocal, 4 foci vary from 1.3 mm to 11 mm in greatest dimension.   Margins were negative, closest margin less than 1 mm anterior/superior   DCIS, EIC  present, solid and comedo pattern, grade 3, extensive comedonecrosis.  Focal lymphovascular invasion present.   1/2 sentinel lymph node was positive for metastatic carcinoma.   [id number]   ER negative in invasive carcinoma, positive and DCIS (80% of the tumor nuclei stain strongly)   RI negative and invasive cancer, positive in DCIS (30% of the tumor nuclei stain strongly of moderately)   HER2/aj positive for overexpression (3+ membrane staining)    4/26/2024- adjuvant [facility]        Physical Exam    /78   Pulse 83   Temp 97.9  F (36.6  C)   Resp 18   Wt 76.3 kg (168 lb 4.8 oz)   SpO2 98%   BMI 28.01 kg/m        GENERAL: Alert and oriented to time place and person. Seated comfortably. In no distress.  HEAD: Atraumatic and normocephalic. No alopecia.  EYES: SOFIA, EOMI. No erythema. No icterus.  ORAL CAVITY: Moist. No mucosal lesion or tonsillar enlargement.  NECK: supple. No thyroid enlargement.  LYMPH NODES: No palpable supraclavicular, cervical, axillary or inguinal lymphadenopathy.  CHEST: clear to auscultation bilaterally. Resonant to percussion throughout bilaterally. Symmetrical breath movements bilaterally.  CVS: RRR  ABDOMEN: Soft. Not tender. Not distended. No palpable hepatomegaly or splenomegaly. No other mass palpable. Bowel sounds present.  EXTREMITIES: Warm. No peripheral edema.  SKIN: no rash, or bruising or purpura.   NEURO: No gross deficit noted. Non-antalgic gait.      Lab Results    Recent Results (from the past 168 hour(s))   Comprehensive metabolic panel   Result Value Ref Range    Sodium 140 135 - 145 mmol/L    Potassium 3.7 3.4 - 5.3 mmol/L    Carbon Dioxide (CO2) 25 22 - 29 mmol/L    Anion Gap 10 7 - 15 mmol/L    Urea Nitrogen 11.2 6.0 - 20.0 mg/dL    Creatinine 0.73 0.51 - 0.95 mg/dL    GFR Estimate >90 >60 mL/min/1.73m2    Calcium 9.4 8.6 - 10.0 mg/dL    Chloride 105 98 - 107 mmol/L    Glucose 108 (H) 70 - 99 mg/dL    Alkaline Phosphatase 54 40 - 150 U/L    AST 17 0 - 45 U/L     ALT 11 0 - 50 U/L    Protein Total 6.6 6.4 - 8.3 g/dL    Albumin 4.3 3.5 - 5.2 g/dL    Bilirubin Total 0.3 <=1.2 mg/dL   CBC with platelets and differential   Result Value Ref Range    WBC Count 4.5 4.0 - 11.0 10e3/uL    RBC Count 3.97 3.80 - 5.20 10e6/uL    Hemoglobin 12.2 11.7 - 15.7 g/dL    Hematocrit 35.5 35.0 - 47.0 %    MCV 89 78 - 100 fL    MCH 30.7 26.5 - 33.0 pg    MCHC 34.4 31.5 - 36.5 g/dL    RDW 14.3 10.0 - 15.0 %    Platelet Count 177 150 - 450 10e3/uL    % Neutrophils 65 %    % Lymphocytes 25 %    % Monocytes 10 %    % Eosinophils 0 %    % Basophils 1 %    % Immature Granulocytes 0 %    NRBCs per 100 WBC 0 <1 /100    Absolute Neutrophils 2.9 1.6 - 8.3 10e3/uL    Absolute Lymphocytes 1.1 0.8 - 5.3 10e3/uL    Absolute Monocytes 0.5 0.0 - 1.3 10e3/uL    Absolute Eosinophils 0.0 0.0 - 0.7 10e3/uL    Absolute Basophils 0.0 0.0 - 0.2 10e3/uL    Absolute Immature Granulocytes 0.0 <=0.4 10e3/uL    Absolute NRBCs 0.0 10e3/uL     I reviewed the above labs today.  Imaging    No results found.      Billing  Total time 30 minutes, to include face to face visit, review of EMR, ordering, documentation and coordination of care on date of service   complexity modifier for longitudinal care.     Signed by: AYLA Mccormick CNP    Oncology Rooming Note    June 7, 2024 8:23 AM   Vibha Kingston is a 33 year old female who presents for:    Chief Complaint   Patient presents with     Oncology Clinic Visit     Visit for fertility preservation counseling prior to cancer therapy  Invasive ductal carcinoma of breast, female, left (H)  Malignant neoplasm of upper-outer quadrant of left breast in female, estrogen receptor negative (H)  Ductal carcinoma in situ (DCIS) of left breast       Initial Vitals: /78   Pulse 83   Temp 97.9  F (36.6  C)   Resp 18   Wt 76.3 kg (168 lb 4.8 oz)   SpO2 98%   BMI 28.01 kg/m   Estimated body mass index is 28.01 kg/m  as calculated from the following:    Height as of 5/17/24:  "1.651 m (5' 5\").    Weight as of this encounter: 76.3 kg (168 lb 4.8 oz). Body surface area is 1.87 meters squared.  No Pain (0) Comment: Data Unavailable   No LMP recorded.  Allergies reviewed: Yes  Medications reviewed: Yes    Medications: Medication refills not needed today.  Pharmacy name entered into [facility]: [id number] - [place], [place] - [address]    Frailty Screening:   Is the patient here for a new oncology consult visit in cancer care? 2. No      Clinical concerns: Rachel Cobb LPN                 Again, thank you for allowing me to participate in the care of your patient.        Sincerely,        AYLA Mccormick CNP  "

## 2024-06-07 NOTE — PROGRESS NOTES
[facility] Hematology and Oncology Outpatient Progress Note    Patient: Vibha Kingston  MRN: [id number]  Date of Service: Jun 7, 2024          Reason for Visit    Chief Complaint   Patient presents with    Oncology Clinic Visit     Visit for fertility preservation counseling prior to cancer therapy  Invasive ductal carcinoma of breast, female, left (H)  Malignant neoplasm of upper-outer quadrant of left breast in female, estrogen receptor negative (H)  Ductal carcinoma in situ (DCIS) of left breast          Cancer Staging   Malignant neoplasm of upper-outer quadrant of left breast in female, estrogen receptor negative (H)  Staging form: Breast, AJCC 8th Edition  - Pathologic stage from 3/13/2024: Stage IIA (pT1c, pN1a(sn), cM0, G3, ER-, OH-, HER2+) - Signed by Rosalind Adams MD on 3/13/2024      Primary Hematologist/Oncologist: Dr. Adams        Assessment/Plan  #. Stage IIA (pT1c pN1a M0) invasive ductal carcinoma of the left breast, grade 3, ER negative, OH negative, HER2 positive  #.  DCIS of the left breast, ER positive, OH positive  Vibha is doing well following her first 2 cycles of TCHP. She had minimal side effects from treatment, including nausea, diarrhea/constipation, and a mild rash. The nausea was a bit more intense this time around, she vomited twice. Using antiemetics appropriately. We reviewed her labs including her CBC, and CMP which are all unremarkable.      Proceed with cycle #3 adjuvant TCHP.  Follow-up labs and provider visit in 3 weeks prior to cycle #4.    Echocardiogram prior to next cycle.   Instructed her to call to our clinic with any concerns specifically excessive diarrhea, inadequate oral intake, nerve and bone pain etc.     ______________________________________________________________________________    History of Present Illness/ Interval History    Ms. Vibha Kingston  is a 33 year old female patient who presents for follow up to breast cancer and for consideration of next  cycle of chemotherapy. She has tolerated the first two cycles well. She is feeling well. She has noticed side effects of nausea that seemed to be more intense this time around. She also noticed some heartburn. Denies trouble with diarrhea or constipation.  No new pain, or symptoms.       ECOG performance status   0- Fully active, without restriction      Pain  Pain Score: No Pain (0)    ROS  A 14 point review of systems was obtained.  Positive findings noted in the history.  The remainder of the review of system is otherwise negative.      Oncology History/Treatment  1/9/2024- self palpated left medial breast mass   - diagnostic mammogram and ultrasound showed 3.4 x 2.2 x 2 cm large ill-defined hypoechoic area with associated calcification at 9:00 3 cm from the nipple.  Sonographic survey of left axilla is within normal limits.    1/11/2024-ultrasound-guided core needle biopsy showed DCIS, grade 3, cribriform and papillary with comedonecrosis.  ER/ND was deferred to excisional specimen.    1/25/2024-breast actionable panel was negative including INGRID, BARD1, BRCA1, BRCA2, CDH1, CHEK2, NF1, PALB2, PTEN, STK11, TP53.    2/4/2024-MRI breast showed large segmental area of non-mass enhancement medial left breast measuring 10 cm from the posterior to subareolar depth corresponding to the known DCIS.  Mildly prominent bilateral internal mammary lymph node technically within normal limit.    3/5/2024-left breast simple mastectomy and left axillary sentinel lymph node biopsy   Invasive ductal carcinoma, grade 3   Multifocal, 4 foci vary from 1.3 mm to 11 mm in greatest dimension.   Margins were negative, closest margin less than 1 mm anterior/superior   DCIS, EIC present, solid and comedo pattern, grade 3, extensive comedonecrosis.  Focal lymphovascular invasion present.   1/2 sentinel lymph node was positive for metastatic carcinoma.   [id number]   ER negative in invasive carcinoma, positive and DCIS (80% of the tumor nuclei  stain strongly)   CT negative and invasive cancer, positive in DCIS (30% of the tumor nuclei stain strongly of moderately)   HER2/aj positive for overexpression (3+ membrane staining)    4/26/2024- adjuvant [facility]        Physical Exam    /78   Pulse 83   Temp 97.9  F (36.6  C)   Resp 18   Wt 76.3 kg (168 lb 4.8 oz)   SpO2 98%   BMI 28.01 kg/m        GENERAL: Alert and oriented to time place and person. Seated comfortably. In no distress.  HEAD: Atraumatic and normocephalic. No alopecia.  EYES: SOFIA, EOMI. No erythema. No icterus.  ORAL CAVITY: Moist. No mucosal lesion or tonsillar enlargement.  NECK: supple. No thyroid enlargement.  LYMPH NODES: No palpable supraclavicular, cervical, axillary or inguinal lymphadenopathy.  CHEST: clear to auscultation bilaterally. Resonant to percussion throughout bilaterally. Symmetrical breath movements bilaterally.  CVS: RRR  ABDOMEN: Soft. Not tender. Not distended. No palpable hepatomegaly or splenomegaly. No other mass palpable. Bowel sounds present.  EXTREMITIES: Warm. No peripheral edema.  SKIN: no rash, or bruising or purpura.   NEURO: No gross deficit noted. Non-antalgic gait.      Lab Results    Recent Results (from the past 168 hour(s))   Comprehensive metabolic panel   Result Value Ref Range    Sodium 140 135 - 145 mmol/L    Potassium 3.7 3.4 - 5.3 mmol/L    Carbon Dioxide (CO2) 25 22 - 29 mmol/L    Anion Gap 10 7 - 15 mmol/L    Urea Nitrogen 11.2 6.0 - 20.0 mg/dL    Creatinine 0.73 0.51 - 0.95 mg/dL    GFR Estimate >90 >60 mL/min/1.73m2    Calcium 9.4 8.6 - 10.0 mg/dL    Chloride 105 98 - 107 mmol/L    Glucose 108 (H) 70 - 99 mg/dL    Alkaline Phosphatase 54 40 - 150 U/L    AST 17 0 - 45 U/L    ALT 11 0 - 50 U/L    Protein Total 6.6 6.4 - 8.3 g/dL    Albumin 4.3 3.5 - 5.2 g/dL    Bilirubin Total 0.3 <=1.2 mg/dL   CBC with platelets and differential   Result Value Ref Range    WBC Count 4.5 4.0 - 11.0 10e3/uL    RBC Count 3.97 3.80 - 5.20 10e6/uL     Hemoglobin 12.2 11.7 - 15.7 g/dL    Hematocrit 35.5 35.0 - 47.0 %    MCV 89 78 - 100 fL    MCH 30.7 26.5 - 33.0 pg    MCHC 34.4 31.5 - 36.5 g/dL    RDW 14.3 10.0 - 15.0 %    Platelet Count 177 150 - 450 10e3/uL    % Neutrophils 65 %    % Lymphocytes 25 %    % Monocytes 10 %    % Eosinophils 0 %    % Basophils 1 %    % Immature Granulocytes 0 %    NRBCs per 100 WBC 0 <1 /100    Absolute Neutrophils 2.9 1.6 - 8.3 10e3/uL    Absolute Lymphocytes 1.1 0.8 - 5.3 10e3/uL    Absolute Monocytes 0.5 0.0 - 1.3 10e3/uL    Absolute Eosinophils 0.0 0.0 - 0.7 10e3/uL    Absolute Basophils 0.0 0.0 - 0.2 10e3/uL    Absolute Immature Granulocytes 0.0 <=0.4 10e3/uL    Absolute NRBCs 0.0 10e3/uL     I reviewed the above labs today.  Imaging    No results found.      Billing  Total time 30 minutes, to include face to face visit, review of EMR, ordering, documentation and coordination of care on date of service   complexity modifier for longitudinal care.     Signed by: AYLA Mccormick CNP

## 2024-06-13 ASSESSMENT — SLEEP AND FATIGUE QUESTIONNAIRES
HOW LIKELY ARE YOU TO NOD OFF OR FALL ASLEEP WHILE SITTING QUIETLY AFTER LUNCH WITHOUT ALCOHOL: WOULD NEVER DOZE
HOW LIKELY ARE YOU TO NOD OFF OR FALL ASLEEP WHILE SITTING AND READING: SLIGHT CHANCE OF DOZING
HOW LIKELY ARE YOU TO NOD OFF OR FALL ASLEEP IN A CAR, WHILE STOPPED FOR A FEW MINUTES IN TRAFFIC: WOULD NEVER DOZE
HOW LIKELY ARE YOU TO NOD OFF OR FALL ASLEEP WHILE SITTING AND TALKING TO SOMEONE: WOULD NEVER DOZE
HOW LIKELY ARE YOU TO NOD OFF OR FALL ASLEEP WHEN YOU ARE A PASSENGER IN A CAR FOR AN HOUR WITHOUT A BREAK: SLIGHT CHANCE OF DOZING
HOW LIKELY ARE YOU TO NOD OFF OR FALL ASLEEP WHILE WATCHING TV: WOULD NEVER DOZE
HOW LIKELY ARE YOU TO NOD OFF OR FALL ASLEEP WHILE SITTING INACTIVE IN A PUBLIC PLACE: WOULD NEVER DOZE
HOW LIKELY ARE YOU TO NOD OFF OR FALL ASLEEP WHILE LYING DOWN TO REST IN THE AFTERNOON WHEN CIRCUMSTANCES PERMIT: WOULD NEVER DOZE

## 2024-06-18 ENCOUNTER — VIRTUAL VISIT (OUTPATIENT)
Dept: SLEEP MEDICINE | Facility: CLINIC | Age: 34
End: 2024-06-18
Attending: INTERNAL MEDICINE
Payer: COMMERCIAL

## 2024-06-18 VITALS — WEIGHT: 160 LBS | BODY MASS INDEX: 26.66 KG/M2 | HEIGHT: 65 IN

## 2024-06-18 DIAGNOSIS — F51.04 CHRONIC INSOMNIA: Primary | ICD-10-CM

## 2024-06-18 DIAGNOSIS — G47.21 CIRCADIAN RHYTHM SLEEP DISORDER, DELAYED SLEEP PHASE TYPE: ICD-10-CM

## 2024-06-18 PROCEDURE — 90791 PSYCH DIAGNOSTIC EVALUATION: CPT | Mod: 95 | Performed by: PSYCHOLOGIST

## 2024-06-18 ASSESSMENT — PAIN SCALES - GENERAL: PAINLEVEL: NO PAIN (0)

## 2024-06-18 ASSESSMENT — PATIENT HEALTH QUESTIONNAIRE - PHQ9: SUM OF ALL RESPONSES TO PHQ QUESTIONS 1-9: 13

## 2024-06-18 NOTE — PROGRESS NOTES
Virtual Visit Details    Type of service:  Video Visit     Originating Location (pt. Location): Home    Distant Location (provider location):  Off-site  Platform used for Video Visit: AmWell  Visit Start Time: 10:30 AM  Visit End Time: 11:19 AM      SLEEP MEDICINE CONSULTATION  Behavioral Sleep Medicine  2024    Name: Vibha Kingston MRN# [id number]   Age: 33 year old YOB: 1990     Date of Consultation: 2024  Consultation is requested by: Daniel Toro MD  [address] [address] [address]  Primary care provider: Zully Bloom    Reason for Sleep Consultation     Vibha Kingston is a 33 year old female seen today for a behavioral sleep medicine consultation because of insomnia    Assessment and Plan     Sleep Diagnoses:       Chronic insomnia  Circadian rhythm sleep disorder, delayed sleep phase type    Co-occurring Conditions:    History of breast cancer, status post left breast mastectomy, chemotherapy  Adjustment disorder with depressed mood    Clinical Impressions:    Vibha Kingston was seen for a sleep psychology consultation and possible behavioral sleep intervention and treatment. History and clinical presentation suggests     Recommendations and Counselin.  Establish a consistent wake time 7 AM with an alarm, even on weekends.    2.  Ensure that your bedroom is dark throughout the night.    3.  In the morning upon rising, make sure you get adequate exposure to bright morning.  In the fall and winter, you may benefit from using a bright light box, 10,000 Lux, full-spectrum with UV protection for about 30 minutes.  This help strengthen your sleep-wake cycle and also can contribute to greater ease and falling asleep.    4.  Behavioral research in individuals experiencing breast cancer suggest that modest reduction in time in bed, cold sleep compression may help improve sleep quality.  Recommend you reduce time in bed to 7 hours  with earliest time to bed of midnight.    5.  An hour or 2 before bed, try to keep lighting relatively low and engage in activities that are relaxing and pleasant.  It is okay to use a screens outside of the bed provided they are set up to night watch to reduce bluelight and that the activity does not involve stressful or stimulating contact.  Activities such as playing solitaire, reading, or watching television at a distance (at least 8 feet) in room with soft lighting is fine.  Avoid using screens in the bed or watching TV in bed.    Vibha was provided information about the pathophysiology of insomnia, psychophysiological factors contributing to the onset and maintenance of insomnia and how co-occurring medical conditions and intrinsic sleep disorders can affect sleep.  Treatment options were discussed  as applicable to patient specific sleep concerns and symptoms. The benefits and potential early side effects of treatment including increased daytime sleepiness were discussed.     Patient was advised to consult with their prescribing provider around use of or changes to prescription sleep medication.  Patient was counseled on the importance of avoiding driving if drowsy.    Services provided are compliant with the requirements of [place] Statute SS 256B.0625 Subd. 3b and paragraph (b)     Follow-up: 6 weeks     History of Present Sleep Complaint     Vibha Kingston is a 33 year old year old female who presents with difficulty waking up in the morning for several years, but it has worsened in the last 3 years since she started working from home during the pandemic.    She estimates that sleep issues emerged in college about 10 years ago.    During college she would be up later into the night with tendency to sleep in later.    A sleep study indicated no evidence of sleep disordered breathing.    She is  and sleeps with her .    Patient takes melatonin nightly and finds it somewhat helpful.    In the  evening Vibha may feel more active and alert, at times it is difficult to wind down.  She does use screens and other mobile devices in the later evening but usually    SLEEP-WAKE SCHEDULE:    Work/School Days: Patient goes to school/work: Yes     Time to Bed:  10-11 mike  Sleep Latency: An hour or so to fall asleep  Difficulty falling asleep:  Most nights nights per week  Number of awakenings: 4 to 5 times i believe times per night due to External stimuli (bed partner, pets, noise, etc), Use the bathroom, Anxiety, Uncertain  Trouble falling back asleep I dont have trouble falling back asleep usually  times a week   Usually takes 10-15 minutes to get back to sleep  Rise time: 7:30 am  Uses alarm? Yes    Weekends/Non-work Days/All Other Days    Usually gets into bed at 11-12ish   Sleep latency:  About an hour or so   Rise time: 8:30-9ish   Uses alarm? No    Patient sleep estimates:    Average total sleep time:  6-8 hours   Patient estimate of sleep need: 8 hours  Preferred sleep position(s): Back, Side     Naps    Intentional naps: Never times a week  Duration:  NA in duration  Feels better after a nap: No  Unintentional Dozing:  Never days per week  Driving accident or near-miss due to sleepiness/drowsiness? No    SLEEP DISRUPTIONS:    Breathing/Snoring    Patient snores:No  Other people complain about Her snoring: No  Patient has been told She stops breathing in Her sleep: No  She has issues with any of the following:      Movement:    Patient gets pain, discomfort, with an urge to move: No  Symptoms occur when She is resting: No  Symptoms occur more at night:No  Patient has been told She kicks Her legs at night:No     Behaviors in Sleep:    Vibha Kingston has experienced the following behaviors while sleeping: See or hear things that are not really there upon awakening or just falling asleep    She has experienced sudden muscle weakness during the day: No    Caffeine, Alcohol and Other Substances:    Number of  caffeinated beverages(per day: Less than a cup  Last caffeine use is usually: At least 6 hours before bedtime  Uses alcohol to promote sleep: No  Drinks alcohol near bedtime: No      FAMILY HISTORY OF SLEEP DISORDERS    Patient has a family member been diagnosed with a sleep disorder: No              SCALES     EPWORTH SLEEPINESS SCALE      Sitting and reading 1   Watching TV 0   Sitting, inactive in a public place (theatre or mtg.) 0    As a passenger in a car 1   Lying down to rest in the afternoon when circumstance permit 0   Sitting and talking to someone 0   Sitting quietly after lunch without alcohol 0   In a car, while stopped for a few minutes in traffic 0   TOTAL SCORE (nl <11) 2     INSOMNIA SEVERITY INDEX       Difficulty falling asleep 3   Difficult staying asleep 3   Problems waking up to early 2   How SATISFIED/DISSATISFIED are you with your CURRENT sleep pattern? 3   How NOTICEABLE to others do you think your sleep pattern is in terms of your quality of life? 3   How WORRIED/DISTRESSED are you about your current sleep pattern? 3   To what extent do you consider your sleep problem to INTERFERE with your daily fuctioning(e.g. daytime fatigue, mood, ability to function at work/daily chores, concentration, mood,etc.) CURRENTLY? 3   INSOMNIA SEVERITY INDEX TOTAL SCORE 20    Absence of insomnia (0-7); sub-threshold insomnia (8-14); moderate insomnia (15-21); and severe insomnia (22-28)    STOP BANG     1. Snoring: Do you snore loudly (louder than talking or loud enough to be heard through closed doors)?  No    2. Tired: Do you often feel tired, fatigued, or sleepy during daytime?  Yes    3. Observed: Has anyone observed you stop breathing during your sleep?  No    4. Blood pressure: Do you have or are you being treated for high blood pressure?  No    5. BMI: BMI more than 35 kg/m2?  No    6. Age: Age over 50 yr old?  No    7. Neck circumference: Neck circumference greater than 40 cm?  No    8. Gender:  Gender male?  No    STOP-BANG Total Score: 1    JOSH Risk  0-2 Low risk for JOSH  3-4  Intermediate risk for JOSH  5-8 High risk      PATIENT HEALTH QUESTIONNAIRE-9 (PHQ - 9)    Over the last 2 weeks, how often have you been bothered by any of the following problems?    1. Little interest or pleasure in doing things -  More than half the days   2. Feeling down, depressed, or hopeless -  Several days   3. Trouble falling or staying asleep, or sleeping too much - More than half the days   4. Feeling tired or having little energy -  More than half the days   5. Poor appetite or overeating -  More than half the days   6. Feeling bad about yourself - or that you are a failure or have let yourself or your family down -  Several days   7. Trouble concentrating on things, such as reading the newspaper or watching television - More than half the days   8. Moving or speaking so slowly that other people could have noticed? Or the opposite - being so fidgety or restless that you have been moving around a lot more than usual Several days   9. Thoughts that you would be better off dead or of hurting  yourself in some way Not at all   Total Score: 13     If you checked off any problems, how difficult have these problems made it for you to do your work, take care of things at home, or get along with other people? Somewhat difficult    Developed by Tom Zepeda, Yesika Murillo, Al Humphreys and colleagues, with an educational gt from [employer] No permission required to reproduce, translate, display or distribute. permission required to reproduce, translate, display or distribute.      BREANNA-7        2/26/2024     9:52 AM   BREANNA-7 SCORE   Total Score 13 (moderate anxiety)   Total Score 13       CAGE-AID    CAGE- AID Score -        No data to display                CAGE-AID score  > 1 is a positive screen, suggesting further discussion is needed to determine if evaluation for alcohol or substance abuse is appropriate.  A  score > 2 is considered clinically significant, suggesting further evaluation of alcohol or substance-related problems is indicated.         Previous Sleep Consultations/Studies     Patient has been seen by Dr. Daniel Toro  For sleep medicine consultation on 7/18/23:  Assessment and Plan:  Chronic Insomnia -multifactorial.  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  Sleep hygiene: Encouraged to follow good sleep hygiene measures including avoiding using phone or other electronic devices in bed and avoiding alcohol within 2-3 hours before bed.  Psychophysiologic insomnia: We discussed cognitive behavioral therapy for insomnia.  Patient was not interested in obtaining the referral to sleep psychology for CBT-I but was willing to consider online CBT-I and  Information regarding online CBT-I was provided as summary.  Anxiety/depression:  She denies suicidal ideation or thoughts of harming others. We discussed the association of insomnia with anxiety and depression. Recommended the patient to establish continuity of care with a  primary care provider and follow-up with PCP for  management of anxiety and depression.  Delayed sleep phase: We arrived at a goal bedtime of 11:45PM and a goal wake up time of 7:45 AM. Recommended to follow regular sleep schedule, optimizing the duration and circadian timing of sleep and aim at obtaining at least 7 to 8 hours per night and avoid sleep deprivation.  We discussed minimizing exposure to bright lights at least a couple hours before the goal bedtime.    Patient was instructed to avoid mind stimulating activities/screen time or use of electronics at least an hour before bed.   Recommended over-the-counter melatonin 1 mg to be taken by mouth at 8 PM(pt's habitual sleep time reported as 1AM).  Recommended exposure to bright light, using a  bright light box of 10,000 Lux intensity with exposure to bright light for 30 to 60 minutes soon after awakening.   She was instructed to communicate with me via MyChart or schedule follow-up if the insomnia does not improve.     Possible JOSH:  Even though the patient denies any reports of snoring or observed apneas during sleep, she  reports frequent nocturnal awakenings and occasional morning headaches with nonrestorative sleep and fatigue. HST/ Polysomnography reviewed.  Will obtain PSG to evaluate for possible JOSH.  Home sleep apnea testing has not been considered due to low probability for JOSH based on STOP-BANG score of 1 out of 8.         9/7/2023 [facility] Study (157.0 lbs) - AHI 1.0, RDI 1.6, Supine AHI 1.7, REM AHI 0.6, Low O2 91.0%, Time Spent ?88% 0 minutes / Time Spent ?89% 0 minutes    Vitals     There were no vitals taken for this visit.     Medical History     Allergies:    No Known Allergies    Medications:    Current Outpatient Medications   Medication Sig Dispense Refill    5-Hydroxytryptophan (5-HTP) 100 MG CAPS Take 100 mg by mouth at bedtime      acetaminophen (TYLENOL) 500 MG tablet Take 1,000 mg by mouth every 8 hours as needed for mild pain, other or pain      dexAMETHasone (DECADRON) 4 MG tablet Take 2 tablets (8 mg) by mouth 2 times daily (with meals) Start evening of Docetaxel infusion and continue for a total of 3 doses. 6 tablet 5    lidocaine-prilocaine (EMLA) 2.5-2.5 % external cream Apply to port site 30-60 minutes before port access 30 g 2    LORazepam (ATIVAN) 1 MG tablet Take 1 tablet (1 mg) by mouth once as needed for anxiety 10 tablet 0    melatonin 3 MG tablet Take 3 mg by mouth nightly as needed for sleep      ondansetron (ZOFRAN) 8 MG tablet Take 1 tablet (8 mg) by mouth every 8 hours as needed for nausea (vomiting) 30 tablet 2    prochlorperazine (COMPAZINE) 10 MG tablet Take 1 tablet (10 mg) by mouth every 6  "hours as needed for nausea or vomiting 30 tablet 2    Vitamin D, Cholecalciferol, 25 MCG (1000 UT) CAPS       vitamin E (TOCOPHEROL) 400 units (180 mg) capsule Take by mouth daily         Problem List:  Patient Active Problem List    Diagnosis Date Noted    History of unilateral modified radical mastectomy, left 04/01/2024     Priority: Medium    Visit for fertility preservation counseling prior to cancer therapy 04/01/2024     Priority: Medium    Invasive ductal carcinoma of breast, female, left (H) 03/19/2024     Priority: Medium    Encounter for antineoplastic chemotherapy 03/14/2024     Priority: Medium    Malignant neoplasm of upper-outer quadrant of left breast in female, estrogen receptor negative (H) 03/13/2024     Priority: Medium    Adjustment disorder with depressed mood 02/26/2024     Priority: Medium    Ductal carcinoma in situ (DCIS) of left breast 02/16/2024     Priority: Medium     Diagnosed January 2024  Left breast total mastectomy March 5, 2024      Abnormal Pap smear of cervix 10/16/2023     Priority: Medium     10/09/23 NIL Pap, Neg HR HPV Plan cotest in 3 years per office visit note \"one normal pap seen on documentation from 11/2021 (cotesting), colpo was at age 22 years old at [facility]. No abnormal since this visit. If normal today repeat in 3 years and then 5 years.\"           Past Medical/Surgical History:  Past Medical History:   Diagnosis Date    Cancer (H) 1/15/24    Breast cancer    Encounter for antineoplastic chemotherapy 03/14/2024     Patient Active Problem List    Diagnosis Date Noted    History of unilateral modified radical mastectomy, left 04/01/2024     Priority: Medium    Visit for fertility preservation counseling prior to cancer therapy 04/01/2024     Priority: Medium    Invasive ductal carcinoma of breast, female, left (H) 03/19/2024     Priority: Medium    Encounter for antineoplastic chemotherapy 03/14/2024     Priority: Medium    Malignant neoplasm of upper-outer " "quadrant of left breast in female, estrogen receptor negative (H) 03/13/2024     Priority: Medium    Adjustment disorder with depressed mood 02/26/2024     Priority: Medium    Ductal carcinoma in situ (DCIS) of left breast 02/16/2024     Priority: Medium     Diagnosed January 2024  Left breast total mastectomy March 5, 2024      Abnormal Pap smear of cervix 10/16/2023     Priority: Medium     10/09/23 NIL Pap, Neg HR HPV Plan cotest in 3 years per office visit note \"one normal pap seen on documentation from 11/2021 (cotesting), colpo was at age 22 years old at [facility]. No abnormal since this visit. If normal today repeat in 3 years and then 5 years.\"        Patient Active Problem List   Diagnosis    Abnormal Pap smear of cervix    Ductal carcinoma in situ (DCIS) of left breast    Adjustment disorder with depressed mood    Malignant neoplasm of upper-outer quadrant of left breast in female, estrogen receptor negative (H)    Encounter for antineoplastic chemotherapy    Invasive ductal carcinoma of breast, female, left (H)    History of unilateral modified radical mastectomy, left    Visit for fertility preservation counseling prior to cancer therapy        Most Recent Labs:  Lab on 06/07/2024   Component Date Value Ref Range Status    Sodium 06/07/2024 140  135 - 145 mmol/L Final    Reference intervals for this test were updated on 09/26/2023 to more accurately reflect our healthy population. There may be differences in the flagging of prior results with similar values performed with this method. Interpretation of those prior results can be made in the context of the updated reference intervals.     Potassium 06/07/2024 3.7  3.4 - 5.3 mmol/L Final    Carbon Dioxide (CO2) 06/07/2024 25  22 - 29 mmol/L Final    Anion Gap 06/07/2024 10  7 - 15 mmol/L Final    Urea Nitrogen 06/07/2024 11.2  6.0 - 20.0 mg/dL Final    Creatinine 06/07/2024 0.73  0.51 - 0.95 mg/dL Final    GFR Estimate 06/07/2024 >90  >60 " mL/min/1.73m2 Final    Calcium 06/07/2024 9.4  8.6 - 10.0 mg/dL Final    Chloride 06/07/2024 105  98 - 107 mmol/L Final    Glucose 06/07/2024 108 (H)  70 - 99 mg/dL Final    Alkaline Phosphatase 06/07/2024 54  40 - 150 U/L Final    AST 06/07/2024 17  0 - 45 U/L Final    Reference intervals for this test were updated on 6/12/2023 to more accurately reflect our healthy population. There may be differences in the flagging of prior results with similar values performed with this method. Interpretation of those prior results can be made in the context of the updated reference intervals.    ALT 06/07/2024 11  0 - 50 U/L Final    Reference intervals for this test were updated on 6/12/2023 to more accurately reflect our healthy population. There may be differences in the flagging of prior results with similar values performed with this method. Interpretation of those prior results can be made in the context of the updated reference intervals.      Protein Total 06/07/2024 6.6  6.4 - 8.3 g/dL Final    Albumin 06/07/2024 4.3  3.5 - 5.2 g/dL Final    Bilirubin Total 06/07/2024 0.3  <=1.2 mg/dL Final    WBC Count 06/07/2024 4.5  4.0 - 11.0 10e3/uL Final    Preliminary ANC is 2.89    RBC Count 06/07/2024 3.97  3.80 - 5.20 10e6/uL Final    Hemoglobin 06/07/2024 12.2  11.7 - 15.7 g/dL Final    Hematocrit 06/07/2024 35.5  35.0 - 47.0 % Final    MCV 06/07/2024 89  78 - 100 fL Final    MCH 06/07/2024 30.7  26.5 - 33.0 pg Final    MCHC 06/07/2024 34.4  31.5 - 36.5 g/dL Final    RDW 06/07/2024 14.3  10.0 - 15.0 % Final    Platelet Count 06/07/2024 177  150 - 450 10e3/uL Final    % Neutrophils 06/07/2024 65  % Final    % Lymphocytes 06/07/2024 25  % Final    % Monocytes 06/07/2024 10  % Final    % Eosinophils 06/07/2024 0  % Final    % Basophils 06/07/2024 1  % Final    % Immature Granulocytes 06/07/2024 0  % Final    NRBCs per 100 WBC 06/07/2024 0  <1 /100 Final    Absolute Neutrophils 06/07/2024 2.9  1.6 - 8.3 10e3/uL Final     Absolute Lymphocytes 06/07/2024 1.1  0.8 - 5.3 10e3/uL Final    Absolute Monocytes 06/07/2024 0.5  0.0 - 1.3 10e3/uL Final    Absolute Eosinophils 06/07/2024 0.0  0.0 - 0.7 10e3/uL Final    Absolute Basophils 06/07/2024 0.0  0.0 - 0.2 10e3/uL Final    Absolute Immature Granulocytes 06/07/2024 0.0  <=0.4 10e3/uL Final    Absolute NRBCs 06/07/2024 0.0  10e3/uL Final       Mental Health History     Prior Mental Health Diagnosis:   Adjustment reaction with depressed mood per EMR, patient and sleep medicine provider notes diagnosis of Depression and Anxiety    Mental Health Treat ment:   Primary care medication management, ; PRN lorazepam    Chemical Abuse/Treatment:    None reported    Family History     Family History   Problem Relation Age of Onset    Hyperlipidemia Mother     Breast Cancer Mother 60        treated and in remission    Depression Mother     Hypertension Father     Hyperlipidemia Father     Prostate Cancer Father 65    Other Cancer Sister 40        ovarian cyst - cancerous - removed one ovary and hystro    Depression Sister     Anxiety Disorder Sister     Cervical Cancer Sister     Other Cancer Maternal Grandfather     Cerebrovascular Disease Other     Other Cancer Other     Other Cancer Maternal Grandmother        Social History         Social Determinants of Health     Food Insecurity: Low Risk  (10/5/2023)    Food Insecurity     Within the past 12 months, did you worry that your food would run out before you got money to buy more?: No     Within the past 12 months, did the food you bought just not last and you didn t have money to get more?: No   Depression: At risk (2/26/2024)    PHQ-2     PHQ-2 Score: 3   Housing Stability: Low Risk  (10/5/2023)    Housing Stability     Do you have housing? : Yes     Are you worried about losing your housing?: No   Tobacco Use: Medium Risk (6/7/2024)    Patient History     Smoking Tobacco Use: Former     Smokeless Tobacco Use: Never     Passive Exposure: Never    Financial Resource Strain: Low Risk  (10/5/2023)    Financial Resource Strain     Within the past 12 months, have you or your family members you live with been unable to get utilities (heat, electricity) when it was really needed?: No   Alcohol Use: Not on file   Transportation Needs: Low Risk  (10/5/2023)    Transportation Needs     Within the past 12 months, has lack of transportation kept you from medical appointments, getting your medicines, non-medical meetings or appointments, work, or from getting things that you need?: No   Physical Activity: Not on file   Interpersonal Safety: Low Risk  (12/22/2023)    Interpersonal Safety     Do you feel physically and emotionally safe where you currently live?: Yes     Within the past 12 months, have you been hit, slapped, kicked or otherwise physically hurt by someone?: No     Within the past 12 months, have you been humiliated or emotionally abused in other ways by your partner or ex-partner?: No   Stress: Not on file   Social Connections: Not on file   Health Literacy: Not on file         Mental Status Examination     Vibha presented as oriented X3 with speech and language intact.  The patient was cooperative throughout the evaluation with no signs of hallucinations, delusions, loosening of associations or other thought disturbance.  Mood was normal Affect was congruent with mood. Insight and judgement were intact.  Memory appeared intact for recent and remote elements.  There was no report of suicidal ideation, intention or plan. Attention and concentration were within normal.        Stephen Chávez, Shawn, LP, DBSM  Diplomate, Behavioral Sleep Medicine  [facility]      Copy:   Zully Bloom MD  [address] [address]    Note: This dictation was created using voice recognition software. This document may contain an error not identified before finalizing the  document. If the error changes the accuracy of the document, I would appreciate it being brought to my attention.

## 2024-06-18 NOTE — PATIENT INSTRUCTIONS
Recommendations and Counselin.  Establish a consistent wake time 7 AM with an alarm, even on weekends.    2.  Ensure that your bedroom is dark throughout the night.    3.  In the morning upon rising, make sure you get adequate exposure to bright morning.  In the fall and winter, you may benefit from using a bright light box, 10,000 Lux, full-spectrum with UV protection for about 30 minutes.  This help strengthen your sleep-wake cycle and also can contribute to greater ease and falling asleep.    4.  Behavioral research in individuals experiencing breast cancer suggest that modest reduction in time in bed, cold sleep compression may help improve sleep quality.  Recommend you reduce time in bed to 7 hours with earliest time to bed of midnight.    5.  An hour or 2 before bed, try to keep lighting relatively low and engage in activities that are relaxing and pleasant.  It is okay to use a screens outside of the bed provided they are set up to night watch to reduce bluelight and that the activity does not involve stressful or stimulating contact.  Activities such as playing solitaire, reading, or watching television at a distance (at least 8 feet) in room with soft lighting is fine.  Avoid using screens in the bed or watching TV in bed.

## 2024-06-18 NOTE — NURSING NOTE
Is the patient currently in the state of [place]? YES    Visit mode:VIDEO    If the visit is dropped, the patient can be reconnected by: VIDEO VISIT: Send to e-mail at: [email or web address]    Will anyone else be joining the visit? NO  (If patient encounters technical issues they should call [phone]    How would you like to obtain your AVS? MyChart    Are changes needed to the allergy or medication list? Pt stated no changes to allergies and Pt stated no med changes    Are refills needed on medications prescribed by this physician? NO  Has patient had flu shot for current/most recent flu season? If so, when? Yes: 11/06/2023    Reason for visit: Consult  Depression Response    Patient completed the PHQ-9 assessment for depression and scored >9? Yes-13  Question 9 on the PHQ-9 was positive for suicidality? No  Does patient have current mental health provider? Yes    Is this a virtual visit? Yes   Does patient have suicidal ideation (positive question 9)? No - offer to place Mental Health Referral.  Patient declined referral/not needed-has a mental health provider    I personally notified the following: visit provider           Mary MALDONADO

## 2024-06-24 ENCOUNTER — MYC MEDICAL ADVICE (OUTPATIENT)
Dept: ONCOLOGY | Facility: HOSPITAL | Age: 34
End: 2024-06-24
Payer: COMMERCIAL

## 2024-06-25 ENCOUNTER — HOSPITAL ENCOUNTER (OUTPATIENT)
Dept: CARDIOLOGY | Facility: CLINIC | Age: 34
Discharge: HOME | End: 2024-06-25
Payer: COMMERCIAL

## 2024-06-25 DIAGNOSIS — Z51.11 ENCOUNTER FOR ANTINEOPLASTIC CHEMOTHERAPY: ICD-10-CM

## 2024-06-25 DIAGNOSIS — Z79.899 ENCOUNTER FOR MONITORING CARDIOTOXIC DRUG THERAPY: ICD-10-CM

## 2024-06-25 DIAGNOSIS — C50.412 MALIGNANT NEOPLASM OF UPPER-OUTER QUADRANT OF LEFT BREAST IN FEMALE, ESTROGEN RECEPTOR NEGATIVE (H): ICD-10-CM

## 2024-06-25 DIAGNOSIS — Z51.81 ENCOUNTER FOR MONITORING CARDIOTOXIC DRUG THERAPY: ICD-10-CM

## 2024-06-25 DIAGNOSIS — Z17.1 MALIGNANT NEOPLASM OF UPPER-OUTER QUADRANT OF LEFT BREAST IN FEMALE, ESTROGEN RECEPTOR NEGATIVE (H): ICD-10-CM

## 2024-06-25 LAB — LVEF ECHO: NORMAL

## 2024-06-25 PROCEDURE — 93356 MYOCRD STRAIN IMG SPCKL TRCK: CPT

## 2024-06-25 PROCEDURE — 93356 MYOCRD STRAIN IMG SPCKL TRCK: CPT | Performed by: INTERNAL MEDICINE

## 2024-06-25 PROCEDURE — 93306 TTE W/DOPPLER COMPLETE: CPT | Mod: 26 | Performed by: INTERNAL MEDICINE

## 2024-06-25 NOTE — TELEPHONE ENCOUNTER
Pt informed by phone call also that forms are complete, she does not need a copy for herself, faxed to [facility] at [phone] with Right Fax confirmation. Patient will send another [employer] message if she needs more help with this.  Danyell BARRAZA CMA

## 2024-06-27 ENCOUNTER — VIRTUAL VISIT (OUTPATIENT)
Dept: ONCOLOGY | Facility: CLINIC | Age: 34
End: 2024-06-27
Attending: SURGERY
Payer: COMMERCIAL

## 2024-06-27 DIAGNOSIS — D05.12 NEOPLASM OF LEFT BREAST, PRIMARY TUMOR STAGING CATEGORY TIS: DUCTAL CARCINOMA IN SITU (DCIS): Primary | ICD-10-CM

## 2024-06-27 DIAGNOSIS — Z17.1 MALIGNANT NEOPLASM OF UPPER-OUTER QUADRANT OF LEFT BREAST IN FEMALE, ESTROGEN RECEPTOR NEGATIVE (H): ICD-10-CM

## 2024-06-27 DIAGNOSIS — Z80.0 FAMILY HISTORY OF PANCREATIC CANCER: ICD-10-CM

## 2024-06-27 DIAGNOSIS — C50.412 MALIGNANT NEOPLASM OF UPPER-OUTER QUADRANT OF LEFT BREAST IN FEMALE, ESTROGEN RECEPTOR NEGATIVE (H): ICD-10-CM

## 2024-06-27 DIAGNOSIS — Z80.3 FAMILY HISTORY OF MALIGNANT NEOPLASM OF BREAST: ICD-10-CM

## 2024-06-27 DIAGNOSIS — Z80.51 FAMILY HISTORY OF KIDNEY CANCER: ICD-10-CM

## 2024-06-27 DIAGNOSIS — Z80.42 FAMILY HISTORY OF PROSTATE CANCER: ICD-10-CM

## 2024-06-27 PROCEDURE — 96040 HC GENETIC COUNSELING, EACH 30 MINUTES: CPT | Mod: GT,95 | Performed by: GENETIC COUNSELOR, MS

## 2024-06-27 NOTE — NURSING NOTE
Is the patient currently in the state of [place]? YES    Visit mode:VIDEO    If the visit is dropped, the patient can be reconnected by: VIDEO VISIT: Send to e-mail at: [email or web address]    Will anyone else be joining the visit? NO  (If patient encounters technical issues they should call [phone]    How would you like to obtain your AVS? MyChart    Are changes needed to the allergy or medication list? No    Are refills needed on medications prescribed by this physician? NO    Reason for visit: No chief complaint on file.    Pauline NAVASF

## 2024-06-27 NOTE — PROGRESS NOTES
"Virtual Visit Details    Type of service:  Video Visit     Originating Location (pt. Location): {video visit patient location:648114::\"Home\"}  {PROVIDER LOCATION On-site should be selected for visits conducted from your clinic location or adjoining [facility] hospital, academic office, or other nearby [facility] building. Off-site should be selected for all other provider locations, including home:475930}  Distant Location (provider location):  {virtual location provider:655364}  Platform used for Video Visit: {Virtual Visit Platforms:227550::\"Front Row\"}    "

## 2024-06-27 NOTE — PROGRESS NOTES
[facility] Hematology and Oncology Progress Note    Patient: Vibha Kingston  MRN: [id number]  Date of Service: Jun 28, 2024         Reason for Visit    Chief Complaint   Patient presents with    Oncology Clinic Visit     Malignant neoplasm of upper-outer quadrant of left breast in female, estrogen receptor negative (H)       Assessment and Plan     Cancer Staging   Malignant neoplasm of upper-outer quadrant of left breast in female, estrogen receptor negative (H)  Staging form: Breast, AJCC 8th Edition  - Pathologic stage from 3/13/2024: Stage IIA (pT1c, pN1a(sn), cM0, G3, ER-, WA-, HER2+) - Signed by Rosalind Adams MD on 3/13/2024      ECOG Performance    0 - Independent     Pain  Pain Score: No Pain (0)      #.  History of stage IIA (pT1c pN1a M0) invasive ductal carcinoma of the left breast, grade 3, ER negative, WA negative, HER2 positive  #.  DCIS of the left breast, ER positive, WA positive  #.  Nausea, related to chemotherapy  #.  Mild thrombocytopenia, related to chemotherapy     She is doing very well on current chemotherapy.  Nausea is fairly well-managed with current therapy.  Her labs were reviewed.  She has mild leukopenia and thrombocytopenia of no clinical significance.  CMP was completely normal.  Echocardiogram showed LVEF of 55-60% and within normal range.  I recommended her to continue adjuvant TCHP.   Referral to radiation oncology was placed today for discussion of risk and benefits of adjuvant radiation.  I believe she will benefit from adjuvant radiation.   Refill lorazepam to use as needed for nausea related to chemotherapy.   Follow-up labs and provider visit prior to next cycle of adjuvant chemotherapy.   Echocardiogram will be monitor every 3 months while she is on HER2 directed therapy. (Next due in 9/2024)    The longitudinal plan of care for the diagnosis(es)/condition(s) as documented were addressed during this visit. Due to the added complexity in care, I will continue to  support Vibha in the subsequent management and with ongoing continuity of care.      Encounter Diagnoses:    Problem List Items Addressed This Visit          Oncology Diagnoses    Malignant neoplasm of upper-outer quadrant of left breast in female, estrogen receptor negative (H)    Relevant Orders    Infusion Appointment Request - Adult    Infusion Appointment Request - Adult    Radiation Therapy Referral       Other    Encounter for antineoplastic chemotherapy - Primary    Relevant Orders    Infusion Appointment Request - Adult    Infusion Appointment Request - Adult     Other Visit Diagnoses       Neoplasm of left breast, primary tumor staging category Tis: ductal carcinoma in situ (DCIS)        Relevant Medications    LORazepam (ATIVAN) 1 MG tablet    Thrombocytopenia (H24)                   CC: Zulyl YoonphyllismaryAYLA CNP   ______________________________________________________________________________  Oncologic history  1/9/2024- self palpated left medial breast mass   - diagnostic mammogram and ultrasound showed 3.4 x 2.2 x 2 cm large ill-defined hypoechoic area with associated calcification at 9:00 3 cm from the nipple.  Sonographic survey of left axilla is within normal limits.    1/11/2024-ultrasound-guided core needle biopsy showed DCIS, grade 3, cribriform and papillary with comedonecrosis.  ER/WI was deferred to excisional specimen.    1/25/2024-breast actionable panel was negative including INGRID, BARD1, BRCA1, BRCA2, CDH1, CHEK2, NF1, PALB2, PTEN, STK11, TP53.    2/4/2024-MRI breast showed large segmental area of non-mass enhancement medial left breast measuring 10 cm from the posterior to subareolar depth corresponding to the known DCIS.  Mildly prominent bilateral internal mammary lymph node technically within normal limit.    3/5/2024-left breast simple mastectomy and left axillary sentinel lymph node biopsy   Invasive ductal carcinoma, grade 3   Multifocal, 4 foci vary from 1.3 mm to 11 mm in greatest  dimension.   Margins were negative, closest margin less than 1 mm anterior/superior   DCIS, EIC present, solid and comedo pattern, grade 3, extensive comedonecrosis.  Focal lymphovascular invasion present.   1/2 sentinel lymph node was positive for metastatic carcinoma.   [id number]   ER negative in invasive carcinoma, positive and DCIS (80% of the tumor nuclei stain strongly)   CT negative and invasive cancer, positive in DCIS (30% of the tumor nuclei stain strongly of moderately)   HER2/aj positive for overexpression (3+ membrane staining)    Fertility preservation was successfully completed.    4/26/2024- adjuvant TCHP    6/27/2024-genetic counseling and testing completed.    History of Present Illness    Ms. Vibha Kingston presented today accompanied by her  prior to cycle #4 adjuvant TCHP.      She does not have neuropathy.  She takes lorazepam 1 tablet a day at the most which control her nausea appropriately.  She noticed rectal pain around midcycle.  She does not have bleeding.  No diarrhea.  It resolved spontaneously.  She was wondering if it was related to hemorrhoids.      She does not have chest pain.  No shortness of breath.  She is doing well on current therapy.     Review of systems  Apart from describing in HPI, the remainder of comprehensive ROS was negative.    Past History    Past Medical History:   Diagnosis Date    Cancer (H) 1/15/24    Breast cancer    Encounter for antineoplastic chemotherapy 03/14/2024       Past Surgical History:   Procedure Laterality Date    BIOPSY      A mole a few years back and an abnormal pap in [facility]    BIOPSY NODE SENTINEL Left 03/05/2024    Procedure: WITH LEFT SENTINEL LYMPH NODE BIOPSY;  Surgeon: Aleena Real DO;  Location: [facility] Main OR    FERTILITY SURGERY  04/10/2024    Egg retrieval    INSERT PORT VASCULAR ACCESS N/A 4/19/2024    Procedure: INSERTION, VASCULAR ACCESS PORT UNDER ULTRASOUND AND FLUOROSCOPIC GUIDANCE;  Surgeon: Aleena Real DO;   "Location: [facility] Main OR    MASTECTOMY SIMPLE Left 03/05/2024    Procedure: LEFT MASTECTOMY;  Surgeon: Aleena Real DO;  Location: [facility] Main OR       Physical Exam    /69   Pulse 80   Temp 98.1  F (36.7  C)   Resp 16   Ht 1.651 m (5' 5\")   Wt 75 kg (165 lb 6.4 oz)   SpO2 98%   BMI 27.52 kg/m      General: alert, awake, not in acute distress  HEENT: Head: Normal, normocephalic, atraumatic.  Eye: Normal external eye, conjunctiva, lids cornea, GABRIELA.  Pharynx: Normal buccal mucosa. Normal pharynx.  Neck / Thyroid: Supple, no masses, nodes, nodules or enlargement.  Lymphatics: No abnormally enlarged lymph nodes.  Chest: Normal chest wall and respirations. Clear to auscultation.  Heart: S1 S2 RRR.   Abdomen: abdomen is soft without significant tenderness, masses, organomegaly or guarding  Extremities: normal strength, tone, and muscle mass  Skin: normal. no rash or abnormalities  CNS: non focal.    Lab Results    Recent Results (from the past 240 hour(s))   Echocardiogram Complete    Collection Time: 06/25/24  9:21 AM   Result Value Ref Range    LVEF  55-60%    Comprehensive metabolic panel    Collection Time: 06/28/24  8:17 AM   Result Value Ref Range    Sodium 140 135 - 145 mmol/L    Potassium 3.7 3.4 - 5.3 mmol/L    Carbon Dioxide (CO2) 25 22 - 29 mmol/L    Anion Gap 10 7 - 15 mmol/L    Urea Nitrogen 10.7 6.0 - 20.0 mg/dL    Creatinine 0.81 0.51 - 0.95 mg/dL    GFR Estimate >90 >60 mL/min/1.73m2    Calcium 9.2 8.6 - 10.0 mg/dL    Chloride 105 98 - 107 mmol/L    Glucose 93 70 - 99 mg/dL    Alkaline Phosphatase 51 40 - 150 U/L    AST 15 0 - 45 U/L    ALT 11 0 - 50 U/L    Protein Total 6.7 6.4 - 8.3 g/dL    Albumin 4.1 3.5 - 5.2 g/dL    Bilirubin Total 0.3 <=1.2 mg/dL   CBC with platelets and differential    Collection Time: 06/28/24  8:17 AM   Result Value Ref Range    WBC Count 3.1 (L) 4.0 - 11.0 10e3/uL    RBC Count 3.89 3.80 - 5.20 10e6/uL    Hemoglobin 12.2 11.7 - 15.7 g/dL    Hematocrit 34.7 " (L) 35.0 - 47.0 %    MCV 89 78 - 100 fL    MCH 31.4 26.5 - 33.0 pg    MCHC 35.2 31.5 - 36.5 g/dL    RDW 14.7 10.0 - 15.0 %    Platelet Count 129 (L) 150 - 450 10e3/uL    % Neutrophils 61 %    % Lymphocytes 27 %    % Monocytes 11 %    % Eosinophils 0 %    % Basophils 0 %    % Immature Granulocytes 0 %    NRBCs per 100 WBC 0 <1 /100    Absolute Neutrophils 1.9 1.6 - 8.3 10e3/uL    Absolute Lymphocytes 0.8 0.8 - 5.3 10e3/uL    Absolute Monocytes 0.3 0.0 - 1.3 10e3/uL    Absolute Eosinophils 0.0 0.0 - 0.7 10e3/uL    Absolute Basophils 0.0 0.0 - 0.2 10e3/uL    Absolute Immature Granulocytes 0.0 <=0.4 10e3/uL    Absolute NRBCs 0.0 10e3/uL         Imaging    Echocardiogram Complete    Result Date: 2024  [id number]^FREEMAN^STEPHANE^[place] [address]  Name: EMILEE ORTA MRN: [id number] : 1990 Study Date: 2024 08:39 AM Age: 33 yrs Gender: Female Patient Location: [facility] Reason For Study: Malignant neoplasm of upper-outer quadrant of left breast in [facility] Ordering Physician: STEPHANE MILLARD Referring Physician: STEPHANE MILLARD Performed By: REED  BSA: 1.8 m2 Height: 65 in Weight: 160 lb BP: 114/69 mmHg ______________________________________________________________________________ Procedure Complete Echo Adult. Myocardial Strain Imaging performed. No hemodynamically significant valvular abnormalities on 2D or color flow imaging. ______________________________________________________________________________ Interpretation Summary  The left ventricle is normal in size. Left ventricular function is normal.The ejection fraction is 55-60%. Normal right ventricle size and systolic function. Global peak LV longitudinal strain is averaged at -22%. This is within reported normal limits (normal <-18%). No hemodynamically significant valvular abnormalities on 2D or color flow imaging. ______________________________________________________________________________  Left Ventricle The left ventricle is normal in size. Left ventricular function is normal.The ejection fraction is 55-60%. There is normal left ventricular wall thickness. Left ventricular diastolic function is normal. Global peak LV longitudinal strain is averaged at -22%. This is within reported normal limits (normal <- 18%). No regional wall motion abnormalities noted.  Right Ventricle Normal right ventricle size and systolic function.  Atria Normal left atrial size. Right atrial size is normal. There is no color Doppler evidence of an atrial shunt.  Mitral Valve Mitral valve leaflets appear normal. There is no evidence of mitral stenosis or clinically significant mitral regurgitation. There is trace mitral regurgitation.  Tricuspid Valve Tricuspid valve leaflets appear normal. Right ventricle systolic pressure estimate normal. There is mild (1+) tricuspid regurgitation.  Aortic Valve Aortic valve leaflets appear normal. There is trace aortic regurgitation.  Pulmonic Valve The pulmonic valve is not well visualized. This degree of valvular regurgitation is within normal limits.  Vessels The aorta root is normal. Normal size ascending aorta. IVC diameter <2.1 cm collapsing >50% with sniff suggests a normal RA pressure of 3 mmHg.  Pericardium There is no pericardial effusion.  ______________________________________________________________________________ MMode/2D Measurements & Calculations IVSd: 0.69 cm LVIDd: 4.5 cm LVIDs: 3.0 cm LVPWd: 0.93 cm FS: 34.5 %  LV mass(C)d: 117.6 grams LV mass(C)dI: 65.3 grams/m2 Ao root diam: 3.1 cm LA dimension: 3.4 cm asc Aorta Diam: 2.7 cm LA/Ao: 1.1 LVOT diam: 2.0 cm LVOT area: 3.1 cm2 Ao root diam index Ht(cm/m): 1.9 Ao root diam index BSA (cm/m2): 1.7 Asc Ao diam index BSA (cm/m2): 1.5 Asc Ao diam index Ht(cm/m): 1.6 EF Biplane: 61.8 % LA Volume (BP): 27.6 ml  LA Volume Index (BP): 15.3 ml/m2 RWT: 0.41 TAPSE: 2.1 cm  Time Measurements MM HR: 73.0 BPM  Doppler Measurements &  Calculations MV E max bo: 77.0 cm/sec MV A max bo: 52.9 cm/sec MV E/A: 1.5 MV dec time: 0.28 sec LV V1 max P.7 mmHg LV V1 max: 81.8 cm/sec LV V1 VTI: 17.3 cm SV(LVOT): 54.3 ml SI(LVOT): 30.2 ml/m2 PA acc time: 0.11 sec TR max bo: 185.0 cm/sec TR max P.7 mmHg E/E' av.9 Lateral E/e': 5.2  Medial E/e': 6.7 RV S Bo: 11.7 cm/sec  ______________________________________________________________________________ Report approved by: Pillo Lang 2024 04:32 PM        30 minutes spent by me on the date of the encounter doing chart review, history and exam, documentation and further activities as noted above.    Signed by: Rosalind Adams MD

## 2024-06-27 NOTE — PATIENT INSTRUCTIONS
Assessing Cancer Risk  Only about 5-10% of cancers are thought to be due to an inherited cancer susceptibility gene.    These families often have:  Several people with the same or related types of cancer  Cancers diagnosed at a young age (before age 50)  Individuals with more than one primary cancer  Multiple generations of the family affected with cancer    Genetic Testing  Genetic testing involves a blood or saliva test and will look at the genetic information in select genes for any harmful mutations that are associated with increased cancer risk.  If possible, it is recommended that the person(s) who has had cancer be tested before other family members.  That person will give us the most useful information about whether or not a specific gene is associated with the cancer in the family.     Results  There are three possible results of genetic testing:  Positive--a harmful mutation was identified  Negative--no mutation was identified  Variant of unknown significance--a variation in one of the genes was identified, but it is unclear how this impacts cancer risk in the family    Advantages and Disadvantages  There are advantages and disadvantages to genetic testing.    Advantages  May clarify your cancer risk  Can help you make medical decisions  May explain the cancers in your family  May give useful information to your family members (if you share your results)    Disadvantages  Possible negative emotional impact of learning about inherited cancer risk  Uncertainty in interpreting a negative test result in some situations  Possible genetic discrimination concerns (see below)    Inheritance   Mutations in most cancer risk genes are inherited in an autosomal dominant pattern.  This means that if a parent has a mutation, each of their children will have a 50% chance of inheriting that same mutation.  Therefore, each child would have a 50% chance of being at increased risk for developing cancer.                                               Image obtained from Genetics Home Reference, 2013     Genetic Information Nondiscrimination Act (JAMMIE)  JAMMIE is a federal law that protects individuals from health insurance or employment discrimination based on a genetic test result alone.  Although rare, there are currently no legal protections in terms of life insurance, long term care, or disability insurances.  Visit the National Human Genome Research [place] at Genome.gov/02720320 to learn more.    Reducing Cancer Risk  If a harmful mutation is found in a cancer risk gene, there may be certain screening tests or preventative surgeries that can be offered. This information will be discussed after genetic testing is completed. If no mutations are found on genetic testing, screening is then recommended based on personal and/or family history of cancer.     Questions to Think About Regarding Genetic Testing  What effect will the test result have on me and my relationship with my family members if I have an inherited gene mutation?  If I don t have a gene mutation?  Should I share my test results, and how will my family react to this news, which may also affect them?  Are my children ready to learn new information that may one day affect their own health?    Resources  American Cancer Society (ACS) cancer.org   National Cancer [place] (NCI) cancer.gov     Please call us if you have any questions or concerns.   Cancer Risk Management Program [phone]-CANCER ([phone])  Abdulaziz Garrido, MS [facility] [place] [phone]  Anusha Chapman, MS, [facility] [place] [phone]  Aure Ashley, MS, [facility] [place]  [phone]  Jessica Lynn, MS, [facility] [place]  [phone]  Laurie Benson, MS, [facility] [place]  [phone]  Charu Simpson, MS, [facility] [place] [phone]  Linda Ibrahim, MS, [facility] [place] [phone]

## 2024-06-27 NOTE — PROGRESS NOTES
Virtual Visit Details    Type of service:  Video Visit     Originating Location (pt. Location): Home  Distant Location (provider location):  Off-site  Platform used for Video Visit: Yogesh  Time spent over video: 37 minutes    6/27/2024    Referring Provider: Aleena Real DO     Presenting Information:   I spoke with Vibha Kingston over video today for genetic counseling to discuss her personal and family history of cancer. This appointment was conducted virtually due to COVID-19 precautions. We talked today to review this history, cancer screening recommendations, and available genetic testing options.    Personal History:  Vibha is a 33 year old female. She was recently diagnosed with a left breast cancer at age 33 in January 2024 (multifocal IDC, ER negative, LA negative, HER2 positive). She had a left mastectomy on 3/5/24 and is currently having chemotherapy.     She has already had some genetic testing via the Breast Actionable Panel ordered by Dr. Real. Results were negative for mutations in the 11 genes analyzed: INGRID, BARD1, BRCA1, BRCA2, CDH1, CHEK2, NF1, PALB2, PTEN, STK11, TP53. Testing was performed by the [facility] at [facility]. We reviewed these results in detail today.     She had her first menstrual period at age 15 or 16 and is premenopausal. She does not have any children. Vibha has her ovaries, fallopian tubes and uterus in place. She reports that she has not used hormone replacement therapy. She has used oral contraceptives and the nuvaring in the past. Vibha has not had a colonoscopy due to her age.     Family History: (Please see scanned pedigree for detailed family history information)  Siblings:  Her sister is 41 years old and has a history of cervical cancer in college. She had a hysterectomy and one ovary removed about 4 years ago due to a tumor on her ovary (which was benign) and her history of cervical cancer. She has one ovary in place.   Maternal:  Her  mother is 71 years old and was diagnosed with breast cancer about 11 years ago. Treatment included chemotherapy, radiation, and a pill. She was also found to have kidney cancer (type unknown to Vibha) at age 60 during her breast cancer treatments and had this removed.   Her uncle was diagnosed with pancreatic cancer and passed away in his 50s or 60s.   Her grandmother was diagnosed with kidney cancer in her 80s and passed away in her 80s or 90s. She had a history of smoking.   Her grandfather was diagnosed with bladder cancer in his 80s and passed away in his 80s. He was a non-smoker, but did have secondhand smoke exposure due to Vibha's grandmother smoking in the house.   Paternal:  Her father is 76 years old and was diagnosed with prostate cancer in his mid 60s. Treatment included prostatectomy. Vibha does not think this was an aggressive prostate cancer.   She is not aware of any other cancers in her paternal relatives.     Vibha is not aware of any genetic testing for inherited cancer risk in any of her family members.     Her maternal ethnicity is Mac[place], Scandinavian. Her paternal ethnicity is [place], [place]. There is no known Ashkenazi Yarsani ancestry on either side of her family.     Discussion:  Vibha's personal and family history of cancer is suggestive of a hereditary cancer syndrome.  We reviewed the features of sporadic, familial, and hereditary cancers. In looking at Vibha's family history, it is possible that a cancer susceptibility gene is present due to her recent diagnosis of breast cancer at age 33, as well as her family history of breast, kidney, pancreatic, and prostate cancer.  We discussed the natural history and genetics of hereditary cancer. As noted above, Vibha has already had some genetic testing via the Breast Actionable Panel with negative results. We discussed that there are additional genes that could cause increased risk for breast cancer (lower risk, would not change surgical  decisions), kidney, pancreatic, prostate, and other cancers. As many of these genes present with overlapping features in a family and accurate cancer risk cannot always be established based upon the pedigree analysis alone, it would be reasonable for Vibha to consider reflex panel genetic testing to analyze additional genes.   A detailed handout regarding genetic testing and the information we discussed will be provided to Vibha via [employer] and can be found in the after visit summary. Topics included: inheritance pattern, cancer risks, cancer screening recommendations, and also risks, benefits and limitations of testing.  Based on her personal and family history, Vibha meets current National Comprehensive Cancer Network (NCCN) criteria for genetic testing of high-penetrance breast cancer susceptibility genes, specifically, BRCA1, BRCA2, CDH1, PALB2, PTEN, and TP53.  We reviewed additional reflex genetic testing options for Vibha based on her personal and family history: a panel of genes associated with an increased risk for certain cancers, or larger panel options to include genes associated with increased risk for multiple different cancer types. Vibha expressed an interest in learning as much information as possible from the testing. We discussed expanded panel options including the Hereditary Cancer Comprehensive Panel and the Hereditary Cancer Comprehensive Expanded Panel. She opted for the Hereditary Cancer Comprehensive Expanded Panel.  We discussed that many genes on this panel are associated with specific hereditary cancer syndromes and have published management guidelines. Other genes have medical management guidelines available to screen for certain cancers. The remaining genes are associated with increased cancer risk and may allow us to make medical recommendations when mutations are identified. Some of the genes on this expanded panel may have limited information available about specific cancer risks  and therefore, there may be limited screening guidelines available. She stated that she understands potential limitations of a larger gene panel.    Due to COVID-19 precautions consent was obtained over the phone/video today. Additional reflex genetic testing via the Hereditary Cancer Comprehensive Expanded Panel will be ordered from the Molecular Diagnostics Laboratory at [facility]. This will be done on the same blood sample that Vibha has already provided for her initial testing.   Medical Management: For Vibha, we reviewed that the information from genetic testing may determine:  additional cancer screening for which Vibha may qualify (i.e. mammogram and breast MRI, more frequent colonoscopies, more frequent dermatologic exams, etc.),  options for risk reducing surgeries Vibha could consider (i.e. surgery to remove her ovaries and/or uterus, etc.),    and targeted chemotherapies if she were to develop certain cancers in the future (i.e. immunotherapy for individuals with Mcrae syndrome, PARP inhibitors, etc.).   These recommendations will be discussed in detail once genetic testing is completed.     Plan:  1) Today Vibha elected to proceed with genetic testing via the Hereditary Cancer Comprehensive Expanded Panel offered by the Molecular Diagnostics Laboratory at [facility].  2) This information should be available in 3-4 weeks.  3) Vibha will be scheduled for a virtual visit (phone or video) to discuss the results.    Laurie Benson MS, CGC  Licensed, Certified Genetic Counselor  Office: [phone]  Email: [email or web address]

## 2024-06-28 ENCOUNTER — INFUSION THERAPY VISIT (OUTPATIENT)
Dept: INFUSION THERAPY | Facility: HOSPITAL | Age: 34
End: 2024-06-28
Attending: INTERNAL MEDICINE
Payer: COMMERCIAL

## 2024-06-28 ENCOUNTER — PATIENT OUTREACH (OUTPATIENT)
Dept: ONCOLOGY | Facility: CLINIC | Age: 34
End: 2024-06-28

## 2024-06-28 ENCOUNTER — ONCOLOGY VISIT (OUTPATIENT)
Dept: ONCOLOGY | Facility: HOSPITAL | Age: 34
End: 2024-06-28
Attending: INTERNAL MEDICINE
Payer: COMMERCIAL

## 2024-06-28 VITALS
TEMPERATURE: 98.1 F | HEIGHT: 65 IN | RESPIRATION RATE: 16 BRPM | BODY MASS INDEX: 27.56 KG/M2 | WEIGHT: 165.4 LBS | SYSTOLIC BLOOD PRESSURE: 103 MMHG | DIASTOLIC BLOOD PRESSURE: 69 MMHG | HEART RATE: 80 BPM | OXYGEN SATURATION: 98 %

## 2024-06-28 DIAGNOSIS — Z51.11 ENCOUNTER FOR ANTINEOPLASTIC CHEMOTHERAPY: Primary | ICD-10-CM

## 2024-06-28 DIAGNOSIS — Z17.1 MALIGNANT NEOPLASM OF UPPER-OUTER QUADRANT OF LEFT BREAST IN FEMALE, ESTROGEN RECEPTOR NEGATIVE (H): Primary | ICD-10-CM

## 2024-06-28 DIAGNOSIS — Z17.1 MALIGNANT NEOPLASM OF UPPER-OUTER QUADRANT OF LEFT BREAST IN FEMALE, ESTROGEN RECEPTOR NEGATIVE (H): ICD-10-CM

## 2024-06-28 DIAGNOSIS — C50.412 MALIGNANT NEOPLASM OF UPPER-OUTER QUADRANT OF LEFT BREAST IN FEMALE, ESTROGEN RECEPTOR NEGATIVE (H): Primary | ICD-10-CM

## 2024-06-28 DIAGNOSIS — C50.412 MALIGNANT NEOPLASM OF UPPER-OUTER QUADRANT OF LEFT BREAST IN FEMALE, ESTROGEN RECEPTOR NEGATIVE (H): ICD-10-CM

## 2024-06-28 DIAGNOSIS — D69.6 THROMBOCYTOPENIA (H): ICD-10-CM

## 2024-06-28 DIAGNOSIS — Z51.11 ENCOUNTER FOR ANTINEOPLASTIC CHEMOTHERAPY: ICD-10-CM

## 2024-06-28 DIAGNOSIS — D05.12 NEOPLASM OF LEFT BREAST, PRIMARY TUMOR STAGING CATEGORY TIS: DUCTAL CARCINOMA IN SITU (DCIS): ICD-10-CM

## 2024-06-28 LAB
ALBUMIN SERPL BCG-MCNC: 4.1 G/DL (ref 3.5–5.2)
ALP SERPL-CCNC: 51 U/L (ref 40–150)
ALT SERPL W P-5'-P-CCNC: 11 U/L (ref 0–50)
ANION GAP SERPL CALCULATED.3IONS-SCNC: 10 MMOL/L (ref 7–15)
AST SERPL W P-5'-P-CCNC: 15 U/L (ref 0–45)
BASOPHILS # BLD AUTO: 0 10E3/UL (ref 0–0.2)
BASOPHILS NFR BLD AUTO: 0 %
BILIRUB SERPL-MCNC: 0.3 MG/DL
BUN SERPL-MCNC: 10.7 MG/DL (ref 6–20)
CALCIUM SERPL-MCNC: 9.2 MG/DL (ref 8.6–10)
CHLORIDE SERPL-SCNC: 105 MMOL/L (ref 98–107)
CREAT SERPL-MCNC: 0.81 MG/DL (ref 0.51–0.95)
DEPRECATED HCO3 PLAS-SCNC: 25 MMOL/L (ref 22–29)
EGFRCR SERPLBLD CKD-EPI 2021: >90 ML/MIN/1.73M2
EOSINOPHIL # BLD AUTO: 0 10E3/UL (ref 0–0.7)
EOSINOPHIL NFR BLD AUTO: 0 %
ERYTHROCYTE [DISTWIDTH] IN BLOOD BY AUTOMATED COUNT: 14.7 % (ref 10–15)
GLUCOSE SERPL-MCNC: 93 MG/DL (ref 70–99)
HCT VFR BLD AUTO: 34.7 % (ref 35–47)
HGB BLD-MCNC: 12.2 G/DL (ref 11.7–15.7)
IMM GRANULOCYTES # BLD: 0 10E3/UL
IMM GRANULOCYTES NFR BLD: 0 %
LYMPHOCYTES # BLD AUTO: 0.8 10E3/UL (ref 0.8–5.3)
LYMPHOCYTES NFR BLD AUTO: 27 %
MCH RBC QN AUTO: 31.4 PG (ref 26.5–33)
MCHC RBC AUTO-ENTMCNC: 35.2 G/DL (ref 31.5–36.5)
MCV RBC AUTO: 89 FL (ref 78–100)
MONOCYTES # BLD AUTO: 0.3 10E3/UL (ref 0–1.3)
MONOCYTES NFR BLD AUTO: 11 %
NEUTROPHILS # BLD AUTO: 1.9 10E3/UL (ref 1.6–8.3)
NEUTROPHILS NFR BLD AUTO: 61 %
NRBC # BLD AUTO: 0 10E3/UL
NRBC BLD AUTO-RTO: 0 /100
PLATELET # BLD AUTO: 129 10E3/UL (ref 150–450)
POTASSIUM SERPL-SCNC: 3.7 MMOL/L (ref 3.4–5.3)
PROT SERPL-MCNC: 6.7 G/DL (ref 6.4–8.3)
RBC # BLD AUTO: 3.89 10E6/UL (ref 3.8–5.2)
SODIUM SERPL-SCNC: 140 MMOL/L (ref 135–145)
WBC # BLD AUTO: 3.1 10E3/UL (ref 4–11)

## 2024-06-28 PROCEDURE — 96367 TX/PROPH/DG ADDL SEQ IV INF: CPT

## 2024-06-28 PROCEDURE — 250N000011 HC RX IP 250 OP 636: Performed by: INTERNAL MEDICINE

## 2024-06-28 PROCEDURE — 99213 OFFICE O/P EST LOW 20 MIN: CPT | Mod: 25 | Performed by: INTERNAL MEDICINE

## 2024-06-28 PROCEDURE — 96417 CHEMO IV INFUS EACH ADDL SEQ: CPT

## 2024-06-28 PROCEDURE — 80053 COMPREHEN METABOLIC PANEL: CPT

## 2024-06-28 PROCEDURE — 258N000003 HC RX IP 258 OP 636: Performed by: INTERNAL MEDICINE

## 2024-06-28 PROCEDURE — 36591 DRAW BLOOD OFF VENOUS DEVICE: CPT

## 2024-06-28 PROCEDURE — 99214 OFFICE O/P EST MOD 30 MIN: CPT | Performed by: INTERNAL MEDICINE

## 2024-06-28 PROCEDURE — 85041 AUTOMATED RBC COUNT: CPT

## 2024-06-28 PROCEDURE — 250N000013 HC RX MED GY IP 250 OP 250 PS 637: Performed by: INTERNAL MEDICINE

## 2024-06-28 PROCEDURE — 96375 TX/PRO/DX INJ NEW DRUG ADDON: CPT

## 2024-06-28 PROCEDURE — G2211 COMPLEX E/M VISIT ADD ON: HCPCS | Performed by: INTERNAL MEDICINE

## 2024-06-28 PROCEDURE — 96413 CHEMO IV INFUSION 1 HR: CPT

## 2024-06-28 RX ORDER — ALBUTEROL SULFATE 0.83 MG/ML
2.5 SOLUTION RESPIRATORY (INHALATION)
Status: CANCELLED | OUTPATIENT
Start: 2024-06-28

## 2024-06-28 RX ORDER — ALBUTEROL SULFATE 90 UG/1
1-2 AEROSOL, METERED RESPIRATORY (INHALATION)
Status: CANCELLED
Start: 2024-06-28

## 2024-06-28 RX ORDER — ACETAMINOPHEN 325 MG/1
650 TABLET ORAL
Status: CANCELLED
Start: 2024-06-28

## 2024-06-28 RX ORDER — PALONOSETRON 0.05 MG/ML
0.25 INJECTION, SOLUTION INTRAVENOUS ONCE
Status: COMPLETED | OUTPATIENT
Start: 2024-06-28 | End: 2024-06-28

## 2024-06-28 RX ORDER — HEPARIN SODIUM (PORCINE) LOCK FLUSH IV SOLN 100 UNIT/ML 100 UNIT/ML
5 SOLUTION INTRAVENOUS
Status: CANCELLED | OUTPATIENT
Start: 2024-06-28

## 2024-06-28 RX ORDER — METHYLPREDNISOLONE SODIUM SUCCINATE 125 MG/2ML
125 INJECTION, POWDER, LYOPHILIZED, FOR SOLUTION INTRAMUSCULAR; INTRAVENOUS
Status: DISCONTINUED | OUTPATIENT
Start: 2024-06-28 | End: 2024-06-28 | Stop reason: HOSPADM

## 2024-06-28 RX ORDER — PALONOSETRON 0.05 MG/ML
0.25 INJECTION, SOLUTION INTRAVENOUS ONCE
Status: CANCELLED | OUTPATIENT
Start: 2024-06-28

## 2024-06-28 RX ORDER — DIPHENHYDRAMINE HYDROCHLORIDE 50 MG/ML
50 INJECTION INTRAMUSCULAR; INTRAVENOUS
Status: DISCONTINUED | OUTPATIENT
Start: 2024-06-28 | End: 2024-06-28 | Stop reason: HOSPADM

## 2024-06-28 RX ORDER — EPINEPHRINE 1 MG/ML
0.3 INJECTION, SOLUTION INTRAMUSCULAR; SUBCUTANEOUS EVERY 5 MIN PRN
Status: DISCONTINUED | OUTPATIENT
Start: 2024-06-28 | End: 2024-06-28 | Stop reason: HOSPADM

## 2024-06-28 RX ORDER — LORAZEPAM 1 MG/1
1 TABLET ORAL
Qty: 30 TABLET | Refills: 0 | Status: SHIPPED | OUTPATIENT
Start: 2024-06-28 | End: 2024-08-09

## 2024-06-28 RX ORDER — MEPERIDINE HYDROCHLORIDE 50 MG/ML
25 INJECTION INTRAMUSCULAR; INTRAVENOUS; SUBCUTANEOUS EVERY 30 MIN PRN
Status: CANCELLED | OUTPATIENT
Start: 2024-06-28

## 2024-06-28 RX ORDER — HEPARIN SODIUM,PORCINE 10 UNIT/ML
5-20 VIAL (ML) INTRAVENOUS DAILY PRN
Status: CANCELLED | OUTPATIENT
Start: 2024-06-28

## 2024-06-28 RX ORDER — ALBUTEROL SULFATE 0.83 MG/ML
2.5 SOLUTION RESPIRATORY (INHALATION)
Status: DISCONTINUED | OUTPATIENT
Start: 2024-06-28 | End: 2024-06-28 | Stop reason: HOSPADM

## 2024-06-28 RX ORDER — MEPERIDINE HYDROCHLORIDE 50 MG/ML
25 INJECTION INTRAMUSCULAR; INTRAVENOUS; SUBCUTANEOUS EVERY 30 MIN PRN
Status: DISCONTINUED | OUTPATIENT
Start: 2024-06-28 | End: 2024-06-28 | Stop reason: HOSPADM

## 2024-06-28 RX ORDER — LORAZEPAM 2 MG/ML
0.5 INJECTION INTRAMUSCULAR EVERY 4 HOURS PRN
Status: CANCELLED | OUTPATIENT
Start: 2024-06-28

## 2024-06-28 RX ORDER — HEPARIN SODIUM (PORCINE) LOCK FLUSH IV SOLN 100 UNIT/ML 100 UNIT/ML
5 SOLUTION INTRAVENOUS
Status: DISCONTINUED | OUTPATIENT
Start: 2024-06-28 | End: 2024-06-28 | Stop reason: HOSPADM

## 2024-06-28 RX ORDER — EPINEPHRINE 1 MG/ML
0.3 INJECTION, SOLUTION INTRAMUSCULAR; SUBCUTANEOUS EVERY 5 MIN PRN
Status: CANCELLED | OUTPATIENT
Start: 2024-06-28

## 2024-06-28 RX ORDER — DIPHENHYDRAMINE HCL 50 MG
50 CAPSULE ORAL
Status: COMPLETED | OUTPATIENT
Start: 2024-06-28 | End: 2024-06-28

## 2024-06-28 RX ORDER — ALBUTEROL SULFATE 90 UG/1
1-2 AEROSOL, METERED RESPIRATORY (INHALATION)
Status: DISCONTINUED | OUTPATIENT
Start: 2024-06-28 | End: 2024-06-28 | Stop reason: HOSPADM

## 2024-06-28 RX ORDER — METHYLPREDNISOLONE SODIUM SUCCINATE 125 MG/2ML
125 INJECTION, POWDER, LYOPHILIZED, FOR SOLUTION INTRAMUSCULAR; INTRAVENOUS
Status: CANCELLED
Start: 2024-06-28

## 2024-06-28 RX ORDER — DIPHENHYDRAMINE HYDROCHLORIDE 50 MG/ML
50 INJECTION INTRAMUSCULAR; INTRAVENOUS
Status: CANCELLED
Start: 2024-06-28

## 2024-06-28 RX ORDER — DIPHENHYDRAMINE HCL 50 MG
50 CAPSULE ORAL
Status: CANCELLED | OUTPATIENT
Start: 2024-06-28

## 2024-06-28 RX ADMIN — FOSAPREPITANT DIMEGLUMINE: 150 INJECTION, POWDER, LYOPHILIZED, FOR SOLUTION INTRAVENOUS at 10:25

## 2024-06-28 RX ADMIN — SODIUM CHLORIDE 250 ML: 9 INJECTION, SOLUTION INTRAVENOUS at 10:23

## 2024-06-28 RX ADMIN — PERTUZUMAB 420 MG: 30 INJECTION, SOLUTION, CONCENTRATE INTRAVENOUS at 11:00

## 2024-06-28 RX ADMIN — SODIUM CHLORIDE 441 MG: 9 INJECTION, SOLUTION INTRAVENOUS at 11:34

## 2024-06-28 RX ADMIN — DIPHENHYDRAMINE HYDROCHLORIDE 50 MG: 50 CAPSULE ORAL at 10:18

## 2024-06-28 RX ADMIN — PALONOSETRON 0.25 MG: 0.05 INJECTION, SOLUTION INTRAVENOUS at 10:19

## 2024-06-28 RX ADMIN — DOCETAXEL 140 MG: 20 INJECTION, SOLUTION INTRAVENOUS at 12:09

## 2024-06-28 RX ADMIN — CARBOPLATIN 755 MG: 10 INJECTION, SOLUTION INTRAVENOUS at 13:17

## 2024-06-28 RX ADMIN — HEPARIN 5 ML: 100 SYRINGE at 14:02

## 2024-06-28 ASSESSMENT — PAIN SCALES - GENERAL: PAINLEVEL: NO PAIN (0)

## 2024-06-28 NOTE — PROGRESS NOTES
New Patient Oncology Nurse Navigator Note     Referring provider: Dr. Rosalind Adams     Referring Clinic/Organization: [facility]     Referred to (specialty:) Radiation Oncology      Date Referral Received: June 28, 2024     Evaluation for:  C50.412, Z17.1 (ICD-10-CM) - Malignant neoplasm of upper-outer quadrant of left breast in female, estrogen receptor negative (H)     Clinical History (per Nurse review of records provided):      Vibha is a 33 year old female with malignant neoplasm of upper-outer quadrant of left breast in female, estrogen receptor negative, Ductal carcinoma in situ (DCIS) of left breast    1/9/2024- self palpated left medial breast mass              - diagnostic mammogram and ultrasound showed 3.4 x 2.2 x 2 cm large ill-defined hypoechoic area with associated calcification at 9:00 3 cm from the nipple.  Sonographic survey of left axilla is within normal limits.     1/11/2024-ultrasound-guided core needle biopsy showed DCIS, grade 3, cribriform and papillary with comedonecrosis.  ER/[place] was deferred to excisional specimen.     1/25/2024-breast actionable panel was negative including INGRID, BARD1, BRCA1, BRCA2, CDH1, CHEK2, NF1, PALB2, PTEN, STK11, TP53. Genetic counseling 6/27/24 with Laurie Benson MS, [facility].     2/4/2024-MRI breast showed large segmental area of non-mass enhancement medial left breast measuring 10 cm from the posterior to subareolar depth corresponding to the known DCIS.  Mildly prominent bilateral internal mammary lymph node technically within normal limit.     3/5/2024-left breast simple mastectomy and left axillary sentinel lymph node biopsy              Invasive ductal carcinoma, grade 3              Multifocal, 4 foci vary from 1.3 mm to 11 mm in greatest dimension.              Margins were negative, closest margin less than 1 mm anterior/superior              DCIS, EIC present, solid and comedo pattern, grade 3, extensive comedonecrosis.  Focal  lymphovascular invasion present.              1/2 sentinel lymph node was positive for metastatic carcinoma.              [id number]              ER negative in invasive carcinoma, positive and DCIS (80% of the tumor nuclei stain strongly)              CA negative and invasive cancer, positive in DCIS (30% of the tumor nuclei stain strongly of moderately)              HER2/aj positive for overexpression (3+ membrane staining)     4/26/2024- adjuvant TCHP in the care of Dr. Adams  Day 1 Cycle 5 7/19/24  Expected final cycle of TCHP = 8/9/2024.    Writer received referral, reviewed for appropriate plan, and call warm transferred to New Patient Scheduling for completion.

## 2024-06-28 NOTE — PROGRESS NOTES
Infusion Nursing Note:  Vibha Kingston presents today for Day 1 Cycle 4 of chemotherapy plan.    Patient seen by provider today: Yes: Dr. Adams   present during visit today: Not Applicable.    Note: Vibha arrives ambulatory to [facility] following her appointment with Dr. Adams. She was premedicated with Emend/Dexamethasone, Aloxi and she requested oral Benadryl. She tolerated her infusions well.      Intravenous Access:  Labs drawn without difficulty by Zack MONTES RN  Implanted Port.    Treatment Conditions:  Lab Results   Component Value Date    HGB 12.2 06/28/2024    WBC 3.1 (L) 06/28/2024    ANEUTAUTO 1.9 06/28/2024     (L) 06/28/2024        Lab Results   Component Value Date     06/28/2024    POTASSIUM 3.7 06/28/2024    CR 0.81 06/28/2024    RACHAEL 9.2 06/28/2024    BILITOTAL 0.3 06/28/2024    ALBUMIN 4.1 06/28/2024    ALT 11 06/28/2024    AST 15 06/28/2024       Results reviewed, labs MET treatment parameters, ok to proceed with treatment.      Post Infusion Assessment:  Patient tolerated infusion without incident.  Blood return noted pre and post infusion.  Site patent and intact, free from redness, edema or discomfort.  No evidence of extravasations.  Access discontinued per protocol.       Discharge Plan:   Patient and/or family verbalized understanding of discharge instructions and all questions answered.  AVS to patient via [employer].  Patient will return 7/19 for next appointment.   Patient discharged in stable condition accompanied by: .  Departure Mode: Ambulatory.      Christie Hardin RN

## 2024-06-28 NOTE — LETTER
6/28/2024      Vibha Kingston  [address] [place] [address]      Dear Colleague,    Thank you for referring your patient, Vibha Kingston, to the [facility] CANCER [facility]. Please see a copy of my visit note below.    [facility] Hematology and Oncology Progress Note    Patient: Vibha Kingston  MRN: [id number]  Date of Service: Jun 28, 2024         Reason for Visit    Chief Complaint   Patient presents with     Oncology Clinic Visit     Malignant neoplasm of upper-outer quadrant of left breast in female, estrogen receptor negative (H)       Assessment and Plan     Cancer Staging   Malignant neoplasm of upper-outer quadrant of left breast in female, estrogen receptor negative (H)  Staging form: Breast, AJCC 8th Edition  - Pathologic stage from 3/13/2024: Stage IIA (pT1c, pN1a(sn), cM0, G3, ER-, AK-, HER2+) - Signed by Rosalind Adams MD on 3/13/2024      ECOG Performance    0 - Independent     Pain  Pain Score: No Pain (0)      #.  History of stage IIA (pT1c pN1a M0) invasive ductal carcinoma of the left breast, grade 3, ER negative, AK negative, HER2 positive  #.  DCIS of the left breast, ER positive, AK positive  #.  Nausea, related to chemotherapy  #.  Mild thrombocytopenia, related to chemotherapy     She is doing very well on current chemotherapy.  Nausea is fairly well-managed with current therapy.  Her labs were reviewed.  She has mild leukopenia and thrombocytopenia of no clinical significance.  CMP was completely normal.  Echocardiogram showed LVEF of 55-60% and within normal range.  I recommended her to continue adjuvant TCHP.   Referral to radiation oncology was placed today for discussion of risk and benefits of adjuvant radiation.  I believe she will benefit from adjuvant radiation.   Refill lorazepam to use as needed for nausea related to chemotherapy.   Follow-up labs and provider visit prior to next cycle of adjuvant chemotherapy.   Echocardiogram will be monitor every 3  months while she is on HER2 directed therapy. (Next due in 9/2024)    The longitudinal plan of care for the diagnosis(es)/condition(s) as documented were addressed during this visit. Due to the added complexity in care, I will continue to support Vibha in the subsequent management and with ongoing continuity of care.      Encounter Diagnoses:    Problem List Items Addressed This Visit          Oncology Diagnoses    Malignant neoplasm of upper-outer quadrant of left breast in female, estrogen receptor negative (H)    Relevant Orders    Infusion Appointment Request - Adult    Infusion Appointment Request - Adult    Radiation Therapy Referral       Other    Encounter for antineoplastic chemotherapy - Primary    Relevant Orders    Infusion Appointment Request - Adult    Infusion Appointment Request - Adult     Other Visit Diagnoses       Neoplasm of left breast, primary tumor staging category Tis: ductal carcinoma in situ (DCIS)        Relevant Medications    LORazepam (ATIVAN) 1 MG tablet    Thrombocytopenia (H24)                   CC: Zully AYLA Bloom CNP   ______________________________________________________________________________  Oncologic history  1/9/2024- self palpated left medial breast mass   - diagnostic mammogram and ultrasound showed 3.4 x 2.2 x 2 cm large ill-defined hypoechoic area with associated calcification at 9:00 3 cm from the nipple.  Sonographic survey of left axilla is within normal limits.    1/11/2024-ultrasound-guided core needle biopsy showed DCIS, grade 3, cribriform and papillary with comedonecrosis.  ER/DE was deferred to excisional specimen.    1/25/2024-breast actionable panel was negative including INGRID, BARD1, BRCA1, BRCA2, CDH1, CHEK2, NF1, PALB2, PTEN, STK11, TP53.    2/4/2024-MRI breast showed large segmental area of non-mass enhancement medial left breast measuring 10 cm from the posterior to subareolar depth corresponding to the known DCIS.  Mildly prominent bilateral  internal mammary lymph node technically within normal limit.    3/5/2024-left breast simple mastectomy and left axillary sentinel lymph node biopsy   Invasive ductal carcinoma, grade 3   Multifocal, 4 foci vary from 1.3 mm to 11 mm in greatest dimension.   Margins were negative, closest margin less than 1 mm anterior/superior   DCIS, EIC present, solid and comedo pattern, grade 3, extensive comedonecrosis.  Focal lymphovascular invasion present.   1/2 sentinel lymph node was positive for metastatic carcinoma.   [id number]   ER negative in invasive carcinoma, positive and DCIS (80% of the tumor nuclei stain strongly)   SC negative and invasive cancer, positive in DCIS (30% of the tumor nuclei stain strongly of moderately)   HER2/aj positive for overexpression (3+ membrane staining)    Fertility preservation was successfully completed.    4/26/2024- adjuvant TCHP    6/27/2024-genetic counseling and testing completed.    History of Present Illness    Ms. Vibha Kingston presented today accompanied by her  prior to cycle #4 adjuvant TCHP.      She does not have neuropathy.  She takes lorazepam 1 tablet a day at the most which control her nausea appropriately.  She noticed rectal pain around midcycle.  She does not have bleeding.  No diarrhea.  It resolved spontaneously.  She was wondering if it was related to hemorrhoids.      She does not have chest pain.  No shortness of breath.  She is doing well on current therapy.     Review of systems  Apart from describing in HPI, the remainder of comprehensive ROS was negative.    Past History    Past Medical History:   Diagnosis Date     Cancer (H) 1/15/24    Breast cancer     Encounter for antineoplastic chemotherapy 03/14/2024       Past Surgical History:   Procedure Laterality Date     BIOPSY      A mole a few years back and an abnormal pap in [facility]     BIOPSY NODE SENTINEL Left 03/05/2024    Procedure: WITH LEFT SENTINEL LYMPH NODE BIOPSY;  Surgeon: Aleena Real,  ";  Location: [facility] Main OR     FERTILITY SURGERY  04/10/2024    Egg retrieval     INSERT PORT VASCULAR ACCESS N/A 4/19/2024    Procedure: INSERTION, VASCULAR ACCESS PORT UNDER ULTRASOUND AND FLUOROSCOPIC GUIDANCE;  Surgeon: Aleena Real DO;  Location: [facility] OR     MASTECTOMY SIMPLE Left 03/05/2024    Procedure: LEFT MASTECTOMY;  Surgeon: Aleena Real DO;  Location: [facility]       Physical Exam    /69   Pulse 80   Temp 98.1  F (36.7  C)   Resp 16   Ht 1.651 m (5' 5\")   Wt 75 kg (165 lb 6.4 oz)   SpO2 98%   BMI 27.52 kg/m      General: alert, awake, not in acute distress  HEENT: Head: Normal, normocephalic, atraumatic.  Eye: Normal external eye, conjunctiva, lids cornea, GABRIELA.  Pharynx: Normal buccal mucosa. Normal pharynx.  Neck / Thyroid: Supple, no masses, nodes, nodules or enlargement.  Lymphatics: No abnormally enlarged lymph nodes.  Chest: Normal chest wall and respirations. Clear to auscultation.  Heart: S1 S2 RRR.   Abdomen: abdomen is soft without significant tenderness, masses, organomegaly or guarding  Extremities: normal strength, tone, and muscle mass  Skin: normal. no rash or abnormalities  CNS: non focal.    Lab Results    Recent Results (from the past 240 hour(s))   Echocardiogram Complete    Collection Time: 06/25/24  9:21 AM   Result Value Ref Range    LVEF  55-60%    Comprehensive metabolic panel    Collection Time: 06/28/24  8:17 AM   Result Value Ref Range    Sodium 140 135 - 145 mmol/L    Potassium 3.7 3.4 - 5.3 mmol/L    Carbon Dioxide (CO2) 25 22 - 29 mmol/L    Anion Gap 10 7 - 15 mmol/L    Urea Nitrogen 10.7 6.0 - 20.0 mg/dL    Creatinine 0.81 0.51 - 0.95 mg/dL    GFR Estimate >90 >60 mL/min/1.73m2    Calcium 9.2 8.6 - 10.0 mg/dL    Chloride 105 98 - 107 mmol/L    Glucose 93 70 - 99 mg/dL    Alkaline Phosphatase 51 40 - 150 U/L    AST 15 0 - 45 U/L    ALT 11 0 - 50 U/L    Protein Total 6.7 6.4 - 8.3 g/dL    Albumin 4.1 3.5 - 5.2 g/dL    Bilirubin " Total 0.3 <=1.2 mg/dL   CBC with platelets and differential    Collection Time: 24  8:17 AM   Result Value Ref Range    WBC Count 3.1 (L) 4.0 - 11.0 10e3/uL    RBC Count 3.89 3.80 - 5.20 10e6/uL    Hemoglobin 12.2 11.7 - 15.7 g/dL    Hematocrit 34.7 (L) 35.0 - 47.0 %    MCV 89 78 - 100 fL    MCH 31.4 26.5 - 33.0 pg    MCHC 35.2 31.5 - 36.5 g/dL    RDW 14.7 10.0 - 15.0 %    Platelet Count 129 (L) 150 - 450 10e3/uL    % Neutrophils 61 %    % Lymphocytes 27 %    % Monocytes 11 %    % Eosinophils 0 %    % Basophils 0 %    % Immature Granulocytes 0 %    NRBCs per 100 WBC 0 <1 /100    Absolute Neutrophils 1.9 1.6 - 8.3 10e3/uL    Absolute Lymphocytes 0.8 0.8 - 5.3 10e3/uL    Absolute Monocytes 0.3 0.0 - 1.3 10e3/uL    Absolute Eosinophils 0.0 0.0 - 0.7 10e3/uL    Absolute Basophils 0.0 0.0 - 0.2 10e3/uL    Absolute Immature Granulocytes 0.0 <=0.4 10e3/uL    Absolute NRBCs 0.0 10e3/uL         Imaging    Echocardiogram Complete    Result Date: 2024  [id number]^FREEMAN^STEPHANE^[place] [address]  Name: EMILEE ORTA MRN: [id number] : 1990 Study Date: 2024 08:39 AM Age: 33 yrs Gender: Female Patient Location: [facility] Reason For Study: Malignant neoplasm of upper-outer quadrant of left breast in [facility] Ordering Physician: STEPHANE MILLARD Referring Physician: STEPHANE MILLARD Performed By: REED  BSA: 1.8 m2 Height: 65 in Weight: 160 lb BP: 114/69 mmHg ______________________________________________________________________________ Procedure Complete Echo Adult. Myocardial Strain Imaging performed. No hemodynamically significant valvular abnormalities on 2D or color flow imaging. ______________________________________________________________________________ Interpretation Summary  The left ventricle is normal in size. Left ventricular function is normal.The ejection fraction is 55-60%. Normal right ventricle size and systolic function. Global peak LV  longitudinal strain is averaged at -22%. This is within reported normal limits (normal <-18%). No hemodynamically significant valvular abnormalities on 2D or color flow imaging. ______________________________________________________________________________ Left Ventricle The left ventricle is normal in size. Left ventricular function is normal.The ejection fraction is 55-60%. There is normal left ventricular wall thickness. Left ventricular diastolic function is normal. Global peak LV longitudinal strain is averaged at -22%. This is within reported normal limits (normal <- 18%). No regional wall motion abnormalities noted.  Right Ventricle Normal right ventricle size and systolic function.  Atria Normal left atrial size. Right atrial size is normal. There is no color Doppler evidence of an atrial shunt.  Mitral Valve Mitral valve leaflets appear normal. There is no evidence of mitral stenosis or clinically significant mitral regurgitation. There is trace mitral regurgitation.  Tricuspid Valve Tricuspid valve leaflets appear normal. Right ventricle systolic pressure estimate normal. There is mild (1+) tricuspid regurgitation.  Aortic Valve Aortic valve leaflets appear normal. There is trace aortic regurgitation.  Pulmonic Valve The pulmonic valve is not well visualized. This degree of valvular regurgitation is within normal limits.  Vessels The aorta root is normal. Normal size ascending aorta. IVC diameter <2.1 cm collapsing >50% with sniff suggests a normal RA pressure of 3 mmHg.  Pericardium There is no pericardial effusion.  ______________________________________________________________________________ MMode/2D Measurements & Calculations IVSd: 0.69 cm LVIDd: 4.5 cm LVIDs: 3.0 cm LVPWd: 0.93 cm FS: 34.5 %  LV mass(C)d: 117.6 grams LV mass(C)dI: 65.3 grams/m2 Ao root diam: 3.1 cm LA dimension: 3.4 cm asc Aorta Diam: 2.7 cm LA/Ao: 1.1 LVOT diam: 2.0 cm LVOT area: 3.1 cm2 Ao root diam index Ht(cm/m): 1.9 Ao root  "diam index BSA (cm/m2): 1.7 Asc Ao diam index BSA (cm/m2): 1.5 Asc Ao diam index Ht(cm/m): 1.6 EF Biplane: 61.8 % LA Volume (BP): 27.6 ml  LA Volume Index (BP): 15.3 ml/m2 RWT: 0.41 TAPSE: 2.1 cm  Time Measurements MM HR: 73.0 BPM  Doppler Measurements & Calculations MV E max bo: 77.0 cm/sec MV A max bo: 52.9 cm/sec MV E/A: 1.5 MV dec time: 0.28 sec LV V1 max P.7 mmHg LV V1 max: 81.8 cm/sec LV V1 VTI: 17.3 cm SV(LVOT): 54.3 ml SI(LVOT): 30.2 ml/m2 PA acc time: 0.11 sec TR max bo: 185.0 cm/sec TR max P.7 mmHg E/E' av.9 Lateral E/e': 5.2  Medial E/e': 6.7 RV S Bo: 11.7 cm/sec  ______________________________________________________________________________ Report approved by: Pillo Lang 2024 04:32 PM        30 minutes spent by me on the date of the encounter doing chart review, history and exam, documentation and further activities as noted above.    Signed by: Rosalind Adams MD    Oncology Rooming Note    2024 8:59 AM   Vibha Kingston is a 33 year old female who presents for:    Chief Complaint   Patient presents with     Oncology Clinic Visit     Malignant neoplasm of upper-outer quadrant of left breast in female, estrogen receptor negative (H)     Initial Vitals: /69   Pulse 80   Temp 98.1  F (36.7  C)   Resp 16   Ht 1.651 m (5' 5\")   Wt 75 kg (165 lb 6.4 oz)   SpO2 98%   BMI 27.52 kg/m   Estimated body mass index is 27.52 kg/m  as calculated from the following:    Height as of this encounter: 1.651 m (5' 5\").    Weight as of this encounter: 75 kg (165 lb 6.4 oz). Body surface area is 1.85 meters squared.  No Pain (0) Comment: Data Unavailable   No LMP recorded.  Allergies reviewed: Yes  Medications reviewed: Yes    Medications: Medication refills not needed today.  Pharmacy name entered into [employer]: [id number] - [place], [place] - [address]    Frailty Screening:   Is the patient here for a new oncology consult visit in cancer care? 2. " No      Clinical concerns: Has questions for provider.       Aria Cobb LPN                 Again, thank you for allowing me to participate in the care of your patient.        Sincerely,        Rosalind Adams MD

## 2024-06-28 NOTE — PROGRESS NOTES
"Oncology Rooming Note    June 28, 2024 8:59 AM   Vibha Kingston is a 33 year old female who presents for:    Chief Complaint   Patient presents with    Oncology Clinic Visit     Malignant neoplasm of upper-outer quadrant of left breast in female, estrogen receptor negative (H)     Initial Vitals: /69   Pulse 80   Temp 98.1  F (36.7  C)   Resp 16   Ht 1.651 m (5' 5\")   Wt 75 kg (165 lb 6.4 oz)   SpO2 98%   BMI 27.52 kg/m   Estimated body mass index is 27.52 kg/m  as calculated from the following:    Height as of this encounter: 1.651 m (5' 5\").    Weight as of this encounter: 75 kg (165 lb 6.4 oz). Body surface area is 1.85 meters squared.  No Pain (0) Comment: Data Unavailable   No LMP recorded.  Allergies reviewed: Yes  Medications reviewed: Yes    Medications: Medication refills not needed today.  Pharmacy name entered into [employer]: [id number] - [place], [place] - [address]    Frailty Screening:   Is the patient here for a new oncology consult visit in cancer care? 2. No      Clinical concerns: Has questions for provider.       Aria Cobb LPN             "

## 2024-07-19 ENCOUNTER — INFUSION THERAPY VISIT (OUTPATIENT)
Dept: INFUSION THERAPY | Facility: HOSPITAL | Age: 34
End: 2024-07-19
Attending: INTERNAL MEDICINE
Payer: COMMERCIAL

## 2024-07-19 ENCOUNTER — VIRTUAL VISIT (OUTPATIENT)
Dept: ONCOLOGY | Facility: CLINIC | Age: 34
End: 2024-07-19
Attending: INTERNAL MEDICINE
Payer: COMMERCIAL

## 2024-07-19 VITALS
RESPIRATION RATE: 18 BRPM | BODY MASS INDEX: 27.49 KG/M2 | SYSTOLIC BLOOD PRESSURE: 102 MMHG | HEART RATE: 75 BPM | WEIGHT: 165.2 LBS | DIASTOLIC BLOOD PRESSURE: 75 MMHG | OXYGEN SATURATION: 97 % | TEMPERATURE: 97.8 F

## 2024-07-19 VITALS
HEIGHT: 65 IN | RESPIRATION RATE: 18 BRPM | SYSTOLIC BLOOD PRESSURE: 102 MMHG | TEMPERATURE: 97.8 F | OXYGEN SATURATION: 97 % | BODY MASS INDEX: 27.52 KG/M2 | WEIGHT: 165.2 LBS | DIASTOLIC BLOOD PRESSURE: 75 MMHG | HEART RATE: 75 BPM

## 2024-07-19 DIAGNOSIS — C50.412 MALIGNANT NEOPLASM OF UPPER-OUTER QUADRANT OF LEFT BREAST IN FEMALE, ESTROGEN RECEPTOR NEGATIVE (H): Primary | ICD-10-CM

## 2024-07-19 DIAGNOSIS — Z51.11 ENCOUNTER FOR ANTINEOPLASTIC CHEMOTHERAPY: ICD-10-CM

## 2024-07-19 DIAGNOSIS — C50.412 MALIGNANT NEOPLASM OF UPPER-OUTER QUADRANT OF LEFT BREAST IN FEMALE, ESTROGEN RECEPTOR NEGATIVE (H): ICD-10-CM

## 2024-07-19 DIAGNOSIS — K21.9 GASTROESOPHAGEAL REFLUX DISEASE WITHOUT ESOPHAGITIS: ICD-10-CM

## 2024-07-19 DIAGNOSIS — K62.89 RECTAL PAIN: ICD-10-CM

## 2024-07-19 DIAGNOSIS — Z17.1 MALIGNANT NEOPLASM OF UPPER-OUTER QUADRANT OF LEFT BREAST IN FEMALE, ESTROGEN RECEPTOR NEGATIVE (H): Primary | ICD-10-CM

## 2024-07-19 DIAGNOSIS — Z17.1 MALIGNANT NEOPLASM OF UPPER-OUTER QUADRANT OF LEFT BREAST IN FEMALE, ESTROGEN RECEPTOR NEGATIVE (H): ICD-10-CM

## 2024-07-19 DIAGNOSIS — Z51.11 ENCOUNTER FOR ANTINEOPLASTIC CHEMOTHERAPY: Primary | ICD-10-CM

## 2024-07-19 LAB
ALBUMIN SERPL BCG-MCNC: 4.2 G/DL (ref 3.5–5.2)
ALP SERPL-CCNC: 48 U/L (ref 40–150)
ALT SERPL W P-5'-P-CCNC: 12 U/L (ref 0–50)
ANION GAP SERPL CALCULATED.3IONS-SCNC: 10 MMOL/L (ref 7–15)
AST SERPL W P-5'-P-CCNC: 17 U/L (ref 0–45)
BASOPHILS # BLD AUTO: 0 10E3/UL (ref 0–0.2)
BASOPHILS NFR BLD AUTO: 0 %
BILIRUB SERPL-MCNC: 0.2 MG/DL
BUN SERPL-MCNC: 14.7 MG/DL (ref 6–20)
CALCIUM SERPL-MCNC: 9.1 MG/DL (ref 8.8–10.4)
CHLORIDE SERPL-SCNC: 105 MMOL/L (ref 98–107)
CREAT SERPL-MCNC: 0.86 MG/DL (ref 0.51–0.95)
EGFRCR SERPLBLD CKD-EPI 2021: >90 ML/MIN/1.73M2
EOSINOPHIL # BLD AUTO: 0 10E3/UL (ref 0–0.7)
EOSINOPHIL NFR BLD AUTO: 0 %
ERYTHROCYTE [DISTWIDTH] IN BLOOD BY AUTOMATED COUNT: 15.3 % (ref 10–15)
GLUCOSE SERPL-MCNC: 132 MG/DL (ref 70–99)
HCO3 SERPL-SCNC: 26 MMOL/L (ref 22–29)
HCT VFR BLD AUTO: 33.5 % (ref 35–47)
HGB BLD-MCNC: 11.8 G/DL (ref 11.7–15.7)
IMM GRANULOCYTES # BLD: 0 10E3/UL
IMM GRANULOCYTES NFR BLD: 0 %
LYMPHOCYTES # BLD AUTO: 0.8 10E3/UL (ref 0.8–5.3)
LYMPHOCYTES NFR BLD AUTO: 22 %
MCH RBC QN AUTO: 31.7 PG (ref 26.5–33)
MCHC RBC AUTO-ENTMCNC: 35.2 G/DL (ref 31.5–36.5)
MCV RBC AUTO: 90 FL (ref 78–100)
MONOCYTES # BLD AUTO: 0.4 10E3/UL (ref 0–1.3)
MONOCYTES NFR BLD AUTO: 10 %
NEUTROPHILS # BLD AUTO: 2.4 10E3/UL (ref 1.6–8.3)
NEUTROPHILS NFR BLD AUTO: 67 %
NRBC # BLD AUTO: 0 10E3/UL
NRBC BLD AUTO-RTO: 0 /100
PLATELET # BLD AUTO: 127 10E3/UL (ref 150–450)
POTASSIUM SERPL-SCNC: 3.7 MMOL/L (ref 3.4–5.3)
PROT SERPL-MCNC: 6.7 G/DL (ref 6.4–8.3)
RBC # BLD AUTO: 3.72 10E6/UL (ref 3.8–5.2)
SODIUM SERPL-SCNC: 141 MMOL/L (ref 135–145)
WBC # BLD AUTO: 3.6 10E3/UL (ref 4–11)

## 2024-07-19 PROCEDURE — 250N000011 HC RX IP 250 OP 636: Performed by: NURSE PRACTITIONER

## 2024-07-19 PROCEDURE — 96375 TX/PRO/DX INJ NEW DRUG ADDON: CPT

## 2024-07-19 PROCEDURE — G2211 COMPLEX E/M VISIT ADD ON: HCPCS | Mod: 95 | Performed by: NURSE PRACTITIONER

## 2024-07-19 PROCEDURE — 258N000003 HC RX IP 258 OP 636: Performed by: NURSE PRACTITIONER

## 2024-07-19 PROCEDURE — 96417 CHEMO IV INFUS EACH ADDL SEQ: CPT

## 2024-07-19 PROCEDURE — 96367 TX/PROPH/DG ADDL SEQ IV INF: CPT

## 2024-07-19 PROCEDURE — 99214 OFFICE O/P EST MOD 30 MIN: CPT | Mod: 95 | Performed by: NURSE PRACTITIONER

## 2024-07-19 PROCEDURE — 96413 CHEMO IV INFUSION 1 HR: CPT

## 2024-07-19 PROCEDURE — 80053 COMPREHEN METABOLIC PANEL: CPT | Performed by: INTERNAL MEDICINE

## 2024-07-19 PROCEDURE — 36591 DRAW BLOOD OFF VENOUS DEVICE: CPT | Performed by: INTERNAL MEDICINE

## 2024-07-19 PROCEDURE — 85025 COMPLETE CBC W/AUTO DIFF WBC: CPT | Performed by: INTERNAL MEDICINE

## 2024-07-19 RX ORDER — EPINEPHRINE 1 MG/ML
0.3 INJECTION, SOLUTION INTRAMUSCULAR; SUBCUTANEOUS EVERY 5 MIN PRN
Status: DISCONTINUED | OUTPATIENT
Start: 2024-07-19 | End: 2024-07-19 | Stop reason: HOSPADM

## 2024-07-19 RX ORDER — PALONOSETRON 0.05 MG/ML
0.25 INJECTION, SOLUTION INTRAVENOUS ONCE
Status: COMPLETED | OUTPATIENT
Start: 2024-07-19 | End: 2024-07-19

## 2024-07-19 RX ORDER — DIPHENHYDRAMINE HYDROCHLORIDE 50 MG/ML
50 INJECTION INTRAMUSCULAR; INTRAVENOUS
Status: CANCELLED
Start: 2024-07-19

## 2024-07-19 RX ORDER — HEPARIN SODIUM,PORCINE 10 UNIT/ML
5-20 VIAL (ML) INTRAVENOUS DAILY PRN
Status: CANCELLED | OUTPATIENT
Start: 2024-07-19

## 2024-07-19 RX ORDER — EPINEPHRINE 1 MG/ML
0.3 INJECTION, SOLUTION, CONCENTRATE INTRAVENOUS EVERY 5 MIN PRN
Status: CANCELLED | OUTPATIENT
Start: 2024-07-19

## 2024-07-19 RX ORDER — ACETAMINOPHEN 325 MG/1
650 TABLET ORAL
Status: CANCELLED
Start: 2024-07-19

## 2024-07-19 RX ORDER — ALBUTEROL SULFATE 90 UG/1
1-2 AEROSOL, METERED RESPIRATORY (INHALATION)
Status: DISCONTINUED | OUTPATIENT
Start: 2024-07-19 | End: 2024-07-19 | Stop reason: HOSPADM

## 2024-07-19 RX ORDER — HEPARIN SODIUM (PORCINE) LOCK FLUSH IV SOLN 100 UNIT/ML 100 UNIT/ML
5 SOLUTION INTRAVENOUS
Status: DISCONTINUED | OUTPATIENT
Start: 2024-07-19 | End: 2024-07-19 | Stop reason: HOSPADM

## 2024-07-19 RX ORDER — HEPARIN SODIUM (PORCINE) LOCK FLUSH IV SOLN 100 UNIT/ML 100 UNIT/ML
5 SOLUTION INTRAVENOUS
Status: CANCELLED | OUTPATIENT
Start: 2024-07-19

## 2024-07-19 RX ORDER — METHYLPREDNISOLONE SODIUM SUCCINATE 125 MG/2ML
125 INJECTION, POWDER, LYOPHILIZED, FOR SOLUTION INTRAMUSCULAR; INTRAVENOUS
Status: DISCONTINUED | OUTPATIENT
Start: 2024-07-19 | End: 2024-07-19 | Stop reason: HOSPADM

## 2024-07-19 RX ORDER — ALBUTEROL SULFATE 0.83 MG/ML
2.5 SOLUTION RESPIRATORY (INHALATION)
Status: DISCONTINUED | OUTPATIENT
Start: 2024-07-19 | End: 2024-07-19 | Stop reason: HOSPADM

## 2024-07-19 RX ORDER — ALBUTEROL SULFATE 0.83 MG/ML
2.5 SOLUTION RESPIRATORY (INHALATION)
Status: CANCELLED | OUTPATIENT
Start: 2024-07-19

## 2024-07-19 RX ORDER — MEPERIDINE HYDROCHLORIDE 25 MG/ML
25 INJECTION INTRAMUSCULAR; INTRAVENOUS; SUBCUTANEOUS EVERY 30 MIN PRN
Status: CANCELLED | OUTPATIENT
Start: 2024-07-19

## 2024-07-19 RX ORDER — MEPERIDINE HYDROCHLORIDE 50 MG/ML
25 INJECTION INTRAMUSCULAR; INTRAVENOUS; SUBCUTANEOUS EVERY 30 MIN PRN
Status: DISCONTINUED | OUTPATIENT
Start: 2024-07-19 | End: 2024-07-19 | Stop reason: HOSPADM

## 2024-07-19 RX ORDER — DIPHENHYDRAMINE HCL 25 MG
50 CAPSULE ORAL
Status: CANCELLED | OUTPATIENT
Start: 2024-07-19

## 2024-07-19 RX ORDER — PALONOSETRON 0.05 MG/ML
0.25 INJECTION, SOLUTION INTRAVENOUS ONCE
Status: CANCELLED | OUTPATIENT
Start: 2024-07-19

## 2024-07-19 RX ORDER — LORAZEPAM 2 MG/ML
0.5 INJECTION INTRAMUSCULAR EVERY 4 HOURS PRN
Status: CANCELLED | OUTPATIENT
Start: 2024-07-19

## 2024-07-19 RX ORDER — ALBUTEROL SULFATE 90 UG/1
1-2 AEROSOL, METERED RESPIRATORY (INHALATION)
Status: CANCELLED
Start: 2024-07-19

## 2024-07-19 RX ORDER — METHYLPREDNISOLONE SODIUM SUCCINATE 125 MG/2ML
125 INJECTION, POWDER, LYOPHILIZED, FOR SOLUTION INTRAMUSCULAR; INTRAVENOUS
Status: CANCELLED
Start: 2024-07-19

## 2024-07-19 RX ORDER — DIPHENHYDRAMINE HYDROCHLORIDE 50 MG/ML
50 INJECTION INTRAMUSCULAR; INTRAVENOUS
Status: DISCONTINUED | OUTPATIENT
Start: 2024-07-19 | End: 2024-07-19 | Stop reason: HOSPADM

## 2024-07-19 RX ADMIN — FOSAPREPITANT DIMEGLUMINE: 150 INJECTION, POWDER, LYOPHILIZED, FOR SOLUTION INTRAVENOUS at 10:20

## 2024-07-19 RX ADMIN — DOCETAXEL 140 MG: 20 INJECTION, SOLUTION INTRAVENOUS at 12:34

## 2024-07-19 RX ADMIN — SODIUM CHLORIDE 720 MG: 900 INJECTION, SOLUTION INTRAVENOUS at 13:44

## 2024-07-19 RX ADMIN — SODIUM CHLORIDE 250 ML: 9 INJECTION, SOLUTION INTRAVENOUS at 10:20

## 2024-07-19 RX ADMIN — HEPARIN 5 ML: 100 SYRINGE at 14:23

## 2024-07-19 RX ADMIN — PALONOSETRON 0.25 MG: 0.05 INJECTION, SOLUTION INTRAVENOUS at 10:20

## 2024-07-19 RX ADMIN — PERTUZUMAB 420 MG: 30 INJECTION, SOLUTION, CONCENTRATE INTRAVENOUS at 11:20

## 2024-07-19 RX ADMIN — DIPHENHYDRAMINE HYDROCHLORIDE 50 MG: 50 INJECTION INTRAMUSCULAR; INTRAVENOUS at 11:01

## 2024-07-19 RX ADMIN — SODIUM CHLORIDE 441 MG: 9 INJECTION, SOLUTION INTRAVENOUS at 11:57

## 2024-07-19 ASSESSMENT — PAIN SCALES - GENERAL: PAINLEVEL: NO PAIN (0)

## 2024-07-19 NOTE — LETTER
7/19/2024      Vibha Kingston  [address] [place] [address]      Dear Colleague,    Thank you for referring your patient, Vibha Kingston, to the [facility]. Please see a copy of my visit note below.    [facility] Hematology and Oncology Progress Note    Patient: Vibha Kingston  MRN: [id number]  Date of Service: Jul 19, 2024         Reason for Visit    Breast cancer    Primary Oncologist: Dr. Adams    Virtual visit    Assessment and Plan     Cancer Staging   Malignant neoplasm of upper-outer quadrant of left breast in female, estrogen receptor negative (H)  Staging form: Breast, AJCC 8th Edition  - Pathologic stage from 3/13/2024: Stage IIA (pT1c, pN1a(sn), cM0, G3, ER-, GA-, HER2+) - Signed by Rosalind Adams MD on 3/13/2024      ECOG Performance    0 - Independent      #.  History of stage IIA (pT1c pN1a M0) invasive ductal carcinoma of the left breast, grade 3, ER negative, GA negative, HER2 positive  #.  DCIS of the left breast, ER positive, GA positive  #.  Nausea + reflux, related to chemotherapy  #.  Mild thrombocytopenia, related to chemotherapy  #.  Rectal pain, sore/hemorrhoid    Vibha is status post left mastectomy with node+ cancer. The invasive cancer was HER2+ (ER/GA neg) and DCIS component ER/GA+.     She has completed 4 cycles of adjuvant TCHP.  She is doing well on current chemotherapy, noting her recurrent side effects are lingering longer each cycle.  Component of reflux/gastritis seems to be cause of her nausea. No neuropathy.    Has had some intermittent rectal pain each cycle and note a small hemorrhoid/sore near rectum last [place] that is now resolving. No fever, drainage or redness she noted.     Labs: mild leukopenia and thrombocytopenia (stable). CMP WNL.   Echocardiogram after cycle 3 was WNL.     Plan:  -Proceed with cycle 5 TCHP. No dose adjustments needed  -Start omeprazole 20 mg daily through rest of chemo. She plans to get OTC.  -Monitor  rectal lesion. If recurrent and accompanied by tenderness, redness or drainage we should assess for abscess/infection. She will call if that occurs.  -Return in 3 weeks for cycle 6 (final planned cycle of chemo)  -Will meet with Rad Onc in early August to discuss adjuvant RT given +estiven disease.   -Upon completion of chemo, will transition to maintenance Herceptin + Perjeta to complete a full year  -ECHO every 3 months on HER2 directed therapy, next due in September  -Role of endocrine therapy would be chemoprevention in setting of ER/LA+ DCIS but no role for the invasive breast cancer. That will be decided by Dr. Adams at a future visit, after completion of radiation.     _____________________________________________________________________________  Oncologic history  1/9/2024- self palpated left medial breast mass   - diagnostic mammogram and ultrasound showed 3.4 x 2.2 x 2 cm large ill-defined hypoechoic area with associated calcification at 9:00 3 cm from the nipple.  Sonographic survey of left axilla is within normal limits.    1/11/2024-ultrasound-guided core needle biopsy showed DCIS, grade 3, cribriform and papillary with comedonecrosis.  ER/LA was deferred to excisional specimen.    1/25/2024-breast actionable panel was negative including INGRID, BARD1, BRCA1, BRCA2, CDH1, CHEK2, NF1, PALB2, PTEN, STK11, TP53.    2/4/2024-MRI breast showed large segmental area of non-mass enhancement medial left breast measuring 10 cm from the posterior to subareolar depth corresponding to the known DCIS.  Mildly prominent bilateral internal mammary lymph node technically within normal limit.    3/5/2024-left breast simple mastectomy and left axillary sentinel lymph node biopsy   Invasive ductal carcinoma, grade 3   Multifocal, 4 foci vary from 1.3 mm to 11 mm in greatest dimension.   Margins were negative, closest margin less than 1 mm anterior/superior   DCIS, EIC present, solid and comedo pattern, grade 3, extensive  comedonecrosis.  Focal lymphovascular invasion present.   1/2 sentinel lymph node was positive for metastatic carcinoma.   [id number]   ER negative in invasive carcinoma, positive in DCIS (80% of the tumor nuclei stain strongly)   CO negative and invasive cancer, positive in DCIS (30% of the tumor nuclei stain strongly of moderately)   HER2/aj positive for overexpression (3+ membrane staining)    Fertility preservation was successfully completed.    4/26/2024- adjuvant TCHP    6/27/2024-genetic counseling and testing completed.    History of Present Illness    Ms. Vibha Kingston presented today prior to cycle #5 adjuvant TCHP.      Tolerating generally well. Side effects lasted longer this cycle.   Reflux/indigestion, intolerant of TUMS. Hasn't tried PPI or Pepcid.  Nausea managed best with lorazepam, usually once daily.  Mild intermittent diarrhea. Has had mild rectal discomfort each cycle and a few weeks ago noted a pea-sized hemorrhoid/sore, resolving this week. No fevers.   No neuropathy.      No cardiac symptoms.       Physical Exam    /75   Pulse 75   Temp 97.8  F (36.6  C)   Resp 18   Wt 74.9 kg (165 lb 3.2 oz)   SpO2 97%   BMI 27.49 kg/m      General: alert, awake, not in acute distress.  accompanies  HEENT: Head: Normal, normocephalic, atraumatic. Complete alopecia.  Eye: Normal external eye, conjunctiva, lids cornea, GABRIELA.  Chest: Normal respirations.   CNS: non focal.    Lab Results    Recent Results (from the past 240 hour(s))   Comprehensive metabolic panel    Collection Time: 07/19/24  8:51 AM   Result Value Ref Range    Sodium 141 135 - 145 mmol/L    Potassium 3.7 3.4 - 5.3 mmol/L    Carbon Dioxide (CO2) 26 22 - 29 mmol/L    Anion Gap 10 7 - 15 mmol/L    Urea Nitrogen 14.7 6.0 - 20.0 mg/dL    Creatinine 0.86 0.51 - 0.95 mg/dL    GFR Estimate >90 >60 mL/min/1.73m2    Calcium 9.1 8.8 - 10.4 mg/dL    Chloride 105 98 - 107 mmol/L    Glucose 132 (H) 70 - 99 mg/dL    Alkaline Phosphatase  48 40 - 150 U/L    AST 17 0 - 45 U/L    ALT 12 0 - 50 U/L    Protein Total 6.7 6.4 - 8.3 g/dL    Albumin 4.2 3.5 - 5.2 g/dL    Bilirubin Total 0.2 <=1.2 mg/dL   CBC with platelets and differential    Collection Time: 24  8:51 AM   Result Value Ref Range    WBC Count 3.6 (L) 4.0 - 11.0 10e3/uL    RBC Count 3.72 (L) 3.80 - 5.20 10e6/uL    Hemoglobin 11.8 11.7 - 15.7 g/dL    Hematocrit 33.5 (L) 35.0 - 47.0 %    MCV 90 78 - 100 fL    MCH 31.7 26.5 - 33.0 pg    MCHC 35.2 31.5 - 36.5 g/dL    RDW 15.3 (H) 10.0 - 15.0 %    Platelet Count 127 (L) 150 - 450 10e3/uL    % Neutrophils 67 %    % Lymphocytes 22 %    % Monocytes 10 %    % Eosinophils 0 %    % Basophils 0 %    % Immature Granulocytes 0 %    NRBCs per 100 WBC 0 <1 /100    Absolute Neutrophils 2.4 1.6 - 8.3 10e3/uL    Absolute Lymphocytes 0.8 0.8 - 5.3 10e3/uL    Absolute Monocytes 0.4 0.0 - 1.3 10e3/uL    Absolute Eosinophils 0.0 0.0 - 0.7 10e3/uL    Absolute Basophils 0.0 0.0 - 0.2 10e3/uL    Absolute Immature Granulocytes 0.0 <=0.4 10e3/uL    Absolute NRBCs 0.0 10e3/uL           Imaging    Echocardiogram Complete    Result Date: 2024  [id number]^FREEMAN^STEPHANE^[place] [address]  Name: EMILEE ORTA MRN: [id number] : 1990 Study Date: 2024 08:39 AM Age: 33 yrs Gender: Female Patient Location: [facility] Reason For Study: Malignant neoplasm of upper-outer quadrant of left breast in [facility] Ordering Physician: STEPHANE MILLARD Referring Physician: STEPHANE MILLARD Performed By: REED  BSA: 1.8 m2 Height: 65 in Weight: 160 lb BP: 114/69 mmHg ______________________________________________________________________________ Procedure Complete Echo Adult. Myocardial Strain Imaging performed. No hemodynamically significant valvular abnormalities on 2D or color flow imaging. ______________________________________________________________________________ Interpretation Summary  The left ventricle is  normal in size. Left ventricular function is normal.The ejection fraction is 55-60%. Normal right ventricle size and systolic function. Global peak LV longitudinal strain is averaged at -22%. This is within reported normal limits (normal <-18%). No hemodynamically significant valvular abnormalities on 2D or color flow imaging. ______________________________________________________________________________ Left Ventricle The left ventricle is normal in size. Left ventricular function is normal.The ejection fraction is 55-60%. There is normal left ventricular wall thickness. Left ventricular diastolic function is normal. Global peak LV longitudinal strain is averaged at -22%. This is within reported normal limits (normal <- 18%). No regional wall motion abnormalities noted.  Right Ventricle Normal right ventricle size and systolic function.  Atria Normal left atrial size. Right atrial size is normal. There is no color Doppler evidence of an atrial shunt.  Mitral Valve Mitral valve leaflets appear normal. There is no evidence of mitral stenosis or clinically significant mitral regurgitation. There is trace mitral regurgitation.  Tricuspid Valve Tricuspid valve leaflets appear normal. Right ventricle systolic pressure estimate normal. There is mild (1+) tricuspid regurgitation.  Aortic Valve Aortic valve leaflets appear normal. There is trace aortic regurgitation.  Pulmonic Valve The pulmonic valve is not well visualized. This degree of valvular regurgitation is within normal limits.  Vessels The aorta root is normal. Normal size ascending aorta. IVC diameter <2.1 cm collapsing >50% with sniff suggests a normal RA pressure of 3 mmHg.  Pericardium There is no pericardial effusion.  ______________________________________________________________________________ MMode/2D Measurements & Calculations IVSd: 0.69 cm LVIDd: 4.5 cm LVIDs: 3.0 cm LVPWd: 0.93 cm FS: 34.5 %  LV mass(C)d: 117.6 grams LV mass(C)dI: 65.3 grams/m2 Ao  "root diam: 3.1 cm LA dimension: 3.4 cm asc Aorta Diam: 2.7 cm LA/Ao: 1.1 LVOT diam: 2.0 cm LVOT area: 3.1 cm2 Ao root diam index Ht(cm/m): 1.9 Ao root diam index BSA (cm/m2): 1.7 Asc Ao diam index BSA (cm/m2): 1.5 Asc Ao diam index Ht(cm/m): 1.6 EF Biplane: 61.8 % LA Volume (BP): 27.6 ml  LA Volume Index (BP): 15.3 ml/m2 RWT: 0.41 TAPSE: 2.1 cm  Time Measurements MM HR: 73.0 BPM  Doppler Measurements & Calculations MV E max bo: 77.0 cm/sec MV A max bo: 52.9 cm/sec MV E/A: 1.5 MV dec time: 0.28 sec LV V1 max P.7 mmHg LV V1 max: 81.8 cm/sec LV V1 VTI: 17.3 cm SV(LVOT): 54.3 ml SI(LVOT): 30.2 ml/m2 PA acc time: 0.11 sec TR max bo: 185.0 cm/sec TR max P.7 mmHg E/E' av.9 Lateral E/e': 5.2  Medial E/e': 6.7 RV S Bo: 11.7 cm/sec  ______________________________________________________________________________ Report approved by: Pillo Lang 2024 04:32 PM        Total time 30 minutes, to include face to face visit, review of EMR, ordering, documentation and coordination of care on date of service.    complexity modifier for longitudinal care.     Start time: 915 / End time: 925  Platform: [employer]  Patient location: Home  Provider location: [facility]       Signed by: Kendal Amos NP    Oncology Rooming Note    2024 9:13 AM   Vibha Kingston is a 33 year old female who presents for:    Chief Complaint   Patient presents with     Oncology Clinic Visit     Initial Vitals: /75   Pulse 75   Temp 97.8  F (36.6  C)   Resp 18   Wt 74.9 kg (165 lb 3.2 oz)   SpO2 97%   BMI 27.49 kg/m   Estimated body mass index is 27.49 kg/m  as calculated from the following:    Height as of 24: 1.651 m (5' 5\").    Weight as of this encounter: 74.9 kg (165 lb 3.2 oz). Body surface area is 1.85 meters squared.  No Pain (0) Comment: Data Unavailable   No LMP recorded.  Allergies reviewed: Yes  Medications reviewed: Yes    Medications: Medication refills not needed " today.  Pharmacy name entered into [employer]: [id number] - [place], [place] - [address]    Frailty Screening:   Is the patient here for a new oncology consult visit in cancer care? 2. No      Clinical concerns: Infusion today  Kendal was notified.      Nanette Monique, JENNIFER                Again, thank you for allowing me to participate in the care of your patient.        Sincerely,        Kendal Amos, NP   no

## 2024-07-19 NOTE — PROGRESS NOTES
[facility] Hematology and Oncology Progress Note    Patient: Vibha Kingston  MRN: [id number]  Date of Service: Jul 19, 2024         Reason for Visit    Breast cancer    Primary Oncologist: Dr. Adams    Virtual visit    Assessment and Plan     Cancer Staging   Malignant neoplasm of upper-outer quadrant of left breast in female, estrogen receptor negative (H)  Staging form: Breast, AJCC 8th Edition  - Pathologic stage from 3/13/2024: Stage IIA (pT1c, pN1a(sn), cM0, G3, ER-, ID-, HER2+) - Signed by Rosalind Adams MD on 3/13/2024      ECOG Performance    0 - Independent      #.  History of stage IIA (pT1c pN1a M0) invasive ductal carcinoma of the left breast, grade 3, ER negative, ID negative, HER2 positive  #.  DCIS of the left breast, ER positive, ID positive  #.  Nausea + reflux, related to chemotherapy  #.  Mild thrombocytopenia, related to chemotherapy  #.  Rectal pain, sore/hemorrhoid    Vibha is status post left mastectomy with node+ cancer. The invasive cancer was HER2+ (ER/ID neg) and DCIS component ER/ID+.     She has completed 4 cycles of adjuvant TCHP.  She is doing well on current chemotherapy, noting her recurrent side effects are lingering longer each cycle.  Component of reflux/gastritis seems to be cause of her nausea. No neuropathy.    Has had some intermittent rectal pain each cycle and note a small hemorrhoid/sore near rectum last [place] that is now resolving. No fever, drainage or redness she noted.     Labs: mild leukopenia and thrombocytopenia (stable). CMP WNL.   Echocardiogram after cycle 3 was WNL.     Plan:  -Proceed with cycle 5 TCHP. No dose adjustments needed  -Start omeprazole 20 mg daily through rest of chemo. She plans to get OTC.  -Monitor rectal lesion. If recurrent and accompanied by tenderness, redness or drainage we should assess for abscess/infection. She will call if that occurs.  -Return in 3 weeks for cycle 6 (final planned cycle of chemo)  -Will meet with Rad Onc in early  August to discuss adjuvant RT given +estiven disease.   -Upon completion of chemo, will transition to maintenance Herceptin + Perjeta to complete a full year  -ECHO every 3 months on HER2 directed therapy, next due in September  -Role of endocrine therapy would be chemoprevention in setting of ER/DC+ DCIS but no role for the invasive breast cancer. That will be decided by Dr. Adams at a future visit, after completion of radiation.     _____________________________________________________________________________  Oncologic history  1/9/2024- self palpated left medial breast mass   - diagnostic mammogram and ultrasound showed 3.4 x 2.2 x 2 cm large ill-defined hypoechoic area with associated calcification at 9:00 3 cm from the nipple.  Sonographic survey of left axilla is within normal limits.    1/11/2024-ultrasound-guided core needle biopsy showed DCIS, grade 3, cribriform and papillary with comedonecrosis.  ER/DC was deferred to excisional specimen.    1/25/2024-breast actionable panel was negative including INGRID, BARD1, BRCA1, BRCA2, CDH1, CHEK2, NF1, PALB2, PTEN, STK11, TP53.    2/4/2024-MRI breast showed large segmental area of non-mass enhancement medial left breast measuring 10 cm from the posterior to subareolar depth corresponding to the known DCIS.  Mildly prominent bilateral internal mammary lymph node technically within normal limit.    3/5/2024-left breast simple mastectomy and left axillary sentinel lymph node biopsy   Invasive ductal carcinoma, grade 3   Multifocal, 4 foci vary from 1.3 mm to 11 mm in greatest dimension.   Margins were negative, closest margin less than 1 mm anterior/superior   DCIS, EIC present, solid and comedo pattern, grade 3, extensive comedonecrosis.  Focal lymphovascular invasion present.   1/2 sentinel lymph node was positive for metastatic carcinoma.   [id number]   ER negative in invasive carcinoma, positive in DCIS (80% of the tumor nuclei stain strongly)   DC negative and invasive  cancer, positive in DCIS (30% of the tumor nuclei stain strongly of moderately)   HER2/aj positive for overexpression (3+ membrane staining)    Fertility preservation was successfully completed.    4/26/2024- adjuvant TCHP    6/27/2024-genetic counseling and testing completed.    History of Present Illness    Ms. Vibha Kingston presented today prior to cycle #5 adjuvant TCHP.      Tolerating generally well. Side effects lasted longer this cycle.   Reflux/indigestion, intolerant of TUMS. Hasn't tried PPI or Pepcid.  Nausea managed best with lorazepam, usually once daily.  Mild intermittent diarrhea. Has had mild rectal discomfort each cycle and a few weeks ago noted a pea-sized hemorrhoid/sore, resolving this week. No fevers.   No neuropathy.      No cardiac symptoms.       Physical Exam    /75   Pulse 75   Temp 97.8  F (36.6  C)   Resp 18   Wt 74.9 kg (165 lb 3.2 oz)   SpO2 97%   BMI 27.49 kg/m      General: alert, awake, not in acute distress.  accompanies  HEENT: Head: Normal, normocephalic, atraumatic. Complete alopecia.  Eye: Normal external eye, conjunctiva, lids cornea, GABRIELA.  Chest: Normal respirations.   CNS: non focal.    Lab Results    Recent Results (from the past 240 hour(s))   Comprehensive metabolic panel    Collection Time: 07/19/24  8:51 AM   Result Value Ref Range    Sodium 141 135 - 145 mmol/L    Potassium 3.7 3.4 - 5.3 mmol/L    Carbon Dioxide (CO2) 26 22 - 29 mmol/L    Anion Gap 10 7 - 15 mmol/L    Urea Nitrogen 14.7 6.0 - 20.0 mg/dL    Creatinine 0.86 0.51 - 0.95 mg/dL    GFR Estimate >90 >60 mL/min/1.73m2    Calcium 9.1 8.8 - 10.4 mg/dL    Chloride 105 98 - 107 mmol/L    Glucose 132 (H) 70 - 99 mg/dL    Alkaline Phosphatase 48 40 - 150 U/L    AST 17 0 - 45 U/L    ALT 12 0 - 50 U/L    Protein Total 6.7 6.4 - 8.3 g/dL    Albumin 4.2 3.5 - 5.2 g/dL    Bilirubin Total 0.2 <=1.2 mg/dL   CBC with platelets and differential    Collection Time: 07/19/24  8:51 AM   Result Value Ref  Range    WBC Count 3.6 (L) 4.0 - 11.0 10e3/uL    RBC Count 3.72 (L) 3.80 - 5.20 10e6/uL    Hemoglobin 11.8 11.7 - 15.7 g/dL    Hematocrit 33.5 (L) 35.0 - 47.0 %    MCV 90 78 - 100 fL    MCH 31.7 26.5 - 33.0 pg    MCHC 35.2 31.5 - 36.5 g/dL    RDW 15.3 (H) 10.0 - 15.0 %    Platelet Count 127 (L) 150 - 450 10e3/uL    % Neutrophils 67 %    % Lymphocytes 22 %    % Monocytes 10 %    % Eosinophils 0 %    % Basophils 0 %    % Immature Granulocytes 0 %    NRBCs per 100 WBC 0 <1 /100    Absolute Neutrophils 2.4 1.6 - 8.3 10e3/uL    Absolute Lymphocytes 0.8 0.8 - 5.3 10e3/uL    Absolute Monocytes 0.4 0.0 - 1.3 10e3/uL    Absolute Eosinophils 0.0 0.0 - 0.7 10e3/uL    Absolute Basophils 0.0 0.0 - 0.2 10e3/uL    Absolute Immature Granulocytes 0.0 <=0.4 10e3/uL    Absolute NRBCs 0.0 10e3/uL           Imaging    Echocardiogram Complete    Result Date: 2024  [id number]^FREEMAN^STEPHANE^[place] [address]  Name: EMILEE ORTA MRN: [id number] : 1990 Study Date: 2024 08:39 AM Age: 33 yrs Gender: Female Patient Location: [facility] Reason For Study: Malignant neoplasm of upper-outer quadrant of left breast in [facility] Ordering Physician: STEPHANE MILLARD Referring Physician: STEPHANE MILLARD Performed By: REED  BSA: 1.8 m2 Height: 65 in Weight: 160 lb BP: 114/69 mmHg ______________________________________________________________________________ Procedure Complete Echo Adult. Myocardial Strain Imaging performed. No hemodynamically significant valvular abnormalities on 2D or color flow imaging. ______________________________________________________________________________ Interpretation Summary  The left ventricle is normal in size. Left ventricular function is normal.The ejection fraction is 55-60%. Normal right ventricle size and systolic function. Global peak LV longitudinal strain is averaged at -22%. This is within reported normal limits (normal <-18%). No  hemodynamically significant valvular abnormalities on 2D or color flow imaging. ______________________________________________________________________________ Left Ventricle The left ventricle is normal in size. Left ventricular function is normal.The ejection fraction is 55-60%. There is normal left ventricular wall thickness. Left ventricular diastolic function is normal. Global peak LV longitudinal strain is averaged at -22%. This is within reported normal limits (normal <- 18%). No regional wall motion abnormalities noted.  Right Ventricle Normal right ventricle size and systolic function.  Atria Normal left atrial size. Right atrial size is normal. There is no color Doppler evidence of an atrial shunt.  Mitral Valve Mitral valve leaflets appear normal. There is no evidence of mitral stenosis or clinically significant mitral regurgitation. There is trace mitral regurgitation.  Tricuspid Valve Tricuspid valve leaflets appear normal. Right ventricle systolic pressure estimate normal. There is mild (1+) tricuspid regurgitation.  Aortic Valve Aortic valve leaflets appear normal. There is trace aortic regurgitation.  Pulmonic Valve The pulmonic valve is not well visualized. This degree of valvular regurgitation is within normal limits.  Vessels The aorta root is normal. Normal size ascending aorta. IVC diameter <2.1 cm collapsing >50% with sniff suggests a normal RA pressure of 3 mmHg.  Pericardium There is no pericardial effusion.  ______________________________________________________________________________ MMode/2D Measurements & Calculations IVSd: 0.69 cm LVIDd: 4.5 cm LVIDs: 3.0 cm LVPWd: 0.93 cm FS: 34.5 %  LV mass(C)d: 117.6 grams LV mass(C)dI: 65.3 grams/m2 Ao root diam: 3.1 cm LA dimension: 3.4 cm asc Aorta Diam: 2.7 cm LA/Ao: 1.1 LVOT diam: 2.0 cm LVOT area: 3.1 cm2 Ao root diam index Ht(cm/m): 1.9 Ao root diam index BSA (cm/m2): 1.7 Asc Ao diam index BSA (cm/m2): 1.5 Asc Ao diam index Ht(cm/m): 1.6 EF  Biplane: 61.8 % LA Volume (BP): 27.6 ml  LA Volume Index (BP): 15.3 ml/m2 RWT: 0.41 TAPSE: 2.1 cm  Time Measurements MM HR: 73.0 BPM  Doppler Measurements & Calculations MV E max bo: 77.0 cm/sec MV A max bo: 52.9 cm/sec MV E/A: 1.5 MV dec time: 0.28 sec LV V1 max P.7 mmHg LV V1 max: 81.8 cm/sec LV V1 VTI: 17.3 cm SV(LVOT): 54.3 ml SI(LVOT): 30.2 ml/m2 PA acc time: 0.11 sec TR max bo: 185.0 cm/sec TR max P.7 mmHg E/E' av.9 Lateral E/e': 5.2  Medial E/e': 6.7 RV S Bo: 11.7 cm/sec  ______________________________________________________________________________ Report approved by: Pillo Lang 2024 04:32 PM        Total time 30 minutes, to include face to face visit, review of EMR, ordering, documentation and coordination of care on date of service.    complexity modifier for longitudinal care.     Start time: 915 / End time: 925  Platform: [facility]  Patient location: Home  Provider location: [facility]       Signed by: Kendal Amos NP

## 2024-07-19 NOTE — PROGRESS NOTES
"Oncology Rooming Note    July 19, 2024 9:13 AM   Vibha Kingston is a 33 year old female who presents for:    Chief Complaint   Patient presents with    Oncology Clinic Visit     Initial Vitals: /75   Pulse 75   Temp 97.8  F (36.6  C)   Resp 18   Wt 74.9 kg (165 lb 3.2 oz)   SpO2 97%   BMI 27.49 kg/m   Estimated body mass index is 27.49 kg/m  as calculated from the following:    Height as of 6/28/24: 1.651 m (5' 5\").    Weight as of this encounter: 74.9 kg (165 lb 3.2 oz). Body surface area is 1.85 meters squared.  No Pain (0) Comment: Data Unavailable   No LMP recorded.  Allergies reviewed: Yes  Medications reviewed: Yes    Medications: Medication refills not needed today.  Pharmacy name entered into [employer]: [id number] - [place], [place] - [address]    Frailty Screening:   Is the patient here for a new oncology consult visit in cancer care? 2. No      Clinical concerns: Infusion today  Kendal was notified.      Nanette Monique RN              "

## 2024-07-19 NOTE — PROGRESS NOTES
Infusion Nursing Note:  Vibha Kingston presents today for Day 1 Cycle 5.    Patient seen by provider today: Yes: Kendal Amos, NEO video visit   present during visit today: Not Applicable.    Note: [facility] her video appointment with Kendal Amos. Reflux and fatigue are her main side effects after chemo. She will be starting OTC Omeprazole to see if that helps. Vibha was premedicated with Aloxi, Emend/Dexamethasone and she requested Benadryl. She tolerated her infusions well.      Intravenous Access:  Labs drawn without difficulty.  Implanted Port.    Treatment Conditions:  Lab Results   Component Value Date    HGB 11.8 07/19/2024    WBC 3.6 (L) 07/19/2024    ANEUTAUTO 2.4 07/19/2024     (L) 07/19/2024        Lab Results   Component Value Date     07/19/2024    POTASSIUM 3.7 07/19/2024    CR 0.86 07/19/2024    RACHAEL 9.1 07/19/2024    BILITOTAL 0.2 07/19/2024    ALBUMIN 4.2 07/19/2024    ALT 12 07/19/2024    AST 17 07/19/2024       Results reviewed, labs MET treatment parameters, ok to proceed with treatment.      Post Infusion Assessment:  Patient tolerated infusion without incident.  Blood return noted pre and post infusion.  Site patent and intact, free from redness, edema or discomfort.  No evidence of extravasations.  Access discontinued per protocol.       Discharge Plan:   AVS to patient via MYCHART.  Patient will return 8/9 for next appointment.   Patient discharged in stable condition accompanied by: self.  Departure Mode: Ambulatory.      Christie Hardin RN

## 2024-07-19 NOTE — LETTER
7/19/2024      Vibha Kingston  [address] [place] [address]      Dear Colleague,    Thank you for referring your patient, Vibha Kingston, to the [facility]. Please see a copy of my visit note below.    [facility] Hematology and Oncology Progress Note    Patient: Vibha Kingston  MRN: [id number]  Date of Service: Jul 19, 2024         Reason for Visit    Breast cancer    Primary Oncologist: Dr. Adams    Virtual visit    Assessment and Plan     Cancer Staging   Malignant neoplasm of upper-outer quadrant of left breast in female, estrogen receptor negative (H)  Staging form: Breast, AJCC 8th Edition  - Pathologic stage from 3/13/2024: Stage IIA (pT1c, pN1a(sn), cM0, G3, ER-, WY-, HER2+) - Signed by Rosalind Adams MD on 3/13/2024      ECOG Performance    0 - Independent      #.  History of stage IIA (pT1c pN1a M0) invasive ductal carcinoma of the left breast, grade 3, ER negative, WY negative, HER2 positive  #.  DCIS of the left breast, ER positive, WY positive  #.  Nausea + reflux, related to chemotherapy  #.  Mild thrombocytopenia, related to chemotherapy  #.  Rectal pain, sore/hemorrhoid    Vibha is status post left mastectomy with node+ cancer. The invasive cancer was HER2+ (ER/WY neg) and DCIS component ER/WY+.     She has completed 4 cycles of adjuvant TCHP.  She is doing well on current chemotherapy, noting her recurrent side effects are lingering longer each cycle.  Component of reflux/gastritis seems to be cause of her nausea. No neuropathy.    Has had some intermittent rectal pain each cycle and note a small hemorrhoid/sore near rectum last [place] that is now resolving. No fever, drainage or redness she noted.     Labs: mild leukopenia and thrombocytopenia (stable). CMP WNL.   Echocardiogram after cycle 3 was WNL.     Plan:  -Proceed with cycle 5 TCHP. No dose adjustments needed  -Start omeprazole 20 mg daily through rest of chemo. She plans to get OTC.  -Monitor  rectal lesion. If recurrent and accompanied by tenderness, redness or drainage we should assess for abscess/infection. She will call if that occurs.  -Return in 3 weeks for cycle 6 (final planned cycle of chemo)  -Will meet with Rad Onc in early August to discuss adjuvant RT given +estiven disease.   -Upon completion of chemo, will transition to maintenance Herceptin + Perjeta to complete a full year  -ECHO every 3 months on HER2 directed therapy, next due in September  -Role of endocrine therapy would be chemoprevention in setting of ER/MS+ DCIS but no role for the invasive breast cancer. That will be decided by Dr. Adams at a future visit, after completion of radiation.     _____________________________________________________________________________  Oncologic history  1/9/2024- self palpated left medial breast mass   - diagnostic mammogram and ultrasound showed 3.4 x 2.2 x 2 cm large ill-defined hypoechoic area with associated calcification at 9:00 3 cm from the nipple.  Sonographic survey of left axilla is within normal limits.    1/11/2024-ultrasound-guided core needle biopsy showed DCIS, grade 3, cribriform and papillary with comedonecrosis.  ER/MS was deferred to excisional specimen.    1/25/2024-breast actionable panel was negative including INGRID, BARD1, BRCA1, BRCA2, CDH1, CHEK2, NF1, PALB2, PTEN, STK11, TP53.    2/4/2024-MRI breast showed large segmental area of non-mass enhancement medial left breast measuring 10 cm from the posterior to subareolar depth corresponding to the known DCIS.  Mildly prominent bilateral internal mammary lymph node technically within normal limit.    3/5/2024-left breast simple mastectomy and left axillary sentinel lymph node biopsy   Invasive ductal carcinoma, grade 3   Multifocal, 4 foci vary from 1.3 mm to 11 mm in greatest dimension.   Margins were negative, closest margin less than 1 mm anterior/superior   DCIS, EIC present, solid and comedo pattern, grade 3, extensive  comedonecrosis.  Focal lymphovascular invasion present.   1/2 sentinel lymph node was positive for metastatic carcinoma.   [id number]   ER negative in invasive carcinoma, positive in DCIS (80% of the tumor nuclei stain strongly)   SC negative and invasive cancer, positive in DCIS (30% of the tumor nuclei stain strongly of moderately)   HER2/aj positive for overexpression (3+ membrane staining)    Fertility preservation was successfully completed.    4/26/2024- adjuvant TCHP    6/27/2024-genetic counseling and testing completed.    History of Present Illness    Ms. Vibha Kingston presented today prior to cycle #5 adjuvant TCHP.      Tolerating generally well. Side effects lasted longer this cycle.   Reflux/indigestion, intolerant of TUMS. Hasn't tried PPI or Pepcid.  Nausea managed best with lorazepam, usually once daily.  Mild intermittent diarrhea. Has had mild rectal discomfort each cycle and a few weeks ago noted a pea-sized hemorrhoid/sore, resolving this week. No fevers.   No neuropathy.      No cardiac symptoms.       Physical Exam    /75   Pulse 75   Temp 97.8  F (36.6  C)   Resp 18   Wt 74.9 kg (165 lb 3.2 oz)   SpO2 97%   BMI 27.49 kg/m      General: alert, awake, not in acute distress.  accompanies  HEENT: Head: Normal, normocephalic, atraumatic. Complete alopecia.  Eye: Normal external eye, conjunctiva, lids cornea, GABRIELA.  Chest: Normal respirations.   CNS: non focal.    Lab Results    Recent Results (from the past 240 hour(s))   Comprehensive metabolic panel    Collection Time: 07/19/24  8:51 AM   Result Value Ref Range    Sodium 141 135 - 145 mmol/L    Potassium 3.7 3.4 - 5.3 mmol/L    Carbon Dioxide (CO2) 26 22 - 29 mmol/L    Anion Gap 10 7 - 15 mmol/L    Urea Nitrogen 14.7 6.0 - 20.0 mg/dL    Creatinine 0.86 0.51 - 0.95 mg/dL    GFR Estimate >90 >60 mL/min/1.73m2    Calcium 9.1 8.8 - 10.4 mg/dL    Chloride 105 98 - 107 mmol/L    Glucose 132 (H) 70 - 99 mg/dL    Alkaline Phosphatase  48 40 - 150 U/L    AST 17 0 - 45 U/L    ALT 12 0 - 50 U/L    Protein Total 6.7 6.4 - 8.3 g/dL    Albumin 4.2 3.5 - 5.2 g/dL    Bilirubin Total 0.2 <=1.2 mg/dL   CBC with platelets and differential    Collection Time: 24  8:51 AM   Result Value Ref Range    WBC Count 3.6 (L) 4.0 - 11.0 10e3/uL    RBC Count 3.72 (L) 3.80 - 5.20 10e6/uL    Hemoglobin 11.8 11.7 - 15.7 g/dL    Hematocrit 33.5 (L) 35.0 - 47.0 %    MCV 90 78 - 100 fL    MCH 31.7 26.5 - 33.0 pg    MCHC 35.2 31.5 - 36.5 g/dL    RDW 15.3 (H) 10.0 - 15.0 %    Platelet Count 127 (L) 150 - 450 10e3/uL    % Neutrophils 67 %    % Lymphocytes 22 %    % Monocytes 10 %    % Eosinophils 0 %    % Basophils 0 %    % Immature Granulocytes 0 %    NRBCs per 100 WBC 0 <1 /100    Absolute Neutrophils 2.4 1.6 - 8.3 10e3/uL    Absolute Lymphocytes 0.8 0.8 - 5.3 10e3/uL    Absolute Monocytes 0.4 0.0 - 1.3 10e3/uL    Absolute Eosinophils 0.0 0.0 - 0.7 10e3/uL    Absolute Basophils 0.0 0.0 - 0.2 10e3/uL    Absolute Immature Granulocytes 0.0 <=0.4 10e3/uL    Absolute NRBCs 0.0 10e3/uL           Imaging    Echocardiogram Complete    Result Date: 2024  [id number]^FREEMAN^STEPHANE^[place] [address]  Name: EMILEE ORTA MRN: [id number] : 1990 Study Date: 2024 08:39 AM Age: 33 yrs Gender: Female Patient Location: [facility] Reason For Study: Malignant neoplasm of upper-outer quadrant of left breast in [facility] Ordering Physician: STEPHANE MILLARD Referring Physician: STEPHANE MILLARD Performed By: REED  BSA: 1.8 m2 Height: 65 in Weight: 160 lb BP: 114/69 mmHg ______________________________________________________________________________ Procedure Complete Echo Adult. Myocardial Strain Imaging performed. No hemodynamically significant valvular abnormalities on 2D or color flow imaging. ______________________________________________________________________________ Interpretation Summary  The left ventricle is  normal in size. Left ventricular function is normal.The ejection fraction is 55-60%. Normal right ventricle size and systolic function. Global peak LV longitudinal strain is averaged at -22%. This is within reported normal limits (normal <-18%). No hemodynamically significant valvular abnormalities on 2D or color flow imaging. ______________________________________________________________________________ Left Ventricle The left ventricle is normal in size. Left ventricular function is normal.The ejection fraction is 55-60%. There is normal left ventricular wall thickness. Left ventricular diastolic function is normal. Global peak LV longitudinal strain is averaged at -22%. This is within reported normal limits (normal <- 18%). No regional wall motion abnormalities noted.  Right Ventricle Normal right ventricle size and systolic function.  Atria Normal left atrial size. Right atrial size is normal. There is no color Doppler evidence of an atrial shunt.  Mitral Valve Mitral valve leaflets appear normal. There is no evidence of mitral stenosis or clinically significant mitral regurgitation. There is trace mitral regurgitation.  Tricuspid Valve Tricuspid valve leaflets appear normal. Right ventricle systolic pressure estimate normal. There is mild (1+) tricuspid regurgitation.  Aortic Valve Aortic valve leaflets appear normal. There is trace aortic regurgitation.  Pulmonic Valve The pulmonic valve is not well visualized. This degree of valvular regurgitation is within normal limits.  Vessels The aorta root is normal. Normal size ascending aorta. IVC diameter <2.1 cm collapsing >50% with sniff suggests a normal RA pressure of 3 mmHg.  Pericardium There is no pericardial effusion.  ______________________________________________________________________________ MMode/2D Measurements & Calculations IVSd: 0.69 cm LVIDd: 4.5 cm LVIDs: 3.0 cm LVPWd: 0.93 cm FS: 34.5 %  LV mass(C)d: 117.6 grams LV mass(C)dI: 65.3 grams/m2 Ao  "root diam: 3.1 cm LA dimension: 3.4 cm asc Aorta Diam: 2.7 cm LA/Ao: 1.1 LVOT diam: 2.0 cm LVOT area: 3.1 cm2 Ao root diam index Ht(cm/m): 1.9 Ao root diam index BSA (cm/m2): 1.7 Asc Ao diam index BSA (cm/m2): 1.5 Asc Ao diam index Ht(cm/m): 1.6 EF Biplane: 61.8 % LA Volume (BP): 27.6 ml  LA Volume Index (BP): 15.3 ml/m2 RWT: 0.41 TAPSE: 2.1 cm  Time Measurements MM HR: 73.0 BPM  Doppler Measurements & Calculations MV E max bo: 77.0 cm/sec MV A max bo: 52.9 cm/sec MV E/A: 1.5 MV dec time: 0.28 sec LV V1 max P.7 mmHg LV V1 max: 81.8 cm/sec LV V1 VTI: 17.3 cm SV(LVOT): 54.3 ml SI(LVOT): 30.2 ml/m2 PA acc time: 0.11 sec TR max bo: 185.0 cm/sec TR max P.7 mmHg E/E' av.9 Lateral E/e': 5.2  Medial E/e': 6.7 RV S Bo: 11.7 cm/sec  ______________________________________________________________________________ Report approved by: Pillo Lang 2024 04:32 PM        Total time 30 minutes, to include face to face visit, review of EMR, ordering, documentation and coordination of care on date of service.    complexity modifier for longitudinal care.     Start time: 915 / End time: 925  Platform: [employer]  Patient location: Home  Provider location: [facility]       Signed by: Kendal Amos NP    Oncology Rooming Note    2024 9:13 AM   Vibha Kingston is a 33 year old female who presents for:    Chief Complaint   Patient presents with     Oncology Clinic Visit     Initial Vitals: /75   Pulse 75   Temp 97.8  F (36.6  C)   Resp 18   Wt 74.9 kg (165 lb 3.2 oz)   SpO2 97%   BMI 27.49 kg/m   Estimated body mass index is 27.49 kg/m  as calculated from the following:    Height as of 24: 1.651 m (5' 5\").    Weight as of this encounter: 74.9 kg (165 lb 3.2 oz). Body surface area is 1.85 meters squared.  No Pain (0) Comment: Data Unavailable   No LMP recorded.  Allergies reviewed: Yes  Medications reviewed: Yes    Medications: Medication refills not needed " today.  Pharmacy name entered into [facility]: [id number] - [place], [place] - [address]    Frailty Screening:   Is the patient here for a new oncology consult visit in cancer care? 2. No      Clinical concerns: Infusion today  Kendal was notified.      Nanette Monique, JENNIFER                Again, thank you for allowing me to participate in the care of your patient.        Sincerely,        Kendal Amos, NP

## 2024-07-26 ENCOUNTER — OFFICE VISIT (OUTPATIENT)
Dept: FAMILY MEDICINE | Facility: CLINIC | Age: 34
End: 2024-07-26
Payer: COMMERCIAL

## 2024-07-26 ENCOUNTER — MYC MEDICAL ADVICE (OUTPATIENT)
Dept: FAMILY MEDICINE | Facility: CLINIC | Age: 34
End: 2024-07-26

## 2024-07-26 VITALS
OXYGEN SATURATION: 98 % | WEIGHT: 160.8 LBS | RESPIRATION RATE: 18 BRPM | HEART RATE: 97 BPM | BODY MASS INDEX: 26.79 KG/M2 | DIASTOLIC BLOOD PRESSURE: 74 MMHG | TEMPERATURE: 98.6 F | SYSTOLIC BLOOD PRESSURE: 100 MMHG | HEIGHT: 65 IN

## 2024-07-26 DIAGNOSIS — K64.4 EXTERNAL HEMORRHOIDS: Primary | ICD-10-CM

## 2024-07-26 PROCEDURE — 99213 OFFICE O/P EST LOW 20 MIN: CPT | Performed by: NURSE PRACTITIONER

## 2024-07-26 RX ORDER — HYDROCORTISONE 2.5 %
CREAM (GRAM) TOPICAL 2 TIMES DAILY
Qty: 30 G | Refills: 0 | Status: SHIPPED | OUTPATIENT
Start: 2024-07-26 | End: 2024-09-12

## 2024-07-26 NOTE — TELEPHONE ENCOUNTER
RECORDS STATUS - BREAST    RECORDS REQUESTED FROM: [facility] - Internal records   DATE REQUESTED: 7/26

## 2024-07-26 NOTE — PROGRESS NOTES
"  Assessment & Plan     External hemorrhoids  Follow up with colorectal to discuss possible removal. Discussed with patient that due to immune status may be beneficial to wait until cancer treatment is complete to due increased risk of infection and decreased wound healing. Patient is planning to discuss with oncology team.     Hyrdocortisone bid as needed.    - Adult Colorectal Surgery  Referral; Future  - hydrocortisone 2.5 % cream; Apply topically 2 times daily          BMI  Estimated body mass index is 26.76 kg/m  as calculated from the following:    Height as of this encounter: 1.651 m (5' 5\").    Weight as of this encounter: 72.9 kg (160 lb 12.8 oz).             Subjective   Vibha is a 34 year old, presenting for the following health issues:  Rectal Problem (Possible abscess or hemorrhoid. Patient does have some constipation, area hurts when facilitating a BM. Light bleeding present on the toilet paper and occasionally in the toilet bowl. Denied clots.  )        7/26/2024    12:45 PM   Additional Questions   Roomed by JENNIFER Mckay     History of Present Illness       Reason for visit:  Abbcsess or hemeroid  Symptom onset:  3-4 weeks ago  Symptoms include:  Bleeding at my anus, small bump below the opening.  Symptom intensity:  Moderate  Symptom progression:  Staying the same  Had these symptoms before:  Yes  Has tried/received treatment for these symptoms:  No  What makes it worse:  Strained bowel movements  What makes it better:  Time and softer bowel movements    She eats 0-1 servings of fruits and vegetables daily.She consumes 0 sweetened beverage(s) daily.She exercises with enough effort to increase her heart rate 10 to 19 minutes per day.  She exercises with enough effort to increase her heart rate 3 or less days per week.   She is taking medications regularly.                     Objective    /74 (BP Location: Right arm, Patient Position: Sitting, Cuff Size: Adult Regular)   Pulse 97   Temp " "98.6  F (37  C) (Oral)   Resp 18   Ht 1.651 m (5' 5\")   Wt 72.9 kg (160 lb 12.8 oz)   SpO2 98%   BMI 26.76 kg/m    Body mass index is 26.76 kg/m .  Physical Exam  Constitutional:       Appearance: Normal appearance.   Genitourinary:     Rectum: Tenderness and external hemorrhoid present. No mass or anal fissure.   Neurological:      General: No focal deficit present.      Mental Status: She is alert and oriented to person, place, and time.   Psychiatric:         Mood and Affect: Mood normal.         Behavior: Behavior normal.                    Signed Electronically by: AYLA MCCARTHY CNP    "

## 2024-08-02 ENCOUNTER — PRE VISIT (OUTPATIENT)
Dept: RADIATION ONCOLOGY | Facility: CLINIC | Age: 34
End: 2024-08-02
Payer: COMMERCIAL

## 2024-08-02 ENCOUNTER — OFFICE VISIT (OUTPATIENT)
Dept: RADIATION ONCOLOGY | Facility: CLINIC | Age: 34
End: 2024-08-02
Attending: INTERNAL MEDICINE
Payer: COMMERCIAL

## 2024-08-02 VITALS
WEIGHT: 164.8 LBS | TEMPERATURE: 98.4 F | HEART RATE: 67 BPM | RESPIRATION RATE: 18 BRPM | BODY MASS INDEX: 27.42 KG/M2 | SYSTOLIC BLOOD PRESSURE: 107 MMHG | DIASTOLIC BLOOD PRESSURE: 72 MMHG | OXYGEN SATURATION: 96 %

## 2024-08-02 DIAGNOSIS — Z17.1 MALIGNANT NEOPLASM OF UPPER-OUTER QUADRANT OF LEFT BREAST IN FEMALE, ESTROGEN RECEPTOR NEGATIVE (H): ICD-10-CM

## 2024-08-02 DIAGNOSIS — C50.412 MALIGNANT NEOPLASM OF UPPER-OUTER QUADRANT OF LEFT BREAST IN FEMALE, ESTROGEN RECEPTOR NEGATIVE (H): ICD-10-CM

## 2024-08-02 PROCEDURE — 99213 OFFICE O/P EST LOW 20 MIN: CPT | Performed by: STUDENT IN AN ORGANIZED HEALTH CARE EDUCATION/TRAINING PROGRAM

## 2024-08-02 PROCEDURE — 77263 THER RADIOLOGY TX PLNG CPLX: CPT | Performed by: STUDENT IN AN ORGANIZED HEALTH CARE EDUCATION/TRAINING PROGRAM

## 2024-08-02 PROCEDURE — 99417 PROLNG OP E/M EACH 15 MIN: CPT | Performed by: STUDENT IN AN ORGANIZED HEALTH CARE EDUCATION/TRAINING PROGRAM

## 2024-08-02 PROCEDURE — 99205 OFFICE O/P NEW HI 60 MIN: CPT | Mod: 25 | Performed by: STUDENT IN AN ORGANIZED HEALTH CARE EDUCATION/TRAINING PROGRAM

## 2024-08-02 RX ORDER — NICOTINE POLACRILEX 4 MG/1
GUM, CHEWING ORAL
COMMUNITY
Start: 2024-07-20 | End: 2024-09-12

## 2024-08-02 ASSESSMENT — PAIN SCALES - GENERAL: PAINLEVEL: NO PAIN (0)

## 2024-08-02 NOTE — PROGRESS NOTES
[facility] Radiation Oncology Consult Note     Patient: Vibha Kingston  MRN: [id number]  Date of Service: 08/02/2024          Rosalind Adams MD  [address] [place] [address]       Dear Dr. Adams:    Thank you very much for referring this patient for consideration of radiotherapy. As you know Ms. Kingston is a 34 year old female with a diagnosis of left breast stage pT1c pN1a Invasive ductal carcinoma ER/MA-, Her2 +, grade 3, multifocal s/p mastectomy and SLN with negative but close margins and 1/3 LN+. She was seen today for consideration of adjuvant radiation.     HISTORY OF PRESENT ILLNESS:   Ms. Kingston is a 34 year old female who palpated a left breast lump.    1/9/2024 diagnostic mammogram:  large segmental area of pleomorphic and amorphous calcifications left 9:00 breast from posterior to subareolar depth, with some calcifications also present within the nipple, area measuring 10 x 5 cm. There is associated focal asymmetry.   U/S: large ill-defined hypoechoic area with associated calcifications, discrete mass centered at 9:00, 3 cm from nipple, 3.4 x 2.2 x 2.0 cm; left axilla is within normal limits.     1/11/2024, ultrasound-guided core needle biopsy, pathology (DCIS, grade 3, cribriform papillary with comedonecrosis.    3/5/2024 left breast simple mastectomy with sentinel lymph node, pathology ([id number]):  Invasive ductal carcinoma, grade 3, multifocal, 4 foci varied from 1.3 mm to 11 mm greatest individual dimensions.  Margins negative (superior less than 1 mm)  Focal lymphovascular invasion.  Associated DCIS with EIC, solid and comedo, high grade 3.  1 of 3 sentinel lymph nodes positive, largest 5 mm deposit no extranodal tumor extension.  ER negative invasive carcinoma, positive in DCIS (80%) MA negative invasive carcinoma, positive in DCIS (30%).  HER2 positive  pT1c pN1a  Next generation sequencing: negative for mutations in the 11 genes analyzed: INGRID, BARD1, BRCA1, BRCA2, CDH1,  CHEK2, NF1, PALB2, PTEN, STK11, TP53.     Fertility preservation was successfully completed with preservation of oocytes.      4/26/2024- adjuvant TCHP x5 cycles    6/27/2024-genetic counseling and testing completed. Hereditary Cancer Comprehensive Expanded Panel --still pending    Last cycle TCHP scheduled 8/9/2024.  She reports she is tolerating chemotherapy overall fairly well.  She notes some fatigue but this improves by week 2 following chemo.  She feels she is overall healed well since surgery but has taken some time.  Continues to have some numbness along her incision and some tenderness in the axilla with most notable pain in the more medial aspect of her incision which does not require any medical pain management.  Currently not using any moisturizer on the skin.  No swelling of the chest wall or upper extremity.  No plans for reconstruction.    CHEMOTHERAPY HISTORY: Concurrent Chemotherapy: Yes: Herceptin    RADIATION THERAPY HISTORY: Prior Radiation: No    IMPLANTED CARDIAC DEVICE: none     PREGNANCY: The patient is informed not to be pregnant during the radiation therapy.    Premenopausal.  Last menstrual period 6/2/2024.  Discussed pregnancy test prior to radiation therapy and ensuring no pregnancy during or immediate months following radiation.     FH: sister is 41 years old and has a history of cervical cancer in college. She had a hysterectomy and one ovary removed about 4 years ago due to a tumor on her ovary (which was benign).  Mom with breast cnacer.     Current Outpatient Medications   Medication Sig Dispense Refill    5-Hydroxytryptophan (5-HTP) 100 MG CAPS Take 100 mg by mouth at bedtime      acetaminophen (TYLENOL) 500 MG tablet Take 1,000 mg by mouth every 8 hours as needed for mild pain, other or pain      dexAMETHasone (DECADRON) 4 MG tablet Take 2 tablets (8 mg) by mouth 2 times daily (with meals) Start evening of Docetaxel infusion and continue for a total of 3 doses. 6 tablet 5     hydrocortisone 2.5 % cream Apply topically 2 times daily 30 g 0    lidocaine-prilocaine (EMLA) 2.5-2.5 % external cream Apply to port site 30-60 minutes before port access 30 g 2    LORazepam (ATIVAN) 1 MG tablet Take 1 tablet (1 mg) by mouth once as needed for anxiety 30 tablet 0    melatonin 3 MG tablet Take 3 mg by mouth nightly as needed for sleep      omeprazole 20 MG tablet       Vitamin D, Cholecalciferol, 25 MCG (1000 UT) CAPS       vitamin E (TOCOPHEROL) 400 units (180 mg) capsule Take by mouth daily      ondansetron (ZOFRAN) 8 MG tablet Take 1 tablet (8 mg) by mouth every 8 hours as needed for nausea (vomiting) (Patient not taking: Reported on 7/19/2024) 30 tablet 2    prochlorperazine (COMPAZINE) 10 MG tablet Take 1 tablet (10 mg) by mouth every 6 hours as needed for nausea or vomiting (Patient not taking: Reported on 7/19/2024) 30 tablet 2     Past Medical History:   Diagnosis Date    Cancer (H) 1/15/24    Breast cancer    Encounter for antineoplastic chemotherapy 03/14/2024     Past Surgical History:   Procedure Laterality Date    BIOPSY      A mole a few years back and an abnormal pap in [facility]    BIOPSY NODE SENTINEL Left 03/05/2024    Procedure: WITH LEFT SENTINEL LYMPH NODE BIOPSY;  Surgeon: Aleena Real DO;  Location: [facility] OR    FERTILITY SURGERY  04/10/2024    Egg retrieval    INSERT PORT VASCULAR ACCESS N/A 4/19/2024    Procedure: INSERTION, VASCULAR ACCESS PORT UNDER ULTRASOUND AND FLUOROSCOPIC GUIDANCE;  Surgeon: Aleena Real DO;  Location: [facility] OR    MASTECTOMY SIMPLE Left 03/05/2024    Procedure: LEFT MASTECTOMY;  Surgeon: Aleena Real DO;  Location: [facility] OR     No Known Allergies  Family History   Problem Relation Age of Onset    Hyperlipidemia Mother     Breast Cancer Mother 60        treated and in remission    Depression Mother     Hypertension Father     Hyperlipidemia Father     Prostate Cancer Father 65    Other Cancer Sister 40        ovarian  cyst - cancerous - removed one ovary and hystro    Depression Sister     Anxiety Disorder Sister     Cervical Cancer Sister     Other Cancer Maternal Grandfather     Cerebrovascular Disease Other     Other Cancer Other     Other Cancer Maternal Grandmother      Social History     Socioeconomic History    Marital status:      Spouse name: Not on file    Number of children: Not on file    Years of education: Not on file    Highest education level: Bachelor's degree (e.g., BA, AB, BS)   Occupational History    Not on file   Tobacco Use    Smoking status: Former     Current packs/day: 0.00     Types: Cigarettes, Cigars, Hookah     Passive exposure: Never    Smokeless tobacco: Never    Tobacco comments:     Social, never created a habit   Vaping Use    Vaping status: Never Used   Substance and Sexual Activity    Alcohol use: Not Currently     Comment: None since Jan 2024    Drug use: Not Currently     Types: Marijuana    Sexual activity: Not Currently     Partners: Male     Birth control/protection: Abstinence   Other Topics Concern    Parent/sibling w/ CABG, MI or angioplasty before 65F 55M? No   Social History Narrative    Not on file     Social Determinants of Health     Financial Resource Strain: Low Risk  (10/5/2023)    Financial Resource Strain     Within the past 12 months, have you or your family members you live with been unable to get utilities (heat, electricity) when it was really needed?: No   Food Insecurity: Low Risk  (10/5/2023)    Food Insecurity     Within the past 12 months, did you worry that your food would run out before you got money to buy more?: No     Within the past 12 months, did the food you bought just not last and you didn t have money to get more?: No   Transportation Needs: Low Risk  (10/5/2023)    Transportation Needs     Within the past 12 months, has lack of transportation kept you from medical appointments, getting your medicines, non-medical meetings or appointments, work, or  from getting things that you need?: No   Physical Activity: Not on file   Stress: Not on file   Social Connections: Not on file   Interpersonal Safety: Low Risk  (7/26/2024)    Interpersonal Safety     Do you feel physically and emotionally safe where you currently live?: Yes     Within the past 12 months, have you been hit, slapped, kicked or otherwise physically hurt by someone?: No     Within the past 12 months, have you been humiliated or emotionally abused in other ways by your partner or ex-partner?: No   Housing Stability: Low Risk  (10/5/2023)    Housing Stability     Do you have housing? : Yes     Are you worried about losing your housing?: No        REVIEW OF SYMPTOMS:  A full 14-point review of systems was performed. Pertinent findings are noted in the HPI.    General  Constitutional  Constitutional (WDL): Exceptions to WDL  Fatigue: Fatigue relieved by rest  Fever: Absent or within normal limits  Chills: Absent or within normal limits  Weight Gain: Absent or within normal limits  Weight Loss: Absent or within normal limits  Hot Flashes: Absent or within normal limits  EENT  Eye Disorders  Eye Disorder (WDL): All eye disorder elements are within defined limits  Ear Disorders  Ear Disorder (WDL): All ear disorder elements are within defined limits  Respiratory  Respiratory  Respiratory (WDL): All respiratory elements are within defined limits  Cardiovascular  Cardiovascular  Cardiovascular (WDL): All cardiovascular elements are within defined limits  Gastrointestinal  Gastrointestinal  Gastrointestinal (WDL): Exceptions to WDL  Anorexia: Loss of appetite without alteration in eating habits (during 2nd wk of chemo)  Nausea: Loss of appetite without alteration in eating habits  Vomiting: Absent or within normal limits  Dehydration: Absent or within normal limits  Dysgeusia: Absent or within normal limits  Dysphagia: Absent or within normal limits  Mucositis Oral: Absent or within normal limits  Esophagitis:  Asymptomatic OR clinical or diagnostic observations only OR intervention not indicated  Constipation: Absent or within normal limits  Diarrhea: Increase of less than 4 stools per day over baseline OR mild increase in ostomy output compared to baseline (2nd wk)  Pharyngitis: Absent or within normal limits  Dry Mouth: Absent or within normal limits  Musculoskeletal  Musculoskeletal and Connective Tissue Disorders  Musculoskeletal & Connective (WDL): Exceptions to WDL  Integumentary  Integumentary  Integumentary (WDL): Exceptions to WDL  Alopecia: Hair loss of greater than or equal to 50% normal for that individual that is readily apparent to others OR a wig or hair piece is necessary if the patient desires to completely camouflage the hair loss OR associated with psychosocial impact  Rash Maculo-Papular: Absent or within normal limits  Pruritus: Absent or within normal limits  Urticaria: Absent or within normal limits  Palmar-Plantar Erythrodysesthesia Syndrome: Absent or within normal limits  Flushing: Absent or within normal limits  Neurological  Neurosensory  Neurosensory (WDL): Exceptions to WDL  Peripheral Motor Neuropathy: Absent or within normal limits  Ataxia: Absent or within normal limits  Peripheral Sensory Neuropathy: Absent or within normal limits  Confusion: Absent or within normal limits  Dizziness: Absent or within normal limits  Syncope: Absent or within normal limits  Dysesthesia: Absent or within normal limits  Genitourinary/Reproductive  Genitourinary  Genitourinary (WDL): All genitourinary elements are within defined limits  Lymphatic  Lymph System Disorders  Lymph (WDL): All lymph elements are within defined limits  Pain  Pain Score: No Pain (0)  AUA Assessment                                                              Accompanied by  Accompanied By: self only    ECOG Status: 0 - Independent    KPS Score: 100% Normal, no complaints    Pain Management Plan: N/A    Recent Labs: No results found  for this or any previous visit (from the past 168 hour(s)).    Imaging: Imaging results 6 weeks:Echocardiogram Complete    Result Date: 2024  [id number]^FREEMAN^STEPHANE^[place] [address]  Name: EMILEE ORTA MRN: [id number] : 1990 Study Date: 2024 08:39 AM Age: 33 yrs Gender: Female Patient Location: [facility] Reason For Study: Malignant neoplasm of upper-outer quadrant of left breast in fem Ordering Physician: STEPHANE MILLARD Referring Physician: STEPHANE MILLARD Performed By: REED  BSA: 1.8 m2 Height: 65 in Weight: 160 lb BP: 114/69 mmHg ______________________________________________________________________________ Procedure Complete Echo Adult. Myocardial Strain Imaging performed. No hemodynamically significant valvular abnormalities on 2D or color flow imaging. ______________________________________________________________________________ Interpretation Summary  The left ventricle is normal in size. Left ventricular function is normal.The ejection fraction is 55-60%. Normal right ventricle size and systolic function. Global peak LV longitudinal strain is averaged at -22%. This is within reported normal limits (normal <-18%). No hemodynamically significant valvular abnormalities on 2D or color flow imaging. ______________________________________________________________________________ Left Ventricle The left ventricle is normal in size. Left ventricular function is normal.The ejection fraction is 55-60%. There is normal left ventricular wall thickness. Left ventricular diastolic function is normal. Global peak LV longitudinal strain is averaged at -22%. This is within reported normal limits (normal <- 18%). No regional wall motion abnormalities noted.  Right Ventricle Normal right ventricle size and systolic function.  Atria Normal left atrial size. Right atrial size is normal. There is no color Doppler evidence of an atrial shunt.   Mitral Valve Mitral valve leaflets appear normal. There is no evidence of mitral stenosis or clinically significant mitral regurgitation. There is trace mitral regurgitation.  Tricuspid Valve Tricuspid valve leaflets appear normal. Right ventricle systolic pressure estimate normal. There is mild (1+) tricuspid regurgitation.  Aortic Valve Aortic valve leaflets appear normal. There is trace aortic regurgitation.  Pulmonic Valve The pulmonic valve is not well visualized. This degree of valvular regurgitation is within normal limits.  Vessels The aorta root is normal. Normal size ascending aorta. IVC diameter <2.1 cm collapsing >50% with sniff suggests a normal RA pressure of 3 mmHg.  Pericardium There is no pericardial effusion.  ______________________________________________________________________________ MMode/2D Measurements & Calculations IVSd: 0.69 cm LVIDd: 4.5 cm LVIDs: 3.0 cm LVPWd: 0.93 cm FS: 34.5 %  LV mass(C)d: 117.6 grams LV mass(C)dI: 65.3 grams/m2 Ao root diam: 3.1 cm LA dimension: 3.4 cm asc Aorta Diam: 2.7 cm LA/Ao: 1.1 LVOT diam: 2.0 cm LVOT area: 3.1 cm2 Ao root diam index Ht(cm/m): 1.9 Ao root diam index BSA (cm/m2): 1.7 Asc Ao diam index BSA (cm/m2): 1.5 Asc Ao diam index Ht(cm/m): 1.6 EF Biplane: 61.8 % LA Volume (BP): 27.6 ml  LA Volume Index (BP): 15.3 ml/m2 RWT: 0.41 TAPSE: 2.1 cm  Time Measurements MM HR: 73.0 BPM  Doppler Measurements & Calculations MV E max bo: 77.0 cm/sec MV A max bo: 52.9 cm/sec MV E/A: 1.5 MV dec time: 0.28 sec LV V1 max P.7 mmHg LV V1 max: 81.8 cm/sec LV V1 VTI: 17.3 cm SV(LVOT): 54.3 ml SI(LVOT): 30.2 ml/m2 PA acc time: 0.11 sec TR max bo: 185.0 cm/sec TR max P.7 mmHg E/E' av.9 Lateral E/e': 5.2  Medial E/e': 6.7 RV S Bo: 11.7 cm/sec  ______________________________________________________________________________ Report approved by: Pillo Lang 2024 04:32 PM        Pathology:   No results found for this or any previous visit (from the  past 8760 hour(s)).  Pathology Results            Malignant - Mastectomy, Left - 3/5/2024        Pathology Code Malignancy Type    Invasive ductal carcinoma Invasive    Ductal carcinoma in situ high nuclear grade Non-Invasive    Lymphovascular invasion Invasive    Axillary estiven with metastatic carcinoma Other              Additional Information              Concordance: Not Entered Primary Tumor: T1c      Regional Lymph Nodes: N1    Stage: 2A      Histology Grade: G3 Margin Status: Uninvolved          Nodes Biopsied: [place]     Removed: 3     Positive: 1     Extracapsular Extension: Absent           Invasive: 11 mm     In Situ: 53 mm           Had Neoadjuvant Chemotherapy: No     Completely Submitted for Microscopic Analysis: Yes     External: No                    Malignant - Ultrasound Guided Biopsy, Left - 1/11/2024        Pathology Code Malignancy Type    Ductal carcinoma in situ high nuclear grade Non-Invasive    Focal fibrosis     Inflammation               Additional Information              Concordance: Concordant           Histology Grade: DCIS: High Grade           External: No                             Objective:        PHYSICAL EXAMINATION:    /72   Pulse 67   Temp 98.4  F (36.9  C) (Oral)   Resp 18   Wt 74.8 kg (164 lb 12.8 oz)   SpO2 96%   BMI 27.42 kg/m      Gen: Alert, in NAD pleasant interactive, well nourished  Eyes:  EOMI, sclera anicteric  HENT     Head: NC/AT--alopecia   Pulm: No wheezing, stridor or respiratory distress  Chest: Lateral left mastectomy incision.   Well-healed surgical scar of the left chest wall consistent with a recent history of surgery. No surrounding erythema, warmth or drainage.  No chest wall masses.  Musculoskeletal: Normal muscle bulk and tone  Skin: Normal tone and turgor, warm, dry, intact  Neurologic: A/Ox3, face symmetric, speech fluent, no focal motor deficits, gait normal and unaided  Psychiatric: Appropriate mood and affect    Intent of Therapy:  Curative  Patient on concurrent Herceptin Yes- trastuzumab/pertuzumab   Adjuvant hormonal therapy: Yes: for DCIS ER/UT+ Agent:  TBD  Start: Following radiation  Chemotherapy: TCHP with 6 planned cycles last 1 to be delivered 2024+ Adj trastuzumab/pertuzumab x1 yr    Intended fractionation schedule: 4005 cGy in 15 fractions to the chest wall +1000 cGy in 4 fraction boost given close (less than 1 mm) margin    Breast cancer risk factors:     LMP Dates from Last 4 Encounters:   LMP: 2024   LMP: 2024   LMP: 2024   LMP: 2024        Side effects that may occur during or within weeks after radiation therapy    Fatigue and general weakness  Darkening, irritation, itchiness, redness, dryness, erythema, peeling, scabbing, ulceration and contraction of the skin of the breast and chest  Swelling of the breast  Loss of armpit hair  Lung irritation  Decrease in appetite    Side effects that may occur months or years after radiation therapy    Development of another tumor or cancer  Thickening, telangiectasias (development of spider like blood vessels in the skin) and ulceration of the skin of the breast and chest  Firming, fibrosis (scar tissue), fat necrosis, and distortion of the breast  Poor healing after a trauma or surgery in the irradiated area  Nerve damage resulting in loss of arm strength and sensation  Coronary artery blockage causing angina pain or a heart attack  Lung inflammation of fibrosis causing cough, fever and shortness of breath  Fracture of the ribs  Swellingof an arm and hand    The risks, benefits and alternatives to radiation therapy were outlined with the patient. All questions were answered and a consent was signed.     Impression   34 year old female with a diagnosis of left breast stage pT1c pN1a Invasive ductal carcinoma ER/UT-, Her2 +, grade 3, multifocal s/p mastectomy and SLN with negative but close margins and 1/3 LN+.  Status post adjuvant chemo TCHP x 5 cycles,  6 cycle planned 8/9/2024.    Visit Dx:  (C50.412,  Z17.1) Malignant neoplasm of upper-outer quadrant of left breast in female, estrogen receptor negative (H)       Cancer Staging   Malignant neoplasm of upper-outer quadrant of left breast in female, estrogen receptor negative (H)  Staging form: Breast, AJCC 8th Edition  - Pathologic stage from 3/13/2024: Stage IIA (pT1c, pN1a(sn), cM0, G3, ER-, DC-, HER2+) - Signed by Rosalind Adams MD on 3/13/2024      Assessment & Plan:   Discussed adjuvant radiation therapy with patient.  The indications for, process of, alternatives, potential side effects and complications of RT to the chest wall were discussed.    They asked multiple questions which were answered to their satisfaction and they verbalized understanding.    She wishes to consider the information to her today and discuss further with her  prior to signing consent so consent is opted signed today.  They will return to clinic a couple weeks following completion of last cycle of chemotherapy should she wish to proceed with adjuvant radiation for consent and CT simulation for RT planning.     Discussed anticipate 4500 cGy in 15 fractions to the left chest wall with 1000 cGy in 4 fraction boost given close less than 1 mm margins.      Thank you for allowing me to participate in the care of this patient. Feel free to contact me with all questions or concerns.      Total time of this visit, including time spent face-to-face with patient and or via video/audio, and also in preparing for today's visit for MDM and documentation. Medical decision-making included consideration and possible diagnoses, management options, complex record review, review of diagnostic tests and information, consideration and discussion of significant complications based on comorbidities, discussion with providers involved in the care of the patient.     75 Minutes spent.    Sincerely,      Megan Hsu MD  Department of Radiation  Oncology   [facility]iation [facility]cology  Tel: [phone]  Page: [phone]    [facility]  [address]   [place] [address]     [place] [id number] [place] [address]    CC:  Patient Care Team:  Zully Bloom APRN CNP as PCP - General (Family Medicine)  Zully Bloom APRN CNP as Assigned PCP  Gloria Sr APRN CNM as Assigned OBGYN Provider  Aleena Real DO as Assigned Surgical Provider  Christie Agee RN as Specialty Care Coordinator (Hematology & Oncology)  Rosalind Adams MD as Assigned Cancer Care Provider  Stephen Chávez PsyD as Assigned Sleep Provider  Megan Hsu MD as MD (Radiation Oncology)

## 2024-08-02 NOTE — LETTER
8/2/2024      Vibha Kingston  [address] [place] [address]      Dear Colleague,    Thank you for referring your patient, Vibha Kingston, to the [facility] RADIATION ONCOLOGY [place]. Please see a copy of my visit note below.    [facility] Radiation Oncology Consult Note     Patient: Vibha Kingston  MRN: [id number]  Date of Service: 08/02/2024          Rosalind Adams MD  [address] [place] [address]       Dear Dr. Adams:    Thank you very much for referring this patient for consideration of radiotherapy. As you know Ms. Kingston is a 34 year old female with a diagnosis of left breast stage pT1c pN1a Invasive ductal carcinoma ER/OH-, Her2 +, grade 3, multifocal s/p mastectomy and SLN with negative but close margins and 1/3 LN+. She was seen today for consideration of adjuvant radiation.     HISTORY OF PRESENT ILLNESS:   Ms. Kingston is a 34 year old female who palpated a left breast lump.    1/9/2024 diagnostic mammogram:  large segmental area of pleomorphic and amorphous calcifications left 9:00 breast from posterior to subareolar depth, with some calcifications also present within the nipple, area measuring 10 x 5 cm. There is associated focal asymmetry.   U/S: large ill-defined hypoechoic area with associated calcifications, discrete mass centered at 9:00, 3 cm from nipple, 3.4 x 2.2 x 2.0 cm; left axilla is within normal limits.     1/11/2024, ultrasound-guided core needle biopsy, pathology (DCIS, grade 3, cribriform papillary with comedonecrosis.    3/5/2024 left breast simple mastectomy with sentinel lymph node, pathology ([id number]):  Invasive ductal carcinoma, grade 3, multifocal, 4 foci varied from 1.3 mm to 11 mm greatest individual dimensions.  Margins negative (superior less than 1 mm)  Focal lymphovascular invasion.  Associated DCIS with EIC, solid and comedo, high grade 3.  1 of 3 sentinel lymph nodes positive, largest 5 mm deposit no extranodal tumor extension.  ER  negative invasive carcinoma, positive in DCIS (80%) CA negative invasive carcinoma, positive in DCIS (30%).  HER2 positive  pT1c pN1a  Next generation sequencing: negative for mutations in the 11 genes analyzed: INGRID, BARD1, BRCA1, BRCA2, CDH1, CHEK2, NF1, PALB2, PTEN, STK11, TP53.     Fertility preservation was successfully completed with preservation of oocytes.      4/26/2024- adjuvant TCHP x5 cycles    6/27/2024-genetic counseling and testing completed. Hereditary Cancer Comprehensive Expanded Panel --still pending    Last cycle TCHP scheduled 8/9/2024.  She reports she is tolerating chemotherapy overall fairly well.  She notes some fatigue but this improves by week 2 following chemo.  She feels she is overall healed well since surgery but has taken some time.  Continues to have some numbness along her incision and some tenderness in the axilla with most notable pain in the more medial aspect of her incision which does not require any medical pain management.  Currently not using any moisturizer on the skin.  No swelling of the chest wall or upper extremity.  No plans for reconstruction.    CHEMOTHERAPY HISTORY: Concurrent Chemotherapy: Yes: Herceptin    RADIATION THERAPY HISTORY: Prior Radiation: No    IMPLANTED CARDIAC DEVICE: none     PREGNANCY: The patient is informed not to be pregnant during the radiation therapy.    Premenopausal.  Last menstrual period 6/2/2024.  Discussed pregnancy test prior to radiation therapy and ensuring no pregnancy during or immediate months following radiation.     FH: sister is 41 years old and has a history of cervical cancer in college. She had a hysterectomy and one ovary removed about 4 years ago due to a tumor on her ovary (which was benign).  Mom with breast cnacer.     Current Outpatient Medications   Medication Sig Dispense Refill     5-Hydroxytryptophan (5-HTP) 100 MG CAPS Take 100 mg by mouth at bedtime       acetaminophen (TYLENOL) 500 MG tablet Take 1,000 mg by mouth  every 8 hours as needed for mild pain, other or pain       dexAMETHasone (DECADRON) 4 MG tablet Take 2 tablets (8 mg) by mouth 2 times daily (with meals) Start evening of Docetaxel infusion and continue for a total of 3 doses. 6 tablet 5     hydrocortisone 2.5 % cream Apply topically 2 times daily 30 g 0     lidocaine-prilocaine (EMLA) 2.5-2.5 % external cream Apply to port site 30-60 minutes before port access 30 g 2     LORazepam (ATIVAN) 1 MG tablet Take 1 tablet (1 mg) by mouth once as needed for anxiety 30 tablet 0     melatonin 3 MG tablet Take 3 mg by mouth nightly as needed for sleep       omeprazole 20 MG tablet        Vitamin D, Cholecalciferol, 25 MCG (1000 UT) CAPS        vitamin E (TOCOPHEROL) 400 units (180 mg) capsule Take by mouth daily       ondansetron (ZOFRAN) 8 MG tablet Take 1 tablet (8 mg) by mouth every 8 hours as needed for nausea (vomiting) (Patient not taking: Reported on 7/19/2024) 30 tablet 2     prochlorperazine (COMPAZINE) 10 MG tablet Take 1 tablet (10 mg) by mouth every 6 hours as needed for nausea or vomiting (Patient not taking: Reported on 7/19/2024) 30 tablet 2     Past Medical History:   Diagnosis Date     Cancer (H) 1/15/24    Breast cancer     Encounter for antineoplastic chemotherapy 03/14/2024     Past Surgical History:   Procedure Laterality Date     BIOPSY      A mole a few years back and an abnormal pap in [facility]     BIOPSY NODE SENTINEL Left 03/05/2024    Procedure: WITH LEFT SENTINEL LYMPH NODE BIOPSY;  Surgeon: Aleena Real DO;  Location: [facility] OR     FERTILITY SURGERY  04/10/2024    Egg retrieval     INSERT PORT VASCULAR ACCESS N/A 4/19/2024    Procedure: INSERTION, VASCULAR ACCESS PORT UNDER ULTRASOUND AND FLUOROSCOPIC GUIDANCE;  Surgeon: Aleena Real DO;  Location: [facility] OR     MASTECTOMY SIMPLE Left 03/05/2024    Procedure: LEFT MASTECTOMY;  Surgeon: Aleena Real DO;  Location: [facility] OR     No Known Allergies  Family History    Problem Relation Age of Onset     Hyperlipidemia Mother      Breast Cancer Mother 60        treated and in remission     Depression Mother      Hypertension Father      Hyperlipidemia Father      Prostate Cancer Father 65     Other Cancer Sister 40        ovarian cyst - cancerous - removed one ovary and hystro     Depression Sister      Anxiety Disorder Sister      Cervical Cancer Sister      Other Cancer Maternal Grandfather      Cerebrovascular Disease Other      Other Cancer Other      Other Cancer Maternal Grandmother      Social History     Socioeconomic History     Marital status:      Spouse name: Not on file     Number of children: Not on file     Years of education: Not on file     Highest education level: Bachelor's degree (e.g., BA, AB, BS)   Occupational History     Not on file   Tobacco Use     Smoking status: Former     Current packs/day: 0.00     Types: Cigarettes, Cigars, Hookah     Passive exposure: Never     Smokeless tobacco: Never     Tobacco comments:     Social, never created a habit   Vaping Use     Vaping status: Never Used   Substance and Sexual Activity     Alcohol use: Not Currently     Comment: None since Jan 2024     Drug use: Not Currently     Types: Marijuana     Sexual activity: Not Currently     Partners: Male     Birth control/protection: Abstinence   Other Topics Concern     Parent/sibling w/ CABG, MI or angioplasty before 65F 55M? No   Social History Narrative     Not on file     Social Determinants of Health     Financial Resource Strain: Low Risk  (10/5/2023)    Financial Resource Strain      Within the past 12 months, have you or your family members you live with been unable to get utilities (heat, electricity) when it was really needed?: No   Food Insecurity: Low Risk  (10/5/2023)    Food Insecurity      Within the past 12 months, did you worry that your food would run out before you got money to buy more?: No      Within the past 12 months, did the food you bought  just not last and you didn t have money to get more?: No   Transportation Needs: Low Risk  (10/5/2023)    Transportation Needs      Within the past 12 months, has lack of transportation kept you from medical appointments, getting your medicines, non-medical meetings or appointments, work, or from getting things that you need?: No   Physical Activity: Not on file   Stress: Not on file   Social Connections: Not on file   Interpersonal Safety: Low Risk  (7/26/2024)    Interpersonal Safety      Do you feel physically and emotionally safe where you currently live?: Yes      Within the past 12 months, have you been hit, slapped, kicked or otherwise physically hurt by someone?: No      Within the past 12 months, have you been humiliated or emotionally abused in other ways by your partner or ex-partner?: No   Housing Stability: Low Risk  (10/5/2023)    Housing Stability      Do you have housing? : Yes      Are you worried about losing your housing?: No        REVIEW OF SYMPTOMS:  A full 14-point review of systems was performed. Pertinent findings are noted in the HPI.    General  Constitutional  Constitutional (WDL): Exceptions to WDL  Fatigue: Fatigue relieved by rest  Fever: Absent or within normal limits  Chills: Absent or within normal limits  Weight Gain: Absent or within normal limits  Weight Loss: Absent or within normal limits  Hot Flashes: Absent or within normal limits  EENT  Eye Disorders  Eye Disorder (WDL): All eye disorder elements are within defined limits  Ear Disorders  Ear Disorder (WDL): All ear disorder elements are within defined limits  Respiratory  Respiratory  Respiratory (WDL): All respiratory elements are within defined limits  Cardiovascular  Cardiovascular  Cardiovascular (WDL): All cardiovascular elements are within defined limits  Gastrointestinal  Gastrointestinal  Gastrointestinal (WDL): Exceptions to WDL  Anorexia: Loss of appetite without alteration in eating habits (during 2nd wk of  chemo)  Nausea: Loss of appetite without alteration in eating habits  Vomiting: Absent or within normal limits  Dehydration: Absent or within normal limits  Dysgeusia: Absent or within normal limits  Dysphagia: Absent or within normal limits  Mucositis Oral: Absent or within normal limits  Esophagitis: Asymptomatic OR clinical or diagnostic observations only OR intervention not indicated  Constipation: Absent or within normal limits  Diarrhea: Increase of less than 4 stools per day over baseline OR mild increase in ostomy output compared to baseline (2nd wk)  Pharyngitis: Absent or within normal limits  Dry Mouth: Absent or within normal limits  Musculoskeletal  Musculoskeletal and Connective Tissue Disorders  Musculoskeletal & Connective (WDL): Exceptions to WDL  Integumentary  Integumentary  Integumentary (WDL): Exceptions to WDL  Alopecia: Hair loss of greater than or equal to 50% normal for that individual that is readily apparent to others OR a wig or hair piece is necessary if the patient desires to completely camouflage the hair loss OR associated with psychosocial impact  Rash Maculo-Papular: Absent or within normal limits  Pruritus: Absent or within normal limits  Urticaria: Absent or within normal limits  Palmar-Plantar Erythrodysesthesia Syndrome: Absent or within normal limits  Flushing: Absent or within normal limits  Neurological  Neurosensory  Neurosensory (WDL): Exceptions to WDL  Peripheral Motor Neuropathy: Absent or within normal limits  Ataxia: Absent or within normal limits  Peripheral Sensory Neuropathy: Absent or within normal limits  Confusion: Absent or within normal limits  Dizziness: Absent or within normal limits  Syncope: Absent or within normal limits  Dysesthesia: Absent or within normal limits  Genitourinary/Reproductive  Genitourinary  Genitourinary (WDL): All genitourinary elements are within defined limits  Lymphatic  Lymph System Disorders  Lymph (WDL): All lymph elements are  within defined limits  Pain  Pain Score: No Pain (0)  AUA Assessment                                                              Accompanied by  Accompanied By: self only    ECOG Status: 0 - Independent    KPS Score: 100% Normal, no complaints    Pain Management Plan: N/A    Recent Labs: No results found for this or any previous visit (from the past 168 hour(s)).    Imaging: Imaging results 6 weeks:Echocardiogram Complete    Result Date: 2024  [id number]^FREEMAN^STEPHANE^[place] [address]  Name: EMILEE ORTA MRN: [id number] : 1990 Study Date: 2024 08:39 AM Age: 33 yrs Gender: Female Patient Location: [facility] Reason For Study: Malignant neoplasm of upper-outer quadrant of left breast in [facility] Ordering Physician: STEPHANE MILLARD Referring Physician: STEPHANE MILLARD Performed By: REED  BSA: 1.8 m2 Height: 65 in Weight: 160 lb BP: 114/69 mmHg ______________________________________________________________________________ Procedure Complete Echo Adult. Myocardial Strain Imaging performed. No hemodynamically significant valvular abnormalities on 2D or color flow imaging. ______________________________________________________________________________ Interpretation Summary  The left ventricle is normal in size. Left ventricular function is normal.The ejection fraction is 55-60%. Normal right ventricle size and systolic function. Global peak LV longitudinal strain is averaged at -22%. This is within reported normal limits (normal <-18%). No hemodynamically significant valvular abnormalities on 2D or color flow imaging. ______________________________________________________________________________ Left Ventricle The left ventricle is normal in size. Left ventricular function is normal.The ejection fraction is 55-60%. There is normal left ventricular wall thickness. Left ventricular diastolic function is normal. Global peak LV longitudinal strain is  averaged at -22%. This is within reported normal limits (normal <- 18%). No regional wall motion abnormalities noted.  Right Ventricle Normal right ventricle size and systolic function.  Atria Normal left atrial size. Right atrial size is normal. There is no color Doppler evidence of an atrial shunt.  Mitral Valve Mitral valve leaflets appear normal. There is no evidence of mitral stenosis or clinically significant mitral regurgitation. There is trace mitral regurgitation.  Tricuspid Valve Tricuspid valve leaflets appear normal. Right ventricle systolic pressure estimate normal. There is mild (1+) tricuspid regurgitation.  Aortic Valve Aortic valve leaflets appear normal. There is trace aortic regurgitation.  Pulmonic Valve The pulmonic valve is not well visualized. This degree of valvular regurgitation is within normal limits.  Vessels The aorta root is normal. Normal size ascending aorta. IVC diameter <2.1 cm collapsing >50% with sniff suggests a normal RA pressure of 3 mmHg.  Pericardium There is no pericardial effusion.  ______________________________________________________________________________ MMode/2D Measurements & Calculations IVSd: 0.69 cm LVIDd: 4.5 cm LVIDs: 3.0 cm LVPWd: 0.93 cm FS: 34.5 %  LV mass(C)d: 117.6 grams LV mass(C)dI: 65.3 grams/m2 Ao root diam: 3.1 cm LA dimension: 3.4 cm asc Aorta Diam: 2.7 cm LA/Ao: 1.1 LVOT diam: 2.0 cm LVOT area: 3.1 cm2 Ao root diam index Ht(cm/m): 1.9 Ao root diam index BSA (cm/m2): 1.7 Asc Ao diam index BSA (cm/m2): 1.5 Asc Ao diam index Ht(cm/m): 1.6 EF Biplane: 61.8 % LA Volume (BP): 27.6 ml  LA Volume Index (BP): 15.3 ml/m2 RWT: 0.41 TAPSE: 2.1 cm  Time Measurements MM HR: 73.0 BPM  Doppler Measurements & Calculations MV E max slade: 77.0 cm/sec MV A max slade: 52.9 cm/sec MV E/A: 1.5 MV dec time: 0.28 sec LV V1 max P.7 mmHg LV V1 max: 81.8 cm/sec LV V1 VTI: 17.3 cm SV(LVOT): 54.3 ml SI(LVOT): 30.2 ml/m2 PA acc time: 0.11 sec TR max slade: 185.0 cm/sec TR max PG:  13.7 mmHg E/E' av.9 Lateral E/e': 5.2  Medial E/e': 6.7 RV S Bo: 11.7 cm/sec  ______________________________________________________________________________ Report approved by: Pillo Lang 2024 04:32 PM        Pathology:   No results found for this or any previous visit (from the past 8760 hour(s)).  Pathology Results            Malignant - Mastectomy, Left - 3/5/2024        Pathology Code Malignancy Type    Invasive ductal carcinoma Invasive    Ductal carcinoma in situ high nuclear grade Non-Invasive    Lymphovascular invasion Invasive    Axillary estiven with metastatic carcinoma Other              Additional Information              Concordance: Not Entered Primary Tumor: T1c      Regional Lymph Nodes: N1    Stage: 2A      Histology Grade: G3 Margin Status: Uninvolved          Nodes Biopsied: [place]     Removed: 3     Positive: 1     Extracapsular Extension: Absent           Invasive: 11 mm     In Situ: 53 mm           Had Neoadjuvant Chemotherapy: No     Completely Submitted for Microscopic Analysis: Yes     External: No                    Malignant - Ultrasound Guided Biopsy, Left - 2024        Pathology Code Malignancy Type    Ductal carcinoma in situ high nuclear grade Non-Invasive    Focal fibrosis     Inflammation               Additional Information              Concordance: Concordant           Histology Grade: DCIS: High Grade           External: No                             Objective:        PHYSICAL EXAMINATION:    /72   Pulse 67   Temp 98.4  F (36.9  C) (Oral)   Resp 18   Wt 74.8 kg (164 lb 12.8 oz)   SpO2 96%   BMI 27.42 kg/m      Gen: Alert, in NAD pleasant interactive, well nourished  Eyes:  EOMI, sclera anicteric  HENT     Head: NC/AT--alopecia   Pulm: No wheezing, stridor or respiratory distress  Chest: Lateral left mastectomy incision.   Well-healed surgical scar of the left chest wall consistent with a recent history of surgery. No surrounding erythema,  warmth or drainage.  No chest wall masses.  Musculoskeletal: Normal muscle bulk and tone  Skin: Normal tone and turgor, warm, dry, intact  Neurologic: A/Ox3, face symmetric, speech fluent, no focal motor deficits, gait normal and unaided  Psychiatric: Appropriate mood and affect    Intent of Therapy: Curative  Patient on concurrent Herceptin Yes- trastuzumab/pertuzumab   Adjuvant hormonal therapy: Yes: for DCIS ER/MD+ Agent:  TBD  Start: Following radiation  Chemotherapy: TCHP with 6 planned cycles last 1 to be delivered 2024+ Adj trastuzumab/pertuzumab x1 yr    Intended fractionation schedule: 4005 cGy in 15 fractions to the chest wall +1000 cGy in 4 fraction boost given close (less than 1 mm) margin    Breast cancer risk factors:     LMP Dates from Last 4 Encounters:   LMP: 2024   LMP: 2024   LMP: 2024   LMP: 2024        Side effects that may occur during or within weeks after radiation therapy    Fatigue and general weakness  Darkening, irritation, itchiness, redness, dryness, erythema, peeling, scabbing, ulceration and contraction of the skin of the breast and chest  Swelling of the breast  Loss of armpit hair  Lung irritation  Decrease in appetite    Side effects that may occur months or years after radiation therapy    Development of another tumor or cancer  Thickening, telangiectasias (development of spider like blood vessels in the skin) and ulceration of the skin of the breast and chest  Firming, fibrosis (scar tissue), fat necrosis, and distortion of the breast  Poor healing after a trauma or surgery in the irradiated area  Nerve damage resulting in loss of arm strength and sensation  Coronary artery blockage causing angina pain or a heart attack  Lung inflammation of fibrosis causing cough, fever and shortness of breath  Fracture of the ribs  Swellingof an arm and hand    The risks, benefits and alternatives to radiation therapy were outlined with the patient. All  questions were answered and a consent was signed.     Impression   34 year old female with a diagnosis of left breast stage pT1c pN1a Invasive ductal carcinoma ER/AZ-, Her2 +, grade 3, multifocal s/p mastectomy and SLN with negative but close margins and 1/3 LN+.  Status post adjuvant chemo TCHP x 5 cycles, 6 cycle planned 8/9/2024.    Visit Dx:  (C50.412,  Z17.1) Malignant neoplasm of upper-outer quadrant of left breast in female, estrogen receptor negative (H)       Cancer Staging   Malignant neoplasm of upper-outer quadrant of left breast in female, estrogen receptor negative (H)  Staging form: Breast, AJCC 8th Edition  - Pathologic stage from 3/13/2024: Stage IIA (pT1c, pN1a(sn), cM0, G3, ER-, AZ-, HER2+) - Signed by Rosalind Adams MD on 3/13/2024      Assessment & Plan:   Discussed adjuvant radiation therapy with patient.  The indications for, process of, alternatives, potential side effects and complications of RT to the chest wall were discussed.    They asked multiple questions which were answered to their satisfaction and they verbalized understanding.    She wishes to consider the information to her today and discuss further with her  prior to signing consent so consent is opted signed today.  They will return to clinic a couple weeks following completion of last cycle of chemotherapy should she wish to proceed with adjuvant radiation for consent and CT simulation for RT planning.     Discussed anticipate 4500 cGy in 15 fractions to the left chest wall with 1000 cGy in 4 fraction boost given close less than 1 mm margins.      Thank you for allowing me to participate in the care of this patient. Feel free to contact me with all questions or concerns.      Total time of this visit, including time spent face-to-face with patient and or via video/audio, and also in preparing for today's visit for MDM and documentation. Medical decision-making included consideration and possible diagnoses, management  options, complex record review, review of diagnostic tests and information, consideration and discussion of significant complications based on comorbidities, discussion with providers involved in the care of the patient.     75 Minutes spent.    Sincerely,      Megan Hsu MD  Department of Radiation Oncology   [facility] Radiation Oncology  Tel: [phone]  Page: [phone]    [facility]  [address] [place] [address]     [facility]   [address] [place] [address]    CC:  Patient Care Team:  Zully Bloom APRN CNP as PCP - General (Family Medicine)  Zully Bloom APRN CNP as Assigned PCP  Gloria Sr APRN CNM as Assigned OBGYN Provider  Aleena Real DO as Assigned Surgical Provider  Christie Agee, RN as Specialty Care Coordinator (Hematology & Oncology)  Rosalind Adams MD as Assigned Cancer Care Provider  Stephen Chávez PsyD as Assigned Sleep Provider  Megan Hsu MD as MD (Radiation Oncology)        Again, thank you for allowing me to participate in the care of your patient.        Sincerely,        Megan Hsu MD

## 2024-08-08 NOTE — PROGRESS NOTES
[facility] Hematology and Oncology Outpatient Progress Note    Patient: Vibha Kingston  MRN: [id number]  Date of Service: Aug 9, 2024          Reason for Visit    Chief Complaint   Patient presents with    Oncology Clinic Visit     Invasive ductal carcinoma of breast, female, left; Malignant neoplasm of upper-outer quadrant of left breast in female, estrogen receptor negative         Cancer Staging   Malignant neoplasm of upper-outer quadrant of left breast in female, estrogen receptor negative (H)  Staging form: Breast, AJCC 8th Edition  - Pathologic stage from 3/13/2024: Stage IIA (pT1c, pN1a(sn), cM0, G3, ER-, VT-, HER2+) - Signed by Rosalind Adams MD on 3/13/2024      Primary Hematologist/Oncologist: Dr. Adams        Assessment/Plan        #.  History of stage IIA (pT1c pN1a M0) invasive ductal carcinoma of the left breast, grade 3, ER negative, VT negative, HER2 positive  #.  DCIS of the left breast, ER positive, VT positive  #.  Nausea + reflux, related to chemotherapy  #.  Mild thrombocytopenia, related to chemotherapy  #.  Rectal pain, sore/hemorrhoid    Vibha is status post left mastectomy with node+ cancer. The invasive cancer was HER2+ (ER/VT neg) and DCIS component ER/VT+. She has completed 5 cycles of adjuvant TCHP. Doing well. Nausea improved after addition of omeprazole. No neuropathy. Still with rectal pain, diagnosed as a fissure - awaiting cream per CRS. No fever, no drainage. We reviewed labs today with are stable. CMP without abnormality and CBC with a hgb of 11.6, plt count 142, WBC of 3.1 and ANC is normal.     Plan:  -Proceed with cycle 6 TCHP. No dose adjustments needed  -Continue omeprazole 20 mg daily through rest of chemo. Rx provided.   -Monitor rectal lesion. Begin treatment per colorectal when able.   -Proceed with Radiation mapping in August as planned given +estiven disease.   -Upon completion of chemo, will transition to maintenance Herceptin + Perjeta to complete a full  year  -ECHO every 3 months on HER2 directed therapy, next due in September  -Role of endocrine therapy would be chemoprevention in setting of ER/MA+ DCIS but no role for the invasive breast cancer. That will be decided by Dr. Adams at a future visit, after completion of radiation.     ______________________________________________________________________________    History of Present Illness/ Interval History    Ms. Vibha Kingston  is a 33 year old female patient who presents for follow up to breast cancer and for consideration of next cycle of chemotherapy. She is here for her last cycle of adjuvant TCHP which she is tolerating pretty well. Her nausea this time around was much improved after starting omeprazole. She fluctuates with constipation and diarrhea. She is still having some rectal discomfort, though saw colorectal who diagnosed a fissure and recommended a cream that hasn't yet come. Warm baths have been helpful. She denies any peripheral neuropathy symptoms. Eating and drinking ok, but has less appetite at times.     ECOG performance status   0- Fully active, without restriction      Pain  Pain Score: Mild Pain (3)  Pain Loc: Neck    ROS  A 14 point review of systems was obtained.  Positive findings noted in the history.  The remainder of the review of system is otherwise negative.      Oncology History/Treatment  1/9/2024- self palpated left medial breast mass   - diagnostic mammogram and ultrasound showed 3.4 x 2.2 x 2 cm large ill-defined hypoechoic area with associated calcification at 9:00 3 cm from the nipple.  Sonographic survey of left axilla is within normal limits.    1/11/2024-ultrasound-guided core needle biopsy showed DCIS, grade 3, cribriform and papillary with comedonecrosis.  ER/MA was deferred to excisional specimen.    1/25/2024-breast actionable panel was negative including INGRID, BARD1, BRCA1, BRCA2, CDH1, CHEK2, NF1, PALB2, PTEN, STK11, TP53.    2/4/2024-MRI breast showed large segmental  "area of non-mass enhancement medial left breast measuring 10 cm from the posterior to subareolar depth corresponding to the known DCIS.  Mildly prominent bilateral internal mammary lymph node technically within normal limit.    3/5/2024-left breast simple mastectomy and left axillary sentinel lymph node biopsy   Invasive ductal carcinoma, grade 3   Multifocal, 4 foci vary from 1.3 mm to 11 mm in greatest dimension.   Margins were negative, closest margin less than 1 mm anterior/superior   DCIS, EIC present, solid and comedo pattern, grade 3, extensive comedonecrosis.  Focal lymphovascular invasion present.   1/2 sentinel lymph node was positive for metastatic carcinoma.   [id number]   ER negative in invasive carcinoma, positive and DCIS (80% of the tumor nuclei stain strongly)   WA negative and invasive cancer, positive in DCIS (30% of the tumor nuclei stain strongly of moderately)   HER2/aj positive for overexpression (3+ membrane staining)    4/26/2024- adjuvant [facility]        Physical Exam    /70   Pulse 83   Temp 98.3  F (36.8  C)   Resp 16   Ht 1.651 m (5' 5\")   Wt 75.1 kg (165 lb 8 oz)   SpO2 96%   BMI 27.54 kg/m        GENERAL: no acute distress. Cooperative in conversation.   HEENT: pupils are equal, round and reactive. Oral mucosa is moist and intact.  RESP:Chest symmetric. Regular respiratory rate. No stridor.  ABD: Nondistended, soft.  EXTREMITIES: No lower extremity edema.   NEURO: non focal. Alert and oriented x3.   PSYCH: within normal limits. No depression or anxiety.  SKIN: warm dry intact         Lab Results    Recent Results (from the past 168 hour(s))   Comprehensive metabolic panel   Result Value Ref Range    Sodium 140 135 - 145 mmol/L    Potassium 3.9 3.4 - 5.3 mmol/L    Carbon Dioxide (CO2) 28 22 - 29 mmol/L    Anion Gap 8 7 - 15 mmol/L    Urea Nitrogen 11.7 6.0 - 20.0 mg/dL    Creatinine 0.83 0.51 - 0.95 mg/dL    GFR Estimate >90 >60 mL/min/1.73m2    Calcium 9.7 8.8 - 10.4 mg/dL    " Chloride 104 98 - 107 mmol/L    Glucose 102 (H) 70 - 99 mg/dL    Alkaline Phosphatase 54 40 - 150 U/L    AST 21 0 - 45 U/L    ALT 18 0 - 50 U/L    Protein Total 6.8 6.4 - 8.3 g/dL    Albumin 4.1 3.5 - 5.2 g/dL    Bilirubin Total 0.2 <=1.2 mg/dL   CBC with platelets and differential   Result Value Ref Range    WBC Count 3.1 (L) 4.0 - 11.0 10e3/uL    RBC Count 3.62 (L) 3.80 - 5.20 10e6/uL    Hemoglobin 11.6 (L) 11.7 - 15.7 g/dL    Hematocrit 33.2 (L) 35.0 - 47.0 %    MCV 92 78 - 100 fL    MCH 32.0 26.5 - 33.0 pg    MCHC 34.9 31.5 - 36.5 g/dL    RDW 15.1 (H) 10.0 - 15.0 %    Platelet Count 142 (L) 150 - 450 10e3/uL    % Neutrophils 59 %    % Lymphocytes 29 %    % Monocytes 11 %    % Eosinophils 0 %    % Basophils 1 %    % Immature Granulocytes 0 %    NRBCs per 100 WBC 0 <1 /100    Absolute Neutrophils 1.8 1.6 - 8.3 10e3/uL    Absolute Lymphocytes 0.9 0.8 - 5.3 10e3/uL    Absolute Monocytes 0.4 0.0 - 1.3 10e3/uL    Absolute Eosinophils 0.0 0.0 - 0.7 10e3/uL    Absolute Basophils 0.0 0.0 - 0.2 10e3/uL    Absolute Immature Granulocytes 0.0 <=0.4 10e3/uL    Absolute NRBCs 0.0 10e3/uL       I reviewed the above labs today.  Imaging    No results found.      Billing  Total time 40 minutes, to include face to face visit, review of EMR, ordering, documentation and coordination of care on date of service   complexity modifier for longitudinal care.     Signed by: AYLA Mccormick CNP

## 2024-08-09 ENCOUNTER — ONCOLOGY VISIT (OUTPATIENT)
Dept: ONCOLOGY | Facility: HOSPITAL | Age: 34
End: 2024-08-09
Attending: INTERNAL MEDICINE
Payer: COMMERCIAL

## 2024-08-09 ENCOUNTER — INFUSION THERAPY VISIT (OUTPATIENT)
Dept: INFUSION THERAPY | Facility: HOSPITAL | Age: 34
End: 2024-08-09
Attending: INTERNAL MEDICINE
Payer: COMMERCIAL

## 2024-08-09 VITALS
HEART RATE: 83 BPM | RESPIRATION RATE: 16 BRPM | DIASTOLIC BLOOD PRESSURE: 70 MMHG | TEMPERATURE: 98.3 F | OXYGEN SATURATION: 96 % | SYSTOLIC BLOOD PRESSURE: 104 MMHG

## 2024-08-09 VITALS
BODY MASS INDEX: 27.57 KG/M2 | HEART RATE: 83 BPM | DIASTOLIC BLOOD PRESSURE: 70 MMHG | SYSTOLIC BLOOD PRESSURE: 104 MMHG | OXYGEN SATURATION: 96 % | WEIGHT: 165.5 LBS | RESPIRATION RATE: 16 BRPM | HEIGHT: 65 IN | TEMPERATURE: 98.3 F

## 2024-08-09 DIAGNOSIS — Z17.1 MALIGNANT NEOPLASM OF UPPER-OUTER QUADRANT OF LEFT BREAST IN FEMALE, ESTROGEN RECEPTOR NEGATIVE (H): Primary | ICD-10-CM

## 2024-08-09 DIAGNOSIS — Z51.11 ENCOUNTER FOR ANTINEOPLASTIC CHEMOTHERAPY: ICD-10-CM

## 2024-08-09 DIAGNOSIS — C50.412 MALIGNANT NEOPLASM OF UPPER-OUTER QUADRANT OF LEFT BREAST IN FEMALE, ESTROGEN RECEPTOR NEGATIVE (H): Primary | ICD-10-CM

## 2024-08-09 DIAGNOSIS — Z51.11 ENCOUNTER FOR ANTINEOPLASTIC CHEMOTHERAPY: Primary | ICD-10-CM

## 2024-08-09 DIAGNOSIS — K21.9 GASTROESOPHAGEAL REFLUX DISEASE WITHOUT ESOPHAGITIS: ICD-10-CM

## 2024-08-09 DIAGNOSIS — D05.12 NEOPLASM OF LEFT BREAST, PRIMARY TUMOR STAGING CATEGORY TIS: DUCTAL CARCINOMA IN SITU (DCIS): ICD-10-CM

## 2024-08-09 DIAGNOSIS — Z17.1 MALIGNANT NEOPLASM OF UPPER-OUTER QUADRANT OF LEFT BREAST IN FEMALE, ESTROGEN RECEPTOR NEGATIVE (H): ICD-10-CM

## 2024-08-09 DIAGNOSIS — R11.0 NAUSEA: ICD-10-CM

## 2024-08-09 DIAGNOSIS — C50.412 MALIGNANT NEOPLASM OF UPPER-OUTER QUADRANT OF LEFT BREAST IN FEMALE, ESTROGEN RECEPTOR NEGATIVE (H): ICD-10-CM

## 2024-08-09 LAB
ALBUMIN SERPL BCG-MCNC: 4.1 G/DL (ref 3.5–5.2)
ALP SERPL-CCNC: 54 U/L (ref 40–150)
ALT SERPL W P-5'-P-CCNC: 18 U/L (ref 0–50)
ANION GAP SERPL CALCULATED.3IONS-SCNC: 8 MMOL/L (ref 7–15)
AST SERPL W P-5'-P-CCNC: 21 U/L (ref 0–45)
BASOPHILS # BLD AUTO: 0 10E3/UL (ref 0–0.2)
BASOPHILS NFR BLD AUTO: 1 %
BILIRUB SERPL-MCNC: 0.2 MG/DL
BUN SERPL-MCNC: 11.7 MG/DL (ref 6–20)
CALCIUM SERPL-MCNC: 9.7 MG/DL (ref 8.8–10.4)
CHLORIDE SERPL-SCNC: 104 MMOL/L (ref 98–107)
CREAT SERPL-MCNC: 0.83 MG/DL (ref 0.51–0.95)
EGFRCR SERPLBLD CKD-EPI 2021: >90 ML/MIN/1.73M2
EOSINOPHIL # BLD AUTO: 0 10E3/UL (ref 0–0.7)
EOSINOPHIL NFR BLD AUTO: 0 %
ERYTHROCYTE [DISTWIDTH] IN BLOOD BY AUTOMATED COUNT: 15.1 % (ref 10–15)
GLUCOSE SERPL-MCNC: 102 MG/DL (ref 70–99)
HCO3 SERPL-SCNC: 28 MMOL/L (ref 22–29)
HCT VFR BLD AUTO: 33.2 % (ref 35–47)
HGB BLD-MCNC: 11.6 G/DL (ref 11.7–15.7)
IMM GRANULOCYTES # BLD: 0 10E3/UL
IMM GRANULOCYTES NFR BLD: 0 %
LYMPHOCYTES # BLD AUTO: 0.9 10E3/UL (ref 0.8–5.3)
LYMPHOCYTES NFR BLD AUTO: 29 %
MCH RBC QN AUTO: 32 PG (ref 26.5–33)
MCHC RBC AUTO-ENTMCNC: 34.9 G/DL (ref 31.5–36.5)
MCV RBC AUTO: 92 FL (ref 78–100)
MONOCYTES # BLD AUTO: 0.4 10E3/UL (ref 0–1.3)
MONOCYTES NFR BLD AUTO: 11 %
NEUTROPHILS # BLD AUTO: 1.8 10E3/UL (ref 1.6–8.3)
NEUTROPHILS NFR BLD AUTO: 59 %
NRBC # BLD AUTO: 0 10E3/UL
NRBC BLD AUTO-RTO: 0 /100
PLATELET # BLD AUTO: 142 10E3/UL (ref 150–450)
POTASSIUM SERPL-SCNC: 3.9 MMOL/L (ref 3.4–5.3)
PROT SERPL-MCNC: 6.8 G/DL (ref 6.4–8.3)
RBC # BLD AUTO: 3.62 10E6/UL (ref 3.8–5.2)
SODIUM SERPL-SCNC: 140 MMOL/L (ref 135–145)
WBC # BLD AUTO: 3.1 10E3/UL (ref 4–11)

## 2024-08-09 PROCEDURE — 96375 TX/PRO/DX INJ NEW DRUG ADDON: CPT

## 2024-08-09 PROCEDURE — 85041 AUTOMATED RBC COUNT: CPT | Performed by: INTERNAL MEDICINE

## 2024-08-09 PROCEDURE — 250N000011 HC RX IP 250 OP 636

## 2024-08-09 PROCEDURE — 99215 OFFICE O/P EST HI 40 MIN: CPT

## 2024-08-09 PROCEDURE — 84155 ASSAY OF PROTEIN SERUM: CPT | Performed by: INTERNAL MEDICINE

## 2024-08-09 PROCEDURE — G2211 COMPLEX E/M VISIT ADD ON: HCPCS

## 2024-08-09 PROCEDURE — 96367 TX/PROPH/DG ADDL SEQ IV INF: CPT

## 2024-08-09 PROCEDURE — 36591 DRAW BLOOD OFF VENOUS DEVICE: CPT | Performed by: INTERNAL MEDICINE

## 2024-08-09 PROCEDURE — 99214 OFFICE O/P EST MOD 30 MIN: CPT

## 2024-08-09 PROCEDURE — 250N000013 HC RX MED GY IP 250 OP 250 PS 637

## 2024-08-09 PROCEDURE — 96413 CHEMO IV INFUSION 1 HR: CPT

## 2024-08-09 PROCEDURE — 82040 ASSAY OF SERUM ALBUMIN: CPT | Performed by: INTERNAL MEDICINE

## 2024-08-09 PROCEDURE — 258N000003 HC RX IP 258 OP 636

## 2024-08-09 PROCEDURE — 96417 CHEMO IV INFUS EACH ADDL SEQ: CPT

## 2024-08-09 RX ORDER — ACETAMINOPHEN 325 MG/1
650 TABLET ORAL
Status: DISCONTINUED | OUTPATIENT
Start: 2024-08-09 | End: 2024-08-09 | Stop reason: HOSPADM

## 2024-08-09 RX ORDER — DIPHENHYDRAMINE HYDROCHLORIDE 50 MG/ML
50 INJECTION INTRAMUSCULAR; INTRAVENOUS
Status: CANCELLED
Start: 2024-08-09

## 2024-08-09 RX ORDER — ALBUTEROL SULFATE 90 UG/1
1-2 AEROSOL, METERED RESPIRATORY (INHALATION)
Status: CANCELLED
Start: 2024-08-09

## 2024-08-09 RX ORDER — HEPARIN SODIUM (PORCINE) LOCK FLUSH IV SOLN 100 UNIT/ML 100 UNIT/ML
5 SOLUTION INTRAVENOUS
Status: DISCONTINUED | OUTPATIENT
Start: 2024-08-09 | End: 2024-08-09 | Stop reason: HOSPADM

## 2024-08-09 RX ORDER — DIPHENHYDRAMINE HCL 50 MG
50 CAPSULE ORAL
Status: COMPLETED | OUTPATIENT
Start: 2024-08-09 | End: 2024-08-09

## 2024-08-09 RX ORDER — HEPARIN SODIUM,PORCINE 10 UNIT/ML
5-20 VIAL (ML) INTRAVENOUS DAILY PRN
Status: CANCELLED | OUTPATIENT
Start: 2024-08-09

## 2024-08-09 RX ORDER — ACETAMINOPHEN 325 MG/1
650 TABLET ORAL
Status: CANCELLED
Start: 2024-08-09

## 2024-08-09 RX ORDER — ALBUTEROL SULFATE 90 UG/1
1-2 AEROSOL, METERED RESPIRATORY (INHALATION)
Status: DISCONTINUED | OUTPATIENT
Start: 2024-08-09 | End: 2024-08-09 | Stop reason: HOSPADM

## 2024-08-09 RX ORDER — LORAZEPAM 1 MG/1
1 TABLET ORAL EVERY 6 HOURS PRN
Qty: 30 TABLET | Refills: 0 | Status: SHIPPED | OUTPATIENT
Start: 2024-08-09

## 2024-08-09 RX ORDER — ALBUTEROL SULFATE 0.83 MG/ML
2.5 SOLUTION RESPIRATORY (INHALATION)
Status: CANCELLED | OUTPATIENT
Start: 2024-08-09

## 2024-08-09 RX ORDER — HEPARIN SODIUM (PORCINE) LOCK FLUSH IV SOLN 100 UNIT/ML 100 UNIT/ML
5 SOLUTION INTRAVENOUS
Status: CANCELLED | OUTPATIENT
Start: 2024-08-09

## 2024-08-09 RX ORDER — EPINEPHRINE 1 MG/ML
0.3 INJECTION, SOLUTION INTRAMUSCULAR; SUBCUTANEOUS EVERY 5 MIN PRN
Status: DISCONTINUED | OUTPATIENT
Start: 2024-08-09 | End: 2024-08-09 | Stop reason: HOSPADM

## 2024-08-09 RX ORDER — EPINEPHRINE 1 MG/ML
0.3 INJECTION, SOLUTION INTRAMUSCULAR; SUBCUTANEOUS EVERY 5 MIN PRN
Status: CANCELLED | OUTPATIENT
Start: 2024-08-09

## 2024-08-09 RX ORDER — DIPHENHYDRAMINE HCL 50 MG
50 CAPSULE ORAL
Status: CANCELLED | OUTPATIENT
Start: 2024-08-09

## 2024-08-09 RX ORDER — METHYLPREDNISOLONE SODIUM SUCCINATE 125 MG/2ML
125 INJECTION, POWDER, LYOPHILIZED, FOR SOLUTION INTRAMUSCULAR; INTRAVENOUS
Status: CANCELLED
Start: 2024-08-09

## 2024-08-09 RX ORDER — DIPHENHYDRAMINE HYDROCHLORIDE 50 MG/ML
50 INJECTION INTRAMUSCULAR; INTRAVENOUS
Status: DISCONTINUED | OUTPATIENT
Start: 2024-08-09 | End: 2024-08-09 | Stop reason: HOSPADM

## 2024-08-09 RX ORDER — ALBUTEROL SULFATE 0.83 MG/ML
2.5 SOLUTION RESPIRATORY (INHALATION)
Status: DISCONTINUED | OUTPATIENT
Start: 2024-08-09 | End: 2024-08-09 | Stop reason: HOSPADM

## 2024-08-09 RX ORDER — PALONOSETRON 0.05 MG/ML
0.25 INJECTION, SOLUTION INTRAVENOUS ONCE
Status: CANCELLED | OUTPATIENT
Start: 2024-08-09

## 2024-08-09 RX ORDER — PALONOSETRON 0.05 MG/ML
0.25 INJECTION, SOLUTION INTRAVENOUS ONCE
Status: COMPLETED | OUTPATIENT
Start: 2024-08-09 | End: 2024-08-09

## 2024-08-09 RX ORDER — MEPERIDINE HYDROCHLORIDE 50 MG/ML
25 INJECTION INTRAMUSCULAR; INTRAVENOUS; SUBCUTANEOUS EVERY 30 MIN PRN
Status: DISCONTINUED | OUTPATIENT
Start: 2024-08-09 | End: 2024-08-09 | Stop reason: HOSPADM

## 2024-08-09 RX ORDER — MEPERIDINE HYDROCHLORIDE 50 MG/ML
25 INJECTION INTRAMUSCULAR; INTRAVENOUS; SUBCUTANEOUS EVERY 30 MIN PRN
Status: CANCELLED | OUTPATIENT
Start: 2024-08-09

## 2024-08-09 RX ORDER — METHYLPREDNISOLONE SODIUM SUCCINATE 125 MG/2ML
125 INJECTION, POWDER, LYOPHILIZED, FOR SOLUTION INTRAMUSCULAR; INTRAVENOUS
Status: DISCONTINUED | OUTPATIENT
Start: 2024-08-09 | End: 2024-08-09 | Stop reason: HOSPADM

## 2024-08-09 RX ORDER — LORAZEPAM 2 MG/ML
0.5 INJECTION INTRAMUSCULAR EVERY 4 HOURS PRN
Status: CANCELLED | OUTPATIENT
Start: 2024-08-09

## 2024-08-09 RX ADMIN — DIPHENHYDRAMINE HYDROCHLORIDE 50 MG: 50 CAPSULE ORAL at 10:04

## 2024-08-09 RX ADMIN — SODIUM CHLORIDE 735 MG: 900 INJECTION, SOLUTION INTRAVENOUS at 12:38

## 2024-08-09 RX ADMIN — SODIUM CHLORIDE 441 MG: 9 INJECTION, SOLUTION INTRAVENOUS at 11:01

## 2024-08-09 RX ADMIN — SODIUM CHLORIDE 250 ML: 9 INJECTION, SOLUTION INTRAVENOUS at 09:51

## 2024-08-09 RX ADMIN — DOCETAXEL 140 MG: 20 INJECTION, SOLUTION INTRAVENOUS at 11:35

## 2024-08-09 RX ADMIN — PERTUZUMAB 420 MG: 30 INJECTION, SOLUTION, CONCENTRATE INTRAVENOUS at 10:21

## 2024-08-09 RX ADMIN — PALONOSETRON 0.25 MG: 0.05 INJECTION, SOLUTION INTRAVENOUS at 09:51

## 2024-08-09 RX ADMIN — HEPARIN 5 ML: 100 SYRINGE at 13:21

## 2024-08-09 RX ADMIN — FOSAPREPITANT: 150 INJECTION, POWDER, LYOPHILIZED, FOR SOLUTION INTRAVENOUS at 09:51

## 2024-08-09 ASSESSMENT — PAIN SCALES - GENERAL: PAINLEVEL: MILD PAIN (3)

## 2024-08-09 NOTE — LETTER
8/9/2024      Vibha Kingston  [address] [place] [address]      Dear Colleague,    Thank you for referring your patient, Vibha Kingston, to the [facility] [place]. Please see a copy of my visit note below.    [facility] Hematology and Oncology Outpatient Progress Note    Patient: Vibha Kingston  MRN: [id number]  Date of Service: Aug 9, 2024          Reason for Visit    Chief Complaint   Patient presents with     Oncology Clinic Visit     Invasive ductal carcinoma of breast, female, left; Malignant neoplasm of upper-outer quadrant of left breast in female, estrogen receptor negative         Cancer Staging   Malignant neoplasm of upper-outer quadrant of left breast in female, estrogen receptor negative (H)  Staging form: Breast, AJCC 8th Edition  - Pathologic stage from 3/13/2024: Stage IIA (pT1c, pN1a(sn), cM0, G3, ER-, NM-, HER2+) - Signed by Rosalind Adams MD on 3/13/2024      Primary Hematologist/Oncologist: Dr. Adams        Assessment/Plan        #.  History of stage IIA (pT1c pN1a M0) invasive ductal carcinoma of the left breast, grade 3, ER negative, NM negative, HER2 positive  #.  DCIS of the left breast, ER positive, NM positive  #.  Nausea + reflux, related to chemotherapy  #.  Mild thrombocytopenia, related to chemotherapy  #.  Rectal pain, sore/hemorrhoid    Vibha is status post left mastectomy with node+ cancer. The invasive cancer was HER2+ (ER/NM neg) and DCIS component ER/NM+. She has completed 5 cycles of adjuvant TCHP. Doing well. Nausea improved after addition of omeprazole. No neuropathy. Still with rectal pain, diagnosed as a fissure - awaiting cream per CRS. No fever, no drainage. We reviewed labs today with are stable. CMP without abnormality and CBC with a hgb of 11.6, plt count 142, WBC of 3.1 and ANC is normal.     Plan:  -Proceed with cycle 6 TCHP. No dose adjustments needed  -Continue omeprazole 20 mg daily through rest of chemo. Rx provided.    -Monitor rectal lesion. Begin treatment per colorectal when able.   -Proceed with Radiation mapping in August as planned given +estiven disease.   -Upon completion of chemo, will transition to maintenance Herceptin + Perjeta to complete a full year  -ECHO every 3 months on HER2 directed therapy, next due in September  -Role of endocrine therapy would be chemoprevention in setting of ER/MI+ DCIS but no role for the invasive breast cancer. That will be decided by Dr. Adams at a future visit, after completion of radiation.     ______________________________________________________________________________    History of Present Illness/ Interval History    Ms. Vibha Kingston  is a 33 year old female patient who presents for follow up to breast cancer and for consideration of next cycle of chemotherapy. She is here for her last cycle of adjuvant TCHP which she is tolerating pretty well. Her nausea this time around was much improved after starting omeprazole. She fluctuates with constipation and diarrhea. She is still having some rectal discomfort, though saw colorectal who diagnosed a fissure and recommended a cream that hasn't yet come. Warm baths have been helpful. She denies any peripheral neuropathy symptoms. Eating and drinking ok, but has less appetite at times.     ECOG performance status   0- Fully active, without restriction      Pain  Pain Score: Mild Pain (3)  Pain Loc: Neck    ROS  A 14 point review of systems was obtained.  Positive findings noted in the history.  The remainder of the review of system is otherwise negative.      Oncology History/Treatment  1/9/2024- self palpated left medial breast mass   - diagnostic mammogram and ultrasound showed 3.4 x 2.2 x 2 cm large ill-defined hypoechoic area with associated calcification at 9:00 3 cm from the nipple.  Sonographic survey of left axilla is within normal limits.    1/11/2024-ultrasound-guided core needle biopsy showed DCIS, grade 3, cribriform and  "papillary with comedonecrosis.  ER/TN was deferred to excisional specimen.    1/25/2024-breast actionable panel was negative including INGRID, BARD1, BRCA1, BRCA2, CDH1, CHEK2, NF1, PALB2, PTEN, STK11, TP53.    2/4/2024-MRI breast showed large segmental area of non-mass enhancement medial left breast measuring 10 cm from the posterior to subareolar depth corresponding to the known DCIS.  Mildly prominent bilateral internal mammary lymph node technically within normal limit.    3/5/2024-left breast simple mastectomy and left axillary sentinel lymph node biopsy   Invasive ductal carcinoma, grade 3   Multifocal, 4 foci vary from 1.3 mm to 11 mm in greatest dimension.   Margins were negative, closest margin less than 1 mm anterior/superior   DCIS, EIC present, solid and comedo pattern, grade 3, extensive comedonecrosis.  Focal lymphovascular invasion present.   1/2 sentinel lymph node was positive for metastatic carcinoma.   [id number]   ER negative in invasive carcinoma, positive and DCIS (80% of the tumor nuclei stain strongly)   TN negative and invasive cancer, positive in DCIS (30% of the tumor nuclei stain strongly of moderately)   HER2/aj positive for overexpression (3+ membrane staining)    4/26/2024- adjuvant TCHP        Physical Exam    /70   Pulse 83   Temp 98.3  F (36.8  C)   Resp 16   Ht 1.651 m (5' 5\")   Wt 75.1 kg (165 lb 8 oz)   SpO2 96%   BMI 27.54 kg/m        GENERAL: no acute distress. Cooperative in conversation.   HEENT: pupils are equal, round and reactive. Oral mucosa is moist and intact.  RESP:Chest symmetric. Regular respiratory rate. No stridor.  ABD: Nondistended, soft.  EXTREMITIES: No lower extremity edema.   NEURO: non focal. Alert and oriented x3.   PSYCH: within normal limits. No depression or anxiety.  SKIN: warm dry intact         Lab Results    Recent Results (from the past 168 hour(s))   Comprehensive metabolic panel   Result Value Ref Range    Sodium 140 135 - 145 mmol/L    " Potassium 3.9 3.4 - 5.3 mmol/L    Carbon Dioxide (CO2) 28 22 - 29 mmol/L    Anion Gap 8 7 - 15 mmol/L    Urea Nitrogen 11.7 6.0 - 20.0 mg/dL    Creatinine 0.83 0.51 - 0.95 mg/dL    GFR Estimate >90 >60 mL/min/1.73m2    Calcium 9.7 8.8 - 10.4 mg/dL    Chloride 104 98 - 107 mmol/L    Glucose 102 (H) 70 - 99 mg/dL    Alkaline Phosphatase 54 40 - 150 U/L    AST 21 0 - 45 U/L    ALT 18 0 - 50 U/L    Protein Total 6.8 6.4 - 8.3 g/dL    Albumin 4.1 3.5 - 5.2 g/dL    Bilirubin Total 0.2 <=1.2 mg/dL   CBC with platelets and differential   Result Value Ref Range    WBC Count 3.1 (L) 4.0 - 11.0 10e3/uL    RBC Count 3.62 (L) 3.80 - 5.20 10e6/uL    Hemoglobin 11.6 (L) 11.7 - 15.7 g/dL    Hematocrit 33.2 (L) 35.0 - 47.0 %    MCV 92 78 - 100 fL    MCH 32.0 26.5 - 33.0 pg    MCHC 34.9 31.5 - 36.5 g/dL    RDW 15.1 (H) 10.0 - 15.0 %    Platelet Count 142 (L) 150 - 450 10e3/uL    % Neutrophils 59 %    % Lymphocytes 29 %    % Monocytes 11 %    % Eosinophils 0 %    % Basophils 1 %    % Immature Granulocytes 0 %    NRBCs per 100 WBC 0 <1 /100    Absolute Neutrophils 1.8 1.6 - 8.3 10e3/uL    Absolute Lymphocytes 0.9 0.8 - 5.3 10e3/uL    Absolute Monocytes 0.4 0.0 - 1.3 10e3/uL    Absolute Eosinophils 0.0 0.0 - 0.7 10e3/uL    Absolute Basophils 0.0 0.0 - 0.2 10e3/uL    Absolute Immature Granulocytes 0.0 <=0.4 10e3/uL    Absolute NRBCs 0.0 10e3/uL       I reviewed the above labs today.  Imaging    No results found.      Billing  Total time 40 minutes, to include face to face visit, review of EMR, ordering, documentation and coordination of care on date of service   complexity modifier for longitudinal care.     Signed by: AYLA Mccormick CNP      Oncology Rooming Note    August 9, 2024 8:31 AM   Vibha Kingston is a 34 year old female who presents for:    Chief Complaint   Patient presents with     Oncology Clinic Visit     Invasive ductal carcinoma of breast, female, left; Malignant neoplasm of upper-outer quadrant of left breast  "in female, estrogen receptor negative      Initial Vitals: /70   Pulse 83   Temp 98.3  F (36.8  C)   Resp 16   Ht 1.651 m (5' 5\")   Wt 75.1 kg (165 lb 8 oz)   SpO2 96%   BMI 27.54 kg/m   Estimated body mass index is 27.54 kg/m  as calculated from the following:    Height as of this encounter: 1.651 m (5' 5\").    Weight as of this encounter: 75.1 kg (165 lb 8 oz). Body surface area is 1.86 meters squared.  Mild Pain (3) Comment: Data Unavailable   No LMP recorded. (Menstrual status: Amenorrhea).  Allergies reviewed: Yes  Medications reviewed: Yes    Medications: MEDICATION REFILLS NEEDED TODAY. Provider was notified.  Pharmacy name entered into [employer]: [id number] - [place], [place] - [address]    Frailty Screening:   Is the patient here for a new oncology consult visit in cancer care? 2. No      Clinical concerns:  3 week labs and chemo      Danyell Lira                Again, thank you for allowing me to participate in the care of your patient.        Sincerely,        Mary Champion, APRN CNP  "

## 2024-08-09 NOTE — PROGRESS NOTES
Infusion Nursing Note:  Vibha Kingston presents today for TCHP.    Patient seen by provider today: Yes: Mary Champion NP   present during visit today: Not Applicable.    Note: /70   Pulse 83   Temp 98.3  F (36.8  C)   Resp 16   SpO2 96%   Last day of chemo today. Will continue with perjeta and trazimera [id number].      Intravenous Access:  Implanted Port.    Treatment Conditions:  Lab Results   Component Value Date    HGB 11.6 (L) 08/09/2024    WBC 3.1 (L) 08/09/2024    ANEUTAUTO 1.8 08/09/2024     (L) 08/09/2024        Lab Results   Component Value Date     08/09/2024    POTASSIUM 3.9 08/09/2024    CR 0.83 08/09/2024    RACHAEL 9.7 08/09/2024    BILITOTAL 0.2 08/09/2024    ALBUMIN 4.1 08/09/2024    ALT 18 08/09/2024    AST 21 08/09/2024       Results reviewed, labs MET treatment parameters, ok to proceed with treatment.      Post Infusion Assessment:  Patient tolerated infusion without incident.  Blood return noted pre and post infusion.  Site patent and intact, free from redness, edema or discomfort.  No evidence of extravasations.  Access discontinued per protocol.       Discharge Plan:   Patient discharged in stable condition accompanied by: self, , sister, mother, and father.  Departure Mode: Ambulatory.      Dian Castro RN

## 2024-08-09 NOTE — PROGRESS NOTES
"Oncology Rooming Note    August 9, 2024 8:31 AM   Vibha Kingston is a 34 year old female who presents for:    Chief Complaint   Patient presents with    Oncology Clinic Visit     Invasive ductal carcinoma of breast, female, left; Malignant neoplasm of upper-outer quadrant of left breast in female, estrogen receptor negative      Initial Vitals: /70   Pulse 83   Temp 98.3  F (36.8  C)   Resp 16   Ht 1.651 m (5' 5\")   Wt 75.1 kg (165 lb 8 oz)   SpO2 96%   BMI 27.54 kg/m   Estimated body mass index is 27.54 kg/m  as calculated from the following:    Height as of this encounter: 1.651 m (5' 5\").    Weight as of this encounter: 75.1 kg (165 lb 8 oz). Body surface area is 1.86 meters squared.  Mild Pain (3) Comment: Data Unavailable   No LMP recorded. (Menstrual status: Amenorrhea).  Allergies reviewed: Yes  Medications reviewed: Yes    Medications: MEDICATION REFILLS NEEDED TODAY. Provider was notified.  Pharmacy name entered into [employer]: [id number] - [place], [place] - [address]    Frailty Screening:   Is the patient here for a new oncology consult visit in cancer care? 2. No      Clinical concerns:  3 week labs and chemo      Danyell Lira              "

## 2024-08-21 ENCOUNTER — ALLIED HEALTH/NURSE VISIT (OUTPATIENT)
Dept: RADIATION ONCOLOGY | Facility: HOSPITAL | Age: 34
End: 2024-08-21
Attending: STUDENT IN AN ORGANIZED HEALTH CARE EDUCATION/TRAINING PROGRAM
Payer: COMMERCIAL

## 2024-08-21 ENCOUNTER — DOCUMENTATION ONLY (OUTPATIENT)
Dept: RADIATION ONCOLOGY | Facility: HOSPITAL | Age: 34
End: 2024-08-21

## 2024-08-21 DIAGNOSIS — Z17.1 MALIGNANT NEOPLASM OF UPPER-OUTER QUADRANT OF LEFT BREAST IN FEMALE, ESTROGEN RECEPTOR NEGATIVE (H): Primary | ICD-10-CM

## 2024-08-21 DIAGNOSIS — C50.412 MALIGNANT NEOPLASM OF UPPER-OUTER QUADRANT OF LEFT BREAST IN FEMALE, ESTROGEN RECEPTOR NEGATIVE (H): Primary | ICD-10-CM

## 2024-08-21 LAB — HCG UR QL: NEGATIVE

## 2024-08-21 PROCEDURE — 81025 URINE PREGNANCY TEST: CPT | Performed by: STUDENT IN AN ORGANIZED HEALTH CARE EDUCATION/TRAINING PROGRAM

## 2024-08-21 PROCEDURE — 77470 SPECIAL RADIATION TREATMENT: CPT | Performed by: STUDENT IN AN ORGANIZED HEALTH CARE EDUCATION/TRAINING PROGRAM

## 2024-08-21 PROCEDURE — 77290 THER RAD SIMULAJ FIELD CPLX: CPT | Performed by: STUDENT IN AN ORGANIZED HEALTH CARE EDUCATION/TRAINING PROGRAM

## 2024-08-21 PROCEDURE — 77290 THER RAD SIMULAJ FIELD CPLX: CPT | Mod: 26 | Performed by: STUDENT IN AN ORGANIZED HEALTH CARE EDUCATION/TRAINING PROGRAM

## 2024-08-21 PROCEDURE — 77334 RADIATION TREATMENT AID(S): CPT | Performed by: STUDENT IN AN ORGANIZED HEALTH CARE EDUCATION/TRAINING PROGRAM

## 2024-08-21 PROCEDURE — 77334 RADIATION TREATMENT AID(S): CPT | Mod: 26 | Performed by: STUDENT IN AN ORGANIZED HEALTH CARE EDUCATION/TRAINING PROGRAM

## 2024-08-21 PROCEDURE — 77470 SPECIAL RADIATION TREATMENT: CPT | Mod: 26 | Performed by: STUDENT IN AN ORGANIZED HEALTH CARE EDUCATION/TRAINING PROGRAM

## 2024-08-21 NOTE — PROGRESS NOTES
..Radiation oncolgy wanted to notify medical oncology that Vibha Kingston is scheduled to start radiation on 9/5/24.  Please call radiation oncology at [facility]'s or [facility]'s if you have further questions.

## 2024-08-21 NOTE — PROGRESS NOTES
Area(s) simulated:  Left breast  For planning:  CT Scan  Custom devices to be used: Breast board, Vaculock  Patient position: Supine  Field arrangement: Obliques, electron en face  Signed Informed Consent obtained.  Planned dose:   4500 cGy in 15 fractions to the left chest wall with 1000 cGy in 4 fraction boost given close less than 1 mm margins.      Comments:  DIAGNOSIS: left breast stage pT1c pN1a Invasive ductal carcinoma ER/ND-, Her2 +, grade 3, multifocal s/p mastectomy and SLN with negative but close margins and 1/3 LN+.  Status post adjuvant chemo TCHP x 6 cycles (8/9/2024).     Simulation for an adjuvant course of radiotherapy was performed. The patient was  placed in the supine position on the CT simulator couch and positioned using a breast board.  Scar and borders wired.  Planning CT of the chest was done without DIBH. The procedure was well tolerated.    CTV will include the chest wall and regional lymphatics and treatment will be done with DIBH complete documentation for simulation, devices, treatment planning, calculations,  in [place]

## 2024-08-21 NOTE — PROGRESS NOTES
Pt ambulatory to radiation for planning pictures for RT. UPT done and negative, pt signed consent for treatment.

## 2024-08-30 ENCOUNTER — LAB (OUTPATIENT)
Dept: INFUSION THERAPY | Facility: HOSPITAL | Age: 34
End: 2024-08-30
Attending: INTERNAL MEDICINE
Payer: COMMERCIAL

## 2024-08-30 ENCOUNTER — ONCOLOGY VISIT (OUTPATIENT)
Dept: ONCOLOGY | Facility: HOSPITAL | Age: 34
End: 2024-08-30
Attending: INTERNAL MEDICINE
Payer: COMMERCIAL

## 2024-08-30 VITALS
SYSTOLIC BLOOD PRESSURE: 114 MMHG | OXYGEN SATURATION: 98 % | BODY MASS INDEX: 27.59 KG/M2 | TEMPERATURE: 97.6 F | HEART RATE: 88 BPM | DIASTOLIC BLOOD PRESSURE: 82 MMHG | RESPIRATION RATE: 16 BRPM | WEIGHT: 165.6 LBS | HEIGHT: 65 IN

## 2024-08-30 DIAGNOSIS — Z17.1 MALIGNANT NEOPLASM OF UPPER-OUTER QUADRANT OF LEFT BREAST IN FEMALE, ESTROGEN RECEPTOR NEGATIVE (H): Primary | ICD-10-CM

## 2024-08-30 DIAGNOSIS — Z17.1 MALIGNANT NEOPLASM OF UPPER-OUTER QUADRANT OF LEFT BREAST IN FEMALE, ESTROGEN RECEPTOR NEGATIVE (H): ICD-10-CM

## 2024-08-30 DIAGNOSIS — Z79.899 ENCOUNTER FOR MONITORING CARDIOTOXIC DRUG THERAPY: ICD-10-CM

## 2024-08-30 DIAGNOSIS — Z51.81 ENCOUNTER FOR MONITORING CARDIOTOXIC DRUG THERAPY: ICD-10-CM

## 2024-08-30 DIAGNOSIS — C50.412 MALIGNANT NEOPLASM OF UPPER-OUTER QUADRANT OF LEFT BREAST IN FEMALE, ESTROGEN RECEPTOR NEGATIVE (H): Primary | ICD-10-CM

## 2024-08-30 DIAGNOSIS — C50.412 MALIGNANT NEOPLASM OF UPPER-OUTER QUADRANT OF LEFT BREAST IN FEMALE, ESTROGEN RECEPTOR NEGATIVE (H): ICD-10-CM

## 2024-08-30 DIAGNOSIS — Z51.11 ENCOUNTER FOR ANTINEOPLASTIC CHEMOTHERAPY: ICD-10-CM

## 2024-08-30 DIAGNOSIS — K21.9 GASTROESOPHAGEAL REFLUX DISEASE WITHOUT ESOPHAGITIS: ICD-10-CM

## 2024-08-30 LAB
ALBUMIN SERPL BCG-MCNC: 4.2 G/DL (ref 3.5–5.2)
ALP SERPL-CCNC: 56 U/L (ref 40–150)
ALT SERPL W P-5'-P-CCNC: 18 U/L (ref 0–50)
ANION GAP SERPL CALCULATED.3IONS-SCNC: 12 MMOL/L (ref 7–15)
AST SERPL W P-5'-P-CCNC: 21 U/L (ref 0–45)
BASOPHILS # BLD AUTO: 0 10E3/UL (ref 0–0.2)
BASOPHILS NFR BLD AUTO: 0 %
BILIRUB SERPL-MCNC: 0.2 MG/DL
BUN SERPL-MCNC: 9.5 MG/DL (ref 6–20)
CALCIUM SERPL-MCNC: 9.3 MG/DL (ref 8.8–10.4)
CHLORIDE SERPL-SCNC: 105 MMOL/L (ref 98–107)
CREAT SERPL-MCNC: 0.81 MG/DL (ref 0.51–0.95)
EGFRCR SERPLBLD CKD-EPI 2021: >90 ML/MIN/1.73M2
EOSINOPHIL # BLD AUTO: 0 10E3/UL (ref 0–0.7)
EOSINOPHIL NFR BLD AUTO: 0 %
ERYTHROCYTE [DISTWIDTH] IN BLOOD BY AUTOMATED COUNT: 14.8 % (ref 10–15)
GLUCOSE SERPL-MCNC: 114 MG/DL (ref 70–99)
HCO3 SERPL-SCNC: 26 MMOL/L (ref 22–29)
HCT VFR BLD AUTO: 32.2 % (ref 35–47)
HGB BLD-MCNC: 11 G/DL (ref 11.7–15.7)
IMM GRANULOCYTES # BLD: 0 10E3/UL
IMM GRANULOCYTES NFR BLD: 0 %
LYMPHOCYTES # BLD AUTO: 0.7 10E3/UL (ref 0.8–5.3)
LYMPHOCYTES NFR BLD AUTO: 25 %
MCH RBC QN AUTO: 31.9 PG (ref 26.5–33)
MCHC RBC AUTO-ENTMCNC: 34.2 G/DL (ref 31.5–36.5)
MCV RBC AUTO: 93 FL (ref 78–100)
MONOCYTES # BLD AUTO: 0.3 10E3/UL (ref 0–1.3)
MONOCYTES NFR BLD AUTO: 9 %
NEUTROPHILS # BLD AUTO: 1.8 10E3/UL (ref 1.6–8.3)
NEUTROPHILS NFR BLD AUTO: 65 %
NRBC # BLD AUTO: 0 10E3/UL
NRBC BLD AUTO-RTO: 0 /100
PLATELET # BLD AUTO: 124 10E3/UL (ref 150–450)
POTASSIUM SERPL-SCNC: 3.8 MMOL/L (ref 3.4–5.3)
PROT SERPL-MCNC: 6.6 G/DL (ref 6.4–8.3)
RBC # BLD AUTO: 3.45 10E6/UL (ref 3.8–5.2)
SODIUM SERPL-SCNC: 143 MMOL/L (ref 135–145)
WBC # BLD AUTO: 2.8 10E3/UL (ref 4–11)

## 2024-08-30 PROCEDURE — 250N000011 HC RX IP 250 OP 636

## 2024-08-30 PROCEDURE — 99214 OFFICE O/P EST MOD 30 MIN: CPT

## 2024-08-30 PROCEDURE — 96413 CHEMO IV INFUSION 1 HR: CPT

## 2024-08-30 PROCEDURE — 258N000003 HC RX IP 258 OP 636

## 2024-08-30 PROCEDURE — 80053 COMPREHEN METABOLIC PANEL: CPT

## 2024-08-30 PROCEDURE — 85025 COMPLETE CBC W/AUTO DIFF WBC: CPT

## 2024-08-30 PROCEDURE — 96417 CHEMO IV INFUS EACH ADDL SEQ: CPT

## 2024-08-30 PROCEDURE — 36591 DRAW BLOOD OFF VENOUS DEVICE: CPT

## 2024-08-30 PROCEDURE — G2211 COMPLEX E/M VISIT ADD ON: HCPCS

## 2024-08-30 PROCEDURE — 99213 OFFICE O/P EST LOW 20 MIN: CPT | Mod: 25

## 2024-08-30 RX ORDER — ALBUTEROL SULFATE 90 UG/1
1-2 AEROSOL, METERED RESPIRATORY (INHALATION)
Status: CANCELLED
Start: 2024-08-30

## 2024-08-30 RX ORDER — ALBUTEROL SULFATE 0.83 MG/ML
2.5 SOLUTION RESPIRATORY (INHALATION)
Status: DISCONTINUED | OUTPATIENT
Start: 2024-08-30 | End: 2024-08-30 | Stop reason: HOSPADM

## 2024-08-30 RX ORDER — HEPARIN SODIUM (PORCINE) LOCK FLUSH IV SOLN 100 UNIT/ML 100 UNIT/ML
5 SOLUTION INTRAVENOUS
Status: DISCONTINUED | OUTPATIENT
Start: 2024-08-30 | End: 2024-08-30 | Stop reason: HOSPADM

## 2024-08-30 RX ORDER — ALBUTEROL SULFATE 90 UG/1
1-2 AEROSOL, METERED RESPIRATORY (INHALATION)
Status: DISCONTINUED | OUTPATIENT
Start: 2024-08-30 | End: 2024-08-30 | Stop reason: HOSPADM

## 2024-08-30 RX ORDER — METHYLPREDNISOLONE SODIUM SUCCINATE 125 MG/2ML
125 INJECTION, POWDER, LYOPHILIZED, FOR SOLUTION INTRAMUSCULAR; INTRAVENOUS
Status: DISCONTINUED | OUTPATIENT
Start: 2024-08-30 | End: 2024-08-30 | Stop reason: HOSPADM

## 2024-08-30 RX ORDER — DIPHENHYDRAMINE HYDROCHLORIDE 50 MG/ML
50 INJECTION INTRAMUSCULAR; INTRAVENOUS
Status: DISCONTINUED | OUTPATIENT
Start: 2024-08-30 | End: 2024-08-30 | Stop reason: HOSPADM

## 2024-08-30 RX ORDER — MEPERIDINE HYDROCHLORIDE 50 MG/ML
25 INJECTION INTRAMUSCULAR; INTRAVENOUS; SUBCUTANEOUS EVERY 30 MIN PRN
Status: CANCELLED | OUTPATIENT
Start: 2024-08-30

## 2024-08-30 RX ORDER — ACETAMINOPHEN 325 MG/1
650 TABLET ORAL
Status: CANCELLED
Start: 2024-08-30

## 2024-08-30 RX ORDER — LORAZEPAM 2 MG/ML
0.5 INJECTION INTRAMUSCULAR EVERY 4 HOURS PRN
Status: CANCELLED | OUTPATIENT
Start: 2024-08-30

## 2024-08-30 RX ORDER — DIPHENHYDRAMINE HCL 50 MG
50 CAPSULE ORAL
Status: CANCELLED | OUTPATIENT
Start: 2024-08-30

## 2024-08-30 RX ORDER — HEPARIN SODIUM,PORCINE 10 UNIT/ML
5-20 VIAL (ML) INTRAVENOUS DAILY PRN
Status: CANCELLED | OUTPATIENT
Start: 2024-08-30

## 2024-08-30 RX ORDER — ALBUTEROL SULFATE 0.83 MG/ML
2.5 SOLUTION RESPIRATORY (INHALATION)
Status: CANCELLED | OUTPATIENT
Start: 2024-08-30

## 2024-08-30 RX ORDER — EPINEPHRINE 1 MG/ML
0.3 INJECTION, SOLUTION INTRAMUSCULAR; SUBCUTANEOUS EVERY 5 MIN PRN
Status: CANCELLED | OUTPATIENT
Start: 2024-08-30

## 2024-08-30 RX ORDER — DIPHENHYDRAMINE HYDROCHLORIDE 50 MG/ML
50 INJECTION INTRAMUSCULAR; INTRAVENOUS
Status: CANCELLED
Start: 2024-08-30

## 2024-08-30 RX ORDER — MEPERIDINE HYDROCHLORIDE 50 MG/ML
25 INJECTION INTRAMUSCULAR; INTRAVENOUS; SUBCUTANEOUS EVERY 30 MIN PRN
Status: DISCONTINUED | OUTPATIENT
Start: 2024-08-30 | End: 2024-08-30 | Stop reason: HOSPADM

## 2024-08-30 RX ORDER — HEPARIN SODIUM (PORCINE) LOCK FLUSH IV SOLN 100 UNIT/ML 100 UNIT/ML
5 SOLUTION INTRAVENOUS
Status: CANCELLED | OUTPATIENT
Start: 2024-08-30

## 2024-08-30 RX ORDER — EPINEPHRINE 1 MG/ML
0.3 INJECTION, SOLUTION INTRAMUSCULAR; SUBCUTANEOUS EVERY 5 MIN PRN
Status: DISCONTINUED | OUTPATIENT
Start: 2024-08-30 | End: 2024-08-30 | Stop reason: HOSPADM

## 2024-08-30 RX ORDER — METHYLPREDNISOLONE SODIUM SUCCINATE 125 MG/2ML
125 INJECTION, POWDER, LYOPHILIZED, FOR SOLUTION INTRAMUSCULAR; INTRAVENOUS
Status: CANCELLED
Start: 2024-08-30

## 2024-08-30 RX ADMIN — HEPARIN 5 ML: 100 SYRINGE at 11:54

## 2024-08-30 RX ADMIN — SODIUM CHLORIDE 250 ML: 9 INJECTION, SOLUTION INTRAVENOUS at 10:46

## 2024-08-30 RX ADMIN — PERTUZUMAB 420 MG: 30 INJECTION, SOLUTION, CONCENTRATE INTRAVENOUS at 10:49

## 2024-08-30 RX ADMIN — SODIUM CHLORIDE 441 MG: 9 INJECTION, SOLUTION INTRAVENOUS at 11:20

## 2024-08-30 ASSESSMENT — PAIN SCALES - GENERAL: PAINLEVEL: MILD PAIN (2)

## 2024-08-30 NOTE — PROGRESS NOTES
Infusion Nursing Note:  Vibha Kingston presents today for D1C7 perjeta and trazmeira.    Patient seen by provider today: Yes: Mary Champion, NICK   present during visit today: Not Applicable.    Note: Reviewed plan of care. Patient asking if she needs to continue double flushing toilet after treatments; confirmed with Brittany Hernandez that this is no longer necessary.      Intravenous Access:  Labs drawn and Implanted Port accessed by Bebe PETTY    Treatment Conditions:  Lab Results   Component Value Date    HGB 11.0 (L) 08/30/2024    WBC 2.8 (L) 08/30/2024    ANEUTAUTO 1.8 08/30/2024     (L) 08/30/2024        Lab Results   Component Value Date     08/30/2024    POTASSIUM 3.8 08/30/2024    CR 0.81 08/30/2024    RACHAEL 9.3 08/30/2024    BILITOTAL 0.2 08/30/2024    ALBUMIN 4.2 08/30/2024    ALT 18 08/30/2024    AST 21 08/30/2024       Results reviewed, labs MET treatment parameters, ok to proceed with treatment.      Post Infusion Assessment:  Patient tolerated infusion without incident.  Blood return noted pre and post infusion.  Site patent and intact, free from redness, edema or discomfort.  Access discontinued per protocol.       Discharge Plan:   Patient will return 9/20 for next appointment.   Patient discharged in stable condition accompanied by: .  Departure Mode: Ambulatory.      Ebony Prakash RN

## 2024-08-30 NOTE — PROGRESS NOTES
[facility] Hematology and Oncology Outpatient Progress Note    Patient: Vibha Kingston  MRN: [id number]  Date of Service: Aug 30, 2024          Reason for Visit    Chief Complaint   Patient presents with    Oncology Clinic Visit     Malignant neoplasm of upper-outer quadrant of left breast in female, estrogen receptor negative        Cancer Staging   Malignant neoplasm of upper-outer quadrant of left breast in female, estrogen receptor negative (H)  Staging form: Breast, AJCC 8th Edition  - Pathologic stage from 3/13/2024: Stage IIA (pT1c, pN1a(sn), cM0, G3, ER-, IL-, HER2+) - Signed by Rosalind Adams MD on 3/13/2024      Primary Hematologist/Oncologist: Dr. Adams    Assessment/Plan  #.  History of stage IIA (pT1c pN1a M0) invasive ductal carcinoma of the left breast, grade 3, ER negative, IL negative, HER2 positive  #.  DCIS of the left breast, ER positive, IL positive  #.  Nausea + reflux, related to chemotherapy  #.  Mild thrombocytopenia, related to chemotherapy  #.  Rectal pain, sore/hemorrhoid    Vibha is status post left mastectomy with node+ cancer. The invasive cancer was HER2+ (ER/IL neg) and DCIS component ER/IL+. She has completed 6 cycles of adjuvant TCHP. Tolerated overall well with some heartburn and diarrhea. Diffuse muscle aches worse this cycle. No neuropathy. Still with rectal pain, diagnosed as a fissure, using cream. No fever, no drainage. We reviewed labs today which are stable. CMP without abnormality and CBC with a hgb of 11.0, plt count 124, WBC of 2.8 and ANC is normal. Discussed starting maintenance Herceptin and Perjeta today. Will complete one year. She is scheduled for radiation therapy starting September. Last ECHO was 6/25/24 and was normal.     Plan:  -Proceed with cycle 1 of Herceptin and Perjeta   -Continue omeprazole 20 mg as needed  -Continue applying cream to fissure  -Proceed with Radiation given +estiven disease and close margins  -ECHO every 3 months on HER2 directed  therapy, next due in September, ordered today  -Role of endocrine therapy would be chemoprevention in setting of ER/KS+ DCIS but no role for the invasive breast cancer. That will be decided by Dr. Adams at a future visit, after completion of radiation.     ______________________________________________________________________________    History of Present Illness/ Interval History    Ms. Vibha Kingston  is a 33 year old female patient who presents for follow up to breast cancer. She completed the TCHP 6 cycles with mild/moderate effects of fatigue, nausea, muscle aches and loose stools. Her stool is softer but only having one bowel movement daily. The muscle aches were worse this cycle and diffuse. She feels sore when moving. Denies fevers or chills. She is taking omeprazole daily for her heartburn which has helped for the nausea. Her rectal fissure continues to be uncomfortable, but she has been applying the cream daily. Denies peripheral  neuropathy. No mouth sores. Appetite okay.     ECOG performance status   0- Fully active, without restriction      Pain  Pain Score: Mild Pain (2)  Pain Loc: Other - see comment (muscle  soreness, bilateral legs)    ROS  A 14 point review of systems was obtained.  Positive findings noted in the history.  The remainder of the review of system is otherwise negative.      Oncology History/Treatment  1/9/2024- self palpated left medial breast mass   - diagnostic mammogram and ultrasound showed 3.4 x 2.2 x 2 cm large ill-defined hypoechoic area with associated calcification at 9:00 3 cm from the nipple.  Sonographic survey of left axilla is within normal limits.    1/11/2024-ultrasound-guided core needle biopsy showed DCIS, grade 3, cribriform and papillary with comedonecrosis.  ER/KS was deferred to excisional specimen.    1/25/2024-breast actionable panel was negative including INGRID, BARD1, BRCA1, BRCA2, CDH1, CHEK2, NF1, PALB2, PTEN, STK11, TP53.    2/4/2024-MRI breast showed large  "segmental area of non-mass enhancement medial left breast measuring 10 cm from the posterior to subareolar depth corresponding to the known DCIS.  Mildly prominent bilateral internal mammary lymph node technically within normal limit.    3/5/2024-left breast simple mastectomy and left axillary sentinel lymph node biopsy   Invasive ductal carcinoma, grade 3   Multifocal, 4 foci vary from 1.3 mm to 11 mm in greatest dimension.   Margins were negative, closest margin less than 1 mm anterior/superior   DCIS, EIC present, solid and comedo pattern, grade 3, extensive comedonecrosis.  Focal lymphovascular invasion present.   1/2 sentinel lymph node was positive for metastatic carcinoma.   [id number]   ER negative in invasive carcinoma, positive and DCIS (80% of the tumor nuclei stain strongly)   MD negative and invasive cancer, positive in DCIS (30% of the tumor nuclei stain strongly of moderately)   HER2/aj positive for overexpression (3+ membrane staining)    4/26/2024- adjuvant TCHP        Physical Exam    /82   Pulse 88   Temp 97.6  F (36.4  C) (Tympanic)   Resp 16   Ht 1.651 m (5' 5\")   Wt 75.1 kg (165 lb 9.6 oz)   SpO2 98%   BMI 27.56 kg/m        GENERAL: no acute distress. Cooperative in conversation. Here with her .   HEENT: pupils are equal, round and reactive. Oral mucosa is moist and intact.  RESP:Chest symmetric. Regular respiratory rate. No stridor. Lungs clear to auscultation bilaterally.   ABD: Nondistended, soft.  EXTREMITIES: No lower extremity edema.   NEURO: non focal. Alert and oriented x3.   PSYCH: within normal limits. No depression or anxiety.  SKIN: warm dry intact         Lab Results    Recent Results (from the past 168 hour(s))   CBC with platelets and differential   Result Value Ref Range    WBC Count 2.8 (L) 4.0 - 11.0 10e3/uL    RBC Count 3.45 (L) 3.80 - 5.20 10e6/uL    Hemoglobin 11.0 (L) 11.7 - 15.7 g/dL    Hematocrit 32.2 (L) 35.0 - 47.0 %    MCV 93 78 - 100 fL    MCH 31.9 " 26.5 - 33.0 pg    MCHC 34.2 31.5 - 36.5 g/dL    RDW 14.8 10.0 - 15.0 %    Platelet Count 124 (L) 150 - 450 10e3/uL    % Neutrophils 65 %    % Lymphocytes 25 %    % Monocytes 9 %    % Eosinophils 0 %    % Basophils 0 %    % Immature Granulocytes 0 %    NRBCs per 100 WBC 0 <1 /100    Absolute Neutrophils 1.8 1.6 - 8.3 10e3/uL    Absolute Lymphocytes 0.7 (L) 0.8 - 5.3 10e3/uL    Absolute Monocytes 0.3 0.0 - 1.3 10e3/uL    Absolute Eosinophils 0.0 0.0 - 0.7 10e3/uL    Absolute Basophils 0.0 0.0 - 0.2 10e3/uL    Absolute Immature Granulocytes 0.0 <=0.4 10e3/uL    Absolute NRBCs 0.0 10e3/uL         I reviewed the above labs today.  Imaging    No results found.    Cony Green PA-C on 8/30/2024 at 12:38 PM      I, Mary Champion, APRN CNP, saw this patient and agree with the findings and plan of care as documented in the note.     Items personally reviewed/procedural attestation: vitals, labs, and I was present for key portions of the visit and agree with Assessment and plan

## 2024-08-30 NOTE — PROGRESS NOTES
"Oncology Rooming Note    August 30, 2024 9:31 AM   Vibha Kingston is a 34 year old female who presents for:    Chief Complaint   Patient presents with    Oncology Clinic Visit     Malignant neoplasm of upper-outer quadrant of left breast in female, estrogen receptor negative     Initial Vitals: /82   Pulse 88   Temp 97.6  F (36.4  C) (Tympanic)   Resp 16   Ht 1.651 m (5' 5\")   Wt 75.1 kg (165 lb 9.6 oz)   SpO2 98%   BMI 27.56 kg/m   Estimated body mass index is 27.56 kg/m  as calculated from the following:    Height as of this encounter: 1.651 m (5' 5\").    Weight as of this encounter: 75.1 kg (165 lb 9.6 oz). Body surface area is 1.86 meters squared.  Mild Pain (2) Comment: Data Unavailable   No LMP recorded. (Menstrual status: Amenorrhea).  Allergies reviewed: Yes  Medications reviewed: Yes    Medications: Medication refills not needed today.  Pharmacy name entered into [employer]: [id number] - [place] - [address]    Frailty Screening:   Is the patient here for a new oncology consult visit in cancer care? 2. No      Clinical concerns:    No concerns per patient.        Dina Comer MA              "

## 2024-08-30 NOTE — LETTER
8/30/2024      Vibha Kingston  [address] [place] [address]      Dear Colleague,    Thank you for referring your patient, Vibha Kingston, to the [facility] [place]. Please see a copy of my visit note below.    [facility] Hematology and Oncology Outpatient Progress Note    Patient: Vibha Kingston  MRN: [id number]  Date of Service: Aug 30, 2024          Reason for Visit    Chief Complaint   Patient presents with     Oncology Clinic Visit     Malignant neoplasm of upper-outer quadrant of left breast in female, estrogen receptor negative        Cancer Staging   Malignant neoplasm of upper-outer quadrant of left breast in female, estrogen receptor negative (H)  Staging form: Breast, AJCC 8th Edition  - Pathologic stage from 3/13/2024: Stage IIA (pT1c, pN1a(sn), cM0, G3, ER-, IN-, HER2+) - Signed by Rosalind Adams MD on 3/13/2024      Primary Hematologist/Oncologist: Dr. Adams    Assessment/Plan  #.  History of stage IIA (pT1c pN1a M0) invasive ductal carcinoma of the left breast, grade 3, ER negative, IN negative, HER2 positive  #.  DCIS of the left breast, ER positive, IN positive  #.  Nausea + reflux, related to chemotherapy  #.  Mild thrombocytopenia, related to chemotherapy  #.  Rectal pain, sore/hemorrhoid    Vibha is status post left mastectomy with node+ cancer. The invasive cancer was HER2+ (ER/IN neg) and DCIS component ER/IN+. She has completed 6 cycles of adjuvant TCHP. Tolerated overall well with some heartburn and diarrhea. Diffuse muscle aches worse this cycle. No neuropathy. Still with rectal pain, diagnosed as a fissure, using cream. No fever, no drainage. We reviewed labs today which are stable. CMP without abnormality and CBC with a hgb of 11.0, plt count 124, WBC of 2.8 and ANC is normal. Discussed starting maintenance Herceptin and Perjeta today. Will complete one year. She is scheduled for radiation therapy starting September. Last ECHO was 6/25/24 and was  normal.     Plan:  -Proceed with cycle 1 of Herceptin and Perjeta   -Continue omeprazole 20 mg as needed  -Continue applying cream to fissure  -Proceed with Radiation given +estiven disease and close margins  -ECHO every 3 months on HER2 directed therapy, next due in September, ordered today  -Role of endocrine therapy would be chemoprevention in setting of ER/TN+ DCIS but no role for the invasive breast cancer. That will be decided by Dr. Adams at a future visit, after completion of radiation.     ______________________________________________________________________________    History of Present Illness/ Interval History    Ms. Vibha Kingston  is a 33 year old female patient who presents for follow up to breast cancer. She completed the TCHP 6 cycles with mild/moderate effects of fatigue, nausea, muscle aches and loose stools. Her stool is softer but only having one bowel movement daily. The muscle aches were worse this cycle and diffuse. She feels sore when moving. Denies fevers or chills. She is taking omeprazole daily for her heartburn which has helped for the nausea. Her rectal fissure continues to be uncomfortable, but she has been applying the cream daily. Denies peripheral  neuropathy. No mouth sores. Appetite okay.     ECOG performance status   0- Fully active, without restriction      Pain  Pain Score: Mild Pain (2)  Pain Loc: Other - see comment (muscle  soreness, bilateral legs)    ROS  A 14 point review of systems was obtained.  Positive findings noted in the history.  The remainder of the review of system is otherwise negative.      Oncology History/Treatment  1/9/2024- self palpated left medial breast mass   - diagnostic mammogram and ultrasound showed 3.4 x 2.2 x 2 cm large ill-defined hypoechoic area with associated calcification at 9:00 3 cm from the nipple.  Sonographic survey of left axilla is within normal limits.    1/11/2024-ultrasound-guided core needle biopsy showed DCIS, grade 3, cribriform  "and papillary with comedonecrosis.  ER/SC was deferred to excisional specimen.    1/25/2024-breast actionable panel was negative including INGRID, BARD1, BRCA1, BRCA2, CDH1, CHEK2, NF1, PALB2, PTEN, STK11, TP53.    2/4/2024-MRI breast showed large segmental area of non-mass enhancement medial left breast measuring 10 cm from the posterior to subareolar depth corresponding to the known DCIS.  Mildly prominent bilateral internal mammary lymph node technically within normal limit.    3/5/2024-left breast simple mastectomy and left axillary sentinel lymph node biopsy   Invasive ductal carcinoma, grade 3   Multifocal, 4 foci vary from 1.3 mm to 11 mm in greatest dimension.   Margins were negative, closest margin less than 1 mm anterior/superior   DCIS, EIC present, solid and comedo pattern, grade 3, extensive comedonecrosis.  Focal lymphovascular invasion present.   1/2 sentinel lymph node was positive for metastatic carcinoma.   [id number]   ER negative in invasive carcinoma, positive and DCIS (80% of the tumor nuclei stain strongly)   SC negative and invasive cancer, positive in DCIS (30% of the tumor nuclei stain strongly of moderately)   HER2/aj positive for overexpression (3+ membrane staining)    4/26/2024- adjuvant TCHP        Physical Exam    /82   Pulse 88   Temp 97.6  F (36.4  C) (Tympanic)   Resp 16   Ht 1.651 m (5' 5\")   Wt 75.1 kg (165 lb 9.6 oz)   SpO2 98%   BMI 27.56 kg/m        GENERAL: no acute distress. Cooperative in conversation. Here with her .   HEENT: pupils are equal, round and reactive. Oral mucosa is moist and intact.  RESP:Chest symmetric. Regular respiratory rate. No stridor. Lungs clear to auscultation bilaterally.   ABD: Nondistended, soft.  EXTREMITIES: No lower extremity edema.   NEURO: non focal. Alert and oriented x3.   PSYCH: within normal limits. No depression or anxiety.  SKIN: warm dry intact         Lab Results    Recent Results (from the past 168 hour(s))   CBC with " "platelets and differential   Result Value Ref Range    WBC Count 2.8 (L) 4.0 - 11.0 10e3/uL    RBC Count 3.45 (L) 3.80 - 5.20 10e6/uL    Hemoglobin 11.0 (L) 11.7 - 15.7 g/dL    Hematocrit 32.2 (L) 35.0 - 47.0 %    MCV 93 78 - 100 fL    MCH 31.9 26.5 - 33.0 pg    MCHC 34.2 31.5 - 36.5 g/dL    RDW 14.8 10.0 - 15.0 %    Platelet Count 124 (L) 150 - 450 10e3/uL    % Neutrophils 65 %    % Lymphocytes 25 %    % Monocytes 9 %    % Eosinophils 0 %    % Basophils 0 %    % Immature Granulocytes 0 %    NRBCs per 100 WBC 0 <1 /100    Absolute Neutrophils 1.8 1.6 - 8.3 10e3/uL    Absolute Lymphocytes 0.7 (L) 0.8 - 5.3 10e3/uL    Absolute Monocytes 0.3 0.0 - 1.3 10e3/uL    Absolute Eosinophils 0.0 0.0 - 0.7 10e3/uL    Absolute Basophils 0.0 0.0 - 0.2 10e3/uL    Absolute Immature Granulocytes 0.0 <=0.4 10e3/uL    Absolute NRBCs 0.0 10e3/uL         I reviewed the above labs today.  Imaging    No results found.    Cony Green PA-C on 8/30/2024 at 12:38 PM      I, Mary Champion, APRN CNP, saw this patient and agree with the findings and plan of care as documented in the note.     Items personally reviewed/procedural attestation: vitals, labs, and I was present for key portions of the visit and agree with Assessment and plan       Oncology Rooming Note    August 30, 2024 9:31 AM   Vibha Kingston is a 34 year old female who presents for:    Chief Complaint   Patient presents with     Oncology Clinic Visit     Malignant neoplasm of upper-outer quadrant of left breast in female, estrogen receptor negative     Initial Vitals: /82   Pulse 88   Temp 97.6  F (36.4  C) (Tympanic)   Resp 16   Ht 1.651 m (5' 5\")   Wt 75.1 kg (165 lb 9.6 oz)   SpO2 98%   BMI 27.56 kg/m   Estimated body mass index is 27.56 kg/m  as calculated from the following:    Height as of this encounter: 1.651 m (5' 5\").    Weight as of this encounter: 75.1 kg (165 lb 9.6 oz). Body surface area is 1.86 meters squared.  Mild Pain (2) Comment: Data " Unavailable   No LMP recorded. (Menstrual status: Amenorrhea).  Allergies reviewed: Yes  Medications reviewed: Yes    Medications: Medication refills not needed today.  Pharmacy name entered into [employer]: [id number] - [place], [place] - [address]    Frailty Screening:   Is the patient here for a new oncology consult visit in cancer care? 2. No      Clinical concerns:    No concerns per patient.        Dina Comer MA                Again, thank you for allowing me to participate in the care of your patient.        Sincerely,        AYLA Mccormick CNP

## 2024-09-04 ENCOUNTER — APPOINTMENT (OUTPATIENT)
Dept: RADIATION ONCOLOGY | Facility: CLINIC | Age: 34
End: 2024-09-04
Attending: STUDENT IN AN ORGANIZED HEALTH CARE EDUCATION/TRAINING PROGRAM
Payer: COMMERCIAL

## 2024-09-04 PROCEDURE — 77295 3-D RADIOTHERAPY PLAN: CPT | Performed by: STUDENT IN AN ORGANIZED HEALTH CARE EDUCATION/TRAINING PROGRAM

## 2024-09-04 PROCEDURE — 77295 3-D RADIOTHERAPY PLAN: CPT | Mod: 26 | Performed by: STUDENT IN AN ORGANIZED HEALTH CARE EDUCATION/TRAINING PROGRAM

## 2024-09-04 PROCEDURE — 77300 RADIATION THERAPY DOSE PLAN: CPT | Mod: 26 | Performed by: STUDENT IN AN ORGANIZED HEALTH CARE EDUCATION/TRAINING PROGRAM

## 2024-09-04 PROCEDURE — 77300 RADIATION THERAPY DOSE PLAN: CPT | Performed by: STUDENT IN AN ORGANIZED HEALTH CARE EDUCATION/TRAINING PROGRAM

## 2024-09-04 PROCEDURE — 77334 RADIATION TREATMENT AID(S): CPT | Mod: 26 | Performed by: STUDENT IN AN ORGANIZED HEALTH CARE EDUCATION/TRAINING PROGRAM

## 2024-09-04 PROCEDURE — 77334 RADIATION TREATMENT AID(S): CPT | Performed by: STUDENT IN AN ORGANIZED HEALTH CARE EDUCATION/TRAINING PROGRAM

## 2024-09-05 ENCOUNTER — TELEPHONE (OUTPATIENT)
Dept: RADIATION ONCOLOGY | Facility: CLINIC | Age: 34
End: 2024-09-05
Payer: COMMERCIAL

## 2024-09-06 ENCOUNTER — HOSPITAL ENCOUNTER (OUTPATIENT)
Dept: CARDIOLOGY | Facility: CLINIC | Age: 34
Discharge: HOME | End: 2024-09-06
Payer: COMMERCIAL

## 2024-09-06 DIAGNOSIS — Z51.11 ENCOUNTER FOR ANTINEOPLASTIC CHEMOTHERAPY: ICD-10-CM

## 2024-09-06 LAB — LVEF ECHO: NORMAL

## 2024-09-06 PROCEDURE — 93308 TTE F-UP OR LMTD: CPT | Mod: 26 | Performed by: STUDENT IN AN ORGANIZED HEALTH CARE EDUCATION/TRAINING PROGRAM

## 2024-09-06 PROCEDURE — 93356 MYOCRD STRAIN IMG SPCKL TRCK: CPT | Performed by: STUDENT IN AN ORGANIZED HEALTH CARE EDUCATION/TRAINING PROGRAM

## 2024-09-06 PROCEDURE — 93325 DOPPLER ECHO COLOR FLOW MAPG: CPT

## 2024-09-06 PROCEDURE — 93321 DOPPLER ECHO F-UP/LMTD STD: CPT | Mod: 26 | Performed by: STUDENT IN AN ORGANIZED HEALTH CARE EDUCATION/TRAINING PROGRAM

## 2024-09-06 PROCEDURE — 93325 DOPPLER ECHO COLOR FLOW MAPG: CPT | Mod: 26 | Performed by: STUDENT IN AN ORGANIZED HEALTH CARE EDUCATION/TRAINING PROGRAM

## 2024-09-09 ENCOUNTER — APPOINTMENT (OUTPATIENT)
Dept: RADIATION ONCOLOGY | Facility: CLINIC | Age: 34
End: 2024-09-09
Attending: STUDENT IN AN ORGANIZED HEALTH CARE EDUCATION/TRAINING PROGRAM
Payer: COMMERCIAL

## 2024-09-09 PROCEDURE — 77387 GUIDANCE FOR RADJ TX DLVR: CPT | Mod: 26 | Performed by: RADIOLOGY

## 2024-09-09 PROCEDURE — 77412 RADIATION TX DELIVERY LVL 3: CPT

## 2024-09-09 PROCEDURE — 77280 THER RAD SIMULAJ FIELD SMPL: CPT | Mod: 26 | Performed by: RADIOLOGY

## 2024-09-09 PROCEDURE — 77387 GUIDANCE FOR RADJ TX DLVR: CPT

## 2024-09-09 PROCEDURE — 77280 THER RAD SIMULAJ FIELD SMPL: CPT

## 2024-09-10 ENCOUNTER — APPOINTMENT (OUTPATIENT)
Dept: RADIATION ONCOLOGY | Facility: CLINIC | Age: 34
End: 2024-09-10
Attending: STUDENT IN AN ORGANIZED HEALTH CARE EDUCATION/TRAINING PROGRAM
Payer: COMMERCIAL

## 2024-09-10 PROCEDURE — 77412 RADIATION TX DELIVERY LVL 3: CPT

## 2024-09-10 PROCEDURE — 77387 GUIDANCE FOR RADJ TX DLVR: CPT | Mod: 26 | Performed by: RADIOLOGY

## 2024-09-10 PROCEDURE — 77387 GUIDANCE FOR RADJ TX DLVR: CPT

## 2024-09-11 ENCOUNTER — APPOINTMENT (OUTPATIENT)
Dept: RADIATION ONCOLOGY | Facility: CLINIC | Age: 34
End: 2024-09-11
Attending: STUDENT IN AN ORGANIZED HEALTH CARE EDUCATION/TRAINING PROGRAM
Payer: COMMERCIAL

## 2024-09-11 PROCEDURE — 77412 RADIATION TX DELIVERY LVL 3: CPT | Performed by: RADIOLOGY

## 2024-09-11 PROCEDURE — 77387 GUIDANCE FOR RADJ TX DLVR: CPT | Mod: 26 | Performed by: RADIOLOGY

## 2024-09-11 PROCEDURE — 77387 GUIDANCE FOR RADJ TX DLVR: CPT | Performed by: RADIOLOGY

## 2024-09-11 NOTE — TELEPHONE ENCOUNTER
Patient had called and spoken with the radiation therapist about delaying her radiation treatment start date to Monday 9/9/24.  Patient has tested positive for covid.  Talked with patient and she states she is not having a fever but is having an increase in cold like symptoms such as nasal congestion and fatigue.  Patient denies fever or shortness of breath.  Patient would like to hold off on starting her radiation until next week.  Patient talked to the radiation therapist about coordinating her schedule to start next week.  Patient instructed to come as the last patient of the day, time given, and to stay in her car and call the treatment room when she arrives.  Staff will come out to get her from her car and bring her in a separate entrance to avoid contact with other immunocompromised patients.  Patient made aware we will continue this process until she is 10 days out from start of symptoms.  Patient appreciative of information.

## 2024-09-12 ENCOUNTER — APPOINTMENT (OUTPATIENT)
Dept: RADIATION ONCOLOGY | Facility: CLINIC | Age: 34
End: 2024-09-12
Attending: STUDENT IN AN ORGANIZED HEALTH CARE EDUCATION/TRAINING PROGRAM
Payer: COMMERCIAL

## 2024-09-12 ENCOUNTER — OFFICE VISIT (OUTPATIENT)
Dept: RADIATION ONCOLOGY | Facility: CLINIC | Age: 34
End: 2024-09-12
Attending: RADIOLOGY
Payer: COMMERCIAL

## 2024-09-12 DIAGNOSIS — C50.412 MALIGNANT NEOPLASM OF UPPER-OUTER QUADRANT OF LEFT BREAST IN FEMALE, ESTROGEN RECEPTOR NEGATIVE (H): Primary | ICD-10-CM

## 2024-09-12 DIAGNOSIS — Z17.1 MALIGNANT NEOPLASM OF UPPER-OUTER QUADRANT OF LEFT BREAST IN FEMALE, ESTROGEN RECEPTOR NEGATIVE (H): Primary | ICD-10-CM

## 2024-09-12 PROCEDURE — 77412 RADIATION TX DELIVERY LVL 3: CPT

## 2024-09-12 PROCEDURE — 77387 GUIDANCE FOR RADJ TX DLVR: CPT | Mod: 26 | Performed by: RADIOLOGY

## 2024-09-12 PROCEDURE — 77387 GUIDANCE FOR RADJ TX DLVR: CPT

## 2024-09-12 ASSESSMENT — PAIN SCALES - GENERAL: PAINLEVEL: NO PAIN (0)

## 2024-09-12 NOTE — LETTER
9/12/2024      Vibha BARRAZA Thee  [address] [place] [address]      Dear Colleague,    Thank you for referring your patient, Vibha Kingston, to the [facility] RADIATION ONCOLOGY [place]. Please see a copy of my visit note below.    RADIATION ONCOLOGY WEEKLY TREATMENT VISIT NOTE      Assessment / Impression       Visit Dx:  (C50.412,  Z17.1) Malignant neoplasm of upper-outer quadrant of left breast in female, estrogen receptor negative (H)  (primary encounter diagnosis)       Cancer Staging   Malignant neoplasm of upper-outer quadrant of left breast in female, estrogen receptor negative (H)  Staging form: Breast, AJCC 8th Edition  - Pathologic stage from 3/13/2024: Stage IIA (pT1c, pN1a(sn), cM0, G3, ER-, NM-, HER2+) - Signed by Rosalind Adams MD on 3/13/2024       Tolerating radiation therapy well.  All questions and concerns addressed.  The patient was seen with the nurse Annabel present within the treatment room.    Patient denies pain issues to address with therapy.  This is week 1.  Patient denies problems with skin erythema or desquamation.  She has no complaints of range of motion of the shoulder.  The patient has returned to performing all her activities of daily living postsurgery.  Plan:     Continue radiation treatment as prescribed.  Radiation:   Site: Lt. CW & LN's  Stereotactic Radiosurgery: No  Concurrent Therapy: No  Today's Dose: 1068  Total Dose for Breast: 5005  Today's Fraction/Total Fraction Breast: 4/19    Pain Management Plan: Discussed using Tylenol or Motrin as needed for pain discomfort when needed.    1.  Patient denies problems concerns or difficulties with therapy week 1.  2.  Discussed range of motion exercises with the patient.  3.  Reviewed skin care for radiation therapy.  4.  Discussed and answered questions regarding reconstruction after radiation therapy is completed.  Offered the patient a plastic surgical consultation.  Patient would be a good candidate for  autologous tissue transplant post radiation therapy preferably 1 year after XRT treatment completed.  5.  Patient was comfortable with the plan of care.  We should proceed forward with radiation therapy as previously prescribed.  6.  Nurse counseling was employed for additional coordination of care during today's visit.    Subjective:      HPI: Vibha Kingston is a 34 year old female with  Malignant neoplasm of upper-outer quadrant of left breast in female, estrogen receptor negative (H) [C50.412, Z17.1]    The following portions of the patient's history were reviewed and updated as appropriate: allergies, current medications, past family history, past medical history, past social history, past surgical history and problem list.    Assessment                  Body Site:  Breast                           Site: Lt. CW & LN's  Stereotactic Radiosurgery: No  Concurrent Therapy: No  Today's Dose: 1068  Total Dose for Breast: 5005  Today's Fraction/Total Fraction Breast:   Drainage: 0: Absent                                   Sexuality Alteration                    Emotional Alteration    Copin: Effective  Comfort Alteration   KPS: 100 % Normal, no complaints  Fatigue (ONS scale): 0: No Fatigue  Pain Location: denies  Pain Intensity. Rate degree of pain ranging from 0 (no pain) to 10 (severe pain): 0   Nutrition Alteration   Anorexia: 0: None  Nausea: 0: None  Vomitin: None  Skin Alteration   Skin Sensation: 0: No problem  Skin Reaction: 0: None (Radiaplex given with verbal/written instructions)  AUA Assessment                                           Accompanied by       Objective:     Exam: Performed with female nurse present at all times    Vitals:    24 1359   PainSc: No Pain (0)       Wt Readings from Last 8 Encounters:   24 75.1 kg (165 lb 9.6 oz)   24 75.1 kg (165 lb 8 oz)   24 74.8 kg (164 lb 12.8 oz)   24 72.9 kg (160 lb 12.8 oz)   24 74.9 kg (165 lb 3.2  oz)   07/19/24 74.9 kg (165 lb 3.2 oz)   06/28/24 75 kg (165 lb 6.4 oz)   06/18/24 72.6 kg (160 lb)       General: Alert and oriented, in no acute distress  Vibha reports no Erythema or skin changes.  Psych: Normal mood and affect for age and disease.  Neurological: Cranial nerves II through XII grossly intact patient ambulates well without assistance.    Treatment Summary to Date    Aria chart and setup information reviewed    Giuseppe Milian MD      Again, thank you for allowing me to participate in the care of your patient.        Sincerely,        Giuseppe Milian MD

## 2024-09-12 NOTE — PROGRESS NOTES
RADIATION ONCOLOGY WEEKLY TREATMENT VISIT NOTE      Assessment / Impression       Visit Dx:  (C50.412,  Z17.1) Malignant neoplasm of upper-outer quadrant of left breast in female, estrogen receptor negative (H)  (primary encounter diagnosis)       Cancer Staging   Malignant neoplasm of upper-outer quadrant of left breast in female, estrogen receptor negative (H)  Staging form: Breast, AJCC 8th Edition  - Pathologic stage from 3/13/2024: Stage IIA (pT1c, pN1a(sn), cM0, G3, ER-, [place]-, HER2+) - Signed by Rosalind Adams MD on 3/13/2024       Tolerating radiation therapy well.  All questions and concerns addressed.  The patient was seen with the nurse Annabel present within the treatment room.    Patient denies pain issues to address with therapy.  This is week 1.  Patient denies problems with skin erythema or desquamation.  She has no complaints of range of motion of the shoulder.  The patient has returned to performing all her activities of daily living postsurgery.  Plan:     Continue radiation treatment as prescribed.  Radiation:   Site: Lt. CW & LN's  Stereotactic Radiosurgery: No  Concurrent Therapy: No  Today's Dose: 1068  Total Dose for Breast: 5005  Today's Fraction/Total Fraction Breast: 4/19    Pain Management Plan: Discussed using Tylenol or Motrin as needed for pain discomfort when needed.    1.  Patient denies problems concerns or difficulties with therapy week 1.  2.  Discussed range of motion exercises with the patient.  3.  Reviewed skin care for radiation therapy.  4.  Discussed and answered questions regarding reconstruction after radiation therapy is completed.  Offered the patient a plastic surgical consultation.  Patient would be a good candidate for autologous tissue transplant post radiation therapy preferably 1 year after XRT treatment completed.  5.  Patient was comfortable with the plan of care.  We should proceed forward with radiation therapy as previously prescribed.  6.  Nurse counseling was  employed for additional coordination of care during today's visit.    Subjective:      HPI: Vibha Kingston is a 34 year old female with  Malignant neoplasm of upper-outer quadrant of left breast in female, estrogen receptor negative (H) [C50.412, Z17.1]    The following portions of the patient's history were reviewed and updated as appropriate: allergies, current medications, past family history, past medical history, past social history, past surgical history and problem list.    Assessment                  Body Site:  Breast                           Site: Lt. CW & LN's  Stereotactic Radiosurgery: No  Concurrent Therapy: No  Today's Dose: 1068  Total Dose for Breast: 5005  Today's Fraction/Total Fraction Breast:   Drainage: 0: Absent                                   Sexuality Alteration                    Emotional Alteration    Copin: Effective  Comfort Alteration   KPS: 100 % Normal, no complaints  Fatigue (ONS scale): 0: No Fatigue  Pain Location: denies  Pain Intensity. Rate degree of pain ranging from 0 (no pain) to 10 (severe pain): 0   Nutrition Alteration   Anorexia: 0: None  Nausea: 0: None  Vomitin: None  Skin Alteration   Skin Sensation: 0: No problem  Skin Reaction: 0: None (Radiaplex given with verbal/written instructions)  AUA Assessment                                           Accompanied by       Objective:     Exam: Performed with female nurse present at all times    Vitals:    24 1359   PainSc: No Pain (0)       Wt Readings from Last 8 Encounters:   24 75.1 kg (165 lb 9.6 oz)   24 75.1 kg (165 lb 8 oz)   24 74.8 kg (164 lb 12.8 oz)   24 72.9 kg (160 lb 12.8 oz)   24 74.9 kg (165 lb 3.2 oz)   24 74.9 kg (165 lb 3.2 oz)   24 75 kg (165 lb 6.4 oz)   24 72.6 kg (160 lb)       General: Alert and oriented, in no acute distress  Vibha reports no Erythema or skin changes.  Psych: Normal mood and affect for age and  disease.  Neurological: Cranial nerves II through XII grossly intact patient ambulates well without assistance.    Treatment Summary to Date    Aria chart and setup information reviewed    Giuseppe Milian MD

## 2024-09-13 ENCOUNTER — APPOINTMENT (OUTPATIENT)
Dept: RADIATION ONCOLOGY | Facility: CLINIC | Age: 34
End: 2024-09-13
Attending: STUDENT IN AN ORGANIZED HEALTH CARE EDUCATION/TRAINING PROGRAM
Payer: COMMERCIAL

## 2024-09-13 PROCEDURE — 77387 GUIDANCE FOR RADJ TX DLVR: CPT | Performed by: RADIOLOGY

## 2024-09-13 PROCEDURE — 77427 RADIATION TX MANAGEMENT X5: CPT | Performed by: RADIOLOGY

## 2024-09-13 PROCEDURE — 77412 RADIATION TX DELIVERY LVL 3: CPT | Performed by: RADIOLOGY

## 2024-09-13 PROCEDURE — 77387 GUIDANCE FOR RADJ TX DLVR: CPT | Mod: 26 | Performed by: RADIOLOGY

## 2024-09-13 PROCEDURE — 77336 RADIATION PHYSICS CONSULT: CPT | Performed by: STUDENT IN AN ORGANIZED HEALTH CARE EDUCATION/TRAINING PROGRAM

## 2024-09-16 ENCOUNTER — APPOINTMENT (OUTPATIENT)
Dept: RADIATION ONCOLOGY | Facility: CLINIC | Age: 34
End: 2024-09-16
Attending: STUDENT IN AN ORGANIZED HEALTH CARE EDUCATION/TRAINING PROGRAM
Payer: COMMERCIAL

## 2024-09-16 PROCEDURE — 77387 GUIDANCE FOR RADJ TX DLVR: CPT | Mod: 26 | Performed by: RADIOLOGY

## 2024-09-16 PROCEDURE — 77387 GUIDANCE FOR RADJ TX DLVR: CPT

## 2024-09-16 PROCEDURE — 77412 RADIATION TX DELIVERY LVL 3: CPT

## 2024-09-17 ENCOUNTER — APPOINTMENT (OUTPATIENT)
Dept: RADIATION ONCOLOGY | Facility: CLINIC | Age: 34
End: 2024-09-17
Attending: STUDENT IN AN ORGANIZED HEALTH CARE EDUCATION/TRAINING PROGRAM
Payer: COMMERCIAL

## 2024-09-17 PROCEDURE — 77412 RADIATION TX DELIVERY LVL 3: CPT

## 2024-09-17 PROCEDURE — 77387 GUIDANCE FOR RADJ TX DLVR: CPT

## 2024-09-17 PROCEDURE — 77387 GUIDANCE FOR RADJ TX DLVR: CPT | Mod: 26 | Performed by: RADIOLOGY

## 2024-09-18 ENCOUNTER — APPOINTMENT (OUTPATIENT)
Dept: RADIATION ONCOLOGY | Facility: CLINIC | Age: 34
End: 2024-09-18
Attending: STUDENT IN AN ORGANIZED HEALTH CARE EDUCATION/TRAINING PROGRAM
Payer: COMMERCIAL

## 2024-09-18 PROCEDURE — 77412 RADIATION TX DELIVERY LVL 3: CPT | Performed by: RADIOLOGY

## 2024-09-18 PROCEDURE — 77387 GUIDANCE FOR RADJ TX DLVR: CPT | Mod: 26 | Performed by: RADIOLOGY

## 2024-09-18 PROCEDURE — 77387 GUIDANCE FOR RADJ TX DLVR: CPT | Performed by: RADIOLOGY

## 2024-09-19 ENCOUNTER — APPOINTMENT (OUTPATIENT)
Dept: RADIATION ONCOLOGY | Facility: CLINIC | Age: 34
End: 2024-09-19
Attending: STUDENT IN AN ORGANIZED HEALTH CARE EDUCATION/TRAINING PROGRAM
Payer: COMMERCIAL

## 2024-09-19 DIAGNOSIS — C50.412 MALIGNANT NEOPLASM OF UPPER-OUTER QUADRANT OF LEFT BREAST IN FEMALE, ESTROGEN RECEPTOR NEGATIVE (H): Primary | ICD-10-CM

## 2024-09-19 DIAGNOSIS — Z51.11 ENCOUNTER FOR ANTINEOPLASTIC CHEMOTHERAPY: ICD-10-CM

## 2024-09-19 DIAGNOSIS — Z17.1 MALIGNANT NEOPLASM OF UPPER-OUTER QUADRANT OF LEFT BREAST IN FEMALE, ESTROGEN RECEPTOR NEGATIVE (H): Primary | ICD-10-CM

## 2024-09-19 PROCEDURE — 77387 GUIDANCE FOR RADJ TX DLVR: CPT | Mod: 26 | Performed by: STUDENT IN AN ORGANIZED HEALTH CARE EDUCATION/TRAINING PROGRAM

## 2024-09-19 PROCEDURE — 77387 GUIDANCE FOR RADJ TX DLVR: CPT | Performed by: STUDENT IN AN ORGANIZED HEALTH CARE EDUCATION/TRAINING PROGRAM

## 2024-09-19 PROCEDURE — 77412 RADIATION TX DELIVERY LVL 3: CPT | Performed by: STUDENT IN AN ORGANIZED HEALTH CARE EDUCATION/TRAINING PROGRAM

## 2024-09-20 ENCOUNTER — ONCOLOGY VISIT (OUTPATIENT)
Dept: ONCOLOGY | Facility: HOSPITAL | Age: 34
End: 2024-09-20
Attending: INTERNAL MEDICINE
Payer: COMMERCIAL

## 2024-09-20 ENCOUNTER — PATIENT OUTREACH (OUTPATIENT)
Dept: ONCOLOGY | Facility: HOSPITAL | Age: 34
End: 2024-09-20

## 2024-09-20 ENCOUNTER — INFUSION THERAPY VISIT (OUTPATIENT)
Dept: INFUSION THERAPY | Facility: HOSPITAL | Age: 34
End: 2024-09-20
Attending: INTERNAL MEDICINE
Payer: COMMERCIAL

## 2024-09-20 ENCOUNTER — OFFICE VISIT (OUTPATIENT)
Dept: RADIATION ONCOLOGY | Facility: CLINIC | Age: 34
End: 2024-09-20
Attending: STUDENT IN AN ORGANIZED HEALTH CARE EDUCATION/TRAINING PROGRAM
Payer: COMMERCIAL

## 2024-09-20 VITALS
RESPIRATION RATE: 16 BRPM | WEIGHT: 165 LBS | DIASTOLIC BLOOD PRESSURE: 71 MMHG | BODY MASS INDEX: 27.46 KG/M2 | HEART RATE: 64 BPM | OXYGEN SATURATION: 98 % | TEMPERATURE: 98.1 F | SYSTOLIC BLOOD PRESSURE: 102 MMHG

## 2024-09-20 VITALS
OXYGEN SATURATION: 97 % | TEMPERATURE: 98 F | DIASTOLIC BLOOD PRESSURE: 86 MMHG | BODY MASS INDEX: 27.76 KG/M2 | HEART RATE: 73 BPM | HEIGHT: 65 IN | SYSTOLIC BLOOD PRESSURE: 114 MMHG | WEIGHT: 166.6 LBS

## 2024-09-20 DIAGNOSIS — Z17.1 MALIGNANT NEOPLASM OF UPPER-OUTER QUADRANT OF LEFT BREAST IN FEMALE, ESTROGEN RECEPTOR NEGATIVE (H): Primary | ICD-10-CM

## 2024-09-20 DIAGNOSIS — D05.12 DUCTAL CARCINOMA IN SITU (DCIS) OF LEFT BREAST: Primary | ICD-10-CM

## 2024-09-20 DIAGNOSIS — C50.412 MALIGNANT NEOPLASM OF UPPER-OUTER QUADRANT OF LEFT BREAST IN FEMALE, ESTROGEN RECEPTOR NEGATIVE (H): Primary | ICD-10-CM

## 2024-09-20 DIAGNOSIS — Z17.1 MALIGNANT NEOPLASM OF UPPER-OUTER QUADRANT OF LEFT BREAST IN FEMALE, ESTROGEN RECEPTOR NEGATIVE (H): ICD-10-CM

## 2024-09-20 DIAGNOSIS — Z51.11 ENCOUNTER FOR ANTINEOPLASTIC CHEMOTHERAPY: ICD-10-CM

## 2024-09-20 DIAGNOSIS — C50.412 MALIGNANT NEOPLASM OF UPPER-OUTER QUADRANT OF LEFT BREAST IN FEMALE, ESTROGEN RECEPTOR NEGATIVE (H): ICD-10-CM

## 2024-09-20 LAB
ALBUMIN SERPL BCG-MCNC: 4.6 G/DL (ref 3.5–5.2)
ALP SERPL-CCNC: 59 U/L (ref 40–150)
ALT SERPL W P-5'-P-CCNC: 32 U/L (ref 0–50)
ANION GAP SERPL CALCULATED.3IONS-SCNC: 18 MMOL/L (ref 7–15)
AST SERPL W P-5'-P-CCNC: 23 U/L (ref 0–45)
BASOPHILS # BLD AUTO: 0 10E3/UL (ref 0–0.2)
BASOPHILS NFR BLD AUTO: 0 %
BILIRUB SERPL-MCNC: 0.2 MG/DL
BUN SERPL-MCNC: 12 MG/DL (ref 6–20)
CALCIUM SERPL-MCNC: 9.5 MG/DL (ref 8.8–10.4)
CHLORIDE SERPL-SCNC: 103 MMOL/L (ref 98–107)
CREAT SERPL-MCNC: 0.81 MG/DL (ref 0.51–0.95)
EGFRCR SERPLBLD CKD-EPI 2021: >90 ML/MIN/1.73M2
EOSINOPHIL # BLD AUTO: 0 10E3/UL (ref 0–0.7)
EOSINOPHIL NFR BLD AUTO: 1 %
ERYTHROCYTE [DISTWIDTH] IN BLOOD BY AUTOMATED COUNT: 12.9 % (ref 10–15)
GLUCOSE SERPL-MCNC: 99 MG/DL (ref 70–99)
HCO3 SERPL-SCNC: 24 MMOL/L (ref 22–29)
HCT VFR BLD AUTO: 36.2 % (ref 35–47)
HGB BLD-MCNC: 12.5 G/DL (ref 11.7–15.7)
IMM GRANULOCYTES # BLD: 0 10E3/UL
IMM GRANULOCYTES NFR BLD: 0 %
LYMPHOCYTES # BLD AUTO: 0.5 10E3/UL (ref 0.8–5.3)
LYMPHOCYTES NFR BLD AUTO: 15 %
MCH RBC QN AUTO: 31.9 PG (ref 26.5–33)
MCHC RBC AUTO-ENTMCNC: 34.5 G/DL (ref 31.5–36.5)
MCV RBC AUTO: 92 FL (ref 78–100)
MONOCYTES # BLD AUTO: 0.3 10E3/UL (ref 0–1.3)
MONOCYTES NFR BLD AUTO: 10 %
NEUTROPHILS # BLD AUTO: 2.5 10E3/UL (ref 1.6–8.3)
NEUTROPHILS NFR BLD AUTO: 75 %
NRBC # BLD AUTO: 0 10E3/UL
NRBC BLD AUTO-RTO: 0 /100
PLATELET # BLD AUTO: 208 10E3/UL (ref 150–450)
POTASSIUM SERPL-SCNC: 3.9 MMOL/L (ref 3.4–5.3)
PROT SERPL-MCNC: 7.2 G/DL (ref 6.4–8.3)
RBC # BLD AUTO: 3.92 10E6/UL (ref 3.8–5.2)
SODIUM SERPL-SCNC: 145 MMOL/L (ref 135–145)
WBC # BLD AUTO: 3.4 10E3/UL (ref 4–11)

## 2024-09-20 PROCEDURE — 250N000011 HC RX IP 250 OP 636: Mod: JZ | Performed by: INTERNAL MEDICINE

## 2024-09-20 PROCEDURE — 36591 DRAW BLOOD OFF VENOUS DEVICE: CPT

## 2024-09-20 PROCEDURE — 85025 COMPLETE CBC W/AUTO DIFF WBC: CPT

## 2024-09-20 PROCEDURE — 99214 OFFICE O/P EST MOD 30 MIN: CPT | Performed by: INTERNAL MEDICINE

## 2024-09-20 PROCEDURE — 96417 CHEMO IV INFUS EACH ADDL SEQ: CPT

## 2024-09-20 PROCEDURE — 99215 OFFICE O/P EST HI 40 MIN: CPT | Performed by: INTERNAL MEDICINE

## 2024-09-20 PROCEDURE — G2211 COMPLEX E/M VISIT ADD ON: HCPCS | Performed by: INTERNAL MEDICINE

## 2024-09-20 PROCEDURE — 258N000003 HC RX IP 258 OP 636: Performed by: INTERNAL MEDICINE

## 2024-09-20 PROCEDURE — 80053 COMPREHEN METABOLIC PANEL: CPT

## 2024-09-20 PROCEDURE — 96413 CHEMO IV INFUSION 1 HR: CPT

## 2024-09-20 RX ORDER — METHYLPREDNISOLONE SODIUM SUCCINATE 125 MG/2ML
125 INJECTION, POWDER, LYOPHILIZED, FOR SOLUTION INTRAMUSCULAR; INTRAVENOUS
Status: DISCONTINUED | OUTPATIENT
Start: 2024-09-20 | End: 2024-09-20 | Stop reason: HOSPADM

## 2024-09-20 RX ORDER — DIPHENHYDRAMINE HYDROCHLORIDE 50 MG/ML
50 INJECTION INTRAMUSCULAR; INTRAVENOUS
Status: CANCELLED
Start: 2024-09-20

## 2024-09-20 RX ORDER — ALBUTEROL SULFATE 0.83 MG/ML
2.5 SOLUTION RESPIRATORY (INHALATION)
Status: DISCONTINUED | OUTPATIENT
Start: 2024-09-20 | End: 2024-09-20 | Stop reason: HOSPADM

## 2024-09-20 RX ORDER — ALBUTEROL SULFATE 90 UG/1
1-2 AEROSOL, METERED RESPIRATORY (INHALATION)
Status: DISCONTINUED | OUTPATIENT
Start: 2024-09-20 | End: 2024-09-20 | Stop reason: HOSPADM

## 2024-09-20 RX ORDER — LORAZEPAM 2 MG/ML
0.5 INJECTION INTRAMUSCULAR EVERY 4 HOURS PRN
Status: CANCELLED | OUTPATIENT
Start: 2024-09-20

## 2024-09-20 RX ORDER — HEPARIN SODIUM (PORCINE) LOCK FLUSH IV SOLN 100 UNIT/ML 100 UNIT/ML
5 SOLUTION INTRAVENOUS
Status: CANCELLED | OUTPATIENT
Start: 2024-09-20

## 2024-09-20 RX ORDER — TAMOXIFEN CITRATE 20 MG/1
20 TABLET ORAL DAILY
Qty: 30 TABLET | Refills: 1 | Status: SHIPPED | OUTPATIENT
Start: 2024-09-20

## 2024-09-20 RX ORDER — DIPHENHYDRAMINE HYDROCHLORIDE 50 MG/ML
50 INJECTION INTRAMUSCULAR; INTRAVENOUS
Status: DISCONTINUED | OUTPATIENT
Start: 2024-09-20 | End: 2024-09-20 | Stop reason: HOSPADM

## 2024-09-20 RX ORDER — HEPARIN SODIUM (PORCINE) LOCK FLUSH IV SOLN 100 UNIT/ML 100 UNIT/ML
5 SOLUTION INTRAVENOUS
Status: DISCONTINUED | OUTPATIENT
Start: 2024-09-20 | End: 2024-09-20 | Stop reason: HOSPADM

## 2024-09-20 RX ORDER — EPINEPHRINE 1 MG/ML
0.3 INJECTION, SOLUTION INTRAMUSCULAR; SUBCUTANEOUS EVERY 5 MIN PRN
Status: CANCELLED | OUTPATIENT
Start: 2024-09-20

## 2024-09-20 RX ORDER — METHYLPREDNISOLONE SODIUM SUCCINATE 125 MG/2ML
125 INJECTION, POWDER, LYOPHILIZED, FOR SOLUTION INTRAMUSCULAR; INTRAVENOUS
Status: CANCELLED
Start: 2024-09-20

## 2024-09-20 RX ORDER — ALBUTEROL SULFATE 90 UG/1
1-2 AEROSOL, METERED RESPIRATORY (INHALATION)
Status: CANCELLED
Start: 2024-09-20

## 2024-09-20 RX ORDER — LORAZEPAM 2 MG/ML
0.5 INJECTION INTRAMUSCULAR EVERY 4 HOURS PRN
Status: DISCONTINUED | OUTPATIENT
Start: 2024-09-20 | End: 2024-09-20 | Stop reason: HOSPADM

## 2024-09-20 RX ORDER — ALBUTEROL SULFATE 0.83 MG/ML
2.5 SOLUTION RESPIRATORY (INHALATION)
Status: CANCELLED | OUTPATIENT
Start: 2024-09-20

## 2024-09-20 RX ORDER — HEPARIN SODIUM,PORCINE 10 UNIT/ML
5-20 VIAL (ML) INTRAVENOUS DAILY PRN
Status: CANCELLED | OUTPATIENT
Start: 2024-09-20

## 2024-09-20 RX ORDER — MEPERIDINE HYDROCHLORIDE 50 MG/ML
25 INJECTION INTRAMUSCULAR; INTRAVENOUS; SUBCUTANEOUS EVERY 30 MIN PRN
Status: CANCELLED | OUTPATIENT
Start: 2024-09-20

## 2024-09-20 RX ORDER — ACETAMINOPHEN 325 MG/1
650 TABLET ORAL
Status: CANCELLED
Start: 2024-09-20

## 2024-09-20 RX ORDER — DIPHENHYDRAMINE HCL 50 MG
50 CAPSULE ORAL
Status: CANCELLED | OUTPATIENT
Start: 2024-09-20

## 2024-09-20 RX ORDER — HEPARIN SODIUM,PORCINE 10 UNIT/ML
5-20 VIAL (ML) INTRAVENOUS DAILY PRN
Status: DISCONTINUED | OUTPATIENT
Start: 2024-09-20 | End: 2024-09-20 | Stop reason: HOSPADM

## 2024-09-20 RX ORDER — ACETAMINOPHEN 325 MG/1
650 TABLET ORAL
Status: DISCONTINUED | OUTPATIENT
Start: 2024-09-20 | End: 2024-09-20 | Stop reason: HOSPADM

## 2024-09-20 RX ORDER — EPINEPHRINE 1 MG/ML
0.3 INJECTION, SOLUTION INTRAMUSCULAR; SUBCUTANEOUS EVERY 5 MIN PRN
Status: DISCONTINUED | OUTPATIENT
Start: 2024-09-20 | End: 2024-09-20 | Stop reason: HOSPADM

## 2024-09-20 RX ORDER — DIPHENHYDRAMINE HCL 50 MG
50 CAPSULE ORAL
Status: DISCONTINUED | OUTPATIENT
Start: 2024-09-20 | End: 2024-09-20 | Stop reason: HOSPADM

## 2024-09-20 RX ORDER — MEPERIDINE HYDROCHLORIDE 50 MG/ML
25 INJECTION INTRAMUSCULAR; INTRAVENOUS; SUBCUTANEOUS EVERY 30 MIN PRN
Status: DISCONTINUED | OUTPATIENT
Start: 2024-09-20 | End: 2024-09-20 | Stop reason: HOSPADM

## 2024-09-20 RX ADMIN — HEPARIN 5 ML: 100 SYRINGE at 12:19

## 2024-09-20 RX ADMIN — PERTUZUMAB 420 MG: 30 INJECTION, SOLUTION, CONCENTRATE INTRAVENOUS at 10:51

## 2024-09-20 RX ADMIN — SODIUM CHLORIDE 450 MG: 9 INJECTION, SOLUTION INTRAVENOUS at 11:46

## 2024-09-20 RX ADMIN — SODIUM CHLORIDE 250 ML: 9 INJECTION, SOLUTION INTRAVENOUS at 10:22

## 2024-09-20 ASSESSMENT — PAIN SCALES - GENERAL: PAINLEVEL: NO PAIN (1)

## 2024-09-20 NOTE — PROGRESS NOTES
"Oncology Rooming Note    September 20, 2024 9:32 AM   Vibha Kingston is a 34 year old female who presents for:    Chief Complaint   Patient presents with    Oncology Clinic Visit     Malignant neoplasm of upper-outer quadrant of left breast in female, estrogen receptor negative (h)     Initial Vitals: /86 (BP Location: Left arm, Patient Position: Sitting, Cuff Size: Adult Regular)   Pulse 73   Temp 98  F (36.7  C) (Oral)   Ht 1.651 m (5' 5\")   Wt 75.6 kg (166 lb 9.6 oz)   SpO2 97%   BMI 27.72 kg/m   Estimated body mass index is 27.72 kg/m  as calculated from the following:    Height as of this encounter: 1.651 m (5' 5\").    Weight as of this encounter: 75.6 kg (166 lb 9.6 oz). Body surface area is 1.86 meters squared.  Data Unavailable Comment: Data Unavailable   No LMP recorded. (Menstrual status: Amenorrhea).  Allergies reviewed: Yes  Medications reviewed: Yes    Medications: Medication refills not needed today.  Pharmacy name entered into [employer]: [id number] - [place], [place] - [address]    Frailty Screening:   Is the patient here for a new oncology consult visit in cancer care? 2. No      Clinical concerns. None       Aria Cobb LPN                       "

## 2024-09-20 NOTE — PROGRESS NOTES
[facility] Hematology and Oncology Progress Note    Patient: Vibha Kingston  MRN: [id number]  Date of Service: Sep 20, 2024         Reason for Visit    Chief Complaint   Patient presents with    Oncology Clinic Visit     Malignant neoplasm of upper-outer quadrant of left breast in female, estrogen receptor negative (h)       Assessment and Plan     Cancer Staging   Malignant neoplasm of upper-outer quadrant of left breast in female, estrogen receptor negative (H)  Staging form: Breast, AJCC 8th Edition  - Pathologic stage from 3/13/2024: Stage IIA (pT1c, pN1a(sn), cM0, G3, ER-, [place]-, HER2+) - Signed by Rosalind Adams MD on 3/13/2024      ECOG Performance    0 - Independent     Pain         #.  History of stage IIA (pT1c pN1a M0) invasive ductal carcinoma of the left breast, grade 3, ER negative, [place] negative, HER2 positive  #.  DCIS of the left breast, ER positive, [place] positive     She is clinically well.  Exam is unremarkable.  Her labs for CBC and CMP are unremarkable.  Echocardiogram from earlier this month showed normal EF.  She does not have intolerable side effects from trastuzumab.  I recommended her to complete adjuvant trastuzumab as current.    She will be finishing adjuvant radiation in a couple weeks.    Today, I discussed about adjuvant endocrine therapy for DCIS of the left breast which was ER positive.  She is premenopausal.  I recommended tamoxifen 20 mg daily for 5 years.  I reviewed the side effects and schedule.  She agreed with the plan.    I also discussed about contraception during adjuvant endocrine therapy.    Follow-up provider visit every 6 weeks while she is on trastuzumab.    #.  Subungal hematoma of the right first toe   I recommended follow-up with either her primary care provider or podiatry.  She has an appointment with her primary care provider in a few weeks and she agreed to follow-up with that at that time.    Encounter Diagnoses:    Problem List Items Addressed This Visit           Oncology Diagnoses    Ductal carcinoma in situ (DCIS) of left breast - Primary    Relevant Medications    tamoxifen (NOLVADEX) 20 MG tablet    Malignant neoplasm of upper-outer quadrant of left breast in female, estrogen receptor negative (H)       Other    Encounter for antineoplastic chemotherapy            CC: AYLA Randolph CNP   ______________________________________________________________________________  Oncologic history  1/9/2024- self palpated left medial breast mass   - diagnostic mammogram and ultrasound showed 3.4 x 2.2 x 2 cm large ill-defined hypoechoic area with associated calcification at 9:00 3 cm from the nipple.  Sonographic survey of left axilla is within normal limits.    1/11/2024-ultrasound-guided core needle biopsy showed DCIS, grade 3, cribriform and papillary with comedonecrosis.  ER/NM was deferred to excisional specimen.    1/25/2024-breast actionable panel was negative including INGRID, BARD1, BRCA1, BRCA2, CDH1, CHEK2, NF1, PALB2, PTEN, STK11, TP53.    2/4/2024-MRI breast showed large segmental area of non-mass enhancement medial left breast measuring 10 cm from the posterior to subareolar depth corresponding to the known DCIS.  Mildly prominent bilateral internal mammary lymph node technically within normal limit.    3/5/2024-left breast simple mastectomy and left axillary sentinel lymph node biopsy   Invasive ductal carcinoma, grade 3   Multifocal, 4 foci vary from 1.3 mm to 11 mm in greatest dimension.   Margins were negative, closest margin less than 1 mm anterior/superior   DCIS, EIC present, solid and comedo pattern, grade 3, extensive comedonecrosis.  Focal lymphovascular invasion present.   1/2 sentinel lymph node was positive for metastatic carcinoma.   [id number]   ER negative in invasive carcinoma, positive in DCIS (80% of the tumor nuclei stain strongly)   NM negative and invasive cancer, positive in DCIS (30% of the tumor nuclei stain strongly of  "moderately)   HER2/aj positive for overexpression (3+ membrane staining)    Fertility preservation was successfully completed.    4/26/2024- adjuvant TCHP    6/27/2024-genetic counseling and testing completed.    10/3/2024-anticipate to complete adjuvant ration to the left chest wall and lymph node    9/23/2024-initiated adjuvant endocrine therapy with tamoxifen.    History of Present Illness    Ms. Vibha Kingston presented today accompanied by her  prior to scheduled maintenance trastuzumab.    She is currently undergoing adjuvant radiation.  LMP 6/2/2024.  She told me that she takes Ativan about 1 time a week.  She has discoloration under the right first toe.    Review of systems  Apart from describing in HPI, the remainder of comprehensive ROS was negative.    Past History    Past Medical History:   Diagnosis Date    Cancer (H) 1/15/24    Breast cancer    Encounter for antineoplastic chemotherapy 03/14/2024       Past Surgical History:   Procedure Laterality Date    BIOPSY      A mole a few years back and an abnormal pap in [facility]    BIOPSY NODE SENTINEL Left 03/05/2024    Procedure: WITH LEFT SENTINEL LYMPH NODE BIOPSY;  Surgeon: Aleena Real DO;  Location: [facility] OR    FERTILITY SURGERY  04/10/2024    Egg retrieval    INSERT PORT VASCULAR ACCESS N/A 4/19/2024    Procedure: INSERTION, VASCULAR ACCESS PORT UNDER ULTRASOUND AND FLUOROSCOPIC GUIDANCE;  Surgeon: Aleena Real DO;  Location: [facility] OR    MASTECTOMY SIMPLE Left 03/05/2024    Procedure: LEFT MASTECTOMY;  Surgeon: Aleena Real DO;  Location: [facility] OR       Physical Exam    /86 (BP Location: Left arm, Patient Position: Sitting, Cuff Size: Adult Regular)   Pulse 73   Temp 98  F (36.7  C) (Oral)   Ht 1.651 m (5' 5\")   Wt 75.6 kg (166 lb 9.6 oz)   SpO2 97%   BMI 27.72 kg/m      General: alert, awake, not in acute distress  HEENT: Head: Normal, normocephalic, atraumatic.  Eye: Normal external eye, " conjunctiva, lids cornea, GABRIELA.  Pharynx: Normal buccal mucosa. Normal pharynx.  Neck / Thyroid: Supple, no masses, nodes, nodules or enlargement.  Lymphatics: No abnormally enlarged lymph nodes.  Chest: Normal chest wall and respirations. Clear to auscultation.  Heart: S1 S2 RRR.   Abdomen: abdomen is soft without significant tenderness, masses, organomegaly or guarding  Extremities: normal strength, tone, and muscle mass  Skin: Subungual hematoma of the right big toe.    CNS: non focal.    Lab Results    Recent Results (from the past 240 hour(s))   Comprehensive metabolic panel (BMP + Alb, Alk Phos, ALT, AST, Total. Bili, TP)    Collection Time: 09/20/24  9:06 AM   Result Value Ref Range    Sodium 145 135 - 145 mmol/L    Potassium 3.9 3.4 - 5.3 mmol/L    Carbon Dioxide (CO2) 24 22 - 29 mmol/L    Anion Gap 18 (H) 7 - 15 mmol/L    Urea Nitrogen 12.0 6.0 - 20.0 mg/dL    Creatinine 0.81 0.51 - 0.95 mg/dL    GFR Estimate >90 >60 mL/min/1.73m2    Calcium 9.5 8.8 - 10.4 mg/dL    Chloride 103 98 - 107 mmol/L    Glucose 99 70 - 99 mg/dL    Alkaline Phosphatase 59 40 - 150 U/L    AST 23 0 - 45 U/L    ALT 32 0 - 50 U/L    Protein Total 7.2 6.4 - 8.3 g/dL    Albumin 4.6 3.5 - 5.2 g/dL    Bilirubin Total 0.2 <=1.2 mg/dL   CBC with platelets and differential    Collection Time: 09/20/24  9:06 AM   Result Value Ref Range    WBC Count 3.4 (L) 4.0 - 11.0 10e3/uL    RBC Count 3.92 3.80 - 5.20 10e6/uL    Hemoglobin 12.5 11.7 - 15.7 g/dL    Hematocrit 36.2 35.0 - 47.0 %    MCV 92 78 - 100 fL    MCH 31.9 26.5 - 33.0 pg    MCHC 34.5 31.5 - 36.5 g/dL    RDW 12.9 10.0 - 15.0 %    Platelet Count 208 150 - 450 10e3/uL    % Neutrophils 75 %    % Lymphocytes 15 %    % Monocytes 10 %    % Eosinophils 1 %    % Basophils 0 %    % Immature Granulocytes 0 %    NRBCs per 100 WBC 0 <1 /100    Absolute Neutrophils 2.5 1.6 - 8.3 10e3/uL    Absolute Lymphocytes 0.5 (L) 0.8 - 5.3 10e3/uL    Absolute Monocytes 0.3 0.0 - 1.3 10e3/uL    Absolute  Eosinophils 0.0 0.0 - 0.7 10e3/uL    Absolute Basophils 0.0 0.0 - 0.2 10e3/uL    Absolute Immature Granulocytes 0.0 <=0.4 10e3/uL    Absolute NRBCs 0.0 10e3/uL         Imaging    Echocardiogram Limited    Result Date: 2024  [id number]^NABIL^DAREN^[place] [address]  Name: EMILEE ORTA MRN: [id number] : 1990 Study Date: 2024 11:08 AM Age: 34 yrs Gender: Female Patient [facility]: [facility] Reason For Study: Encounter for antineoplastic chemotherapy Ordering Physician: DAREN FERRER Referring Physician: DAREN FERRER Performed By: BRYANT  BSA: 1.8 m2 Height: 65 in Weight: 165 lb HR: 71 BP: 108/71 mmHg ______________________________________________________________________________ Procedure Limited Echo Adult. Compared to prior study, there is no significant change. Limited views were obtained. ______________________________________________________________________________ Interpretation Summary  Limited views were obtained. The left ventricle is normal in size. There is normal left ventricular wall thickness. Left ventricular systolic function is normal. The visual ejection fraction is 55-60%. No regional wall motion abnormalities noted. Global peak LV longitudinal strain is averaged at -22.8%. This is within reported normal limits (normal <-18%). The right ventricle is normal in size and function. IVC diameter <2.1 cm collapsing >50% with sniff suggests a normal RA pressure of 3 mmHg.  Compared to prior study, there is no significant change. ______________________________________________________________________________ Left Ventricle The left ventricle is normal in size. There is normal left ventricular wall thickness. Global peak LV longitudinal strain is averaged at -22.8%. This is within reported normal limits (normal <-18%). Left ventricular systolic function is normal. The visual ejection fraction is 55-60%. No regional wall  motion abnormalities noted.  Right Ventricle The right ventricle is normal in size and function.  Vessels IVC diameter <2.1 cm collapsing >50% with sniff suggests a normal RA pressure of 3 mmHg.  ______________________________________________________________________________ MMode/2D Measurements & Calculations IVSd: 0.79 cm LVIDd: 4.9 cm LVIDs: 3.2 cm LVPWd: 0.66 cm FS: 35.1 % LV mass(C)d: 114.0 grams LV mass(C)dI: 62.6 grams/m2 LA dimension: 3.1 cm EF Biplane: 56.2 %  RWT: 0.27  Time Measurements MM HR: 71.0 BPM  Measurements from QLAB LV GLS Endo Peak A2C (AS): -21.2 % LV GLS Endo Peak A3C (AS): -22.6 % LV GLS Endo Peak A4C (AS): -24.7 % LV GLS Endo Peak Avg (AS): -22.8 %  ______________________________________________________________________________ Report approved by: Pillo Corona 09/06/2024 03:01 PM        The longitudinal plan of care for the diagnosis(es)/condition(s) as documented were addressed during this visit. Due to the added complexity in care, I will continue to support Vibha in the subsequent management and with ongoing continuity of care.     40 minutes spent by me on the date of the encounter doing chart review, history and exam, documentation and further activities as noted above.    Signed by: Rosalind Adams MD

## 2024-09-20 NOTE — PROGRESS NOTES
RADIATION ONCOLOGY WEEKLY TREATMENT VISIT NOTE      Assessment / Impression       Visit Dx:  (C50.412,  Z17.1) Malignant neoplasm of upper-outer quadrant of left breast in female, estrogen receptor negative (H)  (primary encounter diagnosis)  Plan: Adult Plastic Surgery  Referral       Cancer Staging   Malignant neoplasm of upper-outer quadrant of left breast in female, estrogen receptor negative (H)  Staging form: Breast, AJCC 8th Edition  - Pathologic stage from 3/13/2024: Stage IIA (pT1c, pN1a(sn), cM0, G3, ER-, [place]-, HER2+) - Signed by Rosalind Adams MD on 3/13/2024         Tolerating radiation therapy well.  All questions and concerns addressed.  Grade 1 radiation dermatitis    Plan:     Continue radiation treatment as prescribed.  Radiation:   Site: Lt CW LN  Stereotactic Radiosurgery: No  Concurrent Therapy: No  Today's Dose: 2670  Total Dose for Breast: 5005  Today's Fraction/Total Fraction Breast: 10/19    Monitor small palpable nodule at sup end of incision   Continue skin care  Interested in considering potential reconstruction and has not previously met with plastic surgery referral placed for consult ASAP    Subjective:      HPI: Vibha Kingston is a 34 year old female with  Malignant neoplasm of upper-outer quadrant of left breast in female, estrogen receptor negative [C50.412, Z17.1]    Increase in skin irritation with some tenderness and puffiness along her incision.  No pruritus.  Also noted some increased tightness in the chest for which she is doing her exercises more.  No swelling of the upper extremity.  Had a small area of irritation and palpable mass in the axilla near the superior aspect of her incision which she is thinking might be related to an ingrown hair.  Recovered from COVID without complications.  Has not met with plastic surgery but has begun to think more about potential reconstructive options and would now like to meet with them as soon as possible.    The following  portions of the patient's history were reviewed and updated as appropriate: allergies, current medications, past family history, past medical history, past social history, past surgical history and problem list.    Assessment                  Body Site:  Breast                                                               Sexuality Alteration                    Emotional Alteration       Comfort Alteration       Nutrition Alteration      Skin Alteration      AUA Assessment                                           Accompanied by       Objective:     Exam:     There were no vitals filed for this visit.    Wt Readings from Last 8 Encounters:   09/20/24 75.6 kg (166 lb 9.6 oz)   09/20/24 74.8 kg (165 lb)   08/30/24 75.1 kg (165 lb 9.6 oz)   08/09/24 75.1 kg (165 lb 8 oz)   08/02/24 74.8 kg (164 lb 12.8 oz)   07/26/24 72.9 kg (160 lb 12.8 oz)   07/19/24 74.9 kg (165 lb 3.2 oz)   07/19/24 74.9 kg (165 lb 3.2 oz)       General: Alert and oriented, in no acute distress  Vibha has mild Erythema.  Erythema mastectomy incision is well-healed.  Small palpable subcutaneous nodule near the superior end of her mastectomy incision with no overlying skin change.    Treatment Summary to Date    Aria chart and setup information reviewed    Megan Hsu MD

## 2024-09-20 NOTE — PROGRESS NOTES
"Oncology Rooming Note    September 20, 2024 8:39 AM   Vibha Kingston is a 34 year old female who presents for:    Chief Complaint   Patient presents with    Radiation Therapy     Initial Vitals: /71   Pulse 64   Temp 98.1  F (36.7  C) (Oral)   Resp 16   Wt 74.8 kg (165 lb)   SpO2 98%   BMI 27.46 kg/m   Estimated body mass index is 27.46 kg/m  as calculated from the following:    Height as of 8/30/24: 1.651 m (5' 5\").    Weight as of this encounter: 74.8 kg (165 lb). Body surface area is 1.85 meters squared.  No Pain (1) Comment: Data Unavailable   No LMP recorded. (Menstrual status: Amenorrhea).  Allergies reviewed: Yes  Medications reviewed: Yes    Medications: Medication refills not needed today.  Pharmacy name entered into [employer]: [id number] - [place], [place] - [address]    Frailty Screening:   Is the patient here for a new oncology consult visit in cancer care? 2. No      Clinical concerns: Reports puffy feeling of skin of chest. O/w no c/o      Louise Thomas RN              "

## 2024-09-20 NOTE — PROGRESS NOTES
Infusion Nursing Note:  Vibha Kingston presents today for Chemotherapy Infusions.  Patient seen by provider today: Yes: Dr. Adams   present during visit today: Not Applicable.    Note: Patient ambulated into Infusion Care accompanied by her ,Keenan. Patient is alert and oriented and able to communicate her needs to staff.Patient was seen by Dr. Adams. PORT was accessed in the clinic and labs drawn. Perjeta double checked with Linette PRO RN. Patient tolerated infusion of Perjeta without any problems. Trazimera double checked with Christie CHASE RN. Patient tolerated infusion of Trazimera without any problems. PORT flushed with Normal Saline and 6 ml Heparin and de-accessed. Dry 2 x 2 gauze taped over PORT site and patient was discharged home.      Intravenous Access:  Implanted Port.    Treatment Conditions:  Lab Results   Component Value Date    HGB 12.5 09/20/2024    WBC 3.4 (L) 09/20/2024    ANEUTAUTO 2.5 09/20/2024     09/20/2024        Lab Results   Component Value Date     09/20/2024    POTASSIUM 3.9 09/20/2024    CR 0.81 09/20/2024    RACHAEL 9.5 09/20/2024    BILITOTAL 0.2 09/20/2024    ALBUMIN 4.6 09/20/2024    ALT 32 09/20/2024    AST 23 09/20/2024       Results reviewed, labs MET treatment parameters, ok to proceed with treatment.      Post Infusion Assessment:  Patient tolerated infusion without incident.  Site patent and intact, free from redness, edema or discomfort.  No evidence of extravasations.  Access discontinued per protocol.       Discharge Plan:   Patient and/or family verbalized understanding of discharge instructions and all questions answered.  Patient discharged in stable condition accompanied by: ,Keenan  Departure Mode: Ambulatory      Martha Benavides RN,OCN

## 2024-09-20 NOTE — PROGRESS NOTES
[facility]: Cancer Care Follow-Up Note                                    Discussion with Patient:                                                      Checked in with patient ahead of her clinic visit with Dr. Adams. She is accompanied by her , Keenan.  See below.    Dates of Treatment:                                                      Infusion given in last 28 days       Administered MAR Action Medication Dose Rate Visit    08/30/2024 10:49 New Bag pertuzumab (PERJETA) 420 mg in sodium chloride 0.9 % 289 mL infusion 420 mg 578 mL/hr Infusion Therapy Visit on 08/30/2024 in [facility]    08/30/2024 11:20 New Bag trastuzumab-qyyp (TRAZIMERA) 441 mg in sodium chloride 0.9 % 296 mL infusion 441 mg 592 mL/hr Infusion Therapy Visit on 08/30/2024 in [facility]            Assessment:                                                      Day 1 Cycle 8:  TRASTuzumab / Pertuzumab     Patient report she is tired the day of her treatment but doesn't linger through the weekend, like previous treatment.    She expresses interest in meeting with Nutritionist. Told her will place referral today and to stop by the  before she leaves today to schedule. Patient verbalized understanding.    Intervention/Education provided during outreach:                                                       Nutrition referral placed.  Navin Marin, Scheduling, notified of referral and will assist patient with scheduling.  Patient scheduled with Andie Higuera RDH on 9/26/24.     Patient to follow up as scheduled at next appt  Confirmed patient has clinic and triage numbers    Signature:  Christie Agee RN

## 2024-09-20 NOTE — LETTER
9/20/2024      Vibha Kingston  [address] [place] [address]      Dear Colleague,    Thank you for referring your patient, Vibha Kingston, to the [facility] RADIATION ONCOLOGY [place]. Please see a copy of my visit note below.    RADIATION ONCOLOGY WEEKLY TREATMENT VISIT NOTE      Assessment / Impression       Visit Dx:  (C50.412,  Z17.1) Malignant neoplasm of upper-outer quadrant of left breast in female, estrogen receptor negative (H)  (primary encounter diagnosis)  Plan: Adult Plastic Surgery  Referral       Cancer Staging   Malignant neoplasm of upper-outer quadrant of left breast in female, estrogen receptor negative (H)  Staging form: Breast, AJCC 8th Edition  - Pathologic stage from 3/13/2024: Stage IIA (pT1c, pN1a(sn), cM0, G3, ER-, MD-, HER2+) - Signed by Rosalind Adams MD on 3/13/2024         Tolerating radiation therapy well.  All questions and concerns addressed.  Grade 1 radiation dermatitis    Plan:     Continue radiation treatment as prescribed.  Radiation:   Site: Lt CW LN  Stereotactic Radiosurgery: No  Concurrent Therapy: No  Today's Dose: 2670  Total Dose for Breast: 5005  Today's Fraction/Total Fraction Breast: 10/19    Monitor small palpable nodule at sup end of incision   Continue skin care  Interested in considering potential reconstruction and has not previously met with plastic surgery referral placed for consult ASAP    Subjective:      HPI: Vibha Kingston is a 34 year old female with  Malignant neoplasm of upper-outer quadrant of left breast in female, estrogen receptor negative [C50.412, Z17.1]    Increase in skin irritation with some tenderness and puffiness along her incision.  No pruritus.  Also noted some increased tightness in the chest for which she is doing her exercises more.  No swelling of the upper extremity.  Had a small area of irritation and palpable mass in the axilla near the superior aspect of her incision which she is thinking might be related  to an ingrown hair.  Recovered from COVID without complications.  Has not met with plastic surgery but has begun to think more about potential reconstructive options and would now like to meet with them as soon as possible.    The following portions of the patient's history were reviewed and updated as appropriate: allergies, current medications, past family history, past medical history, past social history, past surgical history and problem list.    Assessment                  Body Site:  Breast                                                               Sexuality Alteration                    Emotional Alteration       Comfort Alteration       Nutrition Alteration      Skin Alteration      AUA Assessment                                           Accompanied by       Objective:     Exam:     There were no vitals filed for this visit.    Wt Readings from Last 8 Encounters:   09/20/24 75.6 kg (166 lb 9.6 oz)   09/20/24 74.8 kg (165 lb)   08/30/24 75.1 kg (165 lb 9.6 oz)   08/09/24 75.1 kg (165 lb 8 oz)   08/02/24 74.8 kg (164 lb 12.8 oz)   07/26/24 72.9 kg (160 lb 12.8 oz)   07/19/24 74.9 kg (165 lb 3.2 oz)   07/19/24 74.9 kg (165 lb 3.2 oz)       General: Alert and oriented, in no acute distress  Vibha has mild Erythema.  Erythema mastectomy incision is well-healed.  Small palpable subcutaneous nodule near the superior end of her mastectomy incision with no overlying skin change.    Treatment Summary to Date    Aria chart and setup information reviewed    Megan Hsu MD      Again, thank you for allowing me to participate in the care of your patient.        Sincerely,        Megan Hsu MD

## 2024-09-20 NOTE — LETTER
"9/20/2024      Vibha Kingston  [address] [place] [address]      Dear Colleague,    Thank you for referring your patient, Vibha Kingston, to the [facility] CANCER [facility]. Please see a copy of my visit note below.    Oncology Rooming Note    September 20, 2024 9:32 AM   Vibha Kingston is a 34 year old female who presents for:    Chief Complaint   Patient presents with     Oncology Clinic Visit     Malignant neoplasm of upper-outer quadrant of left breast in female, estrogen receptor negative (h)     Initial Vitals: /86 (BP Location: Left arm, Patient Position: Sitting, Cuff Size: Adult Regular)   Pulse 73   Temp 98  F (36.7  C) (Oral)   Ht 1.651 m (5' 5\")   Wt 75.6 kg (166 lb 9.6 oz)   SpO2 97%   BMI 27.72 kg/m   Estimated body mass index is 27.72 kg/m  as calculated from the following:    Height as of this encounter: 1.651 m (5' 5\").    Weight as of this encounter: 75.6 kg (166 lb 9.6 oz). Body surface area is 1.86 meters squared.  Data Unavailable Comment: Data Unavailable   No LMP recorded. (Menstrual status: Amenorrhea).  Allergies reviewed: Yes  Medications reviewed: Yes    Medications: Medication refills not needed today.  Pharmacy name entered into [facility]: [id number] - [place], [place] - [address]    Frailty Screening:   Is the patient here for a new oncology consult visit in cancer care? 2. No      Clinical concerns. None       Aria Cobb LPN                         [facility]ematology [facility] Oncology Progress Note    Patient: Vibha Kingston  MRN: [id number]  Date of Service: Sep 20, 2024         Reason for Visit    Chief Complaint   Patient presents with     Oncology Clinic Visit     Malignant neoplasm of upper-outer quadrant of left breast in female, estrogen receptor negative (h)       Assessment and Plan     Cancer Staging   Malignant neoplasm of upper-outer quadrant of left breast in female, estrogen receptor negative (H)  Staging form: " Breast, AJCC 8th Edition  - Pathologic stage from 3/13/2024: Stage IIA (pT1c, pN1a(sn), cM0, G3, ER-, IL-, HER2+) - Signed by Rosalind Adams MD on 3/13/2024      ECOG Performance    0 - Independent     Pain         #.  History of stage IIA (pT1c pN1a M0) invasive ductal carcinoma of the left breast, grade 3, ER negative, IL negative, HER2 positive  #.  DCIS of the left breast, ER positive, IL positive     She is clinically well.  Exam is unremarkable.  Her labs for CBC and CMP are unremarkable.  Echocardiogram from earlier this month showed normal EF.  She does not have intolerable side effects from trastuzumab.  I recommended her to complete adjuvant trastuzumab as current.    She will be finishing adjuvant radiation in a couple weeks.    Today, I discussed about adjuvant endocrine therapy for DCIS of the left breast which was ER positive.  She is premenopausal.  I recommended tamoxifen 20 mg daily for 5 years.  I reviewed the side effects and schedule.  She agreed with the plan.    I also discussed about contraception during adjuvant endocrine therapy.    Follow-up provider visit every 6 weeks while she is on trastuzumab.    #.  Subungal hematoma of the right first toe   I recommended follow-up with either her primary care provider or podiatry.  She has an appointment with her primary care provider in a few weeks and she agreed to follow-up with that at that time.    Encounter Diagnoses:    Problem List Items Addressed This Visit          Oncology Diagnoses    Ductal carcinoma in situ (DCIS) of left breast - Primary    Relevant Medications    tamoxifen (NOLVADEX) 20 MG tablet    Malignant neoplasm of upper-outer quadrant of left breast in female, estrogen receptor negative (H)       Other    Encounter for antineoplastic chemotherapy            CC: AYLA Randolph CNP   ______________________________________________________________________________  Oncologic history  1/9/2024- self palpated left medial  breast mass   - diagnostic mammogram and ultrasound showed 3.4 x 2.2 x 2 cm large ill-defined hypoechoic area with associated calcification at 9:00 3 cm from the nipple.  Sonographic survey of left axilla is within normal limits.    1/11/2024-ultrasound-guided core needle biopsy showed DCIS, grade 3, cribriform and papillary with comedonecrosis.  ER/MS was deferred to excisional specimen.    1/25/2024-breast actionable panel was negative including INGRID, BARD1, BRCA1, BRCA2, CDH1, CHEK2, NF1, PALB2, PTEN, STK11, TP53.    2/4/2024-MRI breast showed large segmental area of non-mass enhancement medial left breast measuring 10 cm from the posterior to subareolar depth corresponding to the known DCIS.  Mildly prominent bilateral internal mammary lymph node technically within normal limit.    3/5/2024-left breast simple mastectomy and left axillary sentinel lymph node biopsy   Invasive ductal carcinoma, grade 3   Multifocal, 4 foci vary from 1.3 mm to 11 mm in greatest dimension.   Margins were negative, closest margin less than 1 mm anterior/superior   DCIS, EIC present, solid and comedo pattern, grade 3, extensive comedonecrosis.  Focal lymphovascular invasion present.   1/2 sentinel lymph node was positive for metastatic carcinoma.   [id number]   ER negative in invasive carcinoma, positive in DCIS (80% of the tumor nuclei stain strongly)   MS negative and invasive cancer, positive in DCIS (30% of the tumor nuclei stain strongly of moderately)   HER2/aj positive for overexpression (3+ membrane staining)    Fertility preservation was successfully completed.    4/26/2024- adjuvant TCHP    6/27/2024-genetic counseling and testing completed.    10/3/2024-anticipate to complete adjuvant ration to the left chest wall and lymph node    9/23/2024-initiated adjuvant endocrine therapy with tamoxifen.    History of Present Illness    Ms. Vibha Kingston presented today accompanied by her  prior to scheduled maintenance  "trastuzumab.    She is currently undergoing adjuvant radiation.  LMP 6/2/2024.  She told me that she takes Ativan about 1 time a week.  She has discoloration under the right first toe.    Review of systems  Apart from describing in HPI, the remainder of comprehensive ROS was negative.    Past History    Past Medical History:   Diagnosis Date     Cancer (H) 1/15/24    Breast cancer     Encounter for antineoplastic chemotherapy 03/14/2024       Past Surgical History:   Procedure Laterality Date     BIOPSY      A mole a few years back and an abnormal pap in [facility]     BIOPSY NODE SENTINEL Left 03/05/2024    Procedure: WITH LEFT SENTINEL LYMPH NODE BIOPSY;  Surgeon: Aleena Real DO;  Location: [facility] OR     FERTILITY SURGERY  04/10/2024    Egg retrieval     INSERT PORT VASCULAR ACCESS N/A 4/19/2024    Procedure: INSERTION, VASCULAR ACCESS PORT UNDER ULTRASOUND AND FLUOROSCOPIC GUIDANCE;  Surgeon: Aleena Real DO;  Location: [facility] OR     MASTECTOMY SIMPLE Left 03/05/2024    Procedure: LEFT MASTECTOMY;  Surgeon: Aleena Real DO;  Location: [facility] OR       Physical Exam    /86 (BP Location: Left arm, Patient Position: Sitting, Cuff Size: Adult Regular)   Pulse 73   Temp 98  F (36.7  C) (Oral)   Ht 1.651 m (5' 5\")   Wt 75.6 kg (166 lb 9.6 oz)   SpO2 97%   BMI 27.72 kg/m      General: alert, awake, not in acute distress  HEENT: Head: Normal, normocephalic, atraumatic.  Eye: Normal external eye, conjunctiva, lids cornea, GABRIELA.  Pharynx: Normal buccal mucosa. Normal pharynx.  Neck / Thyroid: Supple, no masses, nodes, nodules or enlargement.  Lymphatics: No abnormally enlarged lymph nodes.  Chest: Normal chest wall and respirations. Clear to auscultation.  Heart: S1 S2 RRR.   Abdomen: abdomen is soft without significant tenderness, masses, organomegaly or guarding  Extremities: normal strength, tone, and muscle mass  Skin: Subungual hematoma of the right big toe.    CNS: non " focal.    Lab Results    Recent Results (from the past 240 hour(s))   Comprehensive metabolic panel (BMP + Alb, Alk Phos, ALT, AST, Total. Bili, TP)    Collection Time: 24  9:06 AM   Result Value Ref Range    Sodium 145 135 - 145 mmol/L    Potassium 3.9 3.4 - 5.3 mmol/L    Carbon Dioxide (CO2) 24 22 - 29 mmol/L    Anion Gap 18 (H) 7 - 15 mmol/L    Urea Nitrogen 12.0 6.0 - 20.0 mg/dL    Creatinine 0.81 0.51 - 0.95 mg/dL    GFR Estimate >90 >60 mL/min/1.73m2    Calcium 9.5 8.8 - 10.4 mg/dL    Chloride 103 98 - 107 mmol/L    Glucose 99 70 - 99 mg/dL    Alkaline Phosphatase 59 40 - 150 U/L    AST 23 0 - 45 U/L    ALT 32 0 - 50 U/L    Protein Total 7.2 6.4 - 8.3 g/dL    Albumin 4.6 3.5 - 5.2 g/dL    Bilirubin Total 0.2 <=1.2 mg/dL   CBC with platelets and differential    Collection Time: 24  9:06 AM   Result Value Ref Range    WBC Count 3.4 (L) 4.0 - 11.0 10e3/uL    RBC Count 3.92 3.80 - 5.20 10e6/uL    Hemoglobin 12.5 11.7 - 15.7 g/dL    Hematocrit 36.2 35.0 - 47.0 %    MCV 92 78 - 100 fL    MCH 31.9 26.5 - 33.0 pg    MCHC 34.5 31.5 - 36.5 g/dL    RDW 12.9 10.0 - 15.0 %    Platelet Count 208 150 - 450 10e3/uL    % Neutrophils 75 %    % Lymphocytes 15 %    % Monocytes 10 %    % Eosinophils 1 %    % Basophils 0 %    % Immature Granulocytes 0 %    NRBCs per 100 WBC 0 <1 /100    Absolute Neutrophils 2.5 1.6 - 8.3 10e3/uL    Absolute Lymphocytes 0.5 (L) 0.8 - 5.3 10e3/uL    Absolute Monocytes 0.3 0.0 - 1.3 10e3/uL    Absolute Eosinophils 0.0 0.0 - 0.7 10e3/uL    Absolute Basophils 0.0 0.0 - 0.2 10e3/uL    Absolute Immature Granulocytes 0.0 <=0.4 10e3/uL    Absolute NRBCs 0.0 10e3/uL         Imaging    Echocardiogram Limited    Result Date: 2024  [id number]^NABIL^DAREN^[place] [address]  Name: EMILEE ORTA MRN: [id number] : 1990 Study Date: 2024 11:08 AM Age: 34 yrs Gender: Female Patient Location: [facility] Reason For Study:  Encounter for antineoplastic chemotherapy Ordering Physician: DAREN FERRER Referring Physician: DAREN FERRER Performed By: BRYANT  BSA: 1.8 m2 Height: 65 in Weight: 165 lb HR: 71 BP: 108/71 mmHg ______________________________________________________________________________ Procedure Limited Echo Adult. Compared to prior study, there is no significant change. Limited views were obtained. ______________________________________________________________________________ Interpretation Summary  Limited views were obtained. The left ventricle is normal in size. There is normal left ventricular wall thickness. Left ventricular systolic function is normal. The visual ejection fraction is 55-60%. No regional wall motion abnormalities noted. Global peak LV longitudinal strain is averaged at -22.8%. This is within reported normal limits (normal <-18%). The right ventricle is normal in size and function. IVC diameter <2.1 cm collapsing >50% with sniff suggests a normal RA pressure of 3 mmHg.  Compared to prior study, there is no significant change. ______________________________________________________________________________ Left Ventricle The left ventricle is normal in size. There is normal left ventricular wall thickness. Global peak LV longitudinal strain is averaged at -22.8%. This is within reported normal limits (normal <-18%). Left ventricular systolic function is normal. The visual ejection fraction is 55-60%. No regional wall motion abnormalities noted.  Right Ventricle The right ventricle is normal in size and function.  Vessels IVC diameter <2.1 cm collapsing >50% with sniff suggests a normal RA pressure of 3 mmHg.  ______________________________________________________________________________ MMode/2D Measurements & Calculations IVSd: 0.79 cm LVIDd: 4.9 cm LVIDs: 3.2 cm LVPWd: 0.66 cm FS: 35.1 % LV mass(C)d: 114.0 grams LV mass(C)dI: 62.6 grams/m2 LA dimension: 3.1 cm EF Biplane: 56.2 %  RWT: 0.27  Time  Measurements MM HR: 71.0 BPM  Measurements from QLAB LV GLS Endo Peak A2C (AS): -21.2 % LV GLS Endo Peak A3C (AS): -22.6 % LV GLS Endo Peak A4C (AS): -24.7 % LV GLS Endo Peak Avg (AS): -22.8 %  ______________________________________________________________________________ Report approved by: Pillo Corona 09/06/2024 03:01 PM        The longitudinal plan of care for the diagnosis(es)/condition(s) as documented were addressed during this visit. Due to the added complexity in care, I will continue to support Vibha in the subsequent management and with ongoing continuity of care.     40 minutes spent by me on the date of the encounter doing chart review, history and exam, documentation and further activities as noted above.    Signed by: Rosalind Adams MD      Again, thank you for allowing me to participate in the care of your patient.        Sincerely,        Rosalind Adams MD

## 2024-09-23 ENCOUNTER — TELEPHONE (OUTPATIENT)
Dept: PLASTIC SURGERY | Facility: CLINIC | Age: 34
End: 2024-09-23
Payer: COMMERCIAL

## 2024-09-23 ENCOUNTER — APPOINTMENT (OUTPATIENT)
Dept: RADIATION ONCOLOGY | Facility: CLINIC | Age: 34
End: 2024-09-23
Attending: STUDENT IN AN ORGANIZED HEALTH CARE EDUCATION/TRAINING PROGRAM
Payer: COMMERCIAL

## 2024-09-23 PROCEDURE — 77387 GUIDANCE FOR RADJ TX DLVR: CPT | Mod: 26 | Performed by: RADIOLOGY

## 2024-09-23 PROCEDURE — 77387 GUIDANCE FOR RADJ TX DLVR: CPT

## 2024-09-23 PROCEDURE — 77412 RADIATION TX DELIVERY LVL 3: CPT

## 2024-09-23 NOTE — TELEPHONE ENCOUNTER
[facility] Call Center    Phone Message    May a detailed message be left on voicemail: yes     Reason for Call: Appointment Intake    Referring Provider Name: Megan Hsu MD  Diagnosis and/or Symptoms: Malignant neoplasm of upper-outer quadrant of left breast in female, estrogen receptor negative [C50.412, Z17.1]    Patient is being referred for above cancer dx. Patient would like reconstruction options. Please review and follow up with patient due to cancer dx and urgency. Thanks!     Action Taken: Message routed to:  [facility]astics    Travel Screening: Not Applicable     Date of Service:

## 2024-09-24 ENCOUNTER — APPOINTMENT (OUTPATIENT)
Dept: RADIATION ONCOLOGY | Facility: CLINIC | Age: 34
End: 2024-09-24
Attending: STUDENT IN AN ORGANIZED HEALTH CARE EDUCATION/TRAINING PROGRAM
Payer: COMMERCIAL

## 2024-09-24 PROCEDURE — 77387 GUIDANCE FOR RADJ TX DLVR: CPT

## 2024-09-24 PROCEDURE — 77387 GUIDANCE FOR RADJ TX DLVR: CPT | Mod: 26 | Performed by: RADIOLOGY

## 2024-09-24 PROCEDURE — 77412 RADIATION TX DELIVERY LVL 3: CPT

## 2024-09-25 ENCOUNTER — APPOINTMENT (OUTPATIENT)
Dept: RADIATION ONCOLOGY | Facility: CLINIC | Age: 34
End: 2024-09-25
Attending: STUDENT IN AN ORGANIZED HEALTH CARE EDUCATION/TRAINING PROGRAM
Payer: COMMERCIAL

## 2024-09-25 PROCEDURE — 77412 RADIATION TX DELIVERY LVL 3: CPT | Performed by: RADIOLOGY

## 2024-09-25 PROCEDURE — 77387 GUIDANCE FOR RADJ TX DLVR: CPT | Performed by: STUDENT IN AN ORGANIZED HEALTH CARE EDUCATION/TRAINING PROGRAM

## 2024-09-25 PROCEDURE — 77387 GUIDANCE FOR RADJ TX DLVR: CPT | Mod: 26 | Performed by: STUDENT IN AN ORGANIZED HEALTH CARE EDUCATION/TRAINING PROGRAM

## 2024-09-26 ENCOUNTER — OFFICE VISIT (OUTPATIENT)
Dept: RADIATION ONCOLOGY | Facility: CLINIC | Age: 34
End: 2024-09-26
Attending: STUDENT IN AN ORGANIZED HEALTH CARE EDUCATION/TRAINING PROGRAM
Payer: COMMERCIAL

## 2024-09-26 VITALS
TEMPERATURE: 98.1 F | OXYGEN SATURATION: 96 % | SYSTOLIC BLOOD PRESSURE: 120 MMHG | BODY MASS INDEX: 27.36 KG/M2 | HEART RATE: 96 BPM | DIASTOLIC BLOOD PRESSURE: 70 MMHG | WEIGHT: 164.4 LBS | RESPIRATION RATE: 16 BRPM

## 2024-09-26 DIAGNOSIS — Z17.1 MALIGNANT NEOPLASM OF UPPER-OUTER QUADRANT OF LEFT BREAST IN FEMALE, ESTROGEN RECEPTOR NEGATIVE (H): Primary | ICD-10-CM

## 2024-09-26 DIAGNOSIS — C50.412 MALIGNANT NEOPLASM OF UPPER-OUTER QUADRANT OF LEFT BREAST IN FEMALE, ESTROGEN RECEPTOR NEGATIVE (H): Primary | ICD-10-CM

## 2024-09-26 PROCEDURE — 77412 RADIATION TX DELIVERY LVL 3: CPT | Performed by: STUDENT IN AN ORGANIZED HEALTH CARE EDUCATION/TRAINING PROGRAM

## 2024-09-26 PROCEDURE — 77387 GUIDANCE FOR RADJ TX DLVR: CPT | Performed by: STUDENT IN AN ORGANIZED HEALTH CARE EDUCATION/TRAINING PROGRAM

## 2024-09-26 PROCEDURE — 77387 GUIDANCE FOR RADJ TX DLVR: CPT | Mod: 26 | Performed by: STUDENT IN AN ORGANIZED HEALTH CARE EDUCATION/TRAINING PROGRAM

## 2024-09-26 PROCEDURE — 77280 THER RAD SIMULAJ FIELD SMPL: CPT | Mod: 26 | Performed by: STUDENT IN AN ORGANIZED HEALTH CARE EDUCATION/TRAINING PROGRAM

## 2024-09-26 PROCEDURE — 77280 THER RAD SIMULAJ FIELD SMPL: CPT | Performed by: STUDENT IN AN ORGANIZED HEALTH CARE EDUCATION/TRAINING PROGRAM

## 2024-09-26 ASSESSMENT — PAIN SCALES - GENERAL: PAINLEVEL: NO PAIN (0)

## 2024-09-26 NOTE — LETTER
9/26/2024      Vibha Kingston  [address] [place] [address]      Dear Colleague,    Thank you for referring your patient, Vibha Kingston, to the [facility] RADIATION ONCOLOGY [place]. Please see a copy of my visit note below.    RADIATION ONCOLOGY WEEKLY TREATMENT VISIT NOTE      Assessment / Impression       Visit Dx:  (C50.412,  Z17.1) Malignant neoplasm of upper-outer quadrant of left breast in female, estrogen receptor negative (H)  (primary encounter diagnosis)       Cancer Staging   Malignant neoplasm of upper-outer quadrant of left breast in female, estrogen receptor negative (H)  Staging form: Breast, AJCC 8th Edition  - Pathologic stage from 3/13/2024: Stage IIA (pT1c, pN1a(sn), cM0, G3, ER-, FL-, HER2+) - Signed by Rosalind Adams MD on 3/13/2024       Tolerating radiation therapy well.  All questions and concerns addressed.  Grade 1-2 radiation dermatitis    Plan:     Continue radiation treatment as prescribed.  Radiation:   Site: Lt CW LN  Stereotactic Radiosurgery: No  Concurrent Therapy: No  Today's Dose: 3738  Total Dose for Breast: 5005  Today's Fraction/Total Fraction Breast: 14/19    She will meet with plastic surgery, MD Ch to discuss delayed reconstruction.  Will proceed with the boost as initially discussed    Pain Management Plan: N/A    Subjective:      HPI: Vibha Kingston is a 34 year old female with  Malignant neoplasm of upper-outer quadrant of left breast in female, estrogen receptor negative [C50.412, Z17.1]    She is tolerating radiation therapy well.  No swelling of the chest or upper extremity.  Slight increase in skin irritation.  Was hoping to meet with plastic surgery prior to proceeding with radiation boost to see what additional implications and options she has.  She will be meeting with plastic surgery at this time feels like she would like to proceed with the boost with slight increase risk of skin irritation and long-term fibrosis.    The following  portions of the patient's history were reviewed and updated as appropriate: allergies, current medications, past family history, past medical history, past social history, past surgical history and problem list.    Assessment                  Body Site:  Breast                           Site: Lt CW LN  Stereotactic Radiosurgery: No  Concurrent Therapy: No  Today's Dose: 3738  Total Dose for Breast: 5005  Today's Fraction/Total Fraction Breast:   Drainage: 0: Absent  Drainage Odor: 0: Absent                                   Sexuality Alteration                    Emotional Alteration    Copin: Effective  Comfort Alteration   KPS: 100 % Normal, no complaints  Fatigue (ONS scale): 3: Mild Fatigue  Pain Location: denies   Nutrition Alteration   Anorexia: 0: None  Nausea: 0: None  Vomitin: None  Weight: 74.6 kg (164 lb 6.4 oz)  Skin Alteration   Skin Sensation: 0: No problem  Skin Reaction: 1: Faint erythema or dry desquamation  AUA Assessment                                           Accompanied by       Objective:     Exam:     Vitals:    24 1404   BP: 120/70   Pulse: 96   Resp: 16   Temp: 98.1  F (36.7  C)   SpO2: 96%   Weight: 74.6 kg (164 lb 6.4 oz)   PainSc: No Pain (0)       Wt Readings from Last 8 Encounters:   24 74.6 kg (164 lb 6.4 oz)   24 75.6 kg (166 lb 9.6 oz)   24 74.8 kg (165 lb)   24 75.1 kg (165 lb 9.6 oz)   24 75.1 kg (165 lb 8 oz)   24 74.8 kg (164 lb 12.8 oz)   24 72.9 kg (160 lb 12.8 oz)   24 74.9 kg (165 lb 3.2 oz)       General: Alert and oriented, in no acute distress  Vibha has mild to moderate erythema.  No lymphedema of chest wall or upper extremity.    Treatment Summary to Date    Aria chart and setup information reviewed    Megan Hsu MD      Again, thank you for allowing me to participate in the care of your patient.        Sincerely,        Megan Hsu MD

## 2024-09-27 ENCOUNTER — APPOINTMENT (OUTPATIENT)
Dept: RADIATION ONCOLOGY | Facility: CLINIC | Age: 34
End: 2024-09-27
Attending: STUDENT IN AN ORGANIZED HEALTH CARE EDUCATION/TRAINING PROGRAM
Payer: COMMERCIAL

## 2024-09-27 PROCEDURE — 77334 RADIATION TREATMENT AID(S): CPT | Mod: 26 | Performed by: STUDENT IN AN ORGANIZED HEALTH CARE EDUCATION/TRAINING PROGRAM

## 2024-09-27 PROCEDURE — 77336 RADIATION PHYSICS CONSULT: CPT | Performed by: STUDENT IN AN ORGANIZED HEALTH CARE EDUCATION/TRAINING PROGRAM

## 2024-09-27 PROCEDURE — 77307 TELETHX ISODOSE PLAN CPLX: CPT | Mod: 26 | Performed by: STUDENT IN AN ORGANIZED HEALTH CARE EDUCATION/TRAINING PROGRAM

## 2024-09-27 PROCEDURE — 77427 RADIATION TX MANAGEMENT X5: CPT | Performed by: STUDENT IN AN ORGANIZED HEALTH CARE EDUCATION/TRAINING PROGRAM

## 2024-09-27 PROCEDURE — 77307 TELETHX ISODOSE PLAN CPLX: CPT | Performed by: STUDENT IN AN ORGANIZED HEALTH CARE EDUCATION/TRAINING PROGRAM

## 2024-09-27 PROCEDURE — 77334 RADIATION TREATMENT AID(S): CPT | Performed by: STUDENT IN AN ORGANIZED HEALTH CARE EDUCATION/TRAINING PROGRAM

## 2024-09-27 PROCEDURE — 77387 GUIDANCE FOR RADJ TX DLVR: CPT | Performed by: STUDENT IN AN ORGANIZED HEALTH CARE EDUCATION/TRAINING PROGRAM

## 2024-09-27 PROCEDURE — 77387 GUIDANCE FOR RADJ TX DLVR: CPT | Mod: 26 | Performed by: STUDENT IN AN ORGANIZED HEALTH CARE EDUCATION/TRAINING PROGRAM

## 2024-09-27 PROCEDURE — 77412 RADIATION TX DELIVERY LVL 3: CPT | Performed by: STUDENT IN AN ORGANIZED HEALTH CARE EDUCATION/TRAINING PROGRAM

## 2024-09-27 NOTE — PROGRESS NOTES
RADIATION ONCOLOGY WEEKLY TREATMENT VISIT NOTE      Assessment / Impression       Visit Dx:  (C50.412,  Z17.1) Malignant neoplasm of upper-outer quadrant of left breast in female, estrogen receptor negative (H)  (primary encounter diagnosis)       Cancer Staging   Malignant neoplasm of upper-outer quadrant of left breast in female, estrogen receptor negative (H)  Staging form: Breast, AJCC 8th Edition  - Pathologic stage from 3/13/2024: Stage IIA (pT1c, pN1a(sn), cM0, G3, ER-, CT-, HER2+) - Signed by Rosalind Adams MD on 3/13/2024       Tolerating radiation therapy well.  All questions and concerns addressed.  Grade 1-2 radiation dermatitis    Plan:     Continue radiation treatment as prescribed.  Radiation:   Site: Lt CW LN  Stereotactic Radiosurgery: No  Concurrent Therapy: No  Today's Dose: 3738  Total Dose for Breast: 5005  Today's Fraction/Total Fraction Breast: 14/19    She will meet with plastic surgery, MD Ch to discuss delayed reconstruction.  Will proceed with the boost as initially discussed    Pain Management Plan: N/A    Subjective:      HPI: Vibha Kingston is a 34 year old female with  Malignant neoplasm of upper-outer quadrant of left breast in female, estrogen receptor negative [C50.412, Z17.1]    She is tolerating radiation therapy well.  No swelling of the chest or upper extremity.  Slight increase in skin irritation.  Was hoping to meet with plastic surgery prior to proceeding with radiation boost to see what additional implications and options she has.  She will be meeting with plastic surgery at this time feels like she would like to proceed with the boost with slight increase risk of skin irritation and long-term fibrosis.    The following portions of the patient's history were reviewed and updated as appropriate: allergies, current medications, past family history, past medical history, past social history, past surgical history and problem list.    Assessment                  Body  Site:  Breast                           Site: Lt CW LN  Stereotactic Radiosurgery: No  Concurrent Therapy: No  Today's Dose: 3738  Total Dose for Breast: 5005  Today's Fraction/Total Fraction Breast:   Drainage: 0: Absent  Drainage Odor: 0: Absent                                   Sexuality Alteration                    Emotional Alteration    Copin: Effective  Comfort Alteration   KPS: 100 % Normal, no complaints  Fatigue (ONS scale): 3: Mild Fatigue  Pain Location: denies   Nutrition Alteration   Anorexia: 0: None  Nausea: 0: None  Vomitin: None  Weight: 74.6 kg (164 lb 6.4 oz)  Skin Alteration   Skin Sensation: 0: No problem  Skin Reaction: 1: Faint erythema or dry desquamation  AUA Assessment                                           Accompanied by       Objective:     Exam:     Vitals:    24 1404   BP: 120/70   Pulse: 96   Resp: 16   Temp: 98.1  F (36.7  C)   SpO2: 96%   Weight: 74.6 kg (164 lb 6.4 oz)   PainSc: No Pain (0)       Wt Readings from Last 8 Encounters:   24 74.6 kg (164 lb 6.4 oz)   24 75.6 kg (166 lb 9.6 oz)   24 74.8 kg (165 lb)   24 75.1 kg (165 lb 9.6 oz)   24 75.1 kg (165 lb 8 oz)   24 74.8 kg (164 lb 12.8 oz)   24 72.9 kg (160 lb 12.8 oz)   24 74.9 kg (165 lb 3.2 oz)       General: Alert and oriented, in no acute distress  Vibha has mild to moderate erythema.  No lymphedema of chest wall or upper extremity.    Treatment Summary to Date    Aria chart and setup information reviewed    Megan Hsu MD

## 2024-09-30 ENCOUNTER — APPOINTMENT (OUTPATIENT)
Dept: RADIATION ONCOLOGY | Facility: CLINIC | Age: 34
End: 2024-09-30
Attending: STUDENT IN AN ORGANIZED HEALTH CARE EDUCATION/TRAINING PROGRAM
Payer: COMMERCIAL

## 2024-09-30 PROCEDURE — 77412 RADIATION TX DELIVERY LVL 3: CPT | Performed by: RADIOLOGY

## 2024-09-30 NOTE — TELEPHONE ENCOUNTER
FUTURE VISIT INFORMATION      FUTURE VISIT INFORMATION:  Date: 11/19/24  Time: 10:30am  Location: [facility] [place]  REFERRAL INFORMATION:  Referring provider:  Megan Hsu MD   Referring providers clinic:  [facility] RADIATION ONCOLOGY   Reason for visit/diagnosis  delayed breast recon, Dr Real, finished rad end of sept     RECORDS REQUESTED FROM:       Clinic name Comments Records Status Imaging Status   [facility] RADIATION ONCOLOGY  Ov/referral 9/20/24 [facility]    Imaging MR Breast done 2/4/24  MA 1/11/24 [facility] pac

## 2024-10-01 ENCOUNTER — APPOINTMENT (OUTPATIENT)
Dept: RADIATION ONCOLOGY | Facility: CLINIC | Age: 34
End: 2024-10-01
Attending: STUDENT IN AN ORGANIZED HEALTH CARE EDUCATION/TRAINING PROGRAM
Payer: COMMERCIAL

## 2024-10-01 PROCEDURE — 77387 GUIDANCE FOR RADJ TX DLVR: CPT | Performed by: RADIOLOGY

## 2024-10-01 PROCEDURE — 77412 RADIATION TX DELIVERY LVL 3: CPT | Performed by: RADIOLOGY

## 2024-10-01 PROCEDURE — 77387 GUIDANCE FOR RADJ TX DLVR: CPT | Mod: 26 | Performed by: RADIOLOGY

## 2024-10-02 ENCOUNTER — APPOINTMENT (OUTPATIENT)
Dept: RADIATION ONCOLOGY | Facility: CLINIC | Age: 34
End: 2024-10-02
Attending: STUDENT IN AN ORGANIZED HEALTH CARE EDUCATION/TRAINING PROGRAM
Payer: COMMERCIAL

## 2024-10-02 PROCEDURE — 77387 GUIDANCE FOR RADJ TX DLVR: CPT | Mod: 26 | Performed by: RADIOLOGY

## 2024-10-02 PROCEDURE — 77412 RADIATION TX DELIVERY LVL 3: CPT | Performed by: RADIOLOGY

## 2024-10-02 PROCEDURE — 77387 GUIDANCE FOR RADJ TX DLVR: CPT | Performed by: RADIOLOGY

## 2024-10-03 ENCOUNTER — OFFICE VISIT (OUTPATIENT)
Dept: RADIATION ONCOLOGY | Facility: CLINIC | Age: 34
End: 2024-10-03
Attending: STUDENT IN AN ORGANIZED HEALTH CARE EDUCATION/TRAINING PROGRAM
Payer: COMMERCIAL

## 2024-10-03 ENCOUNTER — RADIATION TREATMENT SUMMARY (OUTPATIENT)
Dept: RADIATION ONCOLOGY | Facility: CLINIC | Age: 34
End: 2024-10-03

## 2024-10-03 ENCOUNTER — PRE VISIT (OUTPATIENT)
Dept: SURGERY | Facility: CLINIC | Age: 34
End: 2024-10-03
Payer: COMMERCIAL

## 2024-10-03 ENCOUNTER — TELEPHONE (OUTPATIENT)
Dept: ONCOLOGY | Facility: HOSPITAL | Age: 34
End: 2024-10-03

## 2024-10-03 VITALS
HEART RATE: 65 BPM | WEIGHT: 161.6 LBS | BODY MASS INDEX: 26.89 KG/M2 | TEMPERATURE: 97.9 F | SYSTOLIC BLOOD PRESSURE: 104 MMHG | OXYGEN SATURATION: 98 % | RESPIRATION RATE: 16 BRPM | DIASTOLIC BLOOD PRESSURE: 68 MMHG

## 2024-10-03 DIAGNOSIS — Z17.1 MALIGNANT NEOPLASM OF UPPER-OUTER QUADRANT OF LEFT BREAST IN FEMALE, ESTROGEN RECEPTOR NEGATIVE (H): Primary | ICD-10-CM

## 2024-10-03 DIAGNOSIS — C50.412 MALIGNANT NEOPLASM OF UPPER-OUTER QUADRANT OF LEFT BREAST IN FEMALE, ESTROGEN RECEPTOR NEGATIVE (H): Primary | ICD-10-CM

## 2024-10-03 PROCEDURE — 77387 GUIDANCE FOR RADJ TX DLVR: CPT | Mod: 26 | Performed by: RADIOLOGY

## 2024-10-03 PROCEDURE — 77427 RADIATION TX MANAGEMENT X5: CPT | Performed by: RADIOLOGY

## 2024-10-03 PROCEDURE — 77412 RADIATION TX DELIVERY LVL 3: CPT | Performed by: RADIOLOGY

## 2024-10-03 PROCEDURE — 77387 GUIDANCE FOR RADJ TX DLVR: CPT | Performed by: RADIOLOGY

## 2024-10-03 PROCEDURE — 77336 RADIATION PHYSICS CONSULT: CPT | Performed by: STUDENT IN AN ORGANIZED HEALTH CARE EDUCATION/TRAINING PROGRAM

## 2024-10-03 ASSESSMENT — PAIN SCALES - GENERAL: PAINLEVEL: NO PAIN (0)

## 2024-10-03 NOTE — PROGRESS NOTES
RADIATION ONCOLOGY WEEKLY TREATMENT VISIT NOTE      Assessment / Impression       Visit Dx:  No diagnosis found.     Cancer Staging   Malignant neoplasm of upper-outer quadrant of left breast in female, estrogen receptor negative (H)  Staging form: Breast, AJCC 8th Edition  - Pathologic stage from 3/13/2024: Stage IIA (pT1c, pN1a(sn), cM0, G3, ER-, IN-, HER2+) - Signed by Rosalind Adams MD on 3/13/2024       Tolerating radiation therapy well.  All questions and concerns addressed.    1.  Discharge instructions were reviewed with the patient by myself and nurse.  2.  Skin care was reviewed with the patient.  It was advised that her erythema may worsen over the next 6 to 8 days but then should start to improve.  She is to call us with any changes  3.  Patient will plan to go undergo physical therapy evaluation for range of motion exercises.  4.  Follow-up appointment given in 4 to 6 weeks.  5.  Patient plans to meet with the plastic surgeon for an evaluation in the near future.    Plan:     Completed Radiation treatment as prescribed.  Radiation:   Site: Lt. CW/LN  Stereotactic Radiosurgery: No  Concurrent Therapy: No  Today's Dose: 5005  Total Dose for Breast: 5005  Today's Fraction/Total Fraction Breast: 19/19    Pain Management Plan: Patient should use Tylenol or Motrin as needed for discomfort over the next few weeks.  If this does not resolve any discomfort she is to let us know and we could prescribe more potent narcotics as requested.    Subjective:      HPI: Vibha Kingston is a 34 year old female with  No primary diagnosis found.    Patient is accompanied by her  today.  The nurses present during the entire visit, Annabel.  Patient reports she is tolerated radiation therapy well.  She denies significant problems concerns or difficulties to address at this visit.  Her motion of her arm has been limited since surgery and she does feel some tightness post radiation therapy as expected.  She states her  skin is holding up well without any open wounds but with some mild erythema noted.      The following portions of the patient's history were reviewed and updated as appropriate: allergies, current medications, past family history, past medical history, past social history, past surgical history and problem list.    Assessment                  Body Site:  Breast                           Site: Lt. CW/LN  Stereotactic Radiosurgery: No  Concurrent Therapy: No  Today's Dose: 5005  Total Dose for Breast: 5005  Today's Fraction/Total Fraction Breast:   Drainage: 0: Absent  Drainage Odor: 0: Absent                                   Sexuality Alteration                    Emotional Alteration    Copin: Effective  Comfort Alteration   KPS: 100 % Normal, no complaints  Fatigue (ONS scale): 3: Mild Fatigue  Pain Location: denies  Pain Intensity. Rate degree of pain ranging from 0 (no pain) to 10 (severe pain): 0   Nutrition Alteration   Anorexia: 0: None  Nausea: 0: None  Vomitin: None  Weight: 73.3 kg (161 lb 9.6 oz)  Skin Alteration   Skin Sensation: 0: No problem  Skin Reaction: 2: Bright erythema  AUA Assessment                                           Accompanied by       Objective:     Exam: Performed by female nurse CAMILLA Present at all times.  Temperature 97.9 blood pressure 104/68  General Alert oriented x 3 nonlabored breathing no acute distress  Psych normal mood and affect for age and disease  Skin with mild erythema and some dry desquamation.  No areas of open wet desquamation appreciated.  Extremity: 1+ swelling of the arm and fingers.  Musculoskeletal: Range of motion limited secondary to discomfort of the chest wall as expected at the completion of treatment.    Vitals:    10/03/24 0826   BP: 104/68   Pulse: 65   Resp: 16   Temp: 97.9  F (36.6  C)   TempSrc: Oral   SpO2: 98%   Weight: 73.3 kg (161 lb 9.6 oz)   PainSc: No Pain (0)       Wt Readings from Last 8 Encounters:   10/03/24 73.3  kg (161 lb 9.6 oz)   09/26/24 74.6 kg (164 lb 6.4 oz)   09/20/24 75.6 kg (166 lb 9.6 oz)   09/20/24 74.8 kg (165 lb)   08/30/24 75.1 kg (165 lb 9.6 oz)   08/09/24 75.1 kg (165 lb 8 oz)   08/02/24 74.8 kg (164 lb 12.8 oz)   07/26/24 72.9 kg (160 lb 12.8 oz)       General: Alert and oriented, in no acute distress  Vibha has mild Erythema.    Treatment Summary to Date    Aria chart and setup information reviewed    Giuseppe Milian MD

## 2024-10-03 NOTE — LETTER
Radiation Treatment Summary    Patient: Vibha Kingston   MRN: [id number]  : 1990  Care Provider: Megan Hsu MD    Encounter Date: Oct 3, 2024      HISTORY: Vibha Kingston was treated with 3D conformal radiation therapy.    Ms. Kingston is a 34 year old female with a diagnosis of left breast stage pT1c pN1a Invasive ductal carcinoma ER/NE-, Her2 +, grade 3, multifocal with ERE/NE+ DCIS s/p mastectomy and SLN with negative but close margins and 1/3 LN+ followed by adjuvant TCHP x 6 cycles.     DX: (C50.412,  Z17.1) Malignant neoplasm of upper-outer quadrant of left breast in female, estrogen receptor negative (H)  (primary encounter diagnosis)    SITE TREATED: Left chest wall and regional LN  TOTAL DOSE: 4005 cGy + 1000 cGy boost   NUMBER OF FRACTIONS: 15 + 4 boost  RADIATION TREATMENT : 2024-10/3/2024  CONCURRENT CHEMOTHERAPY: Yes: trastuzumab   ADJUVANT THERAPY: Yes:  trastuzumab, tamoxifen     Vibha tolerated the treatment without unexpected side effects.     PLAN: Discharge instructions were given and Vibha knows to call if questions/issues arise. Vibha will be seen in f/u in 4-6 weeks without a scan.     Pain Management Plan: N/A    Signed by: Megan Hsu MD    cc:    Patient Care Team:  Zully Bloom APRN CNP as PCP - General (Family Medicine)  Zully Bloom APRN CNP as Assigned PCP  Gloria Sr APRN CNM as Assigned OBGYN Provider  Aleena Real DO as Assigned Surgical Provider  Christie Agee RN as Specialty Care Coordinator (Hematology & Oncology)  Rosalind Adams MD as Assigned Cancer Care Provider  Stephen Chávez PsyD as Assigned Sleep Provider  Megan Hsu MD as MD (Radiation Oncology)

## 2024-10-03 NOTE — PROGRESS NOTES
Patient here ambulatory accompanied by  for final radiation therapy treatment for her breast cancer.  Reinforced skin cares.  Reinforced range of motion exercises.  Written discharge instructions reviewed and given to patient.  Follow-up with Dr. Hsu in about 4 to 6 weeks.  Patient instructed to make appointments on her way out of clinic.

## 2024-10-03 NOTE — LETTER
10/3/2024      Vibha Kingston  [address] [place] [address]      Dear Colleague,    Thank you for referring your patient, Vibha Kingston, to the [facility] RADIATION ONCOLOGY [place]. Please see a copy of my visit note below.    RADIATION ONCOLOGY WEEKLY TREATMENT VISIT NOTE      Assessment / Impression       Visit Dx:  No diagnosis found.     Cancer Staging   Malignant neoplasm of upper-outer quadrant of left breast in female, estrogen receptor negative (H)  Staging form: Breast, AJCC 8th Edition  - Pathologic stage from 3/13/2024: Stage IIA (pT1c, pN1a(sn), cM0, G3, ER-, NV-, HER2+) - Signed by Rosalind Adams MD on 3/13/2024       Tolerating radiation therapy well.  All questions and concerns addressed.    1.  Discharge instructions were reviewed with the patient by myself and nurse.  2.  Skin care was reviewed with the patient.  It was advised that her erythema may worsen over the next 6 to 8 days but then should start to improve.  She is to call us with any changes  3.  Patient will plan to go undergo physical therapy evaluation for range of motion exercises.  4.  Follow-up appointment given in 4 to 6 weeks.  5.  Patient plans to meet with the plastic surgeon for an evaluation in the near future.    Plan:     Completed Radiation treatment as prescribed.  Radiation:   Site: Lt. CW/LN  Stereotactic Radiosurgery: No  Concurrent Therapy: No  Today's Dose: 5005  Total Dose for Breast: 5005  Today's Fraction/Total Fraction Breast: 19/19    Pain Management Plan: Patient should use Tylenol or Motrin as needed for discomfort over the next few weeks.  If this does not resolve any discomfort she is to let us know and we could prescribe more potent narcotics as requested.    Subjective:      HPI: Vibha Kingston is a 34 year old female with  No primary diagnosis found.    Patient is accompanied by her  today.  The nurses present during the entire visit, Annabel.  Patient reports she is tolerated  radiation therapy well.  She denies significant problems concerns or difficulties to address at this visit.  Her motion of her arm has been limited since surgery and she does feel some tightness post radiation therapy as expected.  She states her skin is holding up well without any open wounds but with some mild erythema noted.      The following portions of the patient's history were reviewed and updated as appropriate: allergies, current medications, past family history, past medical history, past social history, past surgical history and problem list.    Assessment                  Body Site:  Breast                           Site: Lt. CW/LN  Stereotactic Radiosurgery: No  Concurrent Therapy: No  Today's Dose: 5005  Total Dose for Breast: 5005  Today's Fraction/Total Fraction Breast:   Drainage: 0: Absent  Drainage Odor: 0: Absent                                   Sexuality Alteration                    Emotional Alteration    Copin: Effective  Comfort Alteration   KPS: 100 % Normal, no complaints  Fatigue (ONS scale): 3: Mild Fatigue  Pain Location: denies  Pain Intensity. Rate degree of pain ranging from 0 (no pain) to 10 (severe pain): 0   Nutrition Alteration   Anorexia: 0: None  Nausea: 0: None  Vomitin: None  Weight: 73.3 kg (161 lb 9.6 oz)  Skin Alteration   Skin Sensation: 0: No problem  Skin Reaction: 2: Bright erythema  AUA Assessment                                           Accompanied by       Objective:     Exam: Performed by female nurse CAMILLA Present at all times.  Temperature 97.9 blood pressure 104/68  General Alert oriented x 3 nonlabored breathing no acute distress  Psych normal mood and affect for age and disease  Skin with mild erythema and some dry desquamation.  No areas of open wet desquamation appreciated.  Extremity: 1+ swelling of the arm and fingers.  Musculoskeletal: Range of motion limited secondary to discomfort of the chest wall as expected at the  completion of treatment.    Vitals:    10/03/24 0826   BP: 104/68   Pulse: 65   Resp: 16   Temp: 97.9  F (36.6  C)   TempSrc: Oral   SpO2: 98%   Weight: 73.3 kg (161 lb 9.6 oz)   PainSc: No Pain (0)       Wt Readings from Last 8 Encounters:   10/03/24 73.3 kg (161 lb 9.6 oz)   09/26/24 74.6 kg (164 lb 6.4 oz)   09/20/24 75.6 kg (166 lb 9.6 oz)   09/20/24 74.8 kg (165 lb)   08/30/24 75.1 kg (165 lb 9.6 oz)   08/09/24 75.1 kg (165 lb 8 oz)   08/02/24 74.8 kg (164 lb 12.8 oz)   07/26/24 72.9 kg (160 lb 12.8 oz)       General: Alert and oriented, in no acute distress  Vibha has mild Erythema.    Treatment Summary to Date    Aria chart and setup information reviewed    Giuseppe Milian MD      Again, thank you for allowing me to participate in the care of your patient.        Sincerely,        Giuseppe Milian MD

## 2024-10-03 NOTE — TELEPHONE ENCOUNTER
Oncology Distress Screen    Called Vibha in regards to her positive oncology nutrition distress screen:    9. If you want to be contacted by one of our professionals, I can send a message to them right now. : ONCOLOGY DIETITIAN    Vibha reports she has been feeling well. She is hoping to work on eating more well-balanced meals and also incorporate more fiber into her diet. Reviewed tips and emailed resources.      Andie Higuera MS, RD, LD  [phone]

## 2024-10-04 SDOH — HEALTH STABILITY: PHYSICAL HEALTH: ON AVERAGE, HOW MANY DAYS PER WEEK DO YOU ENGAGE IN MODERATE TO STRENUOUS EXERCISE (LIKE A BRISK WALK)?: 2 DAYS

## 2024-10-04 SDOH — HEALTH STABILITY: PHYSICAL HEALTH: ON AVERAGE, HOW MANY MINUTES DO YOU ENGAGE IN EXERCISE AT THIS LEVEL?: 30 MIN

## 2024-10-04 ASSESSMENT — SOCIAL DETERMINANTS OF HEALTH (SDOH): HOW OFTEN DO YOU GET TOGETHER WITH FRIENDS OR RELATIVES?: ONCE A WEEK

## 2024-10-06 SDOH — HEALTH STABILITY: PHYSICAL HEALTH: ON AVERAGE, HOW MANY MINUTES DO YOU ENGAGE IN EXERCISE AT THIS LEVEL?: 30 MIN

## 2024-10-06 SDOH — HEALTH STABILITY: PHYSICAL HEALTH: ON AVERAGE, HOW MANY DAYS PER WEEK DO YOU ENGAGE IN MODERATE TO STRENUOUS EXERCISE (LIKE A BRISK WALK)?: 2 DAYS

## 2024-10-06 ASSESSMENT — PATIENT HEALTH QUESTIONNAIRE - PHQ9
SUM OF ALL RESPONSES TO PHQ QUESTIONS 1-9: 13
10. IF YOU CHECKED OFF ANY PROBLEMS, HOW DIFFICULT HAVE THESE PROBLEMS MADE IT FOR YOU TO DO YOUR WORK, TAKE CARE OF THINGS AT HOME, OR GET ALONG WITH OTHER PEOPLE: SOMEWHAT DIFFICULT
SUM OF ALL RESPONSES TO PHQ QUESTIONS 1-9: 13

## 2024-10-06 ASSESSMENT — SOCIAL DETERMINANTS OF HEALTH (SDOH): HOW OFTEN DO YOU GET TOGETHER WITH FRIENDS OR RELATIVES?: ONCE A WEEK

## 2024-10-07 ENCOUNTER — PATIENT OUTREACH (OUTPATIENT)
Dept: CARE COORDINATION | Facility: CLINIC | Age: 34
End: 2024-10-07
Payer: COMMERCIAL

## 2024-10-09 ENCOUNTER — MYC MEDICAL ADVICE (OUTPATIENT)
Dept: FAMILY MEDICINE | Facility: CLINIC | Age: 34
End: 2024-10-09

## 2024-10-09 ENCOUNTER — OFFICE VISIT (OUTPATIENT)
Dept: FAMILY MEDICINE | Facility: CLINIC | Age: 34
End: 2024-10-09
Attending: NURSE PRACTITIONER
Payer: COMMERCIAL

## 2024-10-09 VITALS
SYSTOLIC BLOOD PRESSURE: 100 MMHG | OXYGEN SATURATION: 99 % | HEIGHT: 66 IN | WEIGHT: 159.2 LBS | BODY MASS INDEX: 25.58 KG/M2 | RESPIRATION RATE: 18 BRPM | DIASTOLIC BLOOD PRESSURE: 78 MMHG | HEART RATE: 62 BPM | TEMPERATURE: 98 F

## 2024-10-09 DIAGNOSIS — D70.1 CHEMOTHERAPY-INDUCED NEUTROPENIA (H): ICD-10-CM

## 2024-10-09 DIAGNOSIS — C50.412 MALIGNANT NEOPLASM OF UPPER-OUTER QUADRANT OF LEFT BREAST IN FEMALE, ESTROGEN RECEPTOR NEGATIVE (H): ICD-10-CM

## 2024-10-09 DIAGNOSIS — K60.2 ANAL FISSURE: ICD-10-CM

## 2024-10-09 DIAGNOSIS — Z17.1 MALIGNANT NEOPLASM OF UPPER-OUTER QUADRANT OF LEFT BREAST IN FEMALE, ESTROGEN RECEPTOR NEGATIVE (H): ICD-10-CM

## 2024-10-09 DIAGNOSIS — T45.1X5A CHEMOTHERAPY-INDUCED NEUTROPENIA (H): ICD-10-CM

## 2024-10-09 DIAGNOSIS — Z01.818 PREOP GENERAL PHYSICAL EXAM: Primary | ICD-10-CM

## 2024-10-09 PROCEDURE — 90471 IMMUNIZATION ADMIN: CPT | Performed by: NURSE PRACTITIONER

## 2024-10-09 PROCEDURE — 99214 OFFICE O/P EST MOD 30 MIN: CPT | Mod: 25 | Performed by: NURSE PRACTITIONER

## 2024-10-09 PROCEDURE — 90656 IIV3 VACC NO PRSV 0.5 ML IM: CPT | Performed by: NURSE PRACTITIONER

## 2024-10-09 NOTE — PROGRESS NOTES
Preoperative Evaluation  [facility]  [address] [address]  Phone: [phone]  Fax: [phone]  Primary Provider: AYLA Randolph CNP  Pre-op Performing Provider: AYLA Randolph CNP  Oct 9, 2024             10/6/2024   Surgical Information   What procedure is being done? Anorextal surgery   Facility or Hospital where procedure/surgery will be performed: [facility]   Who is doing the procedure / surgery? Dr Atkinson   Date of surgery / procedure: 11/8 - possibly 10/28 or 11/4 though   Time of surgery / procedure: 12:30 pm   Where do you plan to recover after surgery? at home with family        Fax number for surgical facility: [phone]    Assessment & Plan     The proposed surgical procedure is considered LOW risk.    Preop general physical exam  Anal fissure  Continue follow-up with anorectal surgeon  Patient approved for planned procedure    Patient has no current anemia  Hemoglobin 12.5 on 9/20/2024  Patient will have this rechecked with her oncologist before her planned procedure    Malignant neoplasm of upper-outer quadrant of left breast in female, estrogen receptor negative (H)  S/p mastectomy, on tamoxifen and current neoplastic chemotherapy     Chemotherapy-induced neutropenia (H)  Patient previously getting CBC with differential every 3 weeks  9/20/2024 white blood cell count 3.4, absolute lymphocytes 0.5        Depression Screening Follow Up        10/6/2024     9:28 AM   PHQ   PHQ-9 Total Score 13   Q9: Thoughts of better off dead/self-harm past 2 weeks Not at all           Follow Up Actions Taken  Crisis resource information provided in After Visit Summary      Implanted Device   - Type of device: RIGHT INTERNAL JUGULAR VEIN PORT PLACEMENT UNDER ULTRASOUND AND FLUOROSCOPIC GUIDANCE 4/2019 for chemo access      - No identified additional risk factors other than previously addressed    Preoperative Medication  Instructions  Antiplatelet or Anticoagulation Medication Instructions   - Patient is on no antiplatelet or anticoagulation medications.      Recommendation  Approval given to proceed with proposed procedure, without further diagnostic evaluation.    Subjective   Vibha is a 34 year old, presenting for the following:  Pre-Op Exam          10/9/2024     4:31 PM   Additional Questions   Roomed by TREVOR MCINTOSH   Accompanied by SELF     HPI related to upcoming procedure:     Fissure - has been doing the ointment prescribed to her for 2 months and not seeing improvement.  Pain is minimal related to cho pain. BM daily- every other day, soft.   Has been applying cream that has not been helpful.  Doing a try for Botox to relax.   [facility]     LMP June   Hot flashes a couple times/day     Covid infection 1 month ago - mild symptoms.     Incontinence has worsened since surgery.   Low libido not sexually active         10/6/2024   Pre-Op Questionnaire   Have you ever had a heart attack or stroke? No   Have you ever had surgery on your heart or blood vessels, such as a stent placement, a coronary artery bypass, or surgery on an artery in your head, neck, heart, or legs? (!) YES    Do you have chest pain with activity? No   Do you have a history of heart failure? No   Do you currently have a cold, bronchitis or symptoms of other infection? No   Do you have a cough, shortness of breath, or wheezing? No   Do you or anyone in your family have previous history of blood clots? No   Do you or does anyone in your family have a serious bleeding problem such as prolonged bleeding following surgeries or cuts? No   Have you ever had problems with anemia or been told to take iron pills? No   Have you had any abnormal blood loss such as black, tarry or bloody stools, or abnormal vaginal bleeding? No   Have you ever had a blood transfusion? No   Are you willing to have a blood transfusion if it is medically needed  "before, during, or after your surgery? Yes   Have you or any of your relatives ever had problems with anesthesia? No   Do you have sleep apnea, excessive snoring or daytime drowsiness? No   Do you have any artifical heart valves or other implanted medical devices like a pacemaker, defibrillator, or continuous glucose monitor? Port in place for chemo with subclavian stent in place - inserted 4/19/24   Do you have artificial joints? No   Are you allergic to latex? No        Health Care Directive  Patient does not have a Health Care Directive or Living Will: Discussed advance care planning with patient; information given to patient to review.    Preoperative Review of    reviewed - no record of controlled substances prescribed.          Patient Active Problem List    Diagnosis Date Noted    Chemotherapy-induced neutropenia (H) 10/09/2024     Priority: Medium    Anal fissure 10/09/2024     Priority: Medium    History of unilateral modified radical mastectomy, left 04/01/2024     Priority: Medium    Visit for fertility preservation counseling prior to cancer therapy 04/01/2024     Priority: Medium    Invasive ductal carcinoma of breast, female, left (H) 03/19/2024     Priority: Medium    Encounter for antineoplastic chemotherapy 03/14/2024     Priority: Medium    Malignant neoplasm of upper-outer quadrant of left breast in female, estrogen receptor negative (H) 03/13/2024     Priority: Medium    Adjustment disorder with depressed mood 02/26/2024     Priority: Medium    Ductal carcinoma in situ (DCIS) of left breast 02/16/2024     Priority: Medium     Diagnosed January 2024  Left breast total mastectomy March 5, 2024      Abnormal Pap smear of cervix 10/16/2023     Priority: Medium     10/09/23 NIL Pap, Neg HR HPV Plan cotest in 3 years per office visit note \"one normal pap seen on documentation from 11/2021 (cotesting), colpo was at age 22 years old at [facility]. No abnormal since this visit. If normal today " "repeat in 3 years and then 5 years.\"         Past Medical History:   Diagnosis Date    Cancer (H) 1/15/24    Breast cancer    Encounter for antineoplastic chemotherapy 03/14/2024     Past Surgical History:   Procedure Laterality Date    BIOPSY      A mole a few years back and an abnormal pap in [facility]    BIOPSY NODE SENTINEL Left 03/05/2024    Procedure: WITH LEFT SENTINEL LYMPH NODE BIOPSY;  Surgeon: Aleena Real DO;  Location: [facility] OR    FERTILITY SURGERY  04/10/2024    Egg retrieval    INSERT PORT VASCULAR ACCESS N/A 4/19/2024    Procedure: INSERTION, VASCULAR ACCESS PORT UNDER ULTRASOUND AND FLUOROSCOPIC GUIDANCE;  Surgeon: Aleena Real DO;  Location: [facility] OR    MASTECTOMY SIMPLE Left 03/05/2024    Procedure: LEFT MASTECTOMY;  Surgeon: Aleena Real DO;  Location: [facility] OR     Current Outpatient Medications   Medication Sig Dispense Refill    acetaminophen (TYLENOL) 500 MG tablet Take 1,000 mg by mouth every 8 hours as needed for mild pain, other or pain      lidocaine-prilocaine (EMLA) 2.5-2.5 % external cream Apply to port site 30-60 minutes before port access 30 g 2    LORazepam (ATIVAN) 1 MG tablet Take 1 tablet (1 mg) by mouth every 6 hours as needed for anxiety 30 tablet 0    melatonin 3 MG tablet Take 3 mg by mouth nightly as needed for sleep      tamoxifen (NOLVADEX) 20 MG tablet Take 1 tablet (20 mg) by mouth daily. 30 tablet 1    Vitamin D, Cholecalciferol, 25 MCG (1000 UT) CAPS          No Known Allergies     Social History     Tobacco Use    Smoking status: Former     Current packs/day: 0.00     Types: Cigarettes, Cigars, Hookah     Passive exposure: Never    Smokeless tobacco: Never    Tobacco comments:     Social, never created a habit   Substance Use Topics    Alcohol use: Yes     Comment: None since Jan 2024       History   Drug Use    Types: Marijuana             Review of Systems  Constitutional, HEENT, cardiovascular, pulmonary, gi and gu systems are " "negative, except as otherwise noted.    Objective    /78 (BP Location: Right arm, Patient Position: Sitting, Cuff Size: Adult Regular)   Pulse 62   Temp 98  F (36.7  C)   Resp 18   Ht 1.67 m (5' 5.75\")   Wt 72.2 kg (159 lb 3.2 oz)   SpO2 99%   BMI 25.89 kg/m     Estimated body mass index is 25.89 kg/m  as calculated from the following:    Height as of this encounter: 1.67 m (5' 5.75\").    Weight as of this encounter: 72.2 kg (159 lb 3.2 oz).  Physical Exam  GENERAL: alert and no distress  EYES: Eyes grossly normal to inspection, PERRL and conjunctivae and sclerae normal  HENT: ear canals and TM's normal, nose and mouth without ulcers or lesions  NECK: no adenopathy, no asymmetry, masses, or scars  RESP: lungs clear to auscultation - no rales, rhonchi or wheezes  CV: regular rate and rhythm, normal S1 S2, no S3 or S4, no murmur, click or rub, no peripheral edema  ABDOMEN: soft, nontender, no hepatosplenomegaly, no masses and bowel sounds normal  MS: no gross musculoskeletal defects noted, no edema  SKIN: right first toe- with subungual hematoma in 50% of nail. Small dark dot on the left first toe   NEURO: Normal strength and tone, mentation intact and speech normal  PSYCH: mentation appears normal, affect normal/bright    Recent Labs   Lab Test 09/20/24  0906 08/30/24  0916   HGB 12.5 11.0*    124*    143   POTASSIUM 3.9 3.8   CR 0.81 0.81        Diagnostics  Labs pending at this time.  Results will be reviewed when available.   No EKG required, no history of coronary heart disease, significant arrhythmia, peripheral arterial disease or other structural heart disease.    Revised Cardiac Risk Index (RCRI)  The patient has the following serious cardiovascular risks for perioperative complications:   - No serious cardiac risks = 0 points     RCRI Interpretation: 0 points: Class I (very low risk - 0.4% complication rate)         Signed Electronically by: AYLA Randolph CNP  A copy of this " evaluation report is provided to the requesting physician.         Answers submitted by the patient for this visit:  Patient Health Questionnaire (Submitted on 10/6/2024)  If you checked off any problems, how difficult have these problems made it for you to do your work, take care of things at home, or get along with other people?: Somewhat difficult  PHQ9 TOTAL SCORE: 13

## 2024-10-09 NOTE — PATIENT INSTRUCTIONS
Patient Education   Preparing for Your Surgery  For Adults  Getting started  In most cases, a nurse will call to review your health history and instructions. They will give you an arrival time based on your scheduled surgery time. Please be ready to share:  Your doctor's clinic name and phone number  Your medical, surgical, and anesthesia history  A list of allergies and sensitivities  A list of medicines, including herbal treatments and over-the-counter drugs  Whether the patient has a legal guardian (ask how to send us the papers in advance)  Note: You may not receive a call if you were seen at our PAC (Preoperative Assessment Center).  Please tell us if you're pregnant--or if there's any chance you might be pregnant. Some surgeries may injure a fetus (unborn baby), so they require a pregnancy test. Surgeries that are safe for a fetus don't always need a test, and you can choose whether to have one.   Preparing for surgery  Within 10 to 30 days of surgery: Have a pre-op exam (sometimes called an H&P, or History and Physical). This can be done at a clinic or pre-operative center.  If you're having a , you may not need this exam. Talk to your care team.  At your pre-op exam, talk to your care team about all medicines you take. (This includes CBD oil and any drugs, such as THC, marijuana, and other forms of cannabis.) If you need to stop any medicine before surgery, ask when to start taking it again.  This is for your safety. Many medicines and drugs can make you bleed too much during surgery. Some change how well surgery (anesthesia) drugs work.  Call your insurance company to let them know you're having surgery. (If you don't have insurance, call [phone].)  Call your clinic if there's any change in your health. This includes a scrape or scratch near the surgery site, or any signs of a cold (sore throat, runny nose, cough, rash, fever).  Eating and drinking guidelines  For your safety: Unless  your surgeon tells you otherwise, follow the guidelines below.  Eat and drink as normal until 8 hours before you arrive for surgery. After that, no food or milk. You can spit out gum when you arrive.  Drink clear liquids until 2 hours before you arrive. These are liquids you can see through, like water, Gatorade, and Propel Water. They also include plain black coffee and tea (no cream or milk).  No alcohol for 24 hours before you arrive. The night before surgery, stop any drinks that contain THC.  If your care team tells you to take medicine on the morning of surgery, it's okay to take it with a sip of water. No other medicines or drugs are allowed (including CBD oil)--follow your care team's instructions.  If you have questions the day of surgery, call your hospital or surgery center.   Preventing infection  Shower or bathe the night before and the morning of surgery. Follow the instructions your clinic gave you. (If no instructions, use regular soap.)  Don't shave or clip hair near your surgery site. We'll remove the hair if needed.  Don't smoke or vape the morning of surgery. No chewing tobacco for 6 hours before you arrive. A nicotine patch is okay. You may spit out nicotine gum when you arrive.  For some surgeries, the surgeon will tell you to fully quit smoking and nicotine.  We will make every effort to keep you safe from infection. We will:  Clean our hands often with soap and water (or an alcohol-based hand rub).  Clean the skin at your surgery site with a special soap that kills germs.  Give you a special gown to keep you warm. (Cold raises the risk of infection.)  Wear hair covers, masks, gowns, and gloves during surgery.  Give antibiotic medicine, if prescribed. Not all surgeries need this medicine.  What to bring on the day of surgery  Photo ID and insurance card  Copy of your health care directive, if you have one  Glasses and hearing aids (bring cases)  You can't wear contacts during surgery  Inhaler  and eye drops, if you use them (tell us about these when you arrive)  CPAP machine or breathing device, if you use them  A few personal items, if spending the night  If you have . . .  A pacemaker, ICD (cardiac defibrillator), or other implant: Bring the ID card.  An implanted stimulator: Bring the remote control.  A legal guardian: Bring a copy of the certified (court-stamped) guardianship papers.  Please remove any jewelry, including body piercings. Leave jewelry and other valuables at home.  If you're going home the day of surgery  You must have a responsible adult drive you home. They should stay with you overnight as well.  If you don't have someone to stay with you, and you aren't safe to go home alone, we may keep you overnight. Insurance often won't pay for this.  After surgery  If it's hard to control your pain or you need more pain medicine, please call your surgeon's office.  Questions?   If you have any questions for your care team, list them here:   ____________________________________________________________________________________________________________________________________________________________________________________________________________________________________________________________  For informational purposes only. Not to replace the advice of your health care provider. Copyright   2003, 2019 [employer]. All rights reserved. Clinically reviewed by Fred Reyes MD. eFolder [id number] - REV 08/24.

## 2024-10-10 ENCOUNTER — TELEPHONE (OUTPATIENT)
Dept: RADIATION ONCOLOGY | Facility: CLINIC | Age: 34
End: 2024-10-10
Payer: COMMERCIAL

## 2024-10-10 NOTE — TELEPHONE ENCOUNTER
I called Vibha and needed to leave a message that this was a follow up, no need to call back if she is not having issues in recovery. I did encourage her to make her follow up appt, if not already done. I wished her well. DOMINGA Thomas RN, OCN, CBCN

## 2024-10-11 ENCOUNTER — INFUSION THERAPY VISIT (OUTPATIENT)
Dept: INFUSION THERAPY | Facility: HOSPITAL | Age: 34
End: 2024-10-11
Attending: INTERNAL MEDICINE
Payer: COMMERCIAL

## 2024-10-11 VITALS
RESPIRATION RATE: 16 BRPM | DIASTOLIC BLOOD PRESSURE: 82 MMHG | SYSTOLIC BLOOD PRESSURE: 113 MMHG | TEMPERATURE: 97.6 F | HEART RATE: 73 BPM | OXYGEN SATURATION: 97 %

## 2024-10-11 DIAGNOSIS — Z17.1 MALIGNANT NEOPLASM OF UPPER-OUTER QUADRANT OF LEFT BREAST IN FEMALE, ESTROGEN RECEPTOR NEGATIVE (H): Primary | ICD-10-CM

## 2024-10-11 DIAGNOSIS — C50.412 MALIGNANT NEOPLASM OF UPPER-OUTER QUADRANT OF LEFT BREAST IN FEMALE, ESTROGEN RECEPTOR NEGATIVE (H): Primary | ICD-10-CM

## 2024-10-11 DIAGNOSIS — Z51.11 ENCOUNTER FOR ANTINEOPLASTIC CHEMOTHERAPY: ICD-10-CM

## 2024-10-11 PROCEDURE — 96413 CHEMO IV INFUSION 1 HR: CPT

## 2024-10-11 PROCEDURE — 96417 CHEMO IV INFUS EACH ADDL SEQ: CPT

## 2024-10-11 PROCEDURE — 258N000003 HC RX IP 258 OP 636

## 2024-10-11 PROCEDURE — 250N000011 HC RX IP 250 OP 636: Mod: JZ

## 2024-10-11 RX ORDER — DIPHENHYDRAMINE HCL 50 MG
50 CAPSULE ORAL
Status: CANCELLED | OUTPATIENT
Start: 2024-10-11

## 2024-10-11 RX ORDER — LORAZEPAM 2 MG/ML
0.5 INJECTION INTRAMUSCULAR EVERY 4 HOURS PRN
Status: CANCELLED | OUTPATIENT
Start: 2024-10-11

## 2024-10-11 RX ORDER — EPINEPHRINE 1 MG/ML
0.3 INJECTION, SOLUTION INTRAMUSCULAR; SUBCUTANEOUS EVERY 5 MIN PRN
Status: CANCELLED | OUTPATIENT
Start: 2024-10-11

## 2024-10-11 RX ORDER — HEPARIN SODIUM (PORCINE) LOCK FLUSH IV SOLN 100 UNIT/ML 100 UNIT/ML
5 SOLUTION INTRAVENOUS
Status: DISCONTINUED | OUTPATIENT
Start: 2024-10-11 | End: 2024-10-11 | Stop reason: HOSPADM

## 2024-10-11 RX ORDER — HEPARIN SODIUM,PORCINE 10 UNIT/ML
5-20 VIAL (ML) INTRAVENOUS DAILY PRN
Status: CANCELLED | OUTPATIENT
Start: 2024-10-11

## 2024-10-11 RX ORDER — METHYLPREDNISOLONE SODIUM SUCCINATE 125 MG/2ML
125 INJECTION INTRAMUSCULAR; INTRAVENOUS
Status: CANCELLED
Start: 2024-10-11

## 2024-10-11 RX ORDER — ACETAMINOPHEN 325 MG/1
650 TABLET ORAL
Status: CANCELLED
Start: 2024-10-11

## 2024-10-11 RX ORDER — ALBUTEROL SULFATE 0.83 MG/ML
2.5 SOLUTION RESPIRATORY (INHALATION)
Status: CANCELLED | OUTPATIENT
Start: 2024-10-11

## 2024-10-11 RX ORDER — DIPHENHYDRAMINE HYDROCHLORIDE 50 MG/ML
50 INJECTION INTRAMUSCULAR; INTRAVENOUS
Status: CANCELLED
Start: 2024-10-11

## 2024-10-11 RX ORDER — MEPERIDINE HYDROCHLORIDE 50 MG/ML
25 INJECTION INTRAMUSCULAR; INTRAVENOUS; SUBCUTANEOUS EVERY 30 MIN PRN
Status: CANCELLED | OUTPATIENT
Start: 2024-10-11

## 2024-10-11 RX ORDER — ALBUTEROL SULFATE 90 UG/1
1-2 INHALANT RESPIRATORY (INHALATION)
Status: CANCELLED
Start: 2024-10-11

## 2024-10-11 RX ADMIN — HEPARIN 5 ML: 100 SYRINGE at 10:49

## 2024-10-11 RX ADMIN — SODIUM CHLORIDE 450 MG: 9 INJECTION, SOLUTION INTRAVENOUS at 10:17

## 2024-10-11 RX ADMIN — PERTUZUMAB 420 MG: 30 INJECTION, SOLUTION, CONCENTRATE INTRAVENOUS at 09:43

## 2024-10-11 NOTE — PROGRESS NOTES
Infusion Nursing Note:  Vibha Kingston presents today for herceptin and perjeta.    Patient seen by provider today: No   present during visit today: Not Applicable.    Note: N/A.      Intravenous Access:  Implanted Port.    Treatment Conditions:  Not Applicable.      Post Infusion Assessment:  Patient tolerated infusion without incident.       Discharge Plan:   Patient discharged in stable condition accompanied by: .      EDU GIBBS RN

## 2024-10-12 DIAGNOSIS — D05.12 DUCTAL CARCINOMA IN SITU (DCIS) OF LEFT BREAST: ICD-10-CM

## 2024-10-14 RX ORDER — TAMOXIFEN CITRATE 20 MG/1
20 TABLET ORAL DAILY
Qty: 90 TABLET | Refills: 1 | OUTPATIENT
Start: 2024-10-14

## 2024-10-14 NOTE — TELEPHONE ENCOUNTER
Refusing request for 90 day supply. Medication is new for patient and will need to be assessed for tolerance at next appointment in clinic.    Last Written Prescription Date:  9/20/24  Last Fill Quantity: 30,  # refills: 1  Last office visit provider:  9/20/24 with Dr. Adams, follow up in clinic 11/1/24     Requested Prescriptions   Pending Prescriptions Disp Refills    tamoxifen (NOLVADEX) 20 MG tablet [Pharmacy Med Name: TAMOXIFEN 20 MG TABLET] 90 tablet 1     Sig: TAKE 1 TABLET BY MOUTH EVERY DAY       There is no refill protocol information for this order          Shasha Peters RN 10/14/24 11:18 AM

## 2024-10-16 ENCOUNTER — DOCUMENTATION ONLY (OUTPATIENT)
Dept: FAMILY MEDICINE | Facility: CLINIC | Age: 34
End: 2024-10-16
Payer: COMMERCIAL

## 2024-10-16 DIAGNOSIS — K60.2 ANAL FISSURE: ICD-10-CM

## 2024-10-16 DIAGNOSIS — Z01.818 PREOP GENERAL PHYSICAL EXAM: Primary | ICD-10-CM

## 2024-10-21 ENCOUNTER — LAB (OUTPATIENT)
Dept: LAB | Facility: CLINIC | Age: 34
End: 2024-10-21
Payer: COMMERCIAL

## 2024-10-21 ENCOUNTER — PATIENT OUTREACH (OUTPATIENT)
Dept: CARE COORDINATION | Facility: CLINIC | Age: 34
End: 2024-10-21

## 2024-10-21 ENCOUNTER — THERAPY VISIT (OUTPATIENT)
Dept: PHYSICAL THERAPY | Facility: REHABILITATION | Age: 34
End: 2024-10-21
Attending: RADIOLOGY
Payer: COMMERCIAL

## 2024-10-21 DIAGNOSIS — C50.412 MALIGNANT NEOPLASM OF UPPER-OUTER QUADRANT OF LEFT BREAST IN FEMALE, ESTROGEN RECEPTOR NEGATIVE (H): ICD-10-CM

## 2024-10-21 DIAGNOSIS — Z01.818 PREOP GENERAL PHYSICAL EXAM: ICD-10-CM

## 2024-10-21 DIAGNOSIS — Z11.59 NEED FOR HEPATITIS C SCREENING TEST: ICD-10-CM

## 2024-10-21 DIAGNOSIS — Z11.4 SCREENING FOR HIV (HUMAN IMMUNODEFICIENCY VIRUS): Primary | ICD-10-CM

## 2024-10-21 DIAGNOSIS — K60.2 ANAL FISSURE: ICD-10-CM

## 2024-10-21 DIAGNOSIS — Z17.1 MALIGNANT NEOPLASM OF UPPER-OUTER QUADRANT OF LEFT BREAST IN FEMALE, ESTROGEN RECEPTOR NEGATIVE (H): ICD-10-CM

## 2024-10-21 LAB
HCV AB SERPL QL IA: NONREACTIVE
HGB BLD-MCNC: 13.3 G/DL (ref 11.7–15.7)
HIV 1+2 AB+HIV1 P24 AG SERPL QL IA: NONREACTIVE
POTASSIUM SERPL-SCNC: 4.5 MMOL/L (ref 3.4–5.3)

## 2024-10-21 PROCEDURE — 36415 COLL VENOUS BLD VENIPUNCTURE: CPT

## 2024-10-21 PROCEDURE — 97140 MANUAL THERAPY 1/> REGIONS: CPT | Mod: GP

## 2024-10-21 PROCEDURE — 86803 HEPATITIS C AB TEST: CPT

## 2024-10-21 PROCEDURE — 97110 THERAPEUTIC EXERCISES: CPT | Mod: GP

## 2024-10-21 PROCEDURE — 85018 HEMOGLOBIN: CPT

## 2024-10-21 PROCEDURE — 87389 HIV-1 AG W/HIV-1&-2 AB AG IA: CPT

## 2024-10-21 PROCEDURE — 84132 ASSAY OF SERUM POTASSIUM: CPT

## 2024-10-21 PROCEDURE — 97162 PT EVAL MOD COMPLEX 30 MIN: CPT | Mod: GP

## 2024-10-21 NOTE — PROGRESS NOTES
"PHYSICAL THERAPY EVALUATION  Type of Visit: Evaluation       Fall Risk Screen:  Fall screen completed by: PT  Have you fallen 2 or more times in the past year?: No  Have you fallen and had an injury in the past year?: No  Is patient a fall risk?: No    Subjective         Patient presents to PT for eval and treat for breast cancer rehab.  Past medical history includes, \"malignant neoplasm of upper-outer quadrant of left breast in female, estrogen receptor negative (H)  Staging form: Breast, AJCC 8th Edition - Pathologic stage from 3/13/2024: Stage IIA (pT1c, pN1a(sn), cM0, G3, ER-, NM-, HER2+). Patient had left mastectomy w SLNB on 3/5/2024.  She finished radiation on 10/3/24.  Since patient has finished radiation, she notes that she is feeling tighter and stiffer after radiation.  She does have some discomfort towards end range of motion and notes increased sensitivity over incisions, as well as in her left axilla.  She has been trying to complete stretches given to her when she first finished radiation, but feels like she has not made significant progress.  She would like to learn more stretches and get strengthening exercises as well.      Presenting condition or subjective complaint: The pt exercises that i recieved after my mastectomy just dont seem to be helping after radiation  Date of onset: 10/03/24 (order date)    Relevant medical history: Anemia; Cancer; Radiation treatment   Dates & types of surgery: Mastectomy in march of this year    Prior diagnostic imaging/testing results:       Prior therapy history for the same diagnosis, illness or injury: No      Prior Level of Function  Transfers: Independent  Ambulation: Independent  ADL: Independent  IADL:     Living Environment  Social support: With a significant other or spouse   Type of home: House; Multi-level; Basement   Stairs to enter the home: Yes 14 Is there a railing: Yes     Ramp: No   Stairs inside the home: Yes 14 Is there a railing: Yes     Help " at home: None  Equipment owned:       Employment: Yes   Hobbies/Interests: Baking, cross stitch, tv, reading    Patient goals for therapy: I want the mobility and stength in my left arm and chest to be the same as the right    Pain assessment: See objective evaluation for additional pain details     Objective   SHOULDER EVALUATION  PAIN: Pain Frequency: intermittent or at end range  INTEGUMENTARY (edema, incisions): WFL, incisions appear to be healing well, slight redness  noted, all incisions closed  POSTURE: Sitting Posture: Rounded shoulders, Thoracic kyphosis increased  ROM:   (Degrees) Left AROM Left PROM Right AROM  Right PROM   Shoulder Flexion 154 degrees  169 degrees    Shoulder Extension       Shoulder Abduction 103 degrees   165 degrees    Shoulder Adduction       Shoulder Internal Rotation 81 degrees  WNL    Shoulder External Rotation 43 degrees  WNL    Shoulder Horizontal Abduction       Shoulder Horizontal Adduction       Shoulder Flexion ER WNL  WNL    Shoulder Flexion IR WNL  WNL    Elbow Extension       Elbow Flexion       Pain: Pain at endrange with left flexion and abduction  End feel: empty    STRENGTH:   Pain: - none + mild ++ moderate +++ severe  Strength Scale: 0-5/5 Left Right   Shoulder Flexion 4+ 5   Shoulder Extension 4+ 5   Shoulder Abduction 4+ 5   Shoulder Adduction     Shoulder Internal Rotation 4+ 5   Shoulder External Rotation 4 5   Shoulder Horizontal Abduction     Shoulder Horizontal Adduction     Elbow Flexion 5- 5   Elbow Extension 5- 5   Mid Trap     Lower Trap     Rhomboid     Serratus Anterior       FLEXIBILITY: WFL  PALPATION:   + Tenderness At Location Left Right   Clavicle +    Sternoclavicular +    Acromioclavicular -    Biceps -    Triceps -    Supraspinatus     Infraspinatus     Teres minor     Subscapularis +    Deltoid     Levator     Rhomboids     Upper trap     Incisional     Bicipital groove         Assessment & Plan   CLINICAL  IMPRESSIONS  Medical Diagnosis: Malignant neoplasm of upper-outer quadrant of left breast in female, estrogen receptor negative (H)    Treatment Diagnosis: Pain, decreased  mobility, posture deficits, weakness   Impression/Assessment: Patient is a 34 year old female with pain and range of motion restrictions s/p  chemotherapy and radiation for Malignant neoplasm of upper-outer quadrant of left breast.  The following significant findings have been identified: Pain, Decreased ROM/flexibility, Decreased joint mobility, Decreased strength, Impaired sensation, Impaired muscle performance, Decreased activity tolerance, and Impaired posture. These impairments interfere with their ability to perform self care tasks, work tasks, recreational activities, and household chores as compared to previous level of function.     Clinical Decision Making (Complexity):  Clinical Presentation: Evolving/Changing  Clinical Presentation Rationale: based on medical and personal factors listed in PT evaluation  Clinical Decision Making (Complexity): Moderate complexity    PLAN OF CARE  Treatment Interventions:  Interventions: Manual Therapy, Neuromuscular Re-education, Therapeutic Activity, Therapeutic Exercise, Self-Care/Home Management    Long Term Goals     PT Goal 1  Goal Identifier: HEP  Goal Description: Pt will be independent with Chest and shoulder stretching exercises by the end of therapy to decrease pain in chest and prevent injury with return to previous household and leisure activity.  Goal Progress: initial  Target Date: 12/22/24      Frequency of Treatment: 1x/month  Duration of Treatment: 90 days    Recommended Referrals to Other Professionals:   Education Assessment:   Learner/Method: Patient;No Barriers to Learning;Demonstration;Pictures/Video  Education Comments: Plan, edema education, home program    Risks and benefits of evaluation/treatment have been explained.   Patient/Family/caregiver agrees with Plan of Care.      Evaluation Time:     PT Pradip, Moderate Complexity Minutes (56421): 21       Signing Clinician: Dasia Melara PT

## 2024-10-28 ENCOUNTER — TRANSFERRED RECORDS (OUTPATIENT)
Dept: HEALTH INFORMATION MANAGEMENT | Facility: CLINIC | Age: 34
End: 2024-10-28

## 2024-10-30 NOTE — PROGRESS NOTES
Radiation Treatment Summary    Patient: Vibha Kingston   MRN: [id number]  : 1990  Care Provider: Megan Hsu MD    Encounter Date: Oct 3, 2024      HISTORY: Vibha Kingston was treated with 3D conformal radiation therapy.    Ms. Kingston is a 34 year old female with a diagnosis of left breast stage pT1c pN1a Invasive ductal carcinoma ER/SD-, Her2 +, grade 3, multifocal with ERE/SD+ DCIS s/p mastectomy and SLN with negative but close margins and 1/3 LN+ followed by adjuvant TCHP x 6 cycles.     DX: (C50.412,  Z17.1) Malignant neoplasm of upper-outer quadrant of left breast in female, estrogen receptor negative (H)  (primary encounter diagnosis)    SITE TREATED: Left chest wall and regional LN  TOTAL DOSE: 4005 cGy + 1000 cGy boost   NUMBER OF FRACTIONS: 15 + 4 boost  RADIATION TREATMENT : 2024-10/3/2024  CONCURRENT CHEMOTHERAPY: Yes: trastuzumab   ADJUVANT THERAPY: Yes:  trastuzumab, tamoxifen     Vibha tolerated the treatment without unexpected side effects.     PLAN: Discharge instructions were given and Vibha knows to call if questions/issues arise. Vibha will be seen in f/u in 4-6 weeks without a scan.     Pain Management Plan: N/A    Signed by: Megan Hsu MD    cc:    Patient Care Team:  Zully Bloom APRN CNP as PCP - General (Family Medicine)  Zully Bloom APRN CNP as Assigned PCP  Gloria Sr APRN CNM as Assigned OBGYN Provider  Aleena Real DO as Assigned Surgical Provider  Christie Agee RN as Specialty Care Coordinator (Hematology & Oncology)  Rosalind Adams MD as Assigned Cancer Care Provider  Stephen Chávez PsyD as Assigned Sleep Provider  Megan Hsu MD as MD (Radiation Oncology)

## 2024-11-01 ENCOUNTER — ONCOLOGY VISIT (OUTPATIENT)
Dept: ONCOLOGY | Facility: HOSPITAL | Age: 34
End: 2024-11-01
Attending: INTERNAL MEDICINE
Payer: COMMERCIAL

## 2024-11-01 ENCOUNTER — INFUSION THERAPY VISIT (OUTPATIENT)
Dept: INFUSION THERAPY | Facility: HOSPITAL | Age: 34
End: 2024-11-01
Attending: INTERNAL MEDICINE
Payer: COMMERCIAL

## 2024-11-01 ENCOUNTER — OFFICE VISIT (OUTPATIENT)
Dept: RADIATION ONCOLOGY | Facility: CLINIC | Age: 34
End: 2024-11-01
Attending: STUDENT IN AN ORGANIZED HEALTH CARE EDUCATION/TRAINING PROGRAM
Payer: COMMERCIAL

## 2024-11-01 VITALS
HEART RATE: 71 BPM | DIASTOLIC BLOOD PRESSURE: 65 MMHG | SYSTOLIC BLOOD PRESSURE: 98 MMHG | RESPIRATION RATE: 16 BRPM | TEMPERATURE: 98 F | WEIGHT: 159.1 LBS | BODY MASS INDEX: 26.48 KG/M2 | OXYGEN SATURATION: 98 %

## 2024-11-01 VITALS
RESPIRATION RATE: 16 BRPM | WEIGHT: 159 LBS | DIASTOLIC BLOOD PRESSURE: 72 MMHG | TEMPERATURE: 97.7 F | SYSTOLIC BLOOD PRESSURE: 106 MMHG | BODY MASS INDEX: 26.49 KG/M2 | HEIGHT: 65 IN | HEART RATE: 75 BPM | OXYGEN SATURATION: 97 %

## 2024-11-01 DIAGNOSIS — Z51.11 ENCOUNTER FOR ANTINEOPLASTIC CHEMOTHERAPY: Primary | ICD-10-CM

## 2024-11-01 DIAGNOSIS — Z17.1 MALIGNANT NEOPLASM OF UPPER-OUTER QUADRANT OF LEFT BREAST IN FEMALE, ESTROGEN RECEPTOR NEGATIVE (H): Primary | ICD-10-CM

## 2024-11-01 DIAGNOSIS — Z79.899 ENCOUNTER FOR MONITORING CARDIOTOXIC DRUG THERAPY: ICD-10-CM

## 2024-11-01 DIAGNOSIS — Z51.11 ENCOUNTER FOR ANTINEOPLASTIC CHEMOTHERAPY: ICD-10-CM

## 2024-11-01 DIAGNOSIS — S00.31XA ABRASION, NOSE W/O INFECTION: ICD-10-CM

## 2024-11-01 DIAGNOSIS — C50.412 MALIGNANT NEOPLASM OF UPPER-OUTER QUADRANT OF LEFT BREAST IN FEMALE, ESTROGEN RECEPTOR NEGATIVE (H): ICD-10-CM

## 2024-11-01 DIAGNOSIS — C50.412 MALIGNANT NEOPLASM OF UPPER-OUTER QUADRANT OF LEFT BREAST IN FEMALE, ESTROGEN RECEPTOR NEGATIVE (H): Primary | ICD-10-CM

## 2024-11-01 DIAGNOSIS — Z17.1 MALIGNANT NEOPLASM OF UPPER-OUTER QUADRANT OF LEFT BREAST IN FEMALE, ESTROGEN RECEPTOR NEGATIVE (H): ICD-10-CM

## 2024-11-01 DIAGNOSIS — Z51.81 ENCOUNTER FOR MONITORING CARDIOTOXIC DRUG THERAPY: ICD-10-CM

## 2024-11-01 LAB
ALBUMIN SERPL BCG-MCNC: 4.2 G/DL (ref 3.5–5.2)
ALP SERPL-CCNC: 45 U/L (ref 40–150)
ALT SERPL W P-5'-P-CCNC: 17 U/L (ref 0–50)
ANION GAP SERPL CALCULATED.3IONS-SCNC: 11 MMOL/L (ref 7–15)
AST SERPL W P-5'-P-CCNC: 19 U/L (ref 0–45)
BASOPHILS # BLD AUTO: 0 10E3/UL (ref 0–0.2)
BASOPHILS NFR BLD AUTO: 0 %
BILIRUB SERPL-MCNC: 0.2 MG/DL
BUN SERPL-MCNC: 9.9 MG/DL (ref 6–20)
CALCIUM SERPL-MCNC: 9.9 MG/DL (ref 8.8–10.4)
CHLORIDE SERPL-SCNC: 103 MMOL/L (ref 98–107)
CREAT SERPL-MCNC: 0.88 MG/DL (ref 0.51–0.95)
EGFRCR SERPLBLD CKD-EPI 2021: 88 ML/MIN/1.73M2
EOSINOPHIL # BLD AUTO: 0 10E3/UL (ref 0–0.7)
EOSINOPHIL NFR BLD AUTO: 1 %
ERYTHROCYTE [DISTWIDTH] IN BLOOD BY AUTOMATED COUNT: 11.5 % (ref 10–15)
GLUCOSE SERPL-MCNC: 84 MG/DL (ref 70–99)
HCO3 SERPL-SCNC: 27 MMOL/L (ref 22–29)
HCT VFR BLD AUTO: 38.4 % (ref 35–47)
HGB BLD-MCNC: 13.2 G/DL (ref 11.7–15.7)
IMM GRANULOCYTES # BLD: 0 10E3/UL
IMM GRANULOCYTES NFR BLD: 0 %
LYMPHOCYTES # BLD AUTO: 0.6 10E3/UL (ref 0.8–5.3)
LYMPHOCYTES NFR BLD AUTO: 20 %
MCH RBC QN AUTO: 30.6 PG (ref 26.5–33)
MCHC RBC AUTO-ENTMCNC: 34.4 G/DL (ref 31.5–36.5)
MCV RBC AUTO: 89 FL (ref 78–100)
MONOCYTES # BLD AUTO: 0.3 10E3/UL (ref 0–1.3)
MONOCYTES NFR BLD AUTO: 11 %
NEUTROPHILS # BLD AUTO: 1.9 10E3/UL (ref 1.6–8.3)
NEUTROPHILS NFR BLD AUTO: 68 %
NRBC # BLD AUTO: 0 10E3/UL
NRBC BLD AUTO-RTO: 0 /100
PLATELET # BLD AUTO: 172 10E3/UL (ref 150–450)
POTASSIUM SERPL-SCNC: 4.3 MMOL/L (ref 3.4–5.3)
PROT SERPL-MCNC: 7.1 G/DL (ref 6.4–8.3)
RBC # BLD AUTO: 4.32 10E6/UL (ref 3.8–5.2)
SODIUM SERPL-SCNC: 141 MMOL/L (ref 135–145)
WBC # BLD AUTO: 2.8 10E3/UL (ref 4–11)

## 2024-11-01 PROCEDURE — 250N000011 HC RX IP 250 OP 636: Mod: JZ | Performed by: INTERNAL MEDICINE

## 2024-11-01 PROCEDURE — 99214 OFFICE O/P EST MOD 30 MIN: CPT | Mod: 25 | Performed by: INTERNAL MEDICINE

## 2024-11-01 PROCEDURE — G2211 COMPLEX E/M VISIT ADD ON: HCPCS | Performed by: INTERNAL MEDICINE

## 2024-11-01 PROCEDURE — 36591 DRAW BLOOD OFF VENOUS DEVICE: CPT

## 2024-11-01 PROCEDURE — 258N000003 HC RX IP 258 OP 636: Performed by: INTERNAL MEDICINE

## 2024-11-01 PROCEDURE — 96413 CHEMO IV INFUSION 1 HR: CPT

## 2024-11-01 PROCEDURE — 85025 COMPLETE CBC W/AUTO DIFF WBC: CPT

## 2024-11-01 PROCEDURE — 99214 OFFICE O/P EST MOD 30 MIN: CPT | Performed by: INTERNAL MEDICINE

## 2024-11-01 PROCEDURE — 80053 COMPREHEN METABOLIC PANEL: CPT

## 2024-11-01 PROCEDURE — 96417 CHEMO IV INFUS EACH ADDL SEQ: CPT

## 2024-11-01 RX ORDER — METHYLPREDNISOLONE SODIUM SUCCINATE 40 MG/ML
40 INJECTION INTRAMUSCULAR; INTRAVENOUS
Start: 2024-11-22

## 2024-11-01 RX ORDER — MUPIROCIN 20 MG/G
OINTMENT TOPICAL 3 TIMES DAILY
Qty: 15 G | Refills: 0 | Status: SHIPPED | OUTPATIENT
Start: 2024-11-01

## 2024-11-01 RX ORDER — DIPHENHYDRAMINE HYDROCHLORIDE 50 MG/ML
25 INJECTION INTRAMUSCULAR; INTRAVENOUS
Status: CANCELLED
Start: 2024-11-01

## 2024-11-01 RX ORDER — ALBUTEROL SULFATE 90 UG/1
1-2 INHALANT RESPIRATORY (INHALATION)
Status: DISCONTINUED | OUTPATIENT
Start: 2024-11-01 | End: 2024-11-01 | Stop reason: HOSPADM

## 2024-11-01 RX ORDER — DIPHENHYDRAMINE HYDROCHLORIDE 50 MG/ML
50 INJECTION INTRAMUSCULAR; INTRAVENOUS
Status: CANCELLED
Start: 2024-11-01

## 2024-11-01 RX ORDER — ACETAMINOPHEN 325 MG/1
650 TABLET ORAL
Start: 2024-11-22

## 2024-11-01 RX ORDER — LORAZEPAM 2 MG/ML
0.5 INJECTION INTRAMUSCULAR EVERY 4 HOURS PRN
OUTPATIENT
Start: 2024-11-22

## 2024-11-01 RX ORDER — MEPERIDINE HYDROCHLORIDE 50 MG/ML
25 INJECTION INTRAMUSCULAR; INTRAVENOUS; SUBCUTANEOUS
Status: DISCONTINUED | OUTPATIENT
Start: 2024-11-01 | End: 2024-11-01 | Stop reason: HOSPADM

## 2024-11-01 RX ORDER — ALBUTEROL SULFATE 0.83 MG/ML
2.5 SOLUTION RESPIRATORY (INHALATION)
Status: DISCONTINUED | OUTPATIENT
Start: 2024-11-01 | End: 2024-11-01 | Stop reason: HOSPADM

## 2024-11-01 RX ORDER — DIPHENHYDRAMINE HCL 50 MG
50 CAPSULE ORAL
OUTPATIENT
Start: 2024-11-22

## 2024-11-01 RX ORDER — HEPARIN SODIUM (PORCINE) LOCK FLUSH IV SOLN 100 UNIT/ML 100 UNIT/ML
5 SOLUTION INTRAVENOUS
Status: CANCELLED | OUTPATIENT
Start: 2024-11-01

## 2024-11-01 RX ORDER — ALBUTEROL SULFATE 90 UG/1
1-2 INHALANT RESPIRATORY (INHALATION)
Start: 2024-11-22

## 2024-11-01 RX ORDER — DIPHENHYDRAMINE HYDROCHLORIDE 50 MG/ML
25 INJECTION INTRAMUSCULAR; INTRAVENOUS
Status: DISCONTINUED | OUTPATIENT
Start: 2024-11-01 | End: 2024-11-01 | Stop reason: HOSPADM

## 2024-11-01 RX ORDER — EPINEPHRINE 1 MG/ML
0.3 INJECTION, SOLUTION INTRAMUSCULAR; SUBCUTANEOUS EVERY 5 MIN PRN
OUTPATIENT
Start: 2024-11-22

## 2024-11-01 RX ORDER — HEPARIN SODIUM (PORCINE) LOCK FLUSH IV SOLN 100 UNIT/ML 100 UNIT/ML
5 SOLUTION INTRAVENOUS
OUTPATIENT
Start: 2024-11-22

## 2024-11-01 RX ORDER — HEPARIN SODIUM,PORCINE 10 UNIT/ML
5-20 VIAL (ML) INTRAVENOUS DAILY PRN
Status: CANCELLED | OUTPATIENT
Start: 2024-11-01

## 2024-11-01 RX ORDER — ALBUTEROL SULFATE 90 UG/1
1-2 INHALANT RESPIRATORY (INHALATION)
Status: CANCELLED
Start: 2024-11-01

## 2024-11-01 RX ORDER — LORAZEPAM 2 MG/ML
0.5 INJECTION INTRAMUSCULAR EVERY 4 HOURS PRN
Status: CANCELLED | OUTPATIENT
Start: 2024-11-01

## 2024-11-01 RX ORDER — DIPHENHYDRAMINE HYDROCHLORIDE 50 MG/ML
25 INJECTION INTRAMUSCULAR; INTRAVENOUS
Start: 2024-11-22

## 2024-11-01 RX ORDER — ACETAMINOPHEN 325 MG/1
650 TABLET ORAL
Status: CANCELLED
Start: 2024-11-01

## 2024-11-01 RX ORDER — HEPARIN SODIUM,PORCINE 10 UNIT/ML
5-20 VIAL (ML) INTRAVENOUS DAILY PRN
OUTPATIENT
Start: 2024-11-22

## 2024-11-01 RX ORDER — MEPERIDINE HYDROCHLORIDE 50 MG/ML
25 INJECTION INTRAMUSCULAR; INTRAVENOUS; SUBCUTANEOUS
Status: CANCELLED | OUTPATIENT
Start: 2024-11-01

## 2024-11-01 RX ORDER — HEPARIN SODIUM (PORCINE) LOCK FLUSH IV SOLN 100 UNIT/ML 100 UNIT/ML
5 SOLUTION INTRAVENOUS
Status: DISCONTINUED | OUTPATIENT
Start: 2024-11-01 | End: 2024-11-01 | Stop reason: HOSPADM

## 2024-11-01 RX ORDER — DIPHENHYDRAMINE HCL 50 MG
50 CAPSULE ORAL
Status: CANCELLED | OUTPATIENT
Start: 2024-11-01

## 2024-11-01 RX ORDER — DIPHENHYDRAMINE HYDROCHLORIDE 50 MG/ML
50 INJECTION INTRAMUSCULAR; INTRAVENOUS
Status: DISCONTINUED | OUTPATIENT
Start: 2024-11-01 | End: 2024-11-01 | Stop reason: HOSPADM

## 2024-11-01 RX ORDER — DIPHENHYDRAMINE HYDROCHLORIDE 50 MG/ML
50 INJECTION INTRAMUSCULAR; INTRAVENOUS
Start: 2024-11-22

## 2024-11-01 RX ORDER — METHYLPREDNISOLONE SODIUM SUCCINATE 40 MG/ML
40 INJECTION INTRAMUSCULAR; INTRAVENOUS
Status: DISCONTINUED | OUTPATIENT
Start: 2024-11-01 | End: 2024-11-01 | Stop reason: HOSPADM

## 2024-11-01 RX ORDER — METHYLPREDNISOLONE SODIUM SUCCINATE 40 MG/ML
40 INJECTION INTRAMUSCULAR; INTRAVENOUS
Status: CANCELLED
Start: 2024-11-01

## 2024-11-01 RX ORDER — MEPERIDINE HYDROCHLORIDE 50 MG/ML
25 INJECTION INTRAMUSCULAR; INTRAVENOUS; SUBCUTANEOUS
OUTPATIENT
Start: 2024-11-22

## 2024-11-01 RX ORDER — ALBUTEROL SULFATE 0.83 MG/ML
2.5 SOLUTION RESPIRATORY (INHALATION)
OUTPATIENT
Start: 2024-11-22

## 2024-11-01 RX ORDER — EPINEPHRINE 1 MG/ML
0.3 INJECTION, SOLUTION INTRAMUSCULAR; SUBCUTANEOUS EVERY 5 MIN PRN
Status: DISCONTINUED | OUTPATIENT
Start: 2024-11-01 | End: 2024-11-01 | Stop reason: HOSPADM

## 2024-11-01 RX ORDER — ALBUTEROL SULFATE 0.83 MG/ML
2.5 SOLUTION RESPIRATORY (INHALATION)
Status: CANCELLED | OUTPATIENT
Start: 2024-11-01

## 2024-11-01 RX ORDER — EPINEPHRINE 1 MG/ML
0.3 INJECTION, SOLUTION INTRAMUSCULAR; SUBCUTANEOUS EVERY 5 MIN PRN
Status: CANCELLED | OUTPATIENT
Start: 2024-11-01

## 2024-11-01 RX ADMIN — SODIUM CHLORIDE 450 MG: 9 INJECTION, SOLUTION INTRAVENOUS at 11:05

## 2024-11-01 RX ADMIN — PERTUZUMAB 420 MG: 30 INJECTION, SOLUTION, CONCENTRATE INTRAVENOUS at 10:33

## 2024-11-01 RX ADMIN — HEPARIN 5 ML: 100 SYRINGE at 11:37

## 2024-11-01 ASSESSMENT — PAIN SCALES - GENERAL
PAINLEVEL_OUTOF10: NO PAIN (0)
PAINLEVEL_OUTOF10: NO PAIN (0)

## 2024-11-01 NOTE — PROGRESS NOTES
[facility] Hematology and Oncology Progress Note    Patient: Vibha Kingston  MRN: [id number]  Date of Service: Nov 1, 2024         Reason for Visit    Chief Complaint   Patient presents with    Oncology Clinic Visit    Breast Cancer       Assessment and Plan     Cancer Staging   Malignant neoplasm of upper-outer quadrant of left breast in female, estrogen receptor negative (H)  Staging form: Breast, AJCC 8th Edition  - Pathologic stage from 3/13/2024: Stage IIA (pT1c, pN1a(sn), cM0, G3, ER-, MS-, HER2+) - Signed by Rosalind Adams MD on 3/13/2024      ECOG Performance    0 - Independent     Pain  Pain Score: No Pain (0)      #.  History of stage IIA (pT1c pN1a M0) invasive ductal carcinoma of the left breast, grade 3, ER negative, MS negative, HER2 positive  #.  DCIS of the left breast, ER positive, MS positive     She is clinically well.  Exam is unremarkable.  Her labs for CBC and CMP are unremarkable.  Echocardiogram from 9/2024 was normal. She does not have intolerable side effects from trastuzumab.  I recommended her to complete adjuvant trastuzumab as current.    She takes tamoxifen every day.  Other than hot flashes, she does not notice any significant side effects.  Recommended to continue tamoxifen 20 mg daily.  Plan for 5 years of adjuvant therapy.      Follow-up provider visit every 6 weeks while she is on trastuzumab.  Echocardiogram was requested to complete in 12/2024.    #.  Abrasion/dermatitis of the nose.   Mupirocin prescription sent.  Advised her to continue Vaseline.    Encounter Diagnoses:    Problem List Items Addressed This Visit          Oncology Diagnoses    Malignant neoplasm of upper-outer quadrant of left breast in female, estrogen receptor negative (H)    Relevant Orders    Infusion Appointment Request - Adult (Completed)    Infusion Appointment Request - Adult    Infusion Appointment Request - Adult    Infusion Appointment Request - Adult    Infusion Appointment Request - Adult     Infusion Appointment Request - Adult    Infusion Appointment Request - Adult    Infusion Appointment Request - Adult    Echocardiogram Limited       Other    Encounter for antineoplastic chemotherapy - Primary    Relevant Orders    Infusion Appointment Request - Adult (Completed)    Infusion Appointment Request - Adult    Infusion Appointment Request - Adult    Infusion Appointment Request - Adult    Infusion Appointment Request - Adult    Infusion Appointment Request - Adult    Infusion Appointment Request - Adult    Infusion Appointment Request - Adult     Other Visit Diagnoses       Abrasion, nose w/o infection        Relevant Medications    mupirocin (BACTROBAN) 2 % external ointment    Encounter for monitoring cardiotoxic drug therapy        Relevant Orders    Echocardiogram Limited            CC: Zully Bloom, AYLA CNP   ______________________________________________________________________________  Oncologic history  1/9/2024- self palpated left medial breast mass   - diagnostic mammogram and ultrasound showed 3.4 x 2.2 x 2 cm large ill-defined hypoechoic area with associated calcification at 9:00 3 cm from the nipple.  Sonographic survey of left axilla is within normal limits.    1/11/2024-ultrasound-guided core needle biopsy showed DCIS, grade 3, cribriform and papillary with comedonecrosis.  ER/HI was deferred to excisional specimen.    1/25/2024-breast actionable panel was negative including INGRID, BARD1, BRCA1, BRCA2, CDH1, CHEK2, NF1, PALB2, PTEN, STK11, TP53.    2/4/2024-MRI breast showed large segmental area of non-mass enhancement medial left breast measuring 10 cm from the posterior to subareolar depth corresponding to the known DCIS.  Mildly prominent bilateral internal mammary lymph node technically within normal limit.    3/5/2024-left breast simple mastectomy and left axillary sentinel lymph node biopsy   Invasive ductal carcinoma, grade 3   Multifocal, 4 foci vary from 1.3 mm to 11 mm in  greatest dimension.   Margins were negative, closest margin less than 1 mm anterior/superior   DCIS, EIC present, solid and comedo pattern, grade 3, extensive comedonecrosis.  Focal lymphovascular invasion present.   1/2 sentinel lymph node was positive for metastatic carcinoma.   [id number]   ER negative in invasive carcinoma, ER positive in DCIS (80% of the tumor nuclei stain strongly)   LA negative and invasive cancer, LA positive in DCIS (30% of the tumor nuclei stain strongly of moderately)   HER2/aj positive for overexpression (3+ membrane staining)    Fertility preservation was successfully completed.    4/26/2024- adjuvant TCHP    6/27/2024-genetic counseling and testing completed.    10/3/2024-anticipate to complete adjuvant ration to the left chest wall and lymph node    9/23/2024-initiated adjuvant endocrine therapy with tamoxifen.    History of Present Illness    Ms. Vibha Kingston presented today accompanied by her  prior to scheduled maintenance trastuzumab.  She has dry nasal passage and tenderness at the opening. She uses vaseline which did not help.     She has hot flashes. They are manageable.     She had recent procedure for anal fissure.    No return of menstrual cycle.  LMP 6/2/2024.    Review of systems  Apart from describing in HPI, the remainder of comprehensive ROS was negative.    Past History    Past Medical History:   Diagnosis Date    Cancer (H) 1/15/24    Breast cancer    Encounter for antineoplastic chemotherapy 03/14/2024       Past Surgical History:   Procedure Laterality Date    BIOPSY      A mole a few years back and an abnormal pap in [facility]    BIOPSY NODE SENTINEL Left 03/05/2024    Procedure: WITH LEFT SENTINEL LYMPH NODE BIOPSY;  Surgeon: Aleena Real DO;  Location: [facility] OR    BOTOX INJECTION MEDICAL      for treatment of anal fissure 10/2024 - [facility]    FERTILITY SURGERY  04/10/2024    Egg retrieval    INSERT PORT VASCULAR  "ACCESS N/A 04/19/2024    Procedure: INSERTION, VASCULAR ACCESS PORT UNDER ULTRASOUND AND FLUOROSCOPIC GUIDANCE;  Surgeon: Aleena Real DO;  Location: [facility] OR    MASTECTOMY SIMPLE Left 03/05/2024    Procedure: LEFT MASTECTOMY;  Surgeon: Aleena Real DO;  Location: [facility] OR       Physical Exam    /72   Pulse 75   Temp 97.7  F (36.5  C) (Tympanic)   Resp 16   Ht 1.651 m (5' 5\")   Wt 72.1 kg (159 lb)   SpO2 97%   BMI 26.46 kg/m      General: alert, awake, not in acute distress  HEENT: Head: Normal, normocephalic, atraumatic.  Eye: Normal external eye, conjunctiva, lids cornea, GABRIELA.  Pharynx: Normal buccal mucosa. Normal pharynx.  Neck / Thyroid: Supple, no masses, nodes, nodules or enlargement.  Lymphatics: No abnormally enlarged lymph nodes.  Chest: Normal chest wall and respirations. Clear to auscultation.  Heart: S1 S2 RRR.   Abdomen: abdomen is soft without significant tenderness, masses, organomegaly or guarding  Extremities: normal strength, tone, and muscle mass  Skin: Dryness and erythema of the both nares.  CNS: non focal.    Lab Results    Recent Results (from the past 240 hours)   Comprehensive metabolic panel    Collection Time: 11/01/24  8:50 AM   Result Value Ref Range    Sodium 141 135 - 145 mmol/L    Potassium 4.3 3.4 - 5.3 mmol/L    Carbon Dioxide (CO2) 27 22 - 29 mmol/L    Anion Gap 11 7 - 15 mmol/L    Urea Nitrogen 9.9 6.0 - 20.0 mg/dL    Creatinine 0.88 0.51 - 0.95 mg/dL    GFR Estimate 88 >60 mL/min/1.73m2    Calcium 9.9 8.8 - 10.4 mg/dL    Chloride 103 98 - 107 mmol/L    Glucose 84 70 - 99 mg/dL    Alkaline Phosphatase 45 40 - 150 U/L    AST 19 0 - 45 U/L    ALT 17 0 - 50 U/L    Protein Total 7.1 6.4 - 8.3 g/dL    Albumin 4.2 3.5 - 5.2 g/dL    Bilirubin Total 0.2 <=1.2 mg/dL   CBC with platelets and differential    Collection Time: 11/01/24  8:50 AM   Result Value Ref Range    WBC Count 2.8 (L) 4.0 - 11.0 10e3/uL    RBC Count 4.32 3.80 - 5.20 10e6/uL    " Hemoglobin 13.2 11.7 - 15.7 g/dL    Hematocrit 38.4 35.0 - 47.0 %    MCV 89 78 - 100 fL    MCH 30.6 26.5 - 33.0 pg    MCHC 34.4 31.5 - 36.5 g/dL    RDW 11.5 10.0 - 15.0 %    Platelet Count 172 150 - 450 10e3/uL    % Neutrophils 68 %    % Lymphocytes 20 %    % Monocytes 11 %    % Eosinophils 1 %    % Basophils 0 %    % Immature Granulocytes 0 %    NRBCs per 100 WBC 0 <1 /100    Absolute Neutrophils 1.9 1.6 - 8.3 10e3/uL    Absolute Lymphocytes 0.6 (L) 0.8 - 5.3 10e3/uL    Absolute Monocytes 0.3 0.0 - 1.3 10e3/uL    Absolute Eosinophils 0.0 0.0 - 0.7 10e3/uL    Absolute Basophils 0.0 0.0 - 0.2 10e3/uL    Absolute Immature Granulocytes 0.0 <=0.4 10e3/uL    Absolute NRBCs 0.0 10e3/uL           Imaging    No results found.    The longitudinal plan of care for the diagnosis(es)/condition(s) as documented were addressed during this visit. Due to the added complexity in care, I will continue to support Vibha in the subsequent management and with ongoing continuity of care.     32 minutes spent by me on the date of the encounter doing chart review, history and exam, documentation and further activities as noted above.    Signed by: Rosalind Adams MD

## 2024-11-01 NOTE — LETTER
11/1/2024      Vibha Kingston  [address] [place] [address]      Dear Colleague,    Thank you for referring your patient, Vibha Kingston, to the [facility] RADIATION ONCOLOGY [place]. Please see a copy of my visit note below.    [facility] Radiation Oncology Follow Up     Patient: Vibha Kingston  MRN: [id number]  Date of Service: 11/01/2024       DISEASE TREATED:  34 year old female with a diagnosis of left breast stage pT1c pN1a Invasive ductal carcinoma ER/ID-, Her2 +, grade 3, multifocal with ERE/ID+ DCIS s/p mastectomy and SLN with negative but close margins and 1/3 LN+ followed by adjuvant TCHP x 6 cycles.       TYPE OF RADIATION THERAPY ADMINISTERED:    SITE TREATED: Left chest wall and regional LN  TOTAL DOSE: 4005 cGy + 1000 cGy boost   NUMBER OF FRACTIONS: 15 + 4 boost  RADIATION TREATMENT : 9/9/2024-10/3/2024  CONCURRENT CHEMOTHERAPY: Yes: trastuzumab   ADJUVANT THERAPY: Yes:  trastuzumab, tamoxifen      INTERVAL SINCE COMPLETION OF RADIATION THERAPY: 1 mo      SUBJECTIVE:  Ms. Kingston is a 34 year old female who  palpated a left breast lump.     1/9/2024 diagnostic mammogram:  large segmental area of pleomorphic and amorphous calcifications left 9:00 breast from posterior to subareolar depth, with some calcifications also present within the nipple, area measuring 10 x 5 cm. There is associated focal asymmetry.   U/S: large ill-defined hypoechoic area with associated calcifications, discrete mass centered at 9:00, 3 cm from nipple, 3.4 x 2.2 x 2.0 cm; left axilla is within normal limits.     1/11/2024, ultrasound-guided core needle biopsy, pathology (DCIS, grade 3, cribriform papillary with comedonecrosis.     3/5/2024 left breast simple mastectomy with sentinel lymph node, pathology ([id number]):  Invasive ductal carcinoma, grade 3, multifocal, 4 foci varied from 1.3 mm to 11 mm greatest individual dimensions.  Margins negative (superior less than 1 mm)  Focal lymphovascular  invasion.  Associated DCIS with EIC, solid and comedo, high grade 3.  1 of 3 sentinel lymph nodes positive, largest 5 mm deposit no extranodal tumor extension.  ER negative invasive carcinoma, positive in DCIS (80%) [place] negative invasive carcinoma, positive in DCIS (30%).  HER2 positive  pT1c pN1a  Next generation sequencing: negative for mutations: INGRID, BARD1, BRCA1, BRCA2, CDH1, CHEK2, NF1, PALB2, PTEN, STK11, TP53.      Fertility preservation was successfully completed with preservation of oocytes.      4/26/2024- adjuvant TCHP x6 cycles, then maintenance trastuzumab      6/27/2024-genetic counseling and testing completed. Hereditary Cancer Comprehensive Expanded Panel --  negative  No plans for reconstruction.    9/9/2024-10/3/2024 4005 cGy in 15 fractions + 1000 cGy boost in 4 fractions left chest wall and regional lymph nodes.    9/23/2024-initiated adjuvant endocrine therapy with tamoxifen x 5 years     She returns to clinic today for routine follow-up visit.  Overall she is doing well.  She has some skin dryness and pruritus for which she uses moisturizer.  She has seen PT for chest wall tightness which is slowly improving.  No swelling of the chest wall or upper extremity.  She was interested in seeing plastic surgery for potential reconstructive options which is scheduled later this month.    Medications were reviewed and are up to date on EPIC.    The following portions of the patient's history were reviewed and updated as appropriate: allergies, current medications, past family history, past medical history, past social history, past surgical history and problem list.    Review of Systems:      General  Constitutional  Constitutional (WDL): Exceptions to WDL  Fatigue: Absent or within normal limits  Hot Flashes: Mild symptoms OR intervention not indicated  EENT  Eye Disorders  Eye Disorder (WDL): All eye disorder elements are within defined limits (wears glasses)  Ear Disorders  Ear Disorder (WDL): All  ear disorder elements are within defined limits  Respiratory  Respiratory  Respiratory (WDL): Exceptions to WDL  Cough: Mild symptoms OR nonprescription intervention indicated (after recent procedure)  Cardiovascular  Cardiovascular  Cardiovascular (WDL): All cardiovascular elements are within defined limits (no pacemaker)  Gastrointestinal  Gastrointestinal  Gastrointestinal (WDL): Exceptions to WDL  Anorexia: Absent or within normal limits  Nausea: Absent or within normal limits  Constipation: Occasional or intermittent symptoms OR occasional use of stool softeners, laxatives, dietary modification, or enema  Musculoskeletal  Musculoskeletal and Connective Tissue Disorders  Musculoskeletal & Connective (WDL): All musculoskeletal & connective elements are within defined limits  Integumentary  Integumentary  Integumentary (WDL): Exceptions to WDL (healing right chestwall radiation dermatitis)  Alopecia: Absent or within normal limits  Neurological  Neurosensory  Neurosensory (WDL): All neurosensory elements are within defined limits  Genitourinary/Reproductive  Genitourinary  Genitourinary (WDL): All genitourinary elements are within defined limits  Lymphatic  Lymph System Disorders  Lymph (WDL): All lymph elements are within defined limits  Pain  Pain Score: No Pain (0)  AUA Assessment                                                              Accompanied by  Accompanied By: spouse ()    Objective:          PHYSICAL EXAMINATION:    BP 98/65 (BP Location: Right arm, Patient Position: Sitting, Cuff Size: Adult Regular)   Pulse 71   Temp 98  F (36.7  C) (Oral)   Resp 16   Wt 72.2 kg (159 lb 1.6 oz)   SpO2 98%   BMI 26.48 kg/m      Gen: Alert, in NAD pleasant interactive, well nourished  Eyes:  EOMI, sclera anicteric  HENT     Head: NC/AT  Chest: Left chest wall mastectomy incision well-healed.  She has resolving RT dermatitis with some residual hyperpigmentation.  Good ROM, no lymphedema or other acute  or subacute sequelae of her radiation.   Musculoskeletal: Normal muscle bulk and tone  Skin: Normal tone and turgor, warm, dry, intact  Neurologic: A/Ox3, face symmetric, speech fluent, no focal motor deficits, gait normal and unaided  Psychiatric: Appropriate mood and affect          Imaging: Imaging results 6 weeks:No results found.     Impression   34 year old female with a diagnosis of left breast stage pT1c pN1a Invasive ductal carcinoma ER/KY-, Her2 +, grade 3, multifocal s/p mastectomy and SLN with negative but close margins and 1/3 LN+ status post adjuvant chemotherapy and radiation.    Recovering from acute side effects of radiation with no evidence of progressive disease.    Visit Dx:    (C50.412,  Z17.1) Malignant neoplasm of upper-outer quadrant of left breast in female, estrogen receptor negative (H)  (primary encounter diagnosis)      Cancer Staging   Malignant neoplasm of upper-outer quadrant of left breast in female, estrogen receptor negative (H)  Staging form: Breast, AJCC 8th Edition  - Pathologic stage from 3/13/2024: Stage IIA (pT1c, pN1a(sn), cM0, G3, ER-, [place]-, HER2+) - Signed by Rosalind Adams MD on 3/13/2024      Assessment & Plan:   1.  Continue current skin care  2.  Continue follow-up with medical oncology while on tamoxifen and trastuzumab  3.  Mammogram for right breast indicated-12-months: Due January 2025  4.  Reviewed potential long-term side effects to watch out for including good skin sun protection of the chest where radiated.    5.  Scheduled to see plastic surgery to discuss potential reconstruction   6.  Discussed scheduled follow-up with us or follow-up with return to clinic as needed patient prefers to contact us should any questions or concerns develop in the future to schedule an appointment.     Pain Management Plan: N/A  Total time of this visit, including time spent face-to-face with patient and or via video/audio, and also in preparing for today's visit for [facility] and  "documentation. Medical decision-making included consideration and possible diagnoses, management options, complex record review, review of diagnostic tests and information, consideration and discussion of significant complications based on comorbidities, discussion with providers involved in the care of the patient.     30 Minutes spent.     This note has been dictated using voice recognition software and as a result may contain minor grammatical errors and unintended word substitutions.      Megan Hsu MD  Department of Radiation Oncology   [facility] Radiation Oncology  Tel: [phone]  Page: [phone]    [facility]  [address] [place] [address]     [facility]   [address] [place] [address]    CC:  Patient Care Team:  Zully Bloom APRN CNP as PCP - General (Family Medicine)  Zully Bloom APRN CNP as Assigned PCP  Gloria Sr APRN CNM as Assigned OBGYN Provider  Aleena Real DO as Assigned Surgical Provider  Christie Agee RN as Specialty Care Coordinator (Hematology & Oncology)  Rosalind Adams MD as Assigned Cancer Care Provider  Stephen Chávez PsyD as Assigned Sleep Provider  Megan Hsu MD as MD (Radiation Oncology)      Oncology Rooming Note    November 1, 2024 2:41 PM   Vibha Kingston is a 34 year old female who presents for:    Chief Complaint   Patient presents with     Oncology Clinic Visit     Follow up with Dr. Hsu     Breast Cancer     Initial Vitals: BP 98/65 (BP Location: Right arm, Patient Position: Sitting, Cuff Size: Adult Regular)   Pulse 71   Temp 98  F (36.7  C) (Oral)   Resp 16   Wt 72.2 kg (159 lb 1.6 oz)   SpO2 98%   BMI 26.48 kg/m   Estimated body mass index is 26.48 kg/m  as calculated from the following:    Height as of an earlier encounter on 11/1/24: 1.651 m (5' 5\").    Weight as of this encounter: 72.2 kg (159 lb 1.6 oz). Body surface area is 1.82 meters squared.  No Pain (0) Comment: Data " Unavailable   No LMP recorded. (Menstrual status: Amenorrhea).  Allergies reviewed: Yes  Medications reviewed: Yes    Medications: Medication refills not needed today.  Pharmacy name entered into [employer]: [id number] - [place] - [address]    Frailty Screening:   Is the patient here for a new oncology consult visit in cancer care? 2. No      Clinical concerns: Patient here ambulatory accompanied by  for follow-up status post radiation for her breast cancer.  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  Patient states she still has some itching and occasionally uses moisturizer to relieve this.  Plan return to clinic for follow-up as directed by provider.   Dr. Hsu was notified.      Annabel Sethi RN                Again, thank you for allowing me to participate in the care of your patient.        Sincerely,        Megan Hsu MD

## 2024-11-01 NOTE — PROGRESS NOTES
Infusion Nursing Note:  Vibha Kingston presents today for Perjeta and Trazimra.    Patient seen by provider today: Yes: Dr Adams   present during visit today: Not Applicable.    Note: No pre meds.      Intravenous Access:  Implanted Port.    Treatment Conditions:  Lab Results   Component Value Date    HGB 13.2 11/01/2024    WBC 2.8 (L) 11/01/2024    ANEUTAUTO 1.9 11/01/2024     11/01/2024        Lab Results   Component Value Date     11/01/2024    POTASSIUM 4.3 11/01/2024    CR 0.88 11/01/2024    RACHAEL 9.9 11/01/2024    BILITOTAL 0.2 11/01/2024    ALBUMIN 4.2 11/01/2024    ALT 17 11/01/2024    AST 19 11/01/2024       Results reviewed, labs MET treatment parameters, ok to proceed with treatment.      Post Infusion Assessment:  Patient tolerated infusion without incident.  Site patent and intact, free from redness, edema or discomfort.  Access discontinued per protocol.       Discharge Plan:   Patient and/or family verbalized understanding of discharge instructions and all questions answered.      Klaudia Fermin RN

## 2024-11-01 NOTE — PROGRESS NOTES
[facility] Radiation Oncology Follow Up     Patient: Vibha Kingston  MRN: [id number]  Date of Service: 11/01/2024       DISEASE TREATED:  34 year old female with a diagnosis of left breast stage pT1c pN1a Invasive ductal carcinoma ER/LA-, Her2 +, grade 3, multifocal with ERE/LA+ DCIS s/p mastectomy and SLN with negative but close margins and 1/3 LN+ followed by adjuvant TCHP x 6 cycles.       TYPE OF RADIATION THERAPY ADMINISTERED:    SITE TREATED: Left chest wall and regional LN  TOTAL DOSE: 4005 cGy + 1000 cGy boost   NUMBER OF FRACTIONS: 15 + 4 boost  RADIATION TREATMENT : 9/9/2024-10/3/2024  CONCURRENT CHEMOTHERAPY: Yes: trastuzumab   ADJUVANT THERAPY: Yes:  trastuzumab, tamoxifen      INTERVAL SINCE COMPLETION OF RADIATION THERAPY: 1 mo      SUBJECTIVE:  Ms. Kingston is a 34 year old female who  palpated a left breast lump.     1/9/2024 diagnostic mammogram:  large segmental area of pleomorphic and amorphous calcifications left 9:00 breast from posterior to subareolar depth, with some calcifications also present within the nipple, area measuring 10 x 5 cm. There is associated focal asymmetry.   U/S: large ill-defined hypoechoic area with associated calcifications, discrete mass centered at 9:00, 3 cm from nipple, 3.4 x 2.2 x 2.0 cm; left axilla is within normal limits.     1/11/2024, ultrasound-guided core needle biopsy, pathology (DCIS, grade 3, cribriform papillary with comedonecrosis.     3/5/2024 left breast simple mastectomy with sentinel lymph node, pathology ([id number]):  Invasive ductal carcinoma, grade 3, multifocal, 4 foci varied from 1.3 mm to 11 mm greatest individual dimensions.  Margins negative (superior less than 1 mm)  Focal lymphovascular invasion.  Associated DCIS with EIC, solid and comedo, high grade 3.  1 of 3 sentinel lymph nodes positive, largest 5 mm deposit no extranodal tumor extension.  ER negative invasive carcinoma, positive in DCIS (80%) LA negative invasive carcinoma,  positive in DCIS (30%).  HER2 positive  pT1c pN1a  Next generation sequencing: negative for mutations: INGRID, BARD1, BRCA1, BRCA2, CDH1, CHEK2, NF1, PALB2, PTEN, STK11, TP53.      Fertility preservation was successfully completed with preservation of oocytes.      4/26/2024- adjuvant TCHP x6 cycles, then maintenance trastuzumab      6/27/2024-genetic counseling and testing completed. Hereditary Cancer Comprehensive Expanded Panel --  negative  No plans for reconstruction.    9/9/2024-10/3/2024 4005 cGy in 15 fractions + 1000 cGy boost in 4 fractions left chest wall and regional lymph nodes.    9/23/2024-initiated adjuvant endocrine therapy with tamoxifen x 5 years     She returns to clinic today for routine follow-up visit.  Overall she is doing well.  She has some skin dryness and pruritus for which she uses moisturizer.  She has seen PT for chest wall tightness which is slowly improving.  No swelling of the chest wall or upper extremity.  She was interested in seeing plastic surgery for potential reconstructive options which is scheduled later this month.    Medications were reviewed and are up to date on EPIC.    The following portions of the patient's history were reviewed and updated as appropriate: allergies, current medications, past family history, past medical history, past social history, past surgical history and problem list.    Review of Systems:      General  Constitutional  Constitutional (WDL): Exceptions to WDL  Fatigue: Absent or within normal limits  Hot Flashes: Mild symptoms OR intervention not indicated  EENT  Eye Disorders  Eye Disorder (WDL): All eye disorder elements are within defined limits (wears glasses)  Ear Disorders  Ear Disorder (WDL): All ear disorder elements are within defined limits  Respiratory  Respiratory  Respiratory (WDL): Exceptions to WDL  Cough: Mild symptoms OR nonprescription intervention indicated (after recent procedure)  Cardiovascular  Cardiovascular  Cardiovascular  (WDL): All cardiovascular elements are within defined limits (no pacemaker)  Gastrointestinal  Gastrointestinal  Gastrointestinal (WDL): Exceptions to WDL  Anorexia: Absent or within normal limits  Nausea: Absent or within normal limits  Constipation: Occasional or intermittent symptoms OR occasional use of stool softeners, laxatives, dietary modification, or enema  Musculoskeletal  Musculoskeletal and Connective Tissue Disorders  Musculoskeletal & Connective (WDL): All musculoskeletal & connective elements are within defined limits  Integumentary  Integumentary  Integumentary (WDL): Exceptions to WDL (healing right chestwall radiation dermatitis)  Alopecia: Absent or within normal limits  Neurological  Neurosensory  Neurosensory (WDL): All neurosensory elements are within defined limits  Genitourinary/Reproductive  Genitourinary  Genitourinary (WDL): All genitourinary elements are within defined limits  Lymphatic  Lymph System Disorders  Lymph (WDL): All lymph elements are within defined limits  Pain  Pain Score: No Pain (0)  AUA Assessment                                                              Accompanied by  Accompanied By: spouse ()    Objective:          PHYSICAL EXAMINATION:    BP 98/65 (BP Location: Right arm, Patient Position: Sitting, Cuff Size: Adult Regular)   Pulse 71   Temp 98  F (36.7  C) (Oral)   Resp 16   Wt 72.2 kg (159 lb 1.6 oz)   SpO2 98%   BMI 26.48 kg/m      Gen: Alert, in NAD pleasant interactive, well nourished  Eyes:  EOMI, sclera anicteric  HENT     Head: NC/AT  Chest: Left chest wall mastectomy incision well-healed.  She has resolving RT dermatitis with some residual hyperpigmentation.  Good ROM, no lymphedema or other acute or subacute sequelae of her radiation.   Musculoskeletal: Normal muscle bulk and tone  Skin: Normal tone and turgor, warm, dry, intact  Neurologic: A/Ox3, face symmetric, speech fluent, no focal motor deficits, gait normal and unaided  Psychiatric:  Appropriate mood and affect          Imaging: Imaging results 6 weeks:No results found.     Impression   34 year old female with a diagnosis of left breast stage pT1c pN1a Invasive ductal carcinoma ER/PA-, Her2 +, grade 3, multifocal s/p mastectomy and SLN with negative but close margins and 1/3 LN+ status post adjuvant chemotherapy and radiation.    Recovering from acute side effects of radiation with no evidence of progressive disease.    Visit Dx:    (C50.412,  Z17.1) Malignant neoplasm of upper-outer quadrant of left breast in female, estrogen receptor negative (H)  (primary encounter diagnosis)      Cancer Staging   Malignant neoplasm of upper-outer quadrant of left breast in female, estrogen receptor negative (H)  Staging form: Breast, AJCC 8th Edition  - Pathologic stage from 3/13/2024: Stage IIA (pT1c, pN1a(sn), cM0, G3, ER-, PA-, HER2+) - Signed by Rosalind Adams MD on 3/13/2024      Assessment & Plan:   1.  Continue current skin care  2.  Continue follow-up with medical oncology while on tamoxifen and trastuzumab  3.  Mammogram for right breast indicated-12-months: Due January 2025  4.  Reviewed potential long-term side effects to watch out for including good skin sun protection of the chest where radiated.    5.  Scheduled to see plastic surgery to discuss potential reconstruction   6.  Discussed scheduled follow-up with us or follow-up with return to clinic as needed patient prefers to contact us should any questions or concerns develop in the future to schedule an appointment.     Pain Management Plan: N/A  Total time of this visit, including time spent face-to-face with patient and or via video/audio, and also in preparing for today's visit for MDM and documentation. Medical decision-making included consideration and possible diagnoses, management options, complex record review, review of diagnostic tests and information, consideration and discussion of significant complications based on  comorbidities, discussion with providers involved in the care of the patient.     30 Minutes spent.     This note has been dictated using voice recognition software and as a result may contain minor grammatical errors and unintended word substitutions.      Megan Hsu MD  Department of Radiation Oncology   [facility] Radiation Oncology  Tel: [phone]  Page: [phone]    [facility]  [address] [place] [address]     [id number] [facility] [place] [address]    CC:  Patient Care Team:  Zully Bloom APRN CNP as PCP - General (Family Medicine)  Zully Bloom APRN CNP as Assigned PCP  Gloria Sr APRN CNM as Assigned OBGYN Provider  Aleena Real DO as Assigned Surgical Provider  Christie Agee, RN as Specialty Care Coordinator (Hematology & Oncology)  Rosalind Adams MD as Assigned Cancer Care Provider  Stephen Chávez PsyD as Assigned Sleep Provider  Megan Hsu MD as MD (Radiation Oncology)

## 2024-11-01 NOTE — LETTER
11/1/2024      Vibha Kingston  [address] [place] [address]      Dear Colleague,    Thank you for referring your patient, Vibha Kingston, to the [facility] [place]. Please see a copy of my visit note below.    [facility] Hematology and Oncology Progress Note    Patient: Vibha Kingston  MRN: [id number]  Date of Service: Nov 1, 2024         Reason for Visit    Chief Complaint   Patient presents with     Oncology Clinic Visit     Breast Cancer       Assessment and Plan     Cancer Staging   Malignant neoplasm of upper-outer quadrant of left breast in female, estrogen receptor negative (H)  Staging form: Breast, AJCC 8th Edition  - Pathologic stage from 3/13/2024: Stage IIA (pT1c, pN1a(sn), cM0, G3, ER-, IA-, HER2+) - Signed by Rosalind Adams MD on 3/13/2024      ECOG Performance    0 - Independent     Pain  Pain Score: No Pain (0)      #.  History of stage IIA (pT1c pN1a M0) invasive ductal carcinoma of the left breast, grade 3, ER negative, IA negative, HER2 positive  #.  DCIS of the left breast, ER positive, IA positive     She is clinically well.  Exam is unremarkable.  Her labs for CBC and CMP are unremarkable.  Echocardiogram from 9/2024 was normal. She does not have intolerable side effects from trastuzumab.  I recommended her to complete adjuvant trastuzumab as current.    She takes tamoxifen every day.  Other than hot flashes, she does not notice any significant side effects.  Recommended to continue tamoxifen 20 mg daily.  Plan for 5 years of adjuvant therapy.      Follow-up provider visit every 6 weeks while she is on trastuzumab.  Echocardiogram was requested to complete in 12/2024.    #.  Abrasion/dermatitis of the nose.   Mupirocin prescription sent.  Advised her to continue Vaseline.    Encounter Diagnoses:    Problem List Items Addressed This Visit          Oncology Diagnoses    Malignant neoplasm of upper-outer quadrant of left breast in female, estrogen receptor  negative (H)    Relevant Orders    Infusion Appointment Request - Adult (Completed)    Infusion Appointment Request - Adult    Infusion Appointment Request - Adult    Infusion Appointment Request - Adult    Infusion Appointment Request - Adult    Infusion Appointment Request - Adult    Infusion Appointment Request - Adult    Infusion Appointment Request - Adult    Echocardiogram Limited       Other    Encounter for antineoplastic chemotherapy - Primary    Relevant Orders    Infusion Appointment Request - Adult (Completed)    Infusion Appointment Request - Adult    Infusion Appointment Request - Adult    Infusion Appointment Request - Adult    Infusion Appointment Request - Adult    Infusion Appointment Request - Adult    Infusion Appointment Request - Adult    Infusion Appointment Request - Adult     Other Visit Diagnoses       Abrasion, nose w/o infection        Relevant Medications    mupirocin (BACTROBAN) 2 % external ointment    Encounter for monitoring cardiotoxic drug therapy        Relevant Orders    Echocardiogram Limited            CC: Zully Yoonramon, APRN CNP   ______________________________________________________________________________  Oncologic history  1/9/2024- self palpated left medial breast mass   - diagnostic mammogram and ultrasound showed 3.4 x 2.2 x 2 cm large ill-defined hypoechoic area with associated calcification at 9:00 3 cm from the nipple.  Sonographic survey of left axilla is within normal limits.    1/11/2024-ultrasound-guided core needle biopsy showed DCIS, grade 3, cribriform and papillary with comedonecrosis.  ER/MD was deferred to excisional specimen.    1/25/2024-breast actionable panel was negative including INGRID, BARD1, BRCA1, BRCA2, CDH1, CHEK2, NF1, PALB2, PTEN, STK11, TP53.    2/4/2024-MRI breast showed large segmental area of non-mass enhancement medial left breast measuring 10 cm from the posterior to subareolar depth corresponding to the known DCIS.  Mildly prominent  bilateral internal mammary lymph node technically within normal limit.    3/5/2024-left breast simple mastectomy and left axillary sentinel lymph node biopsy   Invasive ductal carcinoma, grade 3   Multifocal, 4 foci vary from 1.3 mm to 11 mm in greatest dimension.   Margins were negative, closest margin less than 1 mm anterior/superior   DCIS, EIC present, solid and comedo pattern, grade 3, extensive comedonecrosis.  Focal lymphovascular invasion present.   1/2 sentinel lymph node was positive for metastatic carcinoma.   [id number]   ER negative in invasive carcinoma, ER positive in DCIS (80% of the tumor nuclei stain strongly)   OK negative and invasive cancer, OK positive in DCIS (30% of the tumor nuclei stain strongly of moderately)   HER2/aj positive for overexpression (3+ membrane staining)    Fertility preservation was successfully completed.    4/26/2024- adjuvant TCHP    6/27/2024-genetic counseling and testing completed.    10/3/2024-anticipate to complete adjuvant ration to the left chest wall and lymph node    9/23/2024-initiated adjuvant endocrine therapy with tamoxifen.    History of Present Illness    Ms. Vibha Kingston presented today accompanied by her  prior to scheduled maintenance trastuzumab.  She has dry nasal passage and tenderness at the opening. She uses vaseline which did not help.     She has hot flashes. They are manageable.     She had recent procedure for anal fissure.    No return of menstrual cycle.  LMP 6/2/2024.    Review of systems  Apart from describing in HPI, the remainder of comprehensive ROS was negative.    Past History    Past Medical History:   Diagnosis Date     Cancer (H) 1/15/24    Breast cancer     Encounter for antineoplastic chemotherapy 03/14/2024       Past Surgical History:   Procedure Laterality Date     BIOPSY      A mole a few years back and an abnormal pap in [facility]     BIOPSY NODE SENTINEL Left 03/05/2024    Procedure: WITH LEFT SENTINEL LYMPH NODE  "BIOPSY;  Surgeon: Aleena Real DO;  Location: [facility] OR     BOTOX INJECTION MEDICAL      for treatment of anal fissure 10/2024 - [facility]     FERTILITY SURGERY  04/10/2024    Egg retrieval     INSERT PORT VASCULAR ACCESS N/A 04/19/2024    Procedure: INSERTION, VASCULAR ACCESS PORT UNDER ULTRASOUND AND FLUOROSCOPIC GUIDANCE;  Surgeon: Aleena Real DO;  Location: [facility] OR     MASTECTOMY SIMPLE Left 03/05/2024    Procedure: LEFT MASTECTOMY;  Surgeon: Aleena Real DO;  Location: [facility]       Physical Exam    /72   Pulse 75   Temp 97.7  F (36.5  C) (Tympanic)   Resp 16   Ht 1.651 m (5' 5\")   Wt 72.1 kg (159 lb)   SpO2 97%   BMI 26.46 kg/m      General: alert, awake, not in acute distress  HEENT: Head: Normal, normocephalic, atraumatic.  Eye: Normal external eye, conjunctiva, lids cornea, GABRIELA.  Pharynx: Normal buccal mucosa. Normal pharynx.  Neck / Thyroid: Supple, no masses, nodes, nodules or enlargement.  Lymphatics: No abnormally enlarged lymph nodes.  Chest: Normal chest wall and respirations. Clear to auscultation.  Heart: S1 S2 RRR.   Abdomen: abdomen is soft without significant tenderness, masses, organomegaly or guarding  Extremities: normal strength, tone, and muscle mass  Skin: Dryness and erythema of the both nares.  CNS: non focal.    Lab Results    Recent Results (from the past 240 hours)   Comprehensive metabolic panel    Collection Time: 11/01/24  8:50 AM   Result Value Ref Range    Sodium 141 135 - 145 mmol/L    Potassium 4.3 3.4 - 5.3 mmol/L    Carbon Dioxide (CO2) 27 22 - 29 mmol/L    Anion Gap 11 7 - 15 mmol/L    Urea Nitrogen 9.9 6.0 - 20.0 mg/dL    Creatinine 0.88 0.51 - 0.95 mg/dL    GFR Estimate 88 >60 mL/min/1.73m2    Calcium 9.9 8.8 - 10.4 mg/dL    Chloride 103 98 - 107 mmol/L    Glucose 84 70 - 99 mg/dL    Alkaline Phosphatase 45 40 - 150 U/L    AST 19 0 - 45 U/L    ALT 17 0 - 50 U/L    Protein Total 7.1 6.4 - 8.3 g/dL " "   Albumin 4.2 3.5 - 5.2 g/dL    Bilirubin Total 0.2 <=1.2 mg/dL   CBC with platelets and differential    Collection Time: 11/01/24  8:50 AM   Result Value Ref Range    WBC Count 2.8 (L) 4.0 - 11.0 10e3/uL    RBC Count 4.32 3.80 - 5.20 10e6/uL    Hemoglobin 13.2 11.7 - 15.7 g/dL    Hematocrit 38.4 35.0 - 47.0 %    MCV 89 78 - 100 fL    MCH 30.6 26.5 - 33.0 pg    MCHC 34.4 31.5 - 36.5 g/dL    RDW 11.5 10.0 - 15.0 %    Platelet Count 172 150 - 450 10e3/uL    % Neutrophils 68 %    % Lymphocytes 20 %    % Monocytes 11 %    % Eosinophils 1 %    % Basophils 0 %    % Immature Granulocytes 0 %    NRBCs per 100 WBC 0 <1 /100    Absolute Neutrophils 1.9 1.6 - 8.3 10e3/uL    Absolute Lymphocytes 0.6 (L) 0.8 - 5.3 10e3/uL    Absolute Monocytes 0.3 0.0 - 1.3 10e3/uL    Absolute Eosinophils 0.0 0.0 - 0.7 10e3/uL    Absolute Basophils 0.0 0.0 - 0.2 10e3/uL    Absolute Immature Granulocytes 0.0 <=0.4 10e3/uL    Absolute NRBCs 0.0 10e3/uL           Imaging    No results found.    The longitudinal plan of care for the diagnosis(es)/condition(s) as documented were addressed during this visit. Due to the added complexity in care, I will continue to support Vibha in the subsequent management and with ongoing continuity of care.     32 minutes spent by me on the date of the encounter doing chart review, history and exam, documentation and further activities as noted above.    Signed by: Rosalind Adams MD    Oncology Rooming Note    November 1, 2024 9:28 AM   Vibha Kingston is a 34 year old female who presents for:    Chief Complaint   Patient presents with     Oncology Clinic Visit     Ductal carcinoma in situ (DCIS) of left breast     Initial Vitals: /72   Pulse 75   Temp 97.7  F (36.5  C) (Tympanic)   Resp 16   Ht 1.651 m (5' 5\")   Wt 72.1 kg (159 lb)   SpO2 97%   BMI 26.46 kg/m   Estimated body mass index is 26.46 kg/m  as calculated from the following:    Height as of this encounter: 1.651 m (5' 5\").    Weight as of " this encounter: 72.1 kg (159 lb). Body surface area is 1.82 meters squared.  No Pain (0) Comment: Data Unavailable   No LMP recorded. (Menstrual status: Amenorrhea).  Allergies reviewed: Yes  Medications reviewed: Yes    Medications: Medication refills not needed today.  Pharmacy name entered into [employer]: [id number] - [place] - [address]    Frailty Screening:   Is the patient here for a new oncology consult visit in cancer care? 2. No      Clinical concerns:  3 week labs and treatment      Danyell Lira                Again, thank you for allowing me to participate in the care of your patient.        Sincerely,        Rosalind Adams MD

## 2024-11-01 NOTE — PROGRESS NOTES
"Oncology Rooming Note    November 1, 2024 9:28 AM   Vibha Kingston is a 34 year old female who presents for:    Chief Complaint   Patient presents with    Oncology Clinic Visit     Ductal carcinoma in situ (DCIS) of left breast     Initial Vitals: /72   Pulse 75   Temp 97.7  F (36.5  C) (Tympanic)   Resp 16   Ht 1.651 m (5' 5\")   Wt 72.1 kg (159 lb)   SpO2 97%   BMI 26.46 kg/m   Estimated body mass index is 26.46 kg/m  as calculated from the following:    Height as of this encounter: 1.651 m (5' 5\").    Weight as of this encounter: 72.1 kg (159 lb). Body surface area is 1.82 meters squared.  No Pain (0) Comment: Data Unavailable   No LMP recorded. (Menstrual status: Amenorrhea).  Allergies reviewed: Yes  Medications reviewed: Yes    Medications: Medication refills not needed today.  Pharmacy name entered into [email or web address]: [id number] - [place], [place] - [address]    Frailty Screening:   Is the patient here for a new oncology consult visit in cancer care? 2. No      Clinical concerns:  3 week labs and treatment      Danyell Lira              "

## 2024-11-01 NOTE — PROGRESS NOTES
"Oncology Rooming Note    November 1, 2024 2:41 PM   Vibha Kingston is a 34 year old female who presents for:    Chief Complaint   Patient presents with    Oncology Clinic Visit     Follow up with Dr. Hsu    Breast Cancer     Initial Vitals: BP 98/65 (BP Location: Right arm, Patient Position: Sitting, Cuff Size: Adult Regular)   Pulse 71   Temp 98  F (36.7  C) (Oral)   Resp 16   Wt 72.2 kg (159 lb 1.6 oz)   SpO2 98%   BMI 26.48 kg/m   Estimated body mass index is 26.48 kg/m  as calculated from the following:    Height as of an earlier encounter on 11/1/24: 1.651 m (5' 5\").    Weight as of this encounter: 72.2 kg (159 lb 1.6 oz). Body surface area is 1.82 meters squared.  No Pain (0) Comment: Data Unavailable   No LMP recorded. (Menstrual status: Amenorrhea).  Allergies reviewed: Yes  Medications reviewed: Yes    Medications: Medication refills not needed today.  Pharmacy name entered into [facility]: [id number] [address]    Frailty Screening:   Is the patient here for a new oncology consult visit in cancer care? 2. No      Clinical concerns: Patient here ambulatory accompanied by  for follow-up status post radiation for her breast cancer.  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  Patient states she still has some itching and occasionally uses moisturizer to relieve this.  Plan return to clinic for follow-up as directed by provider.   Dr. Hsu was notified.      Annabel Sethi RN              "

## 2024-11-08 ENCOUNTER — DOCUMENTATION ONLY (OUTPATIENT)
Dept: OTHER | Facility: CLINIC | Age: 34
End: 2024-11-08
Payer: COMMERCIAL

## 2024-11-11 DIAGNOSIS — D05.12 DUCTAL CARCINOMA IN SITU (DCIS) OF LEFT BREAST: ICD-10-CM

## 2024-11-11 RX ORDER — TAMOXIFEN CITRATE 20 MG/1
20 TABLET ORAL DAILY
Qty: 90 TABLET | Refills: 1 | Status: SHIPPED | OUTPATIENT
Start: 2024-11-11

## 2024-11-11 NOTE — TELEPHONE ENCOUNTER
[facility]: Cancer Care                                                                                          Refill request received via [employer] from [facility] for tamoxifen. Requesting 90 day supply.  Last refilled by Dr. Adams on 9/20/24.  Quantity 30. 1 refill.     Last seen by Dr. Adams on 11/1/24.    Scheduled to see Mary Champion CNP on 12/13/24.    Message sent to .    Signature:  Christie Agee RN

## 2024-11-13 ENCOUNTER — THERAPY VISIT (OUTPATIENT)
Dept: PHYSICAL THERAPY | Facility: REHABILITATION | Age: 34
End: 2024-11-13
Payer: COMMERCIAL

## 2024-11-13 DIAGNOSIS — C50.412 MALIGNANT NEOPLASM OF UPPER-OUTER QUADRANT OF LEFT BREAST IN FEMALE, ESTROGEN RECEPTOR NEGATIVE (H): Primary | ICD-10-CM

## 2024-11-13 DIAGNOSIS — Z17.1 MALIGNANT NEOPLASM OF UPPER-OUTER QUADRANT OF LEFT BREAST IN FEMALE, ESTROGEN RECEPTOR NEGATIVE (H): Primary | ICD-10-CM

## 2024-11-13 PROCEDURE — 97110 THERAPEUTIC EXERCISES: CPT | Mod: GP

## 2024-11-19 ENCOUNTER — OFFICE VISIT (OUTPATIENT)
Dept: PLASTIC SURGERY | Facility: CLINIC | Age: 34
End: 2024-11-19
Attending: PLASTIC SURGERY
Payer: COMMERCIAL

## 2024-11-19 VITALS
HEIGHT: 66 IN | BODY MASS INDEX: 25.46 KG/M2 | TEMPERATURE: 98 F | DIASTOLIC BLOOD PRESSURE: 68 MMHG | RESPIRATION RATE: 16 BRPM | WEIGHT: 158.4 LBS | OXYGEN SATURATION: 97 % | SYSTOLIC BLOOD PRESSURE: 106 MMHG | HEART RATE: 82 BPM

## 2024-11-19 DIAGNOSIS — Z90.12 S/P LEFT MASTECTOMY: Primary | ICD-10-CM

## 2024-11-19 PROCEDURE — 99213 OFFICE O/P EST LOW 20 MIN: CPT | Performed by: PLASTIC SURGERY

## 2024-11-19 ASSESSMENT — PAIN SCALES - GENERAL: PAINLEVEL_OUTOF10: NO PAIN (0)

## 2024-11-19 NOTE — LETTER
2024      Vibha A Thee  [address] [place] [address]      Dear Colleague,    Thank you for referring your patient, Vibha Kingston, to the [facility] BRE[facility]. Please see a copy of my visit note below.    Referring Provider:  Aleena Real MD.    Primary Care Provider:  Zully Bloom      RE: Vibha Kingston.  : 1990.  DARA: 2024.    Reason for visit: Delayed left  breast reconstruction    HPI: The patient was diagnosed with left-sided breast cancer back in 2024 and underwent left-sided mastectomy.  She underwent adjuvant chemoradiation therapy that ended in 2024.  Patient is now free of disease and is here to discuss delayed reconstruction.  She wears about a B cup on the right side and wants to be around the same size for the left side.    Medical history:  Past Medical History:   Diagnosis Date     Cancer (H) 1/15/24    Breast cancer     Encounter for antineoplastic chemotherapy 2024       Surgical history:  Past Surgical History:   Procedure Laterality Date     BIOPSY      A mole a few years back and an abnormal pap in [facility]     BIOPSY NODE SENTINEL Left 2024    Procedure: WITH LEFT SENTINEL LYMPH NODE BIOPSY;  Surgeon: Aleena Real DO;  Location: [facility] OR     BOTOX INJECTION MEDICAL      for treatment of anal fissure 10/2024 - [facility]     FERTILITY SURGERY  04/10/2024    Egg retrieval     INSERT PORT VASCULAR ACCESS N/A 2024    Procedure: INSERTION, VASCULAR ACCESS PORT UNDER ULTRASOUND AND FLUOROSCOPIC GUIDANCE;  Surgeon: Aleena Real DO;  Location: [facility]     MASTECTOMY SIMPLE Left 2024    Procedure: LEFT MASTECTOMY;  Surgeon: Aleena Real DO;  Location: [facility] OR       Family history:  Family History   Problem Relation Age of Onset     Hyperlipidemia Mother      Breast Cancer Mother 60        treated and in remission     Depression Mother      Other  "Cancer Mother         Kidney     Hypertension Father      Hyperlipidemia Father      Prostate Cancer Father 65     Other Cancer Sister 40        Cervical     Depression Sister      Anxiety Disorder Sister      Cervical Cancer Sister      Other Cancer Maternal Grandfather         Bladder     Cerebrovascular Disease Other      Other Cancer Other         Pancreatic     Other Cancer Maternal Grandmother         Kidney       Medications:  Current Outpatient Medications   Medication Sig Dispense Refill     acetaminophen (TYLENOL) 500 MG tablet Take 1,000 mg by mouth every 8 hours as needed for mild pain, other or pain       diltiazem 2% in PLO gel        lidocaine-prilocaine (EMLA) 2.5-2.5 % external cream Apply to port site 30-60 minutes before port access 30 g 2     LORazepam (ATIVAN) 1 MG tablet Take 1 tablet (1 mg) by mouth every 6 hours as needed for anxiety 30 tablet 0     melatonin 3 MG tablet Take 3 mg by mouth nightly as needed for sleep       mupirocin (BACTROBAN) 2 % external ointment Apply topically 3 times daily. 15 g 0     tamoxifen (NOLVADEX) 20 MG tablet TAKE 1 TABLET BY MOUTH EVERY DAY 90 tablet 1     Vitamin D, Cholecalciferol, 25 MCG (1000 UT) CAPS          Allergies:  No Known Allergies    Social history:   Social History     Tobacco Use     Smoking status: Former     Current packs/day: 0.00     Types: Cigarettes, Cigars, Hookah     Passive exposure: Never     Smokeless tobacco: Never     Tobacco comments:     Social, never created a habit   Substance Use Topics     Alcohol use: Yes     Comment: None since Jan 2024         Physical Examination:  /68 (BP Location: Right arm, Patient Position: Sitting, Cuff Size: Adult Regular)   Pulse 82   Temp 98  F (36.7  C) (Oral)   Resp 16   Ht 1.675 m (5' 5.95\")   Wt 71.8 kg (158 lb 6.4 oz)   SpO2 97%   BMI 25.61 kg/m    Body mass index is 25.61 kg/m .    General: No acute distress.    Breasts: Left side is well-healed transverse scar with radiation " changes and right side has grade 0, B cup breast.    ABDOMEN: Enough tissue to give her the size symmetric to her right breast.  No scars.  No hernias.    BACK: No scars        ASSESMENT and PLAN:     Based upon the above findings, a diagnosis of breast cancer, planning mastectomy, interested in breast reconstruction was made.  I had a lucas, detailed discussion with the patient, in the presence of my nurse, who was present from beginning to end.  I discussed with the patient the concept behind breast reconstruction, the elective nature of reconstruction, immediate versus delayed reconstruction, the staged nature of reconstruction, the different options of reconstruction, including implant based (expander-implant, versus direct to implant) versus autologous and the pros and cons of each. Additionally, the potential need for radiation therapy/chemotherapy as adjuvant therapy and the impact on reconstruction in terms of timing, complications, and choice of reconstruction was all discussed.     In Implant based reconstruction, the use of implants was discussed. The specific nature of implants, the fact that they need to be replaced in time, that they have potential specific complications of implant rupture, contracture, rippling, firmness, palpability, visibility, rippling, and the association with GABRIELLA-ALCL/SCC were all discussed. Reviewed the FDA checklist for implant related risks.  Reiterated importance of implant surveillance, which includes MR/High Resolution US imaging study 5-6 years after the original operation, followed by MR/High Resolution US imaging every 3 years thereafter.     In Autologous based reconstruction, the use of tissue from different sites (JOSE MIGUEL being most commonly used), the longevity of this form of reconstruction, the donor site complications, and the length and complexity of this form or reconstruction was explained.     The potential use of Liposuction and fat grafting was also discussed  in detail.     Overview of the surgery and expectations of the surgery were explained. All risks, benefits and alternatives, including pain, infection, bleeding, scarring, asymmetry, seromas, hematomas, wound breakdown, wound dehiscence, loss of the implants/flaps, abdominal wall-healing issues, abdominal wall weakness, bulges, hernias, sensation loss, requirement of further staged procedures, Implant specific issues and complications as discussed above, removal of infected or exposed implants, pneumothoraces, contour abnormalities, cannula injuries to deeper structures, hernias, fat necrosis, lumps and bumps, loss of grafted material, DVT, PE, MI, CVA, pneumonia, renal failure and death, were explained. They were understood and agreed upon by the patient, they were acknowledged by the patient, all the patient's questions were answered in detail to the patient's fullest understanding that they acknowledged, the team approach for treatment in the operating room was agreed upon by the patient, and proceeding with surgery was agreed upon by the patient.    Given her history of radiation therapy and delayed reconstruction, the patient is most likely agreeable towards a delayed left Harriett flap reconstruction.  I think that is very reasonable.  She wants to think about things and let me know for sure.  If she does we will then schedule her, we will get a CTA, get a PAC clinic visit, and plan the surgery sometime in late spring early summer to allow 6 months to pass from the radiation therapy and her to be off of all biologic treatments.  We will follow-up with her in the coming weeks and then plan accordingly.        All her questions were answered. She was happy with the visit. I look forward to helping her out in the near future as indicated.       Total time spent in the encounter today including chart review, visit itself, and post-visit paperwork was 60 minutes.       Nik Ch MD    Chief,  Division of Plastic Surgery  Department of Surgery  [facility]      CC: No referring provider defined for this encounter.  CC: Zully Bloom      Again, thank you for allowing me to participate in the care of your patient.        Sincerely,        NISHA Ch MD

## 2024-11-19 NOTE — NURSING NOTE
"Oncology Rooming Note    November 19, 2024 11:05 AM   Vibha Kingston is a 34 year old female who presents for:    Chief Complaint   Patient presents with    Oncology Clinic Visit     Delayed breast reconstruction     Initial Vitals: /68 (BP Location: Right arm, Patient Position: Sitting, Cuff Size: Adult Regular)   Pulse 82   Temp 98  F (36.7  C) (Oral)   Resp 16   Ht 1.675 m (5' 5.95\")   Wt 71.8 kg (158 lb 6.4 oz)   SpO2 97%   BMI 25.61 kg/m   Estimated body mass index is 25.61 kg/m  as calculated from the following:    Height as of this encounter: 1.675 m (5' 5.95\").    Weight as of this encounter: 71.8 kg (158 lb 6.4 oz). Body surface area is 1.83 meters squared.  No Pain (0) Comment: Data Unavailable   No LMP recorded. (Menstrual status: Amenorrhea).  Allergies reviewed: Yes  Medications reviewed: Yes    Medications: Medication refills not needed today.  Pharmacy name entered into [employer]: [id number] - [place], [place] - [address] [place] [address]    Frailty Screening:   Is the patient here for a new oncology consult visit in cancer care? 1. Yes. Over the past month, have you experienced difficulty or required a caregiver to assist with:   1. Balance, walking or general mobility (including any falls)? NO  2. Completion of self-care tasks such as bathing, dressing, toileting, grooming/hygiene?  NO  3. Concentration or memory that affects your daily life?  NO       Clinical concerns: none      Kerry Almaraz"

## 2024-11-19 NOTE — PROGRESS NOTES
Referring Provider:  Aleena Real MD.    Primary Care Provider:  Zully Bloom      RE: Vibha Kingston.  : 1990.  DARA: 2024.    Reason for visit: Delayed left  breast reconstruction    HPI: The patient was diagnosed with left-sided breast cancer back in 2024 and underwent left-sided mastectomy.  She underwent adjuvant chemoradiation therapy that ended in 2024.  Patient is now free of disease and is here to discuss delayed reconstruction.  She wears about a B cup on the right side and wants to be around the same size for the left side.    Medical history:  Past Medical History:   Diagnosis Date    Cancer (H) 1/15/24    Breast cancer    Encounter for antineoplastic chemotherapy 2024       Surgical history:  Past Surgical History:   Procedure Laterality Date    BIOPSY      A mole a few years back and an abnormal pap in [facility]    BIOPSY NODE SENTINEL Left 2024    Procedure: WITH LEFT SENTINEL LYMPH NODE BIOPSY;  Surgeon: Aleena Real DO;  Location: [facility] OR    BOTOX INJECTION MEDICAL      for treatment of anal fissure 10/2024 - [facility]    FERTILITY SURGERY  04/10/2024    Egg retrieval    INSERT PORT VASCULAR ACCESS N/A 2024    Procedure: INSERTION, VASCULAR ACCESS PORT UNDER ULTRASOUND AND FLUOROSCOPIC GUIDANCE;  Surgeon: Aleena Real DO;  Location: [facility]    MASTECTOMY SIMPLE Left 2024    Procedure: LEFT MASTECTOMY;  Surgeon: Aleena Real DO;  Location: [facility]       Family history:  Family History   Problem Relation Age of Onset    Hyperlipidemia Mother     Breast Cancer Mother 60        treated and in remission    Depression Mother     Other Cancer Mother         Kidney    Hypertension Father     Hyperlipidemia Father     Prostate Cancer Father 65    Other Cancer Sister 40        Cervical    Depression Sister     Anxiety Disorder Sister     Cervical Cancer Sister     Other Cancer Maternal  "Grandfather         Bladder    Cerebrovascular Disease Other     Other Cancer Other         Pancreatic    Other Cancer Maternal Grandmother         Kidney       Medications:  Current Outpatient Medications   Medication Sig Dispense Refill    acetaminophen (TYLENOL) 500 MG tablet Take 1,000 mg by mouth every 8 hours as needed for mild pain, other or pain      diltiazem 2% in PLO gel       lidocaine-prilocaine (EMLA) 2.5-2.5 % external cream Apply to port site 30-60 minutes before port access 30 g 2    LORazepam (ATIVAN) 1 MG tablet Take 1 tablet (1 mg) by mouth every 6 hours as needed for anxiety 30 tablet 0    melatonin 3 MG tablet Take 3 mg by mouth nightly as needed for sleep      mupirocin (BACTROBAN) 2 % external ointment Apply topically 3 times daily. 15 g 0    tamoxifen (NOLVADEX) 20 MG tablet TAKE 1 TABLET BY MOUTH EVERY DAY 90 tablet 1    Vitamin D, Cholecalciferol, 25 MCG (1000 UT) CAPS          Allergies:  No Known Allergies    Social history:   Social History     Tobacco Use    Smoking status: Former     Current packs/day: 0.00     Types: Cigarettes, Cigars, Hookah     Passive exposure: Never    Smokeless tobacco: Never    Tobacco comments:     Social, never created a habit   Substance Use Topics    Alcohol use: Yes     Comment: None since Jan 2024         Physical Examination:  /68 (BP Location: Right arm, Patient Position: Sitting, Cuff Size: Adult Regular)   Pulse 82   Temp 98  F (36.7  C) (Oral)   Resp 16   Ht 1.675 m (5' 5.95\")   Wt 71.8 kg (158 lb 6.4 oz)   SpO2 97%   BMI 25.61 kg/m    Body mass index is 25.61 kg/m .    General: No acute distress.    Breasts: Left side is well-healed transverse scar with radiation changes and right side has grade 0, B cup breast.    ABDOMEN: Enough tissue to give her the size symmetric to her right breast.  No scars.  No hernias.    BACK: No scars        ASSESMENT and PLAN:     Based upon the above findings, a diagnosis of breast cancer, planning " mastectomy, interested in breast reconstruction was made.  I had a lucas, detailed discussion with the patient, in the presence of my nurse, who was present from beginning to end.  I discussed with the patient the concept behind breast reconstruction, the elective nature of reconstruction, immediate versus delayed reconstruction, the staged nature of reconstruction, the different options of reconstruction, including implant based (expander-implant, versus direct to implant) versus autologous and the pros and cons of each. Additionally, the potential need for radiation therapy/chemotherapy as adjuvant therapy and the impact on reconstruction in terms of timing, complications, and choice of reconstruction was all discussed.     In Implant based reconstruction, the use of implants was discussed. The specific nature of implants, the fact that they need to be replaced in time, that they have potential specific complications of implant rupture, contracture, rippling, firmness, palpability, visibility, rippling, and the association with GABRIELLA-ALCL/SCC were all discussed. Reviewed the FDA checklist for implant related risks.  Reiterated importance of implant surveillance, which includes MR/High Resolution US imaging study 5-6 years after the original operation, followed by MR/High Resolution US imaging every 3 years thereafter.     In Autologous based reconstruction, the use of tissue from different sites (JOSE MIGUEL being most commonly used), the longevity of this form of reconstruction, the donor site complications, and the length and complexity of this form or reconstruction was explained.     The potential use of Liposuction and fat grafting was also discussed in detail.     Overview of the surgery and expectations of the surgery were explained. All risks, benefits and alternatives, including pain, infection, bleeding, scarring, asymmetry, seromas, hematomas, wound breakdown, wound dehiscence, loss of the implants/flaps,  abdominal wall-healing issues, abdominal wall weakness, bulges, hernias, sensation loss, requirement of further staged procedures, Implant specific issues and complications as discussed above, removal of infected or exposed implants, pneumothoraces, contour abnormalities, cannula injuries to deeper structures, hernias, fat necrosis, lumps and bumps, loss of grafted material, DVT, PE, MI, CVA, pneumonia, renal failure and death, were explained. They were understood and agreed upon by the patient, they were acknowledged by the patient, all the patient's questions were answered in detail to the patient's fullest understanding that they acknowledged, the team approach for treatment in the operating room was agreed upon by the patient, and proceeding with surgery was agreed upon by the patient.    Given her history of radiation therapy and delayed reconstruction, the patient is most likely agreeable towards a delayed left Harriett flap reconstruction.  I think that is very reasonable.  She wants to think about things and let me know for sure.  If she does we will then schedule her, we will get a CTA, get a PAC clinic visit, and plan the surgery sometime in late spring early summer to allow 6 months to pass from the radiation therapy and her to be off of all biologic treatments.  We will follow-up with her in the coming weeks and then plan accordingly.        All her questions were answered. She was happy with the visit. I look forward to helping her out in the near future as indicated.       Total time spent in the encounter today including chart review, visit itself, and post-visit paperwork was 60 minutes.       Nik Ch MD    Chief, Division of Plastic Surgery  Department of Surgery  [facility]      CC: No referring provider defined for this encounter.  CC: Zully Bloom

## 2024-12-06 ENCOUNTER — ANCILLARY PROCEDURE (OUTPATIENT)
Dept: GENERAL RADIOLOGY | Facility: CLINIC | Age: 34
End: 2024-12-06
Payer: COMMERCIAL

## 2024-12-06 PROCEDURE — 71046 X-RAY EXAM CHEST 2 VIEWS: CPT | Mod: TC | Performed by: RADIOLOGY

## 2024-12-10 ENCOUNTER — PATIENT OUTREACH (OUTPATIENT)
Dept: CARE COORDINATION | Facility: CLINIC | Age: 34
End: 2024-12-10
Payer: COMMERCIAL

## 2024-12-31 NOTE — LETTER
6/27/2024      Vibha Kingston  [address] [place] [address]      Dear Colleague,    Thank you for referring your patient, Vibha Kingston, to the [facility]. Please see a copy of my visit note below.    Virtual Visit Details    Type of service:  Video Visit     Originating Location (pt. Location): Home  Distant Location (provider location):  Off-site  Platform used for Video Visit: iTMan  Time spent over video: 37 minutes    6/27/2024    Referring Provider: Aleena Real DO     Presenting Information:   I spoke with Vibha Kingston over video today for genetic counseling to discuss her personal and family history of cancer. This appointment was conducted virtually due to COVID-19 precautions. We talked today to review this history, cancer screening recommendations, and available genetic testing options.    Personal History:  Vibha is a 33 year old female. She was recently diagnosed with a left breast cancer at age 33 in January 2024 (multifocal IDC, ER negative, [place] negative, HER2 positive). She had a left mastectomy on 3/5/24 and is currently having chemotherapy.     She has already had some genetic testing via the Breast Actionable Panel ordered by Dr. Real. Results were negative for mutations in the 11 genes analyzed: INGRID, BARD1, BRCA1, BRCA2, CDH1, CHEK2, NF1, PALB2, PTEN, STK11, TP53. Testing was performed by the Molecular Diagnostics Laboratory at [facility]. We reviewed these results in detail today.     She had her first menstrual period at age 15 or 16 and is premenopausal. She does not have any children. Vibha has her ovaries, fallopian tubes and uterus in place. She reports that she has not used hormone replacement therapy. She has used oral contraceptives and the nuvaring in the past. Vibha has not had a colonoscopy due to her age.     Family History: (Please see scanned pedigree for detailed family history information)  Siblings:  Her sister is 41 years old  Please find out out kind of surgery and I will send in an antibiotic for likely a skin infection of the scalp. Kaley    It is going to be keflex, the allergy is just nausea, she must take with food, she is allergic to all other abx that could cover this.    and has a history of cervical cancer in college. She had a hysterectomy and one ovary removed about 4 years ago due to a tumor on her ovary (which was benign) and her history of cervical cancer. She has one ovary in place.   Maternal:  Her mother is 71 years old and was diagnosed with breast cancer about 11 years ago. Treatment included chemotherapy, radiation, and a pill. She was also found to have kidney cancer (type unknown to Vibha) at age 60 during her breast cancer treatments and had this removed.   Her uncle was diagnosed with pancreatic cancer and passed away in his 50s or 60s.   Her grandmother was diagnosed with kidney cancer in her 80s and passed away in her 80s or 90s. She had a history of smoking.   Her grandfather was diagnosed with bladder cancer in his 80s and passed away in his 80s. He was a non-smoker, but did have secondhand smoke exposure due to Vibha's grandmother smoking in the house.   Paternal:  Her father is 76 years old and was diagnosed with prostate cancer in his mid 60s. Treatment included prostatectomy. Vibha does not think this was an aggressive prostate cancer.   She is not aware of any other cancers in her paternal relatives.     Vibha is not aware of any genetic testing for inherited cancer risk in any of her family members.     Her maternal ethnicity is [place], [place]. Her paternal ethnicity is [place], [place]. There is no known Ashkenazi Orthodox ancestry on either side of her family.     Discussion:  Vibha's personal and family history of cancer is suggestive of a hereditary cancer syndrome.  We reviewed the features of sporadic, familial, and hereditary cancers. In looking at Vibha's family history, it is possible that a cancer susceptibility gene is present due to her recent diagnosis of breast cancer at age 33, as well as her family history of breast, kidney, pancreatic, and prostate cancer.  We discussed the natural history and genetics of hereditary cancer. As noted  above, Vibha has already had some genetic testing via the Breast Actionable Panel with negative results. We discussed that there are additional genes that could cause increased risk for breast cancer (lower risk, would not change surgical decisions), kidney, pancreatic, prostate, and other cancers. As many of these genes present with overlapping features in a family and accurate cancer risk cannot always be established based upon the pedigree analysis alone, it would be reasonable for Vibha to consider reflex panel genetic testing to analyze additional genes.   A detailed handout regarding genetic testing and the information we discussed will be provided to Vibha via [employer] and can be found in the after visit summary. Topics included: inheritance pattern, cancer risks, cancer screening recommendations, and also risks, benefits and limitations of testing.  Based on her personal and family history, Vibha meets current National Comprehensive Cancer Network (NCCN) criteria for genetic testing of high-penetrance breast cancer susceptibility genes, specifically, BRCA1, BRCA2, CDH1, PALB2, PTEN, and TP53.  We reviewed additional reflex genetic testing options for Vibha based on her personal and family history: a panel of genes associated with an increased risk for certain cancers, or larger panel options to include genes associated with increased risk for multiple different cancer types. Vibha expressed an interest in learning as much information as possible from the testing. We discussed expanded panel options including the Hereditary Cancer Comprehensive Panel and the Hereditary Cancer Comprehensive Expanded Panel. She opted for the Hereditary Cancer Comprehensive Expanded Panel.  We discussed that many genes on this panel are associated with specific hereditary cancer syndromes and have published management guidelines. Other genes have medical management guidelines available to screen for certain cancers. The remaining  genes are associated with increased cancer risk and may allow us to make medical recommendations when mutations are identified. Some of the genes on this expanded panel may have limited information available about specific cancer risks and therefore, there may be limited screening guidelines available. She stated that she understands potential limitations of a larger gene panel.    Due to COVID-19 precautions consent was obtained over the phone/video today. Additional reflex genetic testing via the Hereditary Cancer Comprehensive Expanded Panel will be ordered from the Molecular Diagnostics Laboratory at [facility]. This will be done on the same blood sample that Vibha has already provided for her initial testing.   Medical Management: For Vibha, we reviewed that the information from genetic testing may determine:  additional cancer screening for which Vibha may qualify (i.e. mammogram and breast MRI, more frequent colonoscopies, more frequent dermatologic exams, etc.),  options for risk reducing surgeries Vibha could consider (i.e. surgery to remove her ovaries and/or uterus, etc.),    and targeted chemotherapies if she were to develop certain cancers in the future (i.e. immunotherapy for individuals with Mcrae syndrome, PARP inhibitors, etc.).   These recommendations will be discussed in detail once genetic testing is completed.     Plan:  1) Today Vibha elected to proceed with genetic testing via the Hereditary Cancer Comprehensive Expanded Panel offered by the Molecular Diagnostics Laboratory at [facility].  2) This information should be available in 3-4 weeks.  3) Vibha will be scheduled for a virtual visit (phone or video) to discuss the results.    Laurie Benson MS, [facility]  Licensed, Certified Genetic Counselor  Office: [phone]  Email: [email or web address]       Again, thank you for allowing me to participate in the care of your patient.        Sincerely,        Laurie Benson GC

## 2025-01-07 ENCOUNTER — PATIENT OUTREACH (OUTPATIENT)
Dept: CARE COORDINATION | Facility: CLINIC | Age: 35
End: 2025-01-07

## 2025-01-07 ENCOUNTER — OFFICE VISIT (OUTPATIENT)
Dept: FAMILY MEDICINE | Facility: CLINIC | Age: 35
End: 2025-01-07
Payer: COMMERCIAL

## 2025-01-07 VITALS
TEMPERATURE: 97.9 F | WEIGHT: 159.8 LBS | SYSTOLIC BLOOD PRESSURE: 111 MMHG | OXYGEN SATURATION: 97 % | HEART RATE: 75 BPM | BODY MASS INDEX: 25.68 KG/M2 | HEIGHT: 66 IN | DIASTOLIC BLOOD PRESSURE: 80 MMHG

## 2025-01-07 DIAGNOSIS — F33.1 MODERATE RECURRENT MAJOR DEPRESSION (H): Primary | ICD-10-CM

## 2025-01-07 DIAGNOSIS — Z12.31 VISIT FOR SCREENING MAMMOGRAM: ICD-10-CM

## 2025-01-07 PROCEDURE — 96127 BRIEF EMOTIONAL/BEHAV ASSMT: CPT

## 2025-01-07 PROCEDURE — 99214 OFFICE O/P EST MOD 30 MIN: CPT

## 2025-01-07 PROCEDURE — 91320 SARSCV2 VAC 30MCG TRS-SUC IM: CPT

## 2025-01-07 PROCEDURE — 90480 ADMN SARSCOV2 VAC 1/ONLY CMP: CPT

## 2025-01-07 PROCEDURE — G2211 COMPLEX E/M VISIT ADD ON: HCPCS

## 2025-01-07 RX ORDER — ESCITALOPRAM OXALATE 10 MG/1
10 TABLET ORAL DAILY
Qty: 30 TABLET | Refills: 0 | Status: SHIPPED | OUTPATIENT
Start: 2025-01-07

## 2025-01-07 ASSESSMENT — ANXIETY QUESTIONNAIRES
7. FEELING AFRAID AS IF SOMETHING AWFUL MIGHT HAPPEN: SEVERAL DAYS
GAD7 TOTAL SCORE: 9
5. BEING SO RESTLESS THAT IT IS HARD TO SIT STILL: SEVERAL DAYS
6. BECOMING EASILY ANNOYED OR IRRITABLE: SEVERAL DAYS
4. TROUBLE RELAXING: SEVERAL DAYS
1. FEELING NERVOUS, ANXIOUS, OR ON EDGE: MORE THAN HALF THE DAYS
7. FEELING AFRAID AS IF SOMETHING AWFUL MIGHT HAPPEN: SEVERAL DAYS
3. WORRYING TOO MUCH ABOUT DIFFERENT THINGS: MORE THAN HALF THE DAYS
2. NOT BEING ABLE TO STOP OR CONTROL WORRYING: SEVERAL DAYS
8. IF YOU CHECKED OFF ANY PROBLEMS, HOW DIFFICULT HAVE THESE MADE IT FOR YOU TO DO YOUR WORK, TAKE CARE OF THINGS AT HOME, OR GET ALONG WITH OTHER PEOPLE?: SOMEWHAT DIFFICULT
IF YOU CHECKED OFF ANY PROBLEMS ON THIS QUESTIONNAIRE, HOW DIFFICULT HAVE THESE PROBLEMS MADE IT FOR YOU TO DO YOUR WORK, TAKE CARE OF THINGS AT HOME, OR GET ALONG WITH OTHER PEOPLE: SOMEWHAT DIFFICULT
GAD7 TOTAL SCORE: 9
GAD7 TOTAL SCORE: 9

## 2025-01-07 ASSESSMENT — PATIENT HEALTH QUESTIONNAIRE - PHQ9
10. IF YOU CHECKED OFF ANY PROBLEMS, HOW DIFFICULT HAVE THESE PROBLEMS MADE IT FOR YOU TO DO YOUR WORK, TAKE CARE OF THINGS AT HOME, OR GET ALONG WITH OTHER PEOPLE: SOMEWHAT DIFFICULT
SUM OF ALL RESPONSES TO PHQ QUESTIONS 1-9: 14
SUM OF ALL RESPONSES TO PHQ QUESTIONS 1-9: 14

## 2025-01-07 NOTE — PROGRESS NOTES
"  Assessment & Plan   Problem List Items Addressed This Visit       Moderate recurrent major depression (H) - Primary     Worsening, PHQ score today 14. Suspect that patient has depression with maybe some anxiety. Has tried therapy virtually but hasn't found the person that she vibes with yet. She would like to start antidepressants today which I think is appropriate. Will start Lexapro 10mg and tritiate up as indicated.  Discussed with patient that antidepressants are not a cure and work as an adjunct to help with depression especially with therapy.  Discussed that antidepressants work on preventing the lows from being so low and allowing them to be tolerable and manageable.  Also discussed with patient that antidepressants take about 3 to 4 weeks to have noticeable effect.  - Start lexapro 10mg for a month and increase as indicated   - Follow up in a month either virtually or in person  - Referral to adult behavioral health for diagnosis and therapy made today         Relevant Medications    escitalopram (LEXAPRO) 10 MG tablet    Other Relevant Orders    Adult Mental Health  Referral     Other Visit Diagnoses       Visit for screening mammogram                 BMI  Estimated body mass index is 25.99 kg/m  as calculated from the following:    Height as of this encounter: 1.67 m (5' 5.75\").    Weight as of this encounter: 72.5 kg (159 lb 12.8 oz).       Depression Screening Follow Up        1/7/2025     2:33 PM   PHQ   PHQ-9 Total Score 14    Q9: Thoughts of better off dead/self-harm past 2 weeks Not at all       Patient-reported         1/7/2025     2:33 PM   Last PHQ-9   1.  Little interest or pleasure in doing things 2   2.  Feeling down, depressed, or hopeless 2   3.  Trouble falling or staying asleep, or sleeping too much 3   4.  Feeling tired or having little energy 2   5.  Poor appetite or overeating 2   6.  Feeling bad about yourself 1   7.  Trouble concentrating 1   8.  Moving slowly or restless 1 "   Q9: Thoughts of better off dead/self-harm past 2 weeks 0   PHQ-9 Total Score 14        Patient-reported         Follow Up Actions Taken  Referred patient back to PCP  Started patient on anti-depressant.     Corin Dunne is a 34 year old, presenting for the following health issues:  Anxiety and depression        1/7/2025     2:39 PM   Additional Questions   Roomed by JOAQUIN BANUELOS     History of Present Illness       Reason for visit:  Mental health assessment   She is taking medications regularly.       Mental health assessment  Patient isn't sure if she has depression or anxiety or both. She also thinks that she may be on the spectrum a little bit. Also endores having OCD tendencies. Denies any suicidal ideation however has has more feelings about death with her breat cancer diagnosis and other family members passing away. States that her anxiety has increase over the past couple of years. Has a tendency of downplaying her problems. But since her diagnosis last year she would like to make a change. Works in [employer]. States that sometimes her mood affects her work but doesn't feel too overwhelm at work. Stresses about scary movies and worrying that people will break into her home even through it has never happen before. Endores some perserveration and anxiety at night.    Sleeping habits are the best. She endores having some issues with falling asleep. Does not have a TV in her bedroom. Eating habits are normal. Denies any binge eating. Can sporadically complete her tasks or she will procastinate. Hobbies include cross-stitching, playing with her cat and dog. She also endores that she struggles with identifying what things brings her josiah. Endores that she is quick to get defensive and argumentative.   She has tried therapy for the past 6 months virtually. She stopped going because she would like to find someone in person with mild breakthrough        Objective    /80 (BP Location: Right arm, Patient  "Position: Sitting, Cuff Size: Adult Regular)   Pulse 75   Temp 97.9  F (36.6  C)   Ht 1.67 m (5' 5.75\")   Wt 72.5 kg (159 lb 12.8 oz)   SpO2 97%   BMI 25.99 kg/m    Body mass index is 25.99 kg/m .  Physical Exam   GENERAL: alert and no distress  PSYCH: mentation appears normal, affect normal/bright, mood slightly depressed      Spent 30mins doing chart review, history and exam, patient education, documentation and further activities per the note. The longitudinal plan of care for the diagnosis(es)/condition(s) as documented were addressed during this visit. Due to the added complexity in care, I will continue to support Vibha in the subsequent management and with ongoing continuity of care.    Signed Electronically by: Jb Danielle MD    "

## 2025-01-14 ENCOUNTER — HOSPITAL ENCOUNTER (OUTPATIENT)
Dept: MAMMOGRAPHY | Facility: CLINIC | Age: 35
Discharge: HOME | End: 2025-01-14
Payer: COMMERCIAL

## 2025-01-14 DIAGNOSIS — Z12.31 VISIT FOR SCREENING MAMMOGRAM: ICD-10-CM

## 2025-01-14 PROCEDURE — 77063 BREAST TOMOSYNTHESIS BI: CPT | Mod: 52

## 2025-01-14 PROCEDURE — 77067 SCR MAMMO BI INCL CAD: CPT | Mod: 52

## 2025-01-14 NOTE — ASSESSMENT & PLAN NOTE
Worsening, PHQ score today 14. Suspect that patient has depression with maybe some anxiety. Has tried therapy virtually but hasn't found the person that she vibes with yet. She would like to start antidepressants today which I think is appropriate. Will start Lexapro 10mg and tritiate up as indicated.  Discussed with patient that antidepressants are not a cure and work as an adjunct to help with depression especially with therapy.  Discussed that antidepressants work on preventing the lows from being so low and allowing them to be tolerable and manageable.  Also discussed with patient that antidepressants take about 3 to 4 weeks to have noticeable effect.  - Start lexapro 10mg for a month and increase as indicated   - Follow up in a month either virtually or in person  - Referral to adult behavioral health for diagnosis and therapy made today

## 2025-01-22 ENCOUNTER — MYC MEDICAL ADVICE (OUTPATIENT)
Dept: FAMILY MEDICINE | Facility: CLINIC | Age: 35
End: 2025-01-22
Payer: COMMERCIAL

## 2025-01-22 DIAGNOSIS — Z17.1 MALIGNANT NEOPLASM OF UPPER-OUTER QUADRANT OF LEFT BREAST IN FEMALE, ESTROGEN RECEPTOR NEGATIVE (H): Primary | ICD-10-CM

## 2025-01-22 DIAGNOSIS — C50.412 MALIGNANT NEOPLASM OF UPPER-OUTER QUADRANT OF LEFT BREAST IN FEMALE, ESTROGEN RECEPTOR NEGATIVE (H): Primary | ICD-10-CM

## 2025-01-22 DIAGNOSIS — Z51.11 ENCOUNTER FOR ANTINEOPLASTIC CHEMOTHERAPY: ICD-10-CM

## 2025-01-22 NOTE — TELEPHONE ENCOUNTER
LOV 1/7/25  Assessment & Plan  Problem List Items Addressed This Visit         Moderate recurrent major depression (H) - Primary       Worsening, PHQ score today 14. Suspect that patient has depression with maybe some anxiety. Has tried therapy virtually but hasn't found the person that she vibes with yet. She would like to start antidepressants today which I think is appropriate. Will start Lexapro 10mg and tritiate up as indicated.  Discussed with patient that antidepressants are not a cure and work as an adjunct to help with depression especially with therapy.  Discussed that antidepressants work on preventing the lows from being so low and allowing them to be tolerable and manageable.  Also discussed with patient that antidepressants take about 3 to 4 weeks to have noticeable effect.  - Start lexapro 10mg for a month and increase as indicated   - Follow up in a month either virtually or in person  - Referral to adult behavioral health for diagnosis and therapy made today           Relevant Medications     escitalopram (LEXAPRO) 10 MG tablet     Other Relevant Orders     Adult Mental Health  Referral

## 2025-01-23 ENCOUNTER — INFUSION THERAPY VISIT (OUTPATIENT)
Dept: INFUSION THERAPY | Facility: HOSPITAL | Age: 35
End: 2025-01-23
Attending: INTERNAL MEDICINE
Payer: COMMERCIAL

## 2025-01-23 ENCOUNTER — LAB (OUTPATIENT)
Dept: LAB | Facility: CLINIC | Age: 35
End: 2025-01-23
Payer: COMMERCIAL

## 2025-01-23 ENCOUNTER — ONCOLOGY VISIT (OUTPATIENT)
Dept: ONCOLOGY | Facility: HOSPITAL | Age: 35
End: 2025-01-23
Payer: COMMERCIAL

## 2025-01-23 ENCOUNTER — PATIENT OUTREACH (OUTPATIENT)
Dept: ONCOLOGY | Facility: HOSPITAL | Age: 35
End: 2025-01-23

## 2025-01-23 VITALS
HEART RATE: 72 BPM | BODY MASS INDEX: 26.38 KG/M2 | RESPIRATION RATE: 18 BRPM | HEIGHT: 65 IN | SYSTOLIC BLOOD PRESSURE: 115 MMHG | TEMPERATURE: 97.9 F | WEIGHT: 158.36 LBS | DIASTOLIC BLOOD PRESSURE: 75 MMHG | OXYGEN SATURATION: 97 %

## 2025-01-23 VITALS
HEART RATE: 72 BPM | DIASTOLIC BLOOD PRESSURE: 75 MMHG | OXYGEN SATURATION: 97 % | SYSTOLIC BLOOD PRESSURE: 115 MMHG | RESPIRATION RATE: 18 BRPM | TEMPERATURE: 97.9 F

## 2025-01-23 DIAGNOSIS — D05.12 DUCTAL CARCINOMA IN SITU (DCIS) OF LEFT BREAST: ICD-10-CM

## 2025-01-23 DIAGNOSIS — C50.412 MALIGNANT NEOPLASM OF UPPER-OUTER QUADRANT OF LEFT BREAST IN FEMALE, ESTROGEN RECEPTOR NEGATIVE (H): ICD-10-CM

## 2025-01-23 DIAGNOSIS — R19.7 DIARRHEA OF PRESUMED INFECTIOUS ORIGIN: ICD-10-CM

## 2025-01-23 DIAGNOSIS — Z51.11 ENCOUNTER FOR ANTINEOPLASTIC CHEMOTHERAPY: ICD-10-CM

## 2025-01-23 DIAGNOSIS — C50.412 MALIGNANT NEOPLASM OF UPPER-OUTER QUADRANT OF LEFT BREAST IN FEMALE, ESTROGEN RECEPTOR NEGATIVE (H): Primary | ICD-10-CM

## 2025-01-23 DIAGNOSIS — Z17.1 MALIGNANT NEOPLASM OF UPPER-OUTER QUADRANT OF LEFT BREAST IN FEMALE, ESTROGEN RECEPTOR NEGATIVE (H): ICD-10-CM

## 2025-01-23 DIAGNOSIS — Z17.1 MALIGNANT NEOPLASM OF UPPER-OUTER QUADRANT OF LEFT BREAST IN FEMALE, ESTROGEN RECEPTOR NEGATIVE (H): Primary | ICD-10-CM

## 2025-01-23 DIAGNOSIS — R19.7 DIARRHEA OF PRESUMED INFECTIOUS ORIGIN: Primary | ICD-10-CM

## 2025-01-23 LAB
ALBUMIN SERPL BCG-MCNC: 4.1 G/DL (ref 3.5–5.2)
ALP SERPL-CCNC: 51 U/L (ref 40–150)
ALT SERPL W P-5'-P-CCNC: 22 U/L (ref 0–50)
ANION GAP SERPL CALCULATED.3IONS-SCNC: 11 MMOL/L (ref 7–15)
AST SERPL W P-5'-P-CCNC: 21 U/L (ref 0–45)
BASOPHILS # BLD AUTO: 0 10E3/UL (ref 0–0.2)
BASOPHILS NFR BLD AUTO: 0 %
BILIRUB SERPL-MCNC: 0.2 MG/DL
BUN SERPL-MCNC: 12.2 MG/DL (ref 6–20)
C DIFF TOX B STL QL: NEGATIVE
CALCIUM SERPL-MCNC: 9.1 MG/DL (ref 8.8–10.4)
CHLORIDE SERPL-SCNC: 102 MMOL/L (ref 98–107)
CREAT SERPL-MCNC: 0.83 MG/DL (ref 0.51–0.95)
EGFRCR SERPLBLD CKD-EPI 2021: >90 ML/MIN/1.73M2
EOSINOPHIL # BLD AUTO: 0 10E3/UL (ref 0–0.7)
EOSINOPHIL NFR BLD AUTO: 0 %
ERYTHROCYTE [DISTWIDTH] IN BLOOD BY AUTOMATED COUNT: 12.6 % (ref 10–15)
GLUCOSE SERPL-MCNC: 115 MG/DL (ref 70–99)
HCO3 SERPL-SCNC: 26 MMOL/L (ref 22–29)
HCT VFR BLD AUTO: 37.7 % (ref 35–47)
HGB BLD-MCNC: 13.2 G/DL (ref 11.7–15.7)
IMM GRANULOCYTES # BLD: 0 10E3/UL
IMM GRANULOCYTES NFR BLD: 1 %
LYMPHOCYTES # BLD AUTO: 0.6 10E3/UL (ref 0.8–5.3)
LYMPHOCYTES NFR BLD AUTO: 18 %
MCH RBC QN AUTO: 30.9 PG (ref 26.5–33)
MCHC RBC AUTO-ENTMCNC: 35 G/DL (ref 31.5–36.5)
MCV RBC AUTO: 88 FL (ref 78–100)
MONOCYTES # BLD AUTO: 0.3 10E3/UL (ref 0–1.3)
MONOCYTES NFR BLD AUTO: 10 %
NEUTROPHILS # BLD AUTO: 2.4 10E3/UL (ref 1.6–8.3)
NEUTROPHILS NFR BLD AUTO: 72 %
NRBC # BLD AUTO: 0 10E3/UL
NRBC BLD AUTO-RTO: 0 /100
PLATELET # BLD AUTO: 143 10E3/UL (ref 150–450)
POTASSIUM SERPL-SCNC: 3.8 MMOL/L (ref 3.4–5.3)
PROT SERPL-MCNC: 6.6 G/DL (ref 6.4–8.3)
RBC # BLD AUTO: 4.27 10E6/UL (ref 3.8–5.2)
SODIUM SERPL-SCNC: 139 MMOL/L (ref 135–145)
WBC # BLD AUTO: 3.3 10E3/UL (ref 4–11)

## 2025-01-23 PROCEDURE — 99214 OFFICE O/P EST MOD 30 MIN: CPT | Performed by: INTERNAL MEDICINE

## 2025-01-23 PROCEDURE — G2211 COMPLEX E/M VISIT ADD ON: HCPCS | Performed by: INTERNAL MEDICINE

## 2025-01-23 PROCEDURE — 87507 IADNA-DNA/RNA PROBE TQ 12-25: CPT

## 2025-01-23 PROCEDURE — 250N000011 HC RX IP 250 OP 636: Performed by: INTERNAL MEDICINE

## 2025-01-23 PROCEDURE — 85004 AUTOMATED DIFF WBC COUNT: CPT

## 2025-01-23 PROCEDURE — 85041 AUTOMATED RBC COUNT: CPT

## 2025-01-23 PROCEDURE — 82040 ASSAY OF SERUM ALBUMIN: CPT

## 2025-01-23 PROCEDURE — 36591 DRAW BLOOD OFF VENOUS DEVICE: CPT

## 2025-01-23 PROCEDURE — 87493 C DIFF AMPLIFIED PROBE: CPT

## 2025-01-23 PROCEDURE — 258N000003 HC RX IP 258 OP 636: Performed by: INTERNAL MEDICINE

## 2025-01-23 RX ORDER — LORAZEPAM 2 MG/ML
0.5 INJECTION INTRAMUSCULAR EVERY 4 HOURS PRN
OUTPATIENT
Start: 2025-02-13

## 2025-01-23 RX ORDER — ACETAMINOPHEN 325 MG/1
650 TABLET ORAL
Start: 2025-02-13

## 2025-01-23 RX ORDER — EPINEPHRINE 1 MG/ML
0.3 INJECTION, SOLUTION INTRAMUSCULAR; SUBCUTANEOUS EVERY 5 MIN PRN
OUTPATIENT
Start: 2025-02-13

## 2025-01-23 RX ORDER — EPINEPHRINE 1 MG/ML
0.3 INJECTION, SOLUTION INTRAMUSCULAR; SUBCUTANEOUS EVERY 5 MIN PRN
Status: CANCELLED | OUTPATIENT
Start: 2025-01-23

## 2025-01-23 RX ORDER — HEPARIN SODIUM,PORCINE 10 UNIT/ML
5-20 VIAL (ML) INTRAVENOUS DAILY PRN
OUTPATIENT
Start: 2025-02-13

## 2025-01-23 RX ORDER — DIPHENHYDRAMINE HCL 50 MG
50 CAPSULE ORAL
Status: CANCELLED | OUTPATIENT
Start: 2025-01-23

## 2025-01-23 RX ORDER — ALBUTEROL SULFATE 90 UG/1
1-2 INHALANT RESPIRATORY (INHALATION)
Status: CANCELLED
Start: 2025-01-23

## 2025-01-23 RX ORDER — METHYLPREDNISOLONE SODIUM SUCCINATE 40 MG/ML
40 INJECTION INTRAMUSCULAR; INTRAVENOUS
Start: 2025-02-13

## 2025-01-23 RX ORDER — HEPARIN SODIUM (PORCINE) LOCK FLUSH IV SOLN 100 UNIT/ML 100 UNIT/ML
5 SOLUTION INTRAVENOUS
OUTPATIENT
Start: 2025-02-13

## 2025-01-23 RX ORDER — MEPERIDINE HYDROCHLORIDE 25 MG/ML
25 INJECTION INTRAMUSCULAR; INTRAVENOUS; SUBCUTANEOUS
OUTPATIENT
Start: 2025-02-13

## 2025-01-23 RX ORDER — HEPARIN SODIUM (PORCINE) LOCK FLUSH IV SOLN 100 UNIT/ML 100 UNIT/ML
5 SOLUTION INTRAVENOUS
Status: CANCELLED | OUTPATIENT
Start: 2025-01-23

## 2025-01-23 RX ORDER — DIPHENHYDRAMINE HYDROCHLORIDE 50 MG/ML
50 INJECTION INTRAMUSCULAR; INTRAVENOUS
Status: CANCELLED
Start: 2025-01-23

## 2025-01-23 RX ORDER — HEPARIN SODIUM (PORCINE) LOCK FLUSH IV SOLN 100 UNIT/ML 100 UNIT/ML
5 SOLUTION INTRAVENOUS
Status: DISCONTINUED | OUTPATIENT
Start: 2025-01-23 | End: 2025-01-23 | Stop reason: HOSPADM

## 2025-01-23 RX ORDER — ACETAMINOPHEN 325 MG/1
650 TABLET ORAL
Status: CANCELLED
Start: 2025-01-23

## 2025-01-23 RX ORDER — DIPHENHYDRAMINE HCL 50 MG
50 CAPSULE ORAL
OUTPATIENT
Start: 2025-02-13

## 2025-01-23 RX ORDER — METHYLPREDNISOLONE SODIUM SUCCINATE 40 MG/ML
40 INJECTION INTRAMUSCULAR; INTRAVENOUS
Status: CANCELLED
Start: 2025-01-23

## 2025-01-23 RX ORDER — ALBUTEROL SULFATE 90 UG/1
1-2 INHALANT RESPIRATORY (INHALATION)
Start: 2025-02-13

## 2025-01-23 RX ORDER — MEPERIDINE HYDROCHLORIDE 25 MG/ML
25 INJECTION INTRAMUSCULAR; INTRAVENOUS; SUBCUTANEOUS
Status: CANCELLED | OUTPATIENT
Start: 2025-01-23

## 2025-01-23 RX ORDER — DIPHENHYDRAMINE HYDROCHLORIDE 50 MG/ML
50 INJECTION INTRAMUSCULAR; INTRAVENOUS
Start: 2025-02-13

## 2025-01-23 RX ORDER — DIPHENHYDRAMINE HYDROCHLORIDE 50 MG/ML
25 INJECTION INTRAMUSCULAR; INTRAVENOUS
Status: CANCELLED
Start: 2025-01-23

## 2025-01-23 RX ORDER — DIPHENHYDRAMINE HYDROCHLORIDE 50 MG/ML
25 INJECTION INTRAMUSCULAR; INTRAVENOUS
Start: 2025-02-13

## 2025-01-23 RX ORDER — HEPARIN SODIUM,PORCINE 10 UNIT/ML
5-20 VIAL (ML) INTRAVENOUS DAILY PRN
Status: CANCELLED | OUTPATIENT
Start: 2025-01-23

## 2025-01-23 RX ORDER — LORAZEPAM 2 MG/ML
0.5 INJECTION INTRAMUSCULAR EVERY 4 HOURS PRN
Status: CANCELLED | OUTPATIENT
Start: 2025-01-23

## 2025-01-23 RX ORDER — ALBUTEROL SULFATE 0.83 MG/ML
2.5 SOLUTION RESPIRATORY (INHALATION)
OUTPATIENT
Start: 2025-02-13

## 2025-01-23 RX ORDER — ALBUTEROL SULFATE 0.83 MG/ML
2.5 SOLUTION RESPIRATORY (INHALATION)
Status: CANCELLED | OUTPATIENT
Start: 2025-01-23

## 2025-01-23 RX ADMIN — HEPARIN 5 ML: 100 SYRINGE at 11:00

## 2025-01-23 RX ADMIN — SODIUM CHLORIDE 420 MG: 9 INJECTION, SOLUTION INTRAVENOUS at 09:54

## 2025-01-23 RX ADMIN — SODIUM CHLORIDE 450 MG: 9 INJECTION, SOLUTION INTRAVENOUS at 10:28

## 2025-01-23 RX ADMIN — SODIUM CHLORIDE 250 ML: 9 INJECTION, SOLUTION INTRAVENOUS at 09:45

## 2025-01-23 ASSESSMENT — PAIN SCALES - GENERAL: PAINLEVEL_OUTOF10: NO PAIN (0)

## 2025-01-23 NOTE — PROGRESS NOTES
"[facility]: Cancer Care Follow-Up Note                                    Discussion with Patient:                                                      Checked in with patient ahead of her clinic visit with Dr. Adams.  Patient alone today.     Dates of Treatment:                                                      Infusion given in last 28 days       Administered MAR Action Medication Dose Rate Visit    01/03/2025 09:17 New Bag pertuzumab (PERJETA) 420 mg in sodium chloride 0.9 % 289 mL infusion 420 mg 578 mL/hr Infusion Therapy Visit on 01/03/2025 in [facility]    01/03/2025 09:54 New Bag trastuzumab-qyyp (TRAZIMERA) 450 mg in sodium chloride 0.9 % 296.43 mL infusion 450 mg 592.9 mL/hr Infusion Therapy Visit on 01/03/2025 in [facility]            Assessment:                                                      Day 1 Cycle 14:  TRASTuzumab / Pertuzumab     Patient reports she has had diarrhea for 5 days. She said stools are loose, \"like pudding\". Denies blood in stool.  She said she has had \"4 or more\" per day.  She said every time she urinated, she had a BM.  Patient states she has not had any diarrhea today.  She reports intermittent nausea. Denies vomiting. She said she is trying to stay well hydrated and drinking more water. She said she's urinating without difficulty. Patient said she started lexapro 2 weeks ago and wondering if this could be a side effect?  She will discuss her diarrhea with Dr. Adams at today's clinic visit.    Intervention/Education provided during outreach:                                                       Patient instructed to monitor her diarrhea and if doesn't improve, worsens to call Cancer Care. Patient verbalized understanding.    Patient to follow up as scheduled at next appt  Confirmed patient has clinic and triage numbers    Signature:  Christie Agee RN January 23, 2025 8:56 AM       "

## 2025-01-23 NOTE — PROGRESS NOTES
"Oncology Rooming Note    January 23, 2025 8:42 AM   Vibha Kingston is a 34 year old female who presents for:    Chief Complaint   Patient presents with    Oncology Clinic Visit     Malignant neoplasm of upper-outer quadrant of left breast in female, estrogen receptor negative      Initial Vitals: /75 (BP Location: Left arm, Patient Position: Sitting, Cuff Size: Adult Regular)   Pulse 72   Temp 97.9  F (36.6  C) (Oral)   Resp 18   Ht 1.651 m (5' 5\")   Wt 71.8 kg (158 lb 5.8 oz)   SpO2 97%   BMI 26.35 kg/m   Estimated body mass index is 26.35 kg/m  as calculated from the following:    Height as of this encounter: 1.651 m (5' 5\").    Weight as of this encounter: 71.8 kg (158 lb 5.8 oz). Body surface area is 1.81 meters squared.  No Pain (0) Comment: Data Unavailable   No LMP recorded. (Menstrual status: Amenorrhea).  Allergies reviewed: Yes  Medications reviewed: Yes    Medications: Medication refills not needed today.  Pharmacy name entered into [employer]: [id number] - [place], [place] - [address]    Frailty Screening:   Is the patient here for a new oncology consult visit in cancer care? 2. No      Clinical concerns: 3 week infusion / review labs      Mary Grubbs MA            "

## 2025-01-23 NOTE — LETTER
"1/23/2025      Vibha Kingston  [address] [place] [address]      Dear Colleague,    Thank you for referring your patient, Vibha Kingston, to the [facility]. Please see a copy of my visit note below.    Oncology Rooming Note    January 23, 2025 8:42 AM   Vibha Kingston is a 34 year old female who presents for:    Chief Complaint   Patient presents with     Oncology Clinic Visit     Malignant neoplasm of upper-outer quadrant of left breast in female, estrogen receptor negative      Initial Vitals: /75 (BP Location: Left arm, Patient Position: Sitting, Cuff Size: Adult Regular)   Pulse 72   Temp 97.9  F (36.6  C) (Oral)   Resp 18   Ht 1.651 m (5' 5\")   Wt 71.8 kg (158 lb 5.8 oz)   SpO2 97%   BMI 26.35 kg/m   Estimated body mass index is 26.35 kg/m  as calculated from the following:    Height as of this encounter: 1.651 m (5' 5\").    Weight as of this encounter: 71.8 kg (158 lb 5.8 oz). Body surface area is 1.81 meters squared.  No Pain (0) Comment: Data Unavailable   No LMP recorded. (Menstrual status: Amenorrhea).  Allergies reviewed: Yes  Medications reviewed: Yes    Medications: Medication refills not needed today.  Pharmacy name entered into [facility]: [id number] - [place], [place] - [address]    Frailty Screening:   Is the patient here for a new oncology consult visit in cancer care? 2. No      Clinical concerns: 3 week infusion / review labs      Mary Grubbs MA              [facility] Hematology and Oncology Progress Note    Patient: Vibha Kingston  MRN: [id number]  Date of Service: Jan 23, 2025         Reason for Visit    Chief Complaint   Patient presents with     Oncology Clinic Visit     Malignant neoplasm of upper-outer quadrant of left breast in female, estrogen receptor negative        Assessment and Plan     Cancer Staging   Malignant neoplasm of upper-outer quadrant of left breast in female, estrogen receptor negative (H)  Staging " form: Breast, AJCC 8th Edition  - Pathologic stage from 3/13/2024: Stage IIA (pT1c, pN1a(sn), cM0, G3, ER-, IL-, HER2+) - Signed by Rosalind Adams MD on 3/13/2024      ECOG Performance    0 - Independent     Pain  Pain Score: No Pain (0)      #.  History of stage IIA (pT1c pN1a M0) invasive ductal carcinoma of the left breast, grade 3, ER negative, IL negative, HER2 positive  #.  DCIS of the left breast, ER positive, IL positive     She is clinically well.  Exam is unremarkable.  Her labs for CBC and CMP are unremarkable with insignificant mild thrombocytopenia and mild lymphopenia.  Hemoglobin and neutrophil are normal.  Echocardiogram from 12/2024 was normal. She does not have intolerable side effects from trastuzumab.  I recommended her to complete adjuvant trastuzumab as current.    She takes tamoxifen every day.  She reported her hot flashes are getting better.  She does not have any intolerable side effects from tamoxifen.  I recommended to continue tamoxifen 20 mg daily.  Plan for 5 years of adjuvant therapy.      Follow-up provider visit every 6 weeks while she is on trastuzumab.  Will monitor echocardiogram around March 2025 as part of the cardiac monitoring for trastuzumab.    #.  Acute diarrhea   Recommended to check for C. difficile and stool for bacteria/virus study.   No acute abdominal signs.  Recommended to continue to push oral fluids to maintain adequate hydration.    Encounter Diagnoses:    Problem List Items Addressed This Visit          Oncology Diagnoses    Ductal carcinoma in situ (DCIS) of left breast    Malignant neoplasm of upper-outer quadrant of left breast in female, estrogen receptor negative (H)       Other    Encounter for antineoplastic chemotherapy     Other Visit Diagnoses       Diarrhea of presumed infectious origin    -  Primary    Relevant Orders    Enteric Bacteria and Virus Panel by HÉCTOR Stool (Completed)    C. difficile Toxin B PCR with reflex to C. difficile Antigen and  Toxins A/B EIA (Completed)              CC: Zully Bloom, AYLA CNP   ______________________________________________________________________________  Oncologic history  1/9/2024- self palpated left medial breast mass   - diagnostic mammogram and ultrasound showed 3.4 x 2.2 x 2 cm large ill-defined hypoechoic area with associated calcification at 9:00 3 cm from the nipple.  Sonographic survey of left axilla is within normal limits.    1/11/2024-ultrasound-guided core needle biopsy showed DCIS, grade 3, cribriform and papillary with comedonecrosis.  ER/IA was deferred to excisional specimen.    1/25/2024-breast actionable panel was negative including INGRID, BARD1, BRCA1, BRCA2, CDH1, CHEK2, NF1, PALB2, PTEN, STK11, TP53.    2/4/2024-MRI breast showed large segmental area of non-mass enhancement medial left breast measuring 10 cm from the posterior to subareolar depth corresponding to the known DCIS.  Mildly prominent bilateral internal mammary lymph node technically within normal limit.    3/5/2024-left breast simple mastectomy and left axillary sentinel lymph node biopsy   Invasive ductal carcinoma, grade 3   Multifocal, 4 foci vary from 1.3 mm to 11 mm in greatest dimension.   Margins were negative, closest margin less than 1 mm anterior/superior   DCIS, EIC present, solid and comedo pattern, grade 3, extensive comedonecrosis.  Focal lymphovascular invasion present.   1/2 sentinel lymph node was positive for metastatic carcinoma.   [id number]   ER negative in invasive carcinoma, ER positive in DCIS (80% of the tumor nuclei stain strongly)   IA negative and invasive cancer, IA positive in DCIS (30% of the tumor nuclei stain strongly of moderately)   HER2/aj positive for overexpression (3+ membrane staining)    Fertility preservation was successfully completed.    4/26/2024- adjuvant TCHP    6/27/2024-genetic counseling and testing completed.    10/3/2024-anticipate to complete adjuvant ration to the left chest wall and  "lymph node    9/23/2024-initiated adjuvant endocrine therapy with tamoxifen.    History of Present Illness    Ms. Vibha Kingston presented today unaccompanied prior to scheduled maintenance trastuzumab.    She reported diarrhea for about 5 days although not so far today.  Her  had acute diarrhea illness around [place].  She does not have any other sick contact.    She has hot flashes. They are manageable.     No return of menstrual cycle.  LMP 6/2/2024.    Review of systems  Apart from describing in HPI, the remainder of comprehensive ROS was negative.    Past History    Past Medical History:   Diagnosis Date     Cancer (H) 1/15/24    Breast cancer     Encounter for antineoplastic chemotherapy 03/14/2024       Past Surgical History:   Procedure Laterality Date     BIOPSY      A mole a few years back and an abnormal pap in [facility]     BIOPSY NODE SENTINEL Left 03/05/2024    Procedure: WITH LEFT SENTINEL LYMPH NODE BIOPSY;  Surgeon: Aleena Real DO;  Location: [facility] OR     BOTOX INJECTION MEDICAL      for treatment of anal fissure 10/2024 - [facility]     FERTILITY SURGERY  04/10/2024    Egg retrieval     INSERT PORT VASCULAR ACCESS N/A 04/19/2024    Procedure: INSERTION, VASCULAR ACCESS PORT UNDER ULTRASOUND AND FLUOROSCOPIC GUIDANCE;  Surgeon: Aleena Real DO;  Location: [facility] OR     MASTECTOMY SIMPLE Left 03/05/2024    Procedure: LEFT MASTECTOMY;  Surgeon: Aleena Real DO;  Location: [facility] OR       Physical Exam    /75 (BP Location: Left arm, Patient Position: Sitting, Cuff Size: Adult Regular)   Pulse 72   Temp 97.9  F (36.6  C) (Oral)   Resp 18   Ht 1.651 m (5' 5\")   Wt 71.8 kg (158 lb 5.8 oz)   SpO2 97%   BMI 26.35 kg/m      General: alert, awake, not in acute distress  HEENT: Head: Normal, normocephalic, atraumatic.  Eye: Normal external eye, conjunctiva, lids cornea, GABRIELA.  Pharynx: Normal buccal mucosa. Normal pharynx.  Neck " / Thyroid: Supple, no masses, nodes, nodules or enlargement.  Lymphatics: No abnormally enlarged lymph nodes.  Chest: Normal chest wall and respirations. Clear to auscultation.  Heart: S1 S2 RRR.   Abdomen: abdomen is soft without significant tenderness, masses, organomegaly or guarding  Extremities: normal strength, tone, and muscle mass  Skin: normal. no rash or abnormalities  CNS: non focal.    Lab Results    Recent Results (from the past 240 hours)   Comprehensive metabolic panel (BMP + Alb, Alk Phos, ALT, AST, Total. Bili, TP)    Collection Time: 01/23/25  8:12 AM   Result Value Ref Range    Sodium 139 135 - 145 mmol/L    Potassium 3.8 3.4 - 5.3 mmol/L    Carbon Dioxide (CO2) 26 22 - 29 mmol/L    Anion Gap 11 7 - 15 mmol/L    Urea Nitrogen 12.2 6.0 - 20.0 mg/dL    Creatinine 0.83 0.51 - 0.95 mg/dL    GFR Estimate >90 >60 mL/min/1.73m2    Calcium 9.1 8.8 - 10.4 mg/dL    Chloride 102 98 - 107 mmol/L    Glucose 115 (H) 70 - 99 mg/dL    Alkaline Phosphatase 51 40 - 150 U/L    AST 21 0 - 45 U/L    ALT 22 0 - 50 U/L    Protein Total 6.6 6.4 - 8.3 g/dL    Albumin 4.1 3.5 - 5.2 g/dL    Bilirubin Total 0.2 <=1.2 mg/dL   CBC with platelets and differential    Collection Time: 01/23/25  8:12 AM   Result Value Ref Range    WBC Count 3.3 (L) 4.0 - 11.0 10e3/uL    RBC Count 4.27 3.80 - 5.20 10e6/uL    Hemoglobin 13.2 11.7 - 15.7 g/dL    Hematocrit 37.7 35.0 - 47.0 %    MCV 88 78 - 100 fL    MCH 30.9 26.5 - 33.0 pg    MCHC 35.0 31.5 - 36.5 g/dL    RDW 12.6 10.0 - 15.0 %    Platelet Count 143 (L) 150 - 450 10e3/uL    % Neutrophils 72 %    % Lymphocytes 18 %    % Monocytes 10 %    % Eosinophils 0 %    % Basophils 0 %    % Immature Granulocytes 1 %    NRBCs per 100 WBC 0 <1 /100    Absolute Neutrophils 2.4 1.6 - 8.3 10e3/uL    Absolute Lymphocytes 0.6 (L) 0.8 - 5.3 10e3/uL    Absolute Monocytes 0.3 0.0 - 1.3 10e3/uL    Absolute Eosinophils 0.0 0.0 - 0.7 10e3/uL    Absolute Basophils 0.0 0.0 - 0.2 10e3/uL    Absolute Immature  Granulocytes 0.0 <=0.4 10e3/uL    Absolute NRBCs 0.0 10e3/uL   Enteric Bacteria and Virus Panel by H[facility]CTOR Stool    Collection Time: 01/23/25  1:15 PM    Specimen: Per Rectum; Stool   Result Value Ref Range    Campylobacter species Negative Negative    Salmonella species Negative Negative    Vibrio species Negative Negative    Vibrio cholerae Negative Negative    Yersinia enterocolitica Negative Negative    Enteropathogenic E. coli (EPEC) Negative Negative, NA    Shiga-like toxin-producing E. coli (STEC) Negative Negative    Shigella/Enteroinvasive E. coli (EIEC) Negative Negative    Cryptosporidium species Negative Negative    Giardia lamblia Negative Negative    Norovirus Gl/Gll Negative Negative    Rotavirus A Negative Negative    Plesiomonas shigelloides Negative Negative    Enteroaggregative E. coli (EAEC) Negative Negative    Enterotoxigenic E. coli (ETEC) Negative Negative    E. coli O157 NA Negative, NA    Cyclospora cayetanensis Negative Negative    Entamoeba histolytica Negative Negative    Adenovirus F40/41 Negative Negative    Astrovirus Negative Negative    Sapovirus Negative Negative   C. difficile Toxin B PCR with reflex to C. difficile Antigen and Toxins A/B EIA    Collection Time: 01/23/25  1:15 PM    Specimen: Per Rectum; Stool   Result Value Ref Range    C Difficile Toxin B by PCR Negative Negative           Imaging    MA Screen Right w/Edouard    Result Date: 1/14/2025  RIGHT FULL FIELD DIGITAL SCREENING MAMMOGRAM WITH TOMOSYNTHESIS Performed on: 1/14/25 Compared to: 01/09/2024 Technique:  This study was evaluated with the assistance of Computer-Aided Detection.  Breast Tomosynthesis was used in interpretation. Findings: The patient is status post left mastectomy. The breasts are extremely dense, which lowers the sensitivity of mammography.  There is no radiographic evidence of malignancy.    IMPRESSION: ACR BI-RADS Category 1: Negative BREAST CANCER SCREENING RECOMMENDATION: Routine yearly mammography  beginning at age 40 or as discussed with your provider. The results and recommendations of this examination will be communicated to the patient. Lesli Elizondo, DO      The longitudinal plan of care for the diagnosis(es)/condition(s) as documented were addressed during this visit. Due to the added complexity in care, I will continue to support Vibha in the subsequent management and with ongoing continuity of care.     32 minutes spent by me on the date of the encounter doing chart review, history and exam, documentation and further activities as noted above.    Signed by: Rosalind Adams MD      Again, thank you for allowing me to participate in the care of your patient.        Sincerely,        Rosalind Adams MD    Electronically signed

## 2025-01-23 NOTE — PROGRESS NOTES
[facility] Hematology and Oncology Progress Note    Patient: Vibha Kingston  MRN: [id number]  Date of Service: Jan 23, 2025         Reason for Visit    Chief Complaint   Patient presents with    Oncology Clinic Visit     Malignant neoplasm of upper-outer quadrant of left breast in female, estrogen receptor negative        Assessment and Plan     Cancer Staging   Malignant neoplasm of upper-outer quadrant of left breast in female, estrogen receptor negative (H)  Staging form: Breast, AJCC 8th Edition  - Pathologic stage from 3/13/2024: Stage IIA (pT1c, pN1a(sn), cM0, G3, ER-, ME-, HER2+) - Signed by Rosalind Adams MD on 3/13/2024      ECOG Performance    0 - Independent     Pain  Pain Score: No Pain (0)      #.  History of stage IIA (pT1c pN1a M0) invasive ductal carcinoma of the left breast, grade 3, ER negative, ME negative, HER2 positive  #.  DCIS of the left breast, ER positive, ME positive     She is clinically well.  Exam is unremarkable.  Her labs for CBC and CMP are unremarkable with insignificant mild thrombocytopenia and mild lymphopenia.  Hemoglobin and neutrophil are normal.  Echocardiogram from 12/2024 was normal. She does not have intolerable side effects from trastuzumab.  I recommended her to complete adjuvant trastuzumab as current.    She takes tamoxifen every day.  She reported her hot flashes are getting better.  She does not have any intolerable side effects from tamoxifen.  I recommended to continue tamoxifen 20 mg daily.  Plan for 5 years of adjuvant therapy.      Follow-up provider visit every 6 weeks while she is on trastuzumab.  Will monitor echocardiogram around March 2025 as part of the cardiac monitoring for trastuzumab.    #.  Acute diarrhea   Recommended to check for C. difficile and stool for bacteria/virus study.   No acute abdominal signs.  Recommended to continue to push oral fluids to maintain adequate hydration.    Encounter Diagnoses:    Problem List Items Addressed  This Visit          Oncology Diagnoses    Ductal carcinoma in situ (DCIS) of left breast    Malignant neoplasm of upper-outer quadrant of left breast in female, estrogen receptor negative (H)       Other    Encounter for antineoplastic chemotherapy     Other Visit Diagnoses       Diarrhea of presumed infectious origin    -  Primary    Relevant Orders    Enteric Bacteria and Virus Panel by H[facility]CTOR Stool (Completed)    C. difficile Toxin B PCR with reflex to C. difficile Antigen and Toxins A/B EIA (Completed)              CC: Zully Bloom, APRN CNP   ______________________________________________________________________________  Oncologic history  1/9/2024- self palpated left medial breast mass   - diagnostic mammogram and ultrasound showed 3.4 x 2.2 x 2 cm large ill-defined hypoechoic area with associated calcification at 9:00 3 cm from the nipple.  Sonographic survey of left axilla is within normal limits.    1/11/2024-ultrasound-guided core needle biopsy showed DCIS, grade 3, cribriform and papillary with comedonecrosis.  ER/SC was deferred to excisional specimen.    1/25/2024-breast actionable panel was negative including INGRID, BARD1, BRCA1, BRCA2, CDH1, CHEK2, NF1, PALB2, PTEN, STK11, TP53.    2/4/2024-MRI breast showed large segmental area of non-mass enhancement medial left breast measuring 10 cm from the posterior to subareolar depth corresponding to the known DCIS.  Mildly prominent bilateral internal mammary lymph node technically within normal limit.    3/5/2024-left breast simple mastectomy and left axillary sentinel lymph node biopsy   Invasive ductal carcinoma, grade 3   Multifocal, 4 foci vary from 1.3 mm to 11 mm in greatest dimension.   Margins were negative, closest margin less than 1 mm anterior/superior   DCIS, EIC present, solid and comedo pattern, grade 3, extensive comedonecrosis.  Focal lymphovascular invasion present.   1/2 sentinel lymph node was positive for metastatic  carcinoma.   [id number]   ER negative in invasive carcinoma, ER positive in DCIS (80% of the tumor nuclei stain strongly)   MD negative and invasive cancer, MD positive in DCIS (30% of the tumor nuclei stain strongly of moderately)   HER2/aj positive for overexpression (3+ membrane staining)    Fertility preservation was successfully completed.    4/26/2024- adjuvant TCHP    6/27/2024-genetic counseling and testing completed.    10/3/2024-anticipate to complete adjuvant ration to the left chest wall and lymph node    9/23/2024-initiated adjuvant endocrine therapy with tamoxifen.    History of Present Illness    Ms. Vibha Kingston presented today unaccompanied prior to scheduled maintenance trastuzumab.    She reported diarrhea for about 5 days although not so far today.  Her  had acute diarrhea illness around [place].  She does not have any other sick contact.    She has hot flashes. They are manageable.     No return of menstrual cycle.  LMP 6/2/2024.    Review of systems  Apart from describing in HPI, the remainder of comprehensive ROS was negative.    Past History    Past Medical History:   Diagnosis Date    Cancer (H) 1/15/24    Breast cancer    Encounter for antineoplastic chemotherapy 03/14/2024       Past Surgical History:   Procedure Laterality Date    BIOPSY      A mole a few years back and an abnormal pap in [facility]    BIOPSY NODE SENTINEL Left 03/05/2024    Procedure: WITH LEFT SENTINEL LYMPH NODE BIOPSY;  Surgeon: Aleena Real DO;  Location: [facility] OR    BOTOX INJECTION MEDICAL      for treatment of anal fissure 10/2024 - [facility].    FERTILITY SURGERY  04/10/2024    Egg retrieval    INSERT PORT VASCULAR ACCESS N/A 04/19/2024    Procedure: INSERTION, VASCULAR ACCESS PORT UNDER ULTRASOUND AND FLUOROSCOPIC GUIDANCE;  Surgeon: Aleena Real DO;  Location: [facility] OR    MASTECTOMY SIMPLE Left 03/05/2024    Procedure: LEFT MASTECTOMY;  Surgeon: Addie  "Aleena, DO;  Location: [facility] OR       Physical Exam    /75 (BP Location: Left arm, Patient Position: Sitting, Cuff Size: Adult Regular)   Pulse 72   Temp 97.9  F (36.6  C) (Oral)   Resp 18   Ht 1.651 m (5' 5\")   Wt 71.8 kg (158 lb 5.8 oz)   SpO2 97%   BMI 26.35 kg/m      General: alert, awake, not in acute distress  HEENT: Head: Normal, normocephalic, atraumatic.  Eye: Normal external eye, conjunctiva, lids cornea, GABRIELA.  Pharynx: Normal buccal mucosa. Normal pharynx.  Neck / Thyroid: Supple, no masses, nodes, nodules or enlargement.  Lymphatics: No abnormally enlarged lymph nodes.  Chest: Normal chest wall and respirations. Clear to auscultation.  Heart: S1 S2 RRR.   Abdomen: abdomen is soft without significant tenderness, masses, organomegaly or guarding  Extremities: normal strength, tone, and muscle mass  Skin: normal. no rash or abnormalities  CNS: non focal.    Lab Results    Recent Results (from the past 240 hours)   Comprehensive metabolic panel (BMP + Alb, Alk Phos, ALT, AST, Total. Bili, TP)    Collection Time: 01/23/25  8:12 AM   Result Value Ref Range    Sodium 139 135 - 145 mmol/L    Potassium 3.8 3.4 - 5.3 mmol/L    Carbon Dioxide (CO2) 26 22 - 29 mmol/L    Anion Gap 11 7 - 15 mmol/L    Urea Nitrogen 12.2 6.0 - 20.0 mg/dL    Creatinine 0.83 0.51 - 0.95 mg/dL    GFR Estimate >90 >60 mL/min/1.73m2    Calcium 9.1 8.8 - 10.4 mg/dL    Chloride 102 98 - 107 mmol/L    Glucose 115 (H) 70 - 99 mg/dL    Alkaline Phosphatase 51 40 - 150 U/L    AST 21 0 - 45 U/L    ALT 22 0 - 50 U/L    Protein Total 6.6 6.4 - 8.3 g/dL    Albumin 4.1 3.5 - 5.2 g/dL    Bilirubin Total 0.2 <=1.2 mg/dL   CBC with platelets and differential    Collection Time: 01/23/25  8:12 AM   Result Value Ref Range    WBC Count 3.3 (L) 4.0 - 11.0 10e3/uL    RBC Count 4.27 3.80 - 5.20 10e6/uL    Hemoglobin 13.2 11.7 - 15.7 g/dL    Hematocrit 37.7 35.0 - 47.0 %    MCV 88 78 - 100 fL    MCH 30.9 26.5 - 33.0 pg    MCHC 35.0 31.5 - " 36.5 g/dL    RDW 12.6 10.0 - 15.0 %    Platelet Count 143 (L) 150 - 450 10e3/uL    % Neutrophils 72 %    % Lymphocytes 18 %    % Monocytes 10 %    % Eosinophils 0 %    % Basophils 0 %    % Immature Granulocytes 1 %    NRBCs per 100 WBC 0 <1 /100    Absolute Neutrophils 2.4 1.6 - 8.3 10e3/uL    Absolute Lymphocytes 0.6 (L) 0.8 - 5.3 10e3/uL    Absolute Monocytes 0.3 0.0 - 1.3 10e3/uL    Absolute Eosinophils 0.0 0.0 - 0.7 10e3/uL    Absolute Basophils 0.0 0.0 - 0.2 10e3/uL    Absolute Immature Granulocytes 0.0 <=0.4 10e3/uL    Absolute NRBCs 0.0 10e3/uL   Enteric Bacteria and Virus Panel by HÉCTOR Stool    Collection Time: 01/23/25  1:15 PM    Specimen: Per Rectum; Stool   Result Value Ref Range    Campylobacter species Negative Negative    Salmonella species Negative Negative    Vibrio species Negative Negative    Vibrio cholerae Negative Negative    Yersinia enterocolitica Negative Negative    Enteropathogenic E. coli (EPEC) Negative Negative, NA    Shiga-like toxin-producing E. coli (STEC) Negative Negative    Shigella/Enteroinvasive E. coli (EIEC) Negative Negative    Cryptosporidium species Negative Negative    Giardia lamblia Negative Negative    Norovirus Gl/Gll Negative Negative    Rotavirus A Negative Negative    Plesiomonas shigelloides Negative Negative    Enteroaggregative E. coli (EAEC) Negative Negative    Enterotoxigenic E. coli (ETEC) Negative Negative    E. coli O157 NA Negative, NA    Cyclospora cayetanensis Negative Negative    Entamoeba histolytica Negative Negative    Adenovirus F40/41 Negative Negative    Astrovirus Negative Negative    Sapovirus Negative Negative   C. difficile Toxin B PCR with reflex to C. difficile Antigen and Toxins A/B EIA    Collection Time: 01/23/25  1:15 PM    Specimen: Per Rectum; Stool   Result Value Ref Range    C Difficile Toxin B by PCR Negative Negative           Imaging    MA Screen Right w/Edouard    Result Date: 1/14/2025  RIGHT FULL FIELD DIGITAL SCREENING MAMMOGRAM  WITH TOMOSYNTHESIS Performed on: 1/14/25 Compared to: 01/09/2024 Technique:  This study was evaluated with the assistance of Computer-Aided Detection.  Breast Tomosynthesis was used in interpretation. Findings: The patient is status post left mastectomy. The breasts are extremely dense, which lowers the sensitivity of mammography.  There is no radiographic evidence of malignancy.    IMPRESSION: ACR BI-RADS Category 1: Negative BREAST CANCER SCREENING RECOMMENDATION: Routine yearly mammography beginning at age 40 or as discussed with your provider. The results and recommendations of this examination will be communicated to the patient. Lesli Elizondo, DO      The longitudinal plan of care for the diagnosis(es)/condition(s) as documented were addressed during this visit. Due to the added complexity in care, I will continue to support Vibha in the subsequent management and with ongoing continuity of care.     32 minutes spent by me on the date of the encounter doing chart review, history and exam, documentation and further activities as noted above.    Signed by: Rosalind Adams MD

## 2025-01-24 LAB
ADV 40+41 DNA STL QL NAA+NON-PROBE: NEGATIVE
ASTRO TYP 1-8 RNA STL QL NAA+NON-PROBE: NEGATIVE
C CAYETANENSIS DNA STL QL NAA+NON-PROBE: NEGATIVE
CAMPYLOBACTER DNA SPEC NAA+PROBE: NEGATIVE
CRYPTOSP DNA STL QL NAA+NON-PROBE: NEGATIVE
E COLI O157 DNA STL QL NAA+NON-PROBE: NORMAL
E HISTOLYT DNA STL QL NAA+NON-PROBE: NEGATIVE
EAEC ASTA GENE ISLT QL NAA+PROBE: NEGATIVE
EC STX1+STX2 GENES STL QL NAA+NON-PROBE: NEGATIVE
EPEC EAE GENE STL QL NAA+NON-PROBE: NEGATIVE
ETEC LTA+ST1A+ST1B TOX ST NAA+NON-PROBE: NEGATIVE
G LAMBLIA DNA STL QL NAA+NON-PROBE: NEGATIVE
NOROVIRUS GI+II RNA STL QL NAA+NON-PROBE: NEGATIVE
P SHIGELLOIDES DNA STL QL NAA+NON-PROBE: NEGATIVE
RVA RNA STL QL NAA+NON-PROBE: NEGATIVE
SALMONELLA SP RPOD STL QL NAA+PROBE: NEGATIVE
SAPO I+II+IV+V RNA STL QL NAA+NON-PROBE: NEGATIVE
SHIGELLA SP+EIEC IPAH ST NAA+NON-PROBE: NEGATIVE
V CHOLERAE DNA SPEC QL NAA+PROBE: NEGATIVE
VIBRIO DNA SPEC NAA+PROBE: NEGATIVE
Y ENTEROCOL DNA STL QL NAA+PROBE: NEGATIVE

## 2025-02-05 ASSESSMENT — ANXIETY QUESTIONNAIRES
3. WORRYING TOO MUCH ABOUT DIFFERENT THINGS: SEVERAL DAYS
2. NOT BEING ABLE TO STOP OR CONTROL WORRYING: SEVERAL DAYS
4. TROUBLE RELAXING: MORE THAN HALF THE DAYS
GAD7 TOTAL SCORE: 8
7. FEELING AFRAID AS IF SOMETHING AWFUL MIGHT HAPPEN: SEVERAL DAYS
7. FEELING AFRAID AS IF SOMETHING AWFUL MIGHT HAPPEN: SEVERAL DAYS
1. FEELING NERVOUS, ANXIOUS, OR ON EDGE: SEVERAL DAYS
6. BECOMING EASILY ANNOYED OR IRRITABLE: SEVERAL DAYS
8. IF YOU CHECKED OFF ANY PROBLEMS, HOW DIFFICULT HAVE THESE MADE IT FOR YOU TO DO YOUR WORK, TAKE CARE OF THINGS AT HOME, OR GET ALONG WITH OTHER PEOPLE?: SOMEWHAT DIFFICULT
5. BEING SO RESTLESS THAT IT IS HARD TO SIT STILL: SEVERAL DAYS
IF YOU CHECKED OFF ANY PROBLEMS ON THIS QUESTIONNAIRE, HOW DIFFICULT HAVE THESE PROBLEMS MADE IT FOR YOU TO DO YOUR WORK, TAKE CARE OF THINGS AT HOME, OR GET ALONG WITH OTHER PEOPLE: SOMEWHAT DIFFICULT
GAD7 TOTAL SCORE: 8
GAD7 TOTAL SCORE: 8

## 2025-02-10 ENCOUNTER — TELEPHONE (OUTPATIENT)
Dept: BEHAVIORAL HEALTH | Facility: CLINIC | Age: 35
End: 2025-02-10

## 2025-02-10 ENCOUNTER — VIRTUAL VISIT (OUTPATIENT)
Dept: FAMILY MEDICINE | Facility: CLINIC | Age: 35
End: 2025-02-10
Payer: COMMERCIAL

## 2025-02-10 DIAGNOSIS — R53.83 OTHER FATIGUE: ICD-10-CM

## 2025-02-10 DIAGNOSIS — F33.1 MODERATE RECURRENT MAJOR DEPRESSION (H): Primary | ICD-10-CM

## 2025-02-10 DIAGNOSIS — F41.1 GAD (GENERALIZED ANXIETY DISORDER): ICD-10-CM

## 2025-02-10 DIAGNOSIS — F43.21 ADJUSTMENT DISORDER WITH DEPRESSED MOOD: ICD-10-CM

## 2025-02-10 PROCEDURE — 98005 SYNCH AUDIO-VIDEO EST LOW 20: CPT | Performed by: NURSE PRACTITIONER

## 2025-02-10 ASSESSMENT — PATIENT HEALTH QUESTIONNAIRE - PHQ9: SUM OF ALL RESPONSES TO PHQ QUESTIONS 1-9: 10

## 2025-02-10 NOTE — PROGRESS NOTES
"Vibha is a 34 year old who is being evaluated via a billable video visit.    What phone number would you like to be contacted at? [phone]  How would you like to obtain your AVS? Adela      Assessment & Plan     Moderate recurrent major depression (H)  Adjustment disorder with depressed mood  BREANNA (generalized anxiety disorder)  Continue Lexapro 10mg - seeing improvement of anxiety on this but not symptoms of depression (Fatigue, decreased motivation)  pt to wait until full psych assessment before increasing dose  MDD complicated by tamoxifen and recent breast CA treatment - possibly causing hormone imbalance affecting mood   -follow up with oncology to discussed hormone imbalance affect on mood and options.  -follow up with me if wanting med adjustment in future.   -labs  -CBC reviewed from 1/2025 - no anemia  - Vitamin D Deficiency  - TSH with free T4 reflex    Other fatigue  Will check vit D and Tsh to rule out other causes of fatigue, Dep, anxiety  - Vitamin D Deficiency  - TSH with free T4 reflex          BMI  Estimated body mass index is 26.35 kg/m  as calculated from the following:    Height as of 1/23/25: 1.651 m (5' 5\").    Weight as of 1/23/25: 71.8 kg (158 lb 5.8 oz).             Subjective   Vibha is a 34 year old, presenting for the following health issues:  Recheck Medication    Video visit started 4:47pm      History of Present Illness       Mental Health Follow-up:  Patient presents to follow-up on Depression & Anxiety.Patient's depression since last visit has been:  Medium  The patient is not having other symptoms associated with depression.  Patient's anxiety since last visit has been:  Medium  The patient is not having other symptoms associated with anxiety.  Any significant life events: health concerns  Patient is not feeling anxious or having panic attacks.  Patient has no concerns about alcohol or drug use.      No change mood wide, but anxiety lower since starting lexapro- having fatigue, " anxiety, and motivation   -has tried to sleep and not able to fall asleep   -having a hard time waking up at reasonable time.   -does have down moods and possibly something different.   -currently taking 1000IU since Egg retrieval back in April 2024    Currently on tamoxifen   Has not gotten period yet - chemo stopped         10/6/2024     9:28 AM 1/7/2025     2:33 PM 2/10/2025     4:14 PM   PHQ   PHQ-9 Total Score 13 14  10   Q9: Thoughts of better off dead/self-harm past 2 weeks Not at all Not at all Not at all       Patient-reported         2/26/2024     9:52 AM 1/7/2025     2:34 PM 2/5/2025     2:24 PM   BREANNA-7 SCORE   Total Score 13 (moderate anxiety) 9 (mild anxiety) 8 (mild anxiety)   Total Score 13 9  8        Patient-reported           Doing a screener for BREANNA, MDD, ADHD.                 Objective           Vitals:  No vitals were obtained today due to virtual visit.    Physical Exam   GENERAL: alert and no distress  EYES: Eyes grossly normal to inspection.  No discharge or erythema, or obvious scleral/conjunctival abnormalities.  RESP: No audible wheeze, cough, or visible cyanosis.    SKIN: Visible skin clear. No significant rash, abnormal pigmentation or lesions.  NEURO: Cranial nerves grossly intact.  Mentation and speech appropriate for age.  PSYCH: Appropriate affect, tone, and pace of words          Video-Visit Details    Type of service:  Video Visit   Ended 5:08 PM    Originating Location (pt. Location): Home    Distant Location (provider location):  On-site  Platform used for Video Visit: Lauren (Telehealth)  Signed Electronically by: AYLA Randolph CNP

## 2025-02-10 NOTE — TELEPHONE ENCOUNTER
Appointment and screener reminder, patient confirmed.    Inocencia Jalloh  Transition Clinic Coordinator  02/10/25 2:14 PM

## 2025-02-11 ENCOUNTER — TELEPHONE (OUTPATIENT)
Dept: BEHAVIORAL HEALTH | Facility: CLINIC | Age: 35
End: 2025-02-11
Payer: COMMERCIAL

## 2025-02-11 ENCOUNTER — VIRTUAL VISIT (OUTPATIENT)
Dept: BEHAVIORAL HEALTH | Facility: CLINIC | Age: 35
End: 2025-02-11
Payer: COMMERCIAL

## 2025-02-11 DIAGNOSIS — F33.1 MODERATE RECURRENT MAJOR DEPRESSION (H): ICD-10-CM

## 2025-02-11 DIAGNOSIS — F33.1 MAJOR DEPRESSIVE DISORDER, RECURRENT EPISODE, MODERATE (H): Primary | ICD-10-CM

## 2025-02-11 PROCEDURE — 99207 PR NO BILLABLE SERVICE THIS VISIT: CPT | Mod: 95

## 2025-02-11 ASSESSMENT — COLUMBIA-SUICIDE SEVERITY RATING SCALE - C-SSRS
SUICIDE, SINCE LAST CONTACT: NO
2. HAVE YOU ACTUALLY HAD ANY THOUGHTS OF KILLING YOURSELF?: NO
TOTAL  NUMBER OF ABORTED OR SELF INTERRUPTED ATTEMPTS SINCE LAST CONTACT: NO
TOTAL  NUMBER OF INTERRUPTED ATTEMPTS SINCE LAST CONTACT: NO
6. HAVE YOU EVER DONE ANYTHING, STARTED TO DO ANYTHING, OR PREPARED TO DO ANYTHING TO END YOUR LIFE?: NO
ATTEMPT SINCE LAST CONTACT: NO
1. SINCE LAST CONTACT, HAVE YOU WISHED YOU WERE DEAD OR WISHED YOU COULD GO TO SLEEP AND NOT WAKE UP?: NO

## 2025-02-11 ASSESSMENT — PATIENT HEALTH QUESTIONNAIRE - PHQ9
10. IF YOU CHECKED OFF ANY PROBLEMS, HOW DIFFICULT HAVE THESE PROBLEMS MADE IT FOR YOU TO DO YOUR WORK, TAKE CARE OF THINGS AT HOME, OR GET ALONG WITH OTHER PEOPLE: SOMEWHAT DIFFICULT
SUM OF ALL RESPONSES TO PHQ QUESTIONS 1-9: 10
SUM OF ALL RESPONSES TO PHQ QUESTIONS 1-9: 10

## 2025-02-11 NOTE — TELEPHONE ENCOUNTER
**Patient Navigator Follow Up**    2/11/2025;    Level of Care Recommended:  Outpatient therapy - pt declines programmatic     Mental Health Referral Needed: Yes: Pt requests individual therapy setup, as well as pt requests referral for ADHD/ASD neurocog testing referral.  Pt with referral from MD Zeke Danielle MD.     Follow up Requests:  Other:  Referrals needed for above.  Individual therapy referral and ADHD/ASD neurocog referral     Comments: Pt requests navigator team follow up call on referrals and questions.         **[id number] orders were placed for both requested services above.  The [facility] Dept will follow up with patient directly to schedule these appointments.          Thank you,    Danita BARROS  Patient Navigator

## 2025-02-11 NOTE — TELEPHONE ENCOUNTER
Summary of Patient Care Communication Handoff to Patient Navigator Coordinator    PATIENT'S NAME: Vibha Kingston  MRN:   [id number]  :   1990    DATE OF SERVICE: 25    Referral Needed: Yes    Is the patient coming from an inpatient unit?  No    What program is this referral for? Adult Mental Health Referral     Level of Care Recommended:  Outpatient therapy - pt declines programmatic     Mental Health Referral Needed: Yes: Pt requests individual therapy setup, as well as pt requests referral for ADHD/ASD neurocog testing referral.  Pt with referral from MD Zeke Danielle MD.     Follow up Requests:  Other:  Referrals needed for above.  Individual therapy referral and ADHD/ASD neurocog referral     Comments: Pt requests navigator team follow up call on referrals and questions.              Patient Navigator Coordinator Contact Information  Pool Message: [email or web address] ([phone]  Phone:  [phone]  Fax:  [phone]  Email:  [email or web address]

## 2025-02-11 NOTE — PROGRESS NOTES
"    [facility]         PATIENT'S NAME: Vibha Kingston  PREFERRED NAME: Vibha  PRONOUNS:       MRN: [id number]  : 1990  ADDRESS: [address] [place] [address]  [id number]  DATE OF SERVICE: 25  START TIME: 7:30 am  END TIME: 8:30 am  PREFERRED PHONE: [phone]  May we leave a program related message: Yes  EMERGENCY CONTACT: was obtained Keenan Thee, [phone] .  SERVICE MODALITY:  Video Visit:      Provider verified identity through the following two step process.  Patient provided:  Patient  and Patient address    Telemedicine Visit: The patient's condition can be safely assessed and treated via synchronous audio and visual telemedicine encounter.      Reason for Telemedicine Visit: Patient convenience (e.g. access to timely appointments / distance to available provider)    Originating Site (Patient Location): Patient's home    Distant Site (Provider Location): [facility] [place]    Consent:  The patient/guardian has verbally consented to: the potential risks and benefits of telemedicine (video visit) versus in person care; bill my insurance or make self-payment for services provided; and responsibility for payment of non-covered services.     Patient would like the video invitation sent by:  My Chart    Mode of Communication:  Video Conference via Amwell    Distant Location (Provider):  Off-site    Source:  Zeke Danielle MD for \"mental health assessment and therapist\", per chart review.    As the provider I attest to compliance with applicable laws and regulations related to telemedicine.    UNIVERSAL ADULT Mental Health DIAGNOSTIC ASSESSMENT    Identifying Information:  Patient is a 34 year old,   individual.  Patient was referred for an assessment by primary care clinic.  Patient attended the session alone.    Chief Complaint:   The reason for seeking services at this time is: \"Mental health " "assessment\".  The problem(s) began 01/01/24 - \"increased sxs at that time due to new medical diagnosis.  Pt reports increased stressors looking for individual therapy and also requests a referral for ADHD/ASD testing, referral to patient navigation team made today.    Per chart review, pt with a hx of MDD and adjustment dx, no notable psych hosp hx, with information available in epic ehr at the time of this note.  Pt reports was seeing a therapist, but not good fit, pt reports desire for individual therapy, pts referring source MD also congruent in order.  Pt denies excessive functioning concerns.  Pt today, presents with depression sxs including sadness, anhedonia, change in appetite, change in sleep, fatigue, most of the day, most days, in the same period, consistent with MDD, with anxious distress, PHQ9/GAD7 screeners appear congruent.  Pt denies current trauma sxs upon inquiry.  Pt denies current SI, plan, intent, SIB, HI, AH/VH, and/or kevin sxs at this point in time.  CSSR completed.  Pt reports hopefulness, looking forward to weekend. Pt reports supportive  and family.  Pt reports medication compliance, adequate supports, and denies current substance use.  Pt reports the following protective factors:  willingness to engage in therapy, attached by family/friends, access to variety of clinical interventions, hopeful, identifies reason for living, access to and engagement with healthcare, current engagement in treatment and/or motivation to establish therapeutic relationship, and responsibilities to others dedication to family or friends; daily obligations; healthy fear of risky behaviors or pain.    Patient has attempted to resolve these concerns in the past through individual therapy and medication management .    Social/Family History:  Patient reported they grew up in [place] [facility] [place]  .  They were raised by biological parents  .  Parents were always together.  Patient reported that their childhood was " "\"stable and meaningful\".  Patient described their current relationships with family of origin as \"Stable and meaningful\".     The patient describes their cultural background as .  Cultural influences and impact on patient's life structure, values, norms, and healthcare: G[place] up in an yr[place] environment. Grew up Jain, though I currently dont practice..  Contextual influences on patient's health include: Individual Factors mental health .    These factors will be addressed in the Preliminary Treatment plan. Patient identified their preferred language to be English. Patient reported they does not need the assistance of an  or other support involved in therapy.     Patient reported had no significant delays in developmental tasks.   Patient's highest education level was college graduate  .  Patient identified the following learning problems: none reported.  Modifications will be used to assist communication in therapy.  Patient reports they are  able to understand written materials.    Patient reported the following relationship history .  Patient's current relationship status is .   Patient identified their sexual orientation as heterosexual.  Patient reported having .\"No children\"  child(cheryl). Patient identified partner; parents; siblings; friends as part of their support system.  Patient identified the quality of these relationships as good,  .      Patient's current living/housing situation involves staying in own home/apartment.  The immediate members of family and household include Keenan Kingston, 34,  and they report that housing is stable.    Patient is currently employed fulltime.  Patient reports their finances are obtained through employment; spouse. Patient does identify finances as a current stressor.      Patient reported that they have not been involved with the legal system.    . Patient does not report being under probation/ parole/ jurisdiction. They are not " under any current court jurisdiction. .    Patient's Strengths and Limitations:  Patient identified the following strengths or resources that will help them succeed in treatment: commitment to health and well being, friends / good social support, family support, insight, and intelligence. Things that may interfere with the patient's success in treatment include: physical health concerns.     Assessments:  The following assessments were completed by patient for this visit:  PHQ9:       2/26/2024     9:51 AM 6/18/2024    10:04 AM 10/6/2024     9:28 AM 1/7/2025     2:33 PM 2/10/2025     4:14 PM 2/11/2025     7:15 AM   PHQ-9 SCORE   PHQ-9 Total Score MyChart 15 (Moderately severe depression)  13 (Moderate depression) 14 (Moderate depression)  10 (Moderate depression)   PHQ-9 Total Score 15 13 13 14  10 10        Patient-reported     GAD7:       2/26/2024     9:52 AM 1/7/2025     2:34 PM 2/5/2025     2:24 PM   BREANNA-7 SCORE   Total Score 13 (moderate anxiety) 9 (mild anxiety) 8 (mild anxiety)   Total Score 13 9  8        Patient-reported     CAGE-AID:       2/6/2025    10:12 AM   CAGE-AID Total Score   Total Score 0    Total Score MyChart 0 (A total score of 2 or greater is considered clinically significant)       Patient-reported     PROMIS 10-Global Health (only subscores and total score):       2/6/2025    10:11 AM 2/11/2025     7:17 AM   PROMIS-10 Scores Only   Global Mental Health Score 9  9    Global Physical Health Score 15  16    PROMIS TOTAL - SUBSCORES 24  25        Patient-reported     [place] Suicide Severity Rating Scale (Short Version)      3/5/2024     6:04 AM 4/15/2024     3:10 PM 2/11/2025     8:00 AM   [place] Suicide Severity Rating (Short Version)   Q1 Wished to be Dead (Past Month) 0-->no 0-->no    Q2 Suicidal Thoughts (Past Month) 0-->no 0-->no    Q6 Suicide Behavior (Lifetime) 0-->no 0-->no    Level of Risk per Screen no risks indicated no risks indicated    1. Wish to be Dead (Since Last Contact)    N   2. Non-Specific Active Suicidal Thoughts (Since Last Contact)   N   Actual Attempt (Since Last Contact)   N   Has subject engaged in non-suicidal self-injurious behavior? (Since Last Contact)   N   Interrupted Attempts (Since Last Contact)   N   Aborted or Self-Interrupted Attempt (Since Last Contact)   N   Preparatory Acts or Behavior (Since Last Contact)   N   Suicide (Since Last Contact)   N   Calculated C-SSRS Risk Score (Since Last Contact)   No Risk Indicated       Personal and Family Medical History:  Patient does not report a family history of mental health concerns.  Patient reports family history includes Anxiety Disorder in her sister; Breast Cancer (age of onset: 60) in her mother; Cerebrovascular Disease in an other family member; Cervical Cancer in her sister; Depression in her mother and sister; Hyperlipidemia in her father and mother; Hypertension in her father; Other Cancer in her maternal grandfather, maternal grandmother, mother, and another family member; Other Cancer (age of onset: 40) in her sister; Prostate Cancer (age of onset: 65) in her father..     Patient does report Mental Health Diagnosis and/or Treatment.  Patient Patient reported the following previous diagnoses which include(s):  per chart review MDD and adjustment .  Patient reported symptoms began 1/1/2024.   Patient has received mental health services in the past:  individual therapy and medication management .  Psychiatric Hospitalizations: None.  Patient denies a history of civil commitment.  Patient is receiving other mental health services.  These include  individual therapy and medication management .       Patient has had a physical exam to rule out medical causes for current symptoms.  Date of last physical exam was within the past year. Client was encouraged to follow up with PCP if symptoms were to develop. The patient has a [facility] Primary Care Provider, who is named Zully Bloom..  Patient reports no current  "medical concerns.  Patient denies any issues with pain..   There are significant appetite / nutritional concerns / weight changes.   Patient does report a history of head injury / trauma / cognitive impairment - \"when I was between 2 and 3 years old, my stroller went down basement stairs hit head\".    Patient reports current meds as: PER EHR  Current Outpatient Medications   Medication Sig Dispense Refill    acetaminophen (TYLENOL) 500 MG tablet Take 1,000 mg by mouth every 8 hours as needed for mild pain, other or pain      diltiazem 2% in PLO gel       escitalopram (LEXAPRO) 10 MG tablet TAKE 1 TABLET (10 MG) BY MOUTH DAILY. 90 tablet 4    lidocaine-prilocaine (EMLA) 2.5-2.5 % external cream Apply to port site 30-60 minutes before port access 30 g 2    LORazepam (ATIVAN) 1 MG tablet Take 1 tablet (1 mg) by mouth every 6 hours as needed for anxiety 30 tablet 0    mupirocin (BACTROBAN) 2 % external ointment Apply topically 3 times daily. 15 g 0    tamoxifen (NOLVADEX) 20 MG tablet TAKE 1 TABLET BY MOUTH EVERY DAY 90 tablet 1    Vitamin D, Cholecalciferol, 25 MCG (1000 UT) CAPS        No current facility-administered medications for this visit.     Medication Adherence:  Patient reports taking.    Patient Allergies:  No Known Allergies - PER EHR.    Medical History:  PER EHR  Past Medical History:   Diagnosis Date    Cancer (H) 1/15/24    Breast cancer    Encounter for antineoplastic chemotherapy 03/14/2024     Current Mental Status Exam:   Appearance:  Appropriate    Eye Contact:  Good   Psychomotor:  Normal       Gait / station:  no problem  Attitude / Demeanor: Cooperative  Friendly  Speech      Rate / Production: Normal/ Responsive      Volume:  Normal  volume      Language:  intact  Mood:   Normal  Affect:   Appropriate    Thought Content: Clear   Thought Process: Coherent       Associations: No loosening of associations  Insight:   Good   Judgment:  Intact "   Orientation:  All  Attention/concentration: Good    Substance Use:   Patient did not report a family history of substance use concerns; see medical history section for details.  Patient has not received chemical dependency treatment in the past.  Patient has not ever been to detox.      Patient is not currently receiving any chemical dependency treatment. Patient reported the following problems as a result of their substance use:   none reported upon pt inquiry .    Based on the CAGE score of 0 and clinical interview there  are not indications of drug or alcohol abuse.    Significant Losses / Trauma / Abuse / Neglect Issues:   Patient did not  serve in the .  There are not indications or report of significant loss, trauma, abuse or neglect issues related to: are no indications and client denies any losses, trauma, abuse, or neglect concerns.  Patient has not been a victim of exploitation.  Concerns for possible neglect are not present.     Safety Assessment:   Patient denies current or past homicidal ideation and behaviors.  Patient denies current or past suicidal ideation and behaviors.  Patient denies current or past self-injurious behaviors.  Patient denied risk behaviors associated with substance use.  Patient denies any high risk behaviors associated with mental health symptoms.  Patient denied current or past personal safety concerns.    Patient denies past of current/recent assaultive behaviors.    Patient denied a history of sexual assault behaviors.       Patient reports the following protective factors: hopeful, identifies reason for living, access to and engagement with healthcare, current engagement in treatment and/or motivation to establish therapeutic relationship, and responsibilities to others dedication to family or friends; daily obligations; healthy fear of risky behaviors or pain    Risk Plan:  See Recommendations for Safety and Risk Management Plan    Review of Symptoms per patient  report:   Depression: Lack of interest or pleasure in doing things, Feeling sad, down, or depressed, Change in sleep, Change in appetite, Irritability, and Poor hygeine  Alee:  No Symptoms  Psychosis: No Symptoms  Anxiety: Excessive worry, Nervousness, Sleep disturbance, Ruminations, and Irritability  Panic:  No symptoms  Post Traumatic Stress Disorder:  No Symptoms   Eating Disorder: No Symptoms  ADD / ADHD:  Inattentive -assessment referrals made to pt navigation team today.  Conduct Disorder: No symptoms  Autism Spectrum Disorder: No symptoms - assessment referrals made to pt navigation team today.  Obsessive Compulsive Disorder: Symetry    Patient reports the following compulsive behaviors and treatment history: Picking - has not had treatment..      Diagnostic Criteria:   Major Depressive Disorder  CRITERIA (A-C) REPRESENT A MAJOR DEPRESSIVE EPISODE - SELECT THESE CRITERIA  A) Recurrent episode(s) - symptoms have been present during the same 2-week period and represent a change from previous functioning 5 or more symptoms (required for diagnosis)   - Depressed mood. Note: In children and adolescents, can be irritable mood.     - Diminished interest or pleasure in all, or almost all, activities.    - Significant weight /appetite changes.    - sleep changes/ sleep.    - Fatigue or loss of energy.     Adjustment with depressed mood, by hx, per chart review.    Functional Status:  Patient reports the following functional impairments:  self-care.   Nonprogrammatic care:  Patient is requesting basic services to address current mental health concerns.  Pt declined programmatic care at this point in time, requests therapy referral, referral made today.    Clinical Summary:  1. Psychosocial Factors:  Medical complexities.  Cultural and Contextual Factors: mental health  2. Principal DSM5 Diagnoses  (Sustained by DSM5 Criteria Listed Above):   296.32 (F33.1) Major Depressive Disorder, Recurrent Episode, Moderate _ and  "With anxious distress.  3. Other Diagnoses that is relevant to services:   Adjustment Disorders  309.0 (F43.21) With depressed mood.  4. Provisional Diagnosis:    5. Prognosis: Expect Improvement and Relieve Acute Symptoms.  6. Likely consequences of symptoms if not treated: possible continuation of sxs.  7. Patient strengths include:  caring, educated, empathetic, employed, goal-focused, good listener, has a previous history of therapy, insightful, intelligent, open to learning, open to suggestions / feedback, and support of family, friends and providers .     Recommendations:     1. Plan for Safety and Risk Management:   Safety and Risk: Recommended that patient call 911 or go to the local ED should there be a change in any of these risk factors      2. Patient's identified isabelle / Latter-day / spiritual influences will be incorporated into care by pts personal preferences .     3. Initial Treatment will focus on:    Depressed Mood - anxiety .     4. Resources/Service Plan:    services are not indicated.   Modifications to assist communication are not indicated.   Additional disability accommodations are not indicated.      5. Collaboration:   Collaboration / coordination of treatment will be initiated with the following  support professionals: [place] Patient Navigation coordinator handoff completed today.      6.  Referrals:   The following referral(s) will be initiated:  individual therapy referral .  Pt also requests ADHD/ASD Neurocog testing referral, both referrals  handed off and to be made by [place] Patient Navigation Team to follow up on, and assist pt with follow up needs..       A Release of Information has been obtained for the following: N/A.     Clinical Substantiation/medical necessity for the above recommendations:  Pt is a 34 year old who is seen for a diagnostic assessment for \"individual therapy referral and ADHD/ASD testing referral\".  Pt is referred by Jb Albright MD/care team . " Pt reported an increase in mental health sxs, stressors.  Pt desires individual therapy.  The pt endorses the following strengths, caring, committed to sobriety, good listener, has a previous history of therapy, motivated, open to learning, open to suggestions / feedback, support of family, friends and providers, supportive, wants to learn, willing to ask questions, willing to relate to others, and work history.  Pt may benefit from individual therapy to learn and build new skills to manage stress, gain insight, and learn new ways to respond differently. .    7. ASHTYN:    ASHTYN:  Discussed the general effects of drugs and alcohol on health and well-being.     8. Records:   These were reviewed at time of assessment.   Information in this assessment was obtained from the medical record and  provided by patient who is a good historian.    Patient will have open access to their mental health medical record.    9.   Interactive Complexity: No    10. Safety Plan: Reviewed safety plan with pt, pt agrees to follow, safety plan in this note and in pts mychart.  If unable to follow safety plan call 911.        [facility]ara Safety Plan       Step 1: Warning signs:    Warning Signs    overwhlemed    stress      Step 2: Internal coping strategies - Things I can do to take my mind off my problems without contacting another person:    Strategies    music    socialization    family/friends      Step 3: People and social settings that provide distraction:    Name Contact Information    Keenan parra in phone       Places    parents house and outside      Step 4: People whom I can ask for help during a crisis:    Name Contact Information    [facility] Crisis Line:  [phone]     2     emergency dept       Step 5: Professionals or agencies I can contact during a crisis:    Clinician/Agency Name Phone Emergency Contact    911      emergency dep      [facility] Crisis Line:  [phone]        Local Emergency Department  "Emergency Department Address Emergency Department Phone    911        Suicide Prevention Lifeline Phone: Call or Text 046  Crisis Text Line: Text HOME to 646076     Step 6: Making the environment safer (plan for lethal means safety):      Optional: What is most important to me and worth living for?:   Family\"     [facility]ara Safety Plan. Sailaja Garcia and Oliver Mackenzie. Used with permission of the authors.           Provider Name/ Credentials:  Tyrese BARROS Psychotherapist Trainee  February 11, 2025        "

## 2025-02-23 DIAGNOSIS — D05.12 DUCTAL CARCINOMA IN SITU (DCIS) OF LEFT BREAST: ICD-10-CM

## 2025-02-24 ENCOUNTER — MYC MEDICAL ADVICE (OUTPATIENT)
Dept: FAMILY MEDICINE | Facility: CLINIC | Age: 35
End: 2025-02-24
Payer: COMMERCIAL

## 2025-02-24 RX ORDER — TAMOXIFEN CITRATE 20 MG/1
20 TABLET ORAL DAILY
Qty: 90 TABLET | Refills: 1 | OUTPATIENT
Start: 2025-02-24

## 2025-02-24 NOTE — TELEPHONE ENCOUNTER
Refusing refill request, should have refill on file.    Last Written Prescription Date:  11/11/24  Last Fill Quantity: 90,  # refills: 1   Last office visit provider:  1/23/25 with Dr. Adams     Requested Prescriptions   Pending Prescriptions Disp Refills    tamoxifen (NOLVADEX) 20 MG tablet [Pharmacy Med Name: TAMOXIFEN 20 MG TABLET] 90 tablet 1     Sig: TAKE 1 TABLET BY MOUTH EVERY DAY       There is no refill protocol information for this order          Shasha Peters RN 02/24/25 8:17 AM

## 2025-03-07 ENCOUNTER — ONCOLOGY VISIT (OUTPATIENT)
Dept: ONCOLOGY | Facility: HOSPITAL | Age: 35
End: 2025-03-07
Attending: INTERNAL MEDICINE
Payer: COMMERCIAL

## 2025-03-07 VITALS
TEMPERATURE: 97.3 F | HEART RATE: 64 BPM | HEIGHT: 65 IN | RESPIRATION RATE: 16 BRPM | BODY MASS INDEX: 26.12 KG/M2 | DIASTOLIC BLOOD PRESSURE: 75 MMHG | SYSTOLIC BLOOD PRESSURE: 109 MMHG | OXYGEN SATURATION: 98 % | WEIGHT: 156.8 LBS

## 2025-03-07 DIAGNOSIS — Z12.31 ENCOUNTER FOR SCREENING MAMMOGRAM FOR MALIGNANT NEOPLASM OF BREAST: ICD-10-CM

## 2025-03-07 DIAGNOSIS — C50.412 MALIGNANT NEOPLASM OF UPPER-OUTER QUADRANT OF LEFT BREAST IN FEMALE, ESTROGEN RECEPTOR NEGATIVE (H): Primary | ICD-10-CM

## 2025-03-07 DIAGNOSIS — Z79.810 LONG-TERM CURRENT USE OF TAMOXIFEN: ICD-10-CM

## 2025-03-07 DIAGNOSIS — D05.12 DUCTAL CARCINOMA IN SITU (DCIS) OF LEFT BREAST: ICD-10-CM

## 2025-03-07 DIAGNOSIS — Z17.1 MALIGNANT NEOPLASM OF UPPER-OUTER QUADRANT OF LEFT BREAST IN FEMALE, ESTROGEN RECEPTOR NEGATIVE (H): Primary | ICD-10-CM

## 2025-03-07 DIAGNOSIS — N91.2 AMENORRHEA: ICD-10-CM

## 2025-03-07 DIAGNOSIS — Z51.11 ENCOUNTER FOR ANTINEOPLASTIC CHEMOTHERAPY: ICD-10-CM

## 2025-03-07 PROCEDURE — G2211 COMPLEX E/M VISIT ADD ON: HCPCS | Performed by: INTERNAL MEDICINE

## 2025-03-07 PROCEDURE — 99215 OFFICE O/P EST HI 40 MIN: CPT | Performed by: INTERNAL MEDICINE

## 2025-03-07 PROCEDURE — 99213 OFFICE O/P EST LOW 20 MIN: CPT | Performed by: INTERNAL MEDICINE

## 2025-03-07 RX ORDER — ALBUTEROL SULFATE 90 UG/1
1-2 INHALANT RESPIRATORY (INHALATION)
Status: CANCELLED
Start: 2025-03-07

## 2025-03-07 RX ORDER — METHYLPREDNISOLONE SODIUM SUCCINATE 40 MG/ML
40 INJECTION INTRAMUSCULAR; INTRAVENOUS
Start: 2025-03-28

## 2025-03-07 RX ORDER — HEPARIN SODIUM,PORCINE 10 UNIT/ML
5-20 VIAL (ML) INTRAVENOUS DAILY PRN
OUTPATIENT
Start: 2025-03-28

## 2025-03-07 RX ORDER — LORAZEPAM 2 MG/ML
0.5 INJECTION INTRAMUSCULAR EVERY 4 HOURS PRN
OUTPATIENT
Start: 2025-03-28

## 2025-03-07 RX ORDER — ALBUTEROL SULFATE 0.83 MG/ML
2.5 SOLUTION RESPIRATORY (INHALATION)
OUTPATIENT
Start: 2025-03-28

## 2025-03-07 RX ORDER — DIPHENHYDRAMINE HYDROCHLORIDE 50 MG/ML
25 INJECTION, SOLUTION INTRAMUSCULAR; INTRAVENOUS
Status: CANCELLED
Start: 2025-03-07

## 2025-03-07 RX ORDER — DIPHENHYDRAMINE HCL 50 MG
50 CAPSULE ORAL
OUTPATIENT
Start: 2025-03-28

## 2025-03-07 RX ORDER — HEPARIN SODIUM (PORCINE) LOCK FLUSH IV SOLN 100 UNIT/ML 100 UNIT/ML
5 SOLUTION INTRAVENOUS
OUTPATIENT
Start: 2025-03-28

## 2025-03-07 RX ORDER — ACETAMINOPHEN 325 MG/1
650 TABLET ORAL
Start: 2025-03-28

## 2025-03-07 RX ORDER — DIPHENHYDRAMINE HYDROCHLORIDE 50 MG/ML
50 INJECTION, SOLUTION INTRAMUSCULAR; INTRAVENOUS
Start: 2025-03-28

## 2025-03-07 RX ORDER — LORAZEPAM 2 MG/ML
0.5 INJECTION INTRAMUSCULAR EVERY 4 HOURS PRN
Status: CANCELLED | OUTPATIENT
Start: 2025-03-07

## 2025-03-07 RX ORDER — HEPARIN SODIUM,PORCINE 10 UNIT/ML
5-20 VIAL (ML) INTRAVENOUS DAILY PRN
Status: CANCELLED | OUTPATIENT
Start: 2025-03-07

## 2025-03-07 RX ORDER — MEPERIDINE HYDROCHLORIDE 25 MG/ML
25 INJECTION INTRAMUSCULAR; INTRAVENOUS; SUBCUTANEOUS
Status: CANCELLED | OUTPATIENT
Start: 2025-03-07

## 2025-03-07 RX ORDER — EPINEPHRINE 1 MG/ML
0.3 INJECTION, SOLUTION INTRAMUSCULAR; SUBCUTANEOUS EVERY 5 MIN PRN
Status: CANCELLED | OUTPATIENT
Start: 2025-03-07

## 2025-03-07 RX ORDER — ALBUTEROL SULFATE 0.83 MG/ML
2.5 SOLUTION RESPIRATORY (INHALATION)
Status: CANCELLED | OUTPATIENT
Start: 2025-03-07

## 2025-03-07 RX ORDER — DIPHENHYDRAMINE HYDROCHLORIDE 50 MG/ML
25 INJECTION, SOLUTION INTRAMUSCULAR; INTRAVENOUS
Start: 2025-03-28

## 2025-03-07 RX ORDER — EPINEPHRINE 1 MG/ML
0.3 INJECTION, SOLUTION INTRAMUSCULAR; SUBCUTANEOUS EVERY 5 MIN PRN
OUTPATIENT
Start: 2025-03-28

## 2025-03-07 RX ORDER — HEPARIN SODIUM (PORCINE) LOCK FLUSH IV SOLN 100 UNIT/ML 100 UNIT/ML
5 SOLUTION INTRAVENOUS
Status: CANCELLED | OUTPATIENT
Start: 2025-03-07

## 2025-03-07 RX ORDER — METHYLPREDNISOLONE SODIUM SUCCINATE 40 MG/ML
40 INJECTION INTRAMUSCULAR; INTRAVENOUS
Status: CANCELLED
Start: 2025-03-07

## 2025-03-07 RX ORDER — ALBUTEROL SULFATE 90 UG/1
1-2 INHALANT RESPIRATORY (INHALATION)
Start: 2025-03-28

## 2025-03-07 RX ORDER — DIPHENHYDRAMINE HCL 50 MG
50 CAPSULE ORAL
Status: CANCELLED | OUTPATIENT
Start: 2025-03-07

## 2025-03-07 RX ORDER — DIPHENHYDRAMINE HYDROCHLORIDE 50 MG/ML
50 INJECTION, SOLUTION INTRAMUSCULAR; INTRAVENOUS
Status: CANCELLED
Start: 2025-03-07

## 2025-03-07 RX ORDER — ACETAMINOPHEN 325 MG/1
650 TABLET ORAL
Status: CANCELLED
Start: 2025-03-07

## 2025-03-07 RX ORDER — MEPERIDINE HYDROCHLORIDE 25 MG/ML
25 INJECTION INTRAMUSCULAR; INTRAVENOUS; SUBCUTANEOUS
OUTPATIENT
Start: 2025-03-28

## 2025-03-07 ASSESSMENT — PAIN SCALES - GENERAL: PAINLEVEL_OUTOF10: NO PAIN (0)

## 2025-03-07 NOTE — PROGRESS NOTES
[facility] Hematology and Oncology Progress Note    Patient: Vibha Kingston  MRN: [id number]  Date of Service: Mar 7, 2025         Reason for Visit    Chief Complaint   Patient presents with    Oncology Clinic Visit       Invasive ductal carcinoma of breast, female, left         Assessment and Plan     Cancer Staging   Malignant neoplasm of upper-outer quadrant of left breast in female, estrogen receptor negative (H)  Staging form: Breast, AJCC 8th Edition  - Pathologic stage from 3/13/2024: Stage IIA (pT1c, pN1a(sn), cM0, G3, ER-, WY-, HER2+) - Signed by Rosalind Adams MD on 3/13/2024      ECOG Performance    0 - Independent     Pain  Pain Score: No Pain (0)      #.  History of stage IIA (pT1c pN1a M0) invasive ductal carcinoma of the left breast, grade 3, ER negative, WY negative, HER2 positive  #.  DCIS of the left breast, ER positive, WY positive    Assessment & Plan  Breast Cancer    She is undergoing treatment with Herceptin and endocrine therapy, responding well to the latter and nearing completion. The port is causing skin irritation and itchiness, and she prefers its removal post-treatment.   -Schedule follow-ups every four months for a year, then every six months if well-managed.   -Remove the port after treatment completion and contact Dr. Villeda for the procedure. If not removed immediately, she inquires about the frequency of port flushes, which is recommended every two to three months.    Breast Cancer Surveillance    She is concerned about recurrence due to previous lymph node involvement and is interested in more frequent imaging, such as MRI, due to dense breast tissue and limited sensation. Discussed the potential for MRI in addition to mammograms, considering insurance coverage and medical necessity. I made her aware that MRI give higher sensitivity and it may detect more but also shows false positive result.   -Schedule a right breast MRI in June and continue with annual mammograms.  Check insurance coverage for the MRI.    Tamoxifen Therapy    She is on tamoxifen for DCIS prevention, reports adherence but experiences side effects like hot flashes and mood changes. She is open to adjusting treatment if side effects become unmanageable. Discussed the potential for a break from tamoxifen to assess the impact on mood symptoms.   -Continue tamoxifen therapy, monitor for mood changes and side effects, and consider a break if side effects are severe, after consulting with her primary doctor.    Menopausal Symptoms    She experiences mood swings, night sweats, and temperature regulation issues, potentially related to tamoxifen. Her primary doctor suggested earlier hormone testing due to mood changes. She is considering the timing of hormone testing and its impact on treatment decisions, particularly regarding tamoxifen and antidepressant management. Hormone replacement is not an option due to her cancer history.   -Plan hormone testing in June and discuss with her primary doctor regarding the urgency of hormone testing and its impact on treatment. Consider a break from tamoxifen if mood symptoms are severe, after consulting with her primary doctor.    Echocardiogram Monitoring    Regular echocardiograms during treatment have been normal. She has the option to skip the final echocardiogram if asymptomatic for cardiac issues.   -Skip the final echocardiogram unless symptoms such as leg swelling or shortness of breath occur.    Follow-up    She will continue regular monitoring post-treatment to ensure ongoing health and address any emerging issues. -Schedule a follow-up appointment in June for blood work and evaluation.      Encounter Diagnoses:    Problem List Items Addressed This Visit          Oncology Diagnoses    Ductal carcinoma in situ (DCIS) of left breast    Malignant neoplasm of upper-outer quadrant of left breast in female, estrogen receptor negative (H) - Primary       Other    Encounter for  antineoplastic chemotherapy    Long-term current use of tamoxifen     Other Visit Diagnoses       Amenorrhea        Relevant Orders    Luteinizing Hormone    Follicle stimulating hormone    Encounter for screening mammogram for malignant neoplasm of breast        Relevant Orders    MR Breast Right w/o & w Contrast                CC: AYLA Randolph CNP   ______________________________________________________________________________  Oncologic history  1/9/2024- self palpated left medial breast mass   - diagnostic mammogram and ultrasound showed 3.4 x 2.2 x 2 cm large ill-defined hypoechoic area with associated calcification at 9:00 3 cm from the nipple.  Sonographic survey of left axilla is within normal limits.    1/11/2024-ultrasound-guided core needle biopsy showed DCIS, grade 3, cribriform and papillary with comedonecrosis.  ER/LA was deferred to excisional specimen.    1/25/2024-breast actionable panel was negative including INGRID, BARD1, BRCA1, BRCA2, CDH1, CHEK2, NF1, PALB2, PTEN, STK11, TP53.    2/4/2024-MRI breast showed large segmental area of non-mass enhancement medial left breast measuring 10 cm from the posterior to subareolar depth corresponding to the known DCIS.  Mildly prominent bilateral internal mammary lymph node technically within normal limit.    3/5/2024-left breast simple mastectomy and left axillary sentinel lymph node biopsy   Invasive ductal carcinoma, grade 3   Multifocal, 4 foci vary from 1.3 mm to 11 mm in greatest dimension.   Margins were negative, closest margin less than 1 mm anterior/superior   DCIS, EIC present, solid and comedo pattern, grade 3, extensive comedonecrosis.  Focal lymphovascular invasion present.   1/2 sentinel lymph node was positive for metastatic carcinoma.   [id number]   ER negative in invasive carcinoma, ER positive in DCIS (80% of the tumor nuclei stain strongly)   LA negative and invasive cancer, LA positive in DCIS (30% of the tumor nuclei stain strongly  of moderately)   HER2/aj positive for overexpression (3+ membrane staining)    Fertility preservation was successfully completed.    4/26/2024- adjuvant TCHP    6/27/2024-genetic counseling and testing completed.    10/3/2024-anticipate to complete adjuvant ration to the left chest wall and lymph node    9/23/2024-initiated adjuvant endocrine therapy with tamoxifen.    History of Present Illness    Ms. Vibha Kingston presented today accompanied by her .    History of Present Illness  The patient is a 34 year old with breast cancer who presents for follow-up regarding treatment and management.    She is currently undergoing treatment with Herceptin and endocrine therapy, with follow-up visits scheduled every four months for the first year and then every six months if she is doing well. She has questions about the timing of her next visit and the management of her port, which was placed by Dr. Villeda. She is considering having it removed after treatment completion due to skin irritation and itchiness.     She has been experiencing mood swings, night sweats, and temperature regulation issues, which she attributes to tamoxifen. She started antidepressants in January, which may also be contributing to her symptoms. She is unsure if the tamoxifen is affecting her mood and is considering a break from it to assess its impact.    She is concerned about the frequency of scans, particularly MRI versus mammogram, due to her dense breast tissue and previous lymph node involvement. She performs self-exams but lacks sensation in some areas, making detection challenging. She wants more frequent imaging for reassurance.    She is currently on tamoxifen and has been consistent with her medication for the past two months, noting that missing doses leads to increased hot flashes and flushing. She also reports ongoing diarrhea, which she suspects is related to her antidepressant, Lexapro.    No significant changes in heart  "function have been noted as per previous echocardiograms.      No return of menstrual cycle.  LMP 6/2/2024.    Review of systems  Apart from describing in HPI, the remainder of comprehensive ROS was negative.    Past History    Past Medical History:   Diagnosis Date    Cancer (H) 1/15/24    Breast cancer    Encounter for antineoplastic chemotherapy 03/14/2024       Past Surgical History:   Procedure Laterality Date    BIOPSY      A mole a few years back and an abnormal pap in [facility]    BIOPSY NODE SENTINEL Left 03/05/2024    Procedure: WITH LEFT SENTINEL LYMPH NODE BIOPSY;  Surgeon: Aleena Real DO;  Location: [facility] OR    BOTOX INJECTION MEDICAL      for treatment of anal fissure 10/2024 - [facility].    FERTILITY SURGERY  04/10/2024    Egg retrieval    INSERT PORT VASCULAR ACCESS N/A 04/19/2024    Procedure: INSERTION, VASCULAR ACCESS PORT UNDER ULTRASOUND AND FLUOROSCOPIC GUIDANCE;  Surgeon: Aleena Real DO;  Location: [facility] OR    MASTECTOMY SIMPLE Left 03/05/2024    Procedure: LEFT MASTECTOMY;  Surgeon: Aleena Real DO;  Location: [facility]       Physical Exam    /75 (BP Location: Left arm, Patient Position: Sitting, Cuff Size: Adult Regular)   Pulse 64   Temp 97.3  F (36.3  C) (Tympanic)   Resp 16   Ht 1.651 m (5' 5\")   Wt 71.1 kg (156 lb 12.8 oz)   SpO2 98%   BMI 26.09 kg/m      General: alert, awake, not in acute distress  HEENT: Head: Normal, normocephalic, atraumatic.  Eye: Normal external eye, conjunctiva, lids cornea, GABRIELA.  Pharynx: Normal buccal mucosa. Normal pharynx.  Neck / Thyroid: Supple, no masses, nodes, nodules or enlargement.  Abdomen: abdomen is soft without significant tenderness, masses, organomegaly or guarding  Extremities: normal strength, tone, and muscle mass  Skin: normal. no rash or abnormalities  CNS: non focal.    Lab Results    Recent Results (from the past 240 hours)   Comprehensive metabolic panel    " Collection Time: 03/07/25  8:41 AM   Result Value Ref Range    Sodium 139 135 - 145 mmol/L    Potassium 4.3 3.4 - 5.3 mmol/L    Carbon Dioxide (CO2) 24 22 - 29 mmol/L    Anion Gap 11 7 - 15 mmol/L    Urea Nitrogen 15.8 6.0 - 20.0 mg/dL    Creatinine 0.82 0.51 - 0.95 mg/dL    GFR Estimate >90 >60 mL/min/1.73m2    Calcium 8.9 8.8 - 10.4 mg/dL    Chloride 104 98 - 107 mmol/L    Glucose 125 (H) 70 - 99 mg/dL    Alkaline Phosphatase 53 40 - 150 U/L    AST 56 (H) 0 - 45 U/L    ALT 85 (H) 0 - 50 U/L    Protein Total 6.3 (L) 6.4 - 8.3 g/dL    Albumin 4.0 3.5 - 5.2 g/dL    Bilirubin Total 0.2 <=1.2 mg/dL   CBC with platelets and differential    Collection Time: 03/07/25  8:41 AM   Result Value Ref Range    WBC Count 3.7 (L) 4.0 - 11.0 10e3/uL    RBC Count 4.16 3.80 - 5.20 10e6/uL    Hemoglobin 13.0 11.7 - 15.7 g/dL    Hematocrit 36.8 35.0 - 47.0 %    MCV 89 78 - 100 fL    MCH 31.3 26.5 - 33.0 pg    MCHC 35.3 31.5 - 36.5 g/dL    RDW 12.0 10.0 - 15.0 %    Platelet Count 152 150 - 450 10e3/uL    % Neutrophils 74 %    % Lymphocytes 17 %    % Monocytes 8 %    % Eosinophils 0 %    % Basophils 1 %    % Immature Granulocytes 1 %    NRBCs per 100 WBC 0 <1 /100    Absolute Neutrophils 2.8 1.6 - 8.3 10e3/uL    Absolute Lymphocytes 0.6 (L) 0.8 - 5.3 10e3/uL    Absolute Monocytes 0.3 0.0 - 1.3 10e3/uL    Absolute Eosinophils 0.0 0.0 - 0.7 10e3/uL    Absolute Basophils 0.0 0.0 - 0.2 10e3/uL    Absolute Immature Granulocytes 0.0 <=0.4 10e3/uL    Absolute NRBCs 0.0 10e3/uL           Imaging    No results found.    The longitudinal plan of care for the diagnosis(es)/condition(s) as documented were addressed during this visit. Due to the added complexity in care, I will continue to support Vibha in the subsequent management and with ongoing continuity of care.     40 minutes spent by me on the date of the encounter doing chart review, history and exam, documentation and further activities as noted above.    Consent was obtained from the  patient to use an AI documentation tool in the creation of this note.    Signed by: Rosalind Adams MD

## 2025-03-07 NOTE — PROGRESS NOTES
"Oncology Rooming Note    March 7, 2025 8:59 AM   Vibha Kingston is a 34 year old female who presents for:    Chief Complaint   Patient presents with    Oncology Clinic Visit       Invasive ductal carcinoma of breast, female, left       Initial Vitals: /75 (BP Location: Left arm, Patient Position: Sitting, Cuff Size: Adult Regular)   Pulse 64   Temp 97.3  F (36.3  C) (Tympanic)   Resp 16   Ht 1.651 m (5' 5\")   Wt 71.1 kg (156 lb 12.8 oz)   SpO2 98%   BMI 26.09 kg/m   Estimated body mass index is 26.09 kg/m  as calculated from the following:    Height as of this encounter: 1.651 m (5' 5\").    Weight as of this encounter: 71.1 kg (156 lb 12.8 oz). Body surface area is 1.81 meters squared.  No Pain (0) Comment: Data Unavailable   No LMP recorded. (Menstrual status: Amenorrhea).  Allergies reviewed: Yes  Medications reviewed: Yes    Medications: Medication refills not needed today.  Pharmacy name entered into [employer]: [id number] - [place], [place] - [address]    Frailty Screening:   Is the patient here for a new oncology consult visit in cancer care? 2. No    PHQ9:  Did this patient require a PHQ9?: No      Clinical concerns:  labs and follow up      Danyell Lira"

## 2025-03-07 NOTE — LETTER
"3/7/2025      Vibha Kingston  [address] [place] [address]      Dear Colleague,    Thank you for referring your patient, Vibha Kingston, to the [facility] CANCER [facility]. Please see a copy of my visit note below.    Oncology Rooming Note    March 7, 2025 8:59 AM   Vibha Kingston is a 34 year old female who presents for:    Chief Complaint   Patient presents with     Oncology Clinic Visit       Invasive ductal carcinoma of breast, female, left       Initial Vitals: /75 (BP Location: Left arm, Patient Position: Sitting, Cuff Size: Adult Regular)   Pulse 64   Temp 97.3  F (36.3  C) (Tympanic)   Resp 16   Ht 1.651 m (5' 5\")   Wt 71.1 kg (156 lb 12.8 oz)   SpO2 98%   BMI 26.09 kg/m   Estimated body mass index is 26.09 kg/m  as calculated from the following:    Height as of this encounter: 1.651 m (5' 5\").    Weight as of this encounter: 71.1 kg (156 lb 12.8 oz). Body surface area is 1.81 meters squared.  No Pain (0) Comment: Data Unavailable   No LMP recorded. (Menstrual status: Amenorrhea).  Allergies reviewed: Yes  Medications reviewed: Yes    Medications: Medication refills not needed today.  Pharmacy name entered into iNEWiT: [id number] - [place], [place] - [address]    Frailty Screening:   Is the patient here for a new oncology consult visit in cancer care? 2. No    PHQ9:  Did this patient require a PHQ9?: No      Clinical concerns:  labs and follow up      Danyell Lira              [facility] Hematology and Oncology Progress Note    Patient: Vibha Kingston  MRN: [id number]  Date of Service: Mar 7, 2025         Reason for Visit    Chief Complaint   Patient presents with     Oncology Clinic Visit       Invasive ductal carcinoma of breast, female, left         Assessment and Plan     Cancer Staging   Malignant neoplasm of upper-outer quadrant of left breast in female, estrogen receptor negative (H)  Staging form: Breast, AJCC 8th Edition  - Pathologic stage " from 3/13/2024: Stage IIA (pT1c, pN1a(sn), cM0, G3, ER-, WI-, HER2+) - Signed by Rosalind Adams MD on 3/13/2024      ECOG Performance    0 - Independent     Pain  Pain Score: No Pain (0)      #.  History of stage IIA (pT1c pN1a M0) invasive ductal carcinoma of the left breast, grade 3, ER negative, WI negative, HER2 positive  #.  DCIS of the left breast, ER positive, WI positive    Assessment & Plan  Breast Cancer    She is undergoing treatment with Herceptin and endocrine therapy, responding well to the latter and nearing completion. The port is causing skin irritation and itchiness, and she prefers its removal post-treatment.   -Schedule follow-ups every four months for a year, then every six months if well-managed.   -Remove the port after treatment completion and contact Dr. Villeda for the procedure. If not removed immediately, she inquires about the frequency of port flushes, which is recommended every two to three months.    Breast Cancer Surveillance    She is concerned about recurrence due to previous lymph node involvement and is interested in more frequent imaging, such as MRI, due to dense breast tissue and limited sensation. Discussed the potential for MRI in addition to mammograms, considering insurance coverage and medical necessity. I made her aware that MRI give higher sensitivity and it may detect more but also shows false positive result.   -Schedule a right breast MRI in June and continue with annual mammograms. Check insurance coverage for the MRI.    Tamoxifen Therapy    She is on tamoxifen for DCIS prevention, reports adherence but experiences side effects like hot flashes and mood changes. She is open to adjusting treatment if side effects become unmanageable. Discussed the potential for a break from tamoxifen to assess the impact on mood symptoms.   -Continue tamoxifen therapy, monitor for mood changes and side effects, and consider a break if side effects are severe, after consulting with  her primary doctor.    Menopausal Symptoms    She experiences mood swings, night sweats, and temperature regulation issues, potentially related to tamoxifen. Her primary doctor suggested earlier hormone testing due to mood changes. She is considering the timing of hormone testing and its impact on treatment decisions, particularly regarding tamoxifen and antidepressant management. Hormone replacement is not an option due to her cancer history.   -Plan hormone testing in June and discuss with her primary doctor regarding the urgency of hormone testing and its impact on treatment. Consider a break from tamoxifen if mood symptoms are severe, after consulting with her primary doctor.    Echocardiogram Monitoring    Regular echocardiograms during treatment have been normal. She has the option to skip the final echocardiogram if asymptomatic for cardiac issues.   -Skip the final echocardiogram unless symptoms such as leg swelling or shortness of breath occur.    Follow-up    She will continue regular monitoring post-treatment to ensure ongoing health and address any emerging issues. -Schedule a follow-up appointment in June for blood work and evaluation.      Encounter Diagnoses:    Problem List Items Addressed This Visit          Oncology Diagnoses    Ductal carcinoma in situ (DCIS) of left breast    Malignant neoplasm of upper-outer quadrant of left breast in female, estrogen receptor negative (H) - Primary       Other    Encounter for antineoplastic chemotherapy    Long-term current use of tamoxifen     Other Visit Diagnoses       Amenorrhea        Relevant Orders    Luteinizing Hormone    Follicle stimulating hormone    Encounter for screening mammogram for malignant neoplasm of breast        Relevant Orders    MR Breast Right w/o & w Contrast                CC: AYLA Randolph CNP   ______________________________________________________________________________  Oncologic history  1/9/2024- self palpated left  medial breast mass   - diagnostic mammogram and ultrasound showed 3.4 x 2.2 x 2 cm large ill-defined hypoechoic area with associated calcification at 9:00 3 cm from the nipple.  Sonographic survey of left axilla is within normal limits.    1/11/2024-ultrasound-guided core needle biopsy showed DCIS, grade 3, cribriform and papillary with comedonecrosis.  ER/DC was deferred to excisional specimen.    1/25/2024-breast actionable panel was negative including INGRID, BARD1, BRCA1, BRCA2, CDH1, CHEK2, NF1, PALB2, PTEN, STK11, TP53.    2/4/2024-MRI breast showed large segmental area of non-mass enhancement medial left breast measuring 10 cm from the posterior to subareolar depth corresponding to the known DCIS.  Mildly prominent bilateral internal mammary lymph node technically within normal limit.    3/5/2024-left breast simple mastectomy and left axillary sentinel lymph node biopsy   Invasive ductal carcinoma, grade 3   Multifocal, 4 foci vary from 1.3 mm to 11 mm in greatest dimension.   Margins were negative, closest margin less than 1 mm anterior/superior   DCIS, EIC present, solid and comedo pattern, grade 3, extensive comedonecrosis.  Focal lymphovascular invasion present.   1/2 sentinel lymph node was positive for metastatic carcinoma.   [id number]   ER negative in invasive carcinoma, ER positive in DCIS (80% of the tumor nuclei stain strongly)   DC negative and invasive cancer, DC positive in DCIS (30% of the tumor nuclei stain strongly of moderately)   HER2/aj positive for overexpression (3+ membrane staining)    Fertility preservation was successfully completed.    4/26/2024- adjuvant TCHP    6/27/2024-genetic counseling and testing completed.    10/3/2024-anticipate to complete adjuvant ration to the left chest wall and lymph node    9/23/2024-initiated adjuvant endocrine therapy with tamoxifen.    History of Present Illness    Ms. Vibha Kingston presented today accompanied by her .    History of Present  Illness  The patient is a 34 year old with breast cancer who presents for follow-up regarding treatment and management.    She is currently undergoing treatment with Herceptin and endocrine therapy, with follow-up visits scheduled every four months for the first year and then every six months if she is doing well. She has questions about the timing of her next visit and the management of her port, which was placed by Dr. Villeda. She is considering having it removed after treatment completion due to skin irritation and itchiness.     She has been experiencing mood swings, night sweats, and temperature regulation issues, which she attributes to tamoxifen. She started antidepressants in January, which may also be contributing to her symptoms. She is unsure if the tamoxifen is affecting her mood and is considering a break from it to assess its impact.    She is concerned about the frequency of scans, particularly MRI versus mammogram, due to her dense breast tissue and previous lymph node involvement. She performs self-exams but lacks sensation in some areas, making detection challenging. She wants more frequent imaging for reassurance.    She is currently on tamoxifen and has been consistent with her medication for the past two months, noting that missing doses leads to increased hot flashes and flushing. She also reports ongoing diarrhea, which she suspects is related to her antidepressant, Lexapro.    No significant changes in heart function have been noted as per previous echocardiograms.      No return of menstrual cycle.  LMP 6/2/2024.    Review of systems  Apart from describing in HPI, the remainder of comprehensive ROS was negative.    Past History    Past Medical History:   Diagnosis Date     Cancer (H) 1/15/24    Breast cancer     Encounter for antineoplastic chemotherapy 03/14/2024       Past Surgical History:   Procedure Laterality Date     BIOPSY      A mole a few years back and an abnormal pap in college  "    BIOPSY NODE SENTINEL Left 03/05/2024    Procedure: WITH LEFT SENTINEL LYMPH NODE BIOPSY;  Surgeon: Aleena Real DO;  Location: [facility] OR     BOTOX INJECTION MEDICAL      for treatment of anal fissure 10/2024 - [facility]     FERTILITY SURGERY  04/10/2024    Egg retrieval     INSERT PORT VASCULAR ACCESS N/A 04/19/2024    Procedure: INSERTION, VASCULAR ACCESS PORT UNDER ULTRASOUND AND FLUOROSCOPIC GUIDANCE;  Surgeon: Aleena Real DO;  Location: [facility]     MASTECTOMY SIMPLE Left 03/05/2024    Procedure: LEFT MASTECTOMY;  Surgeon: Aleena Real DO;  Location: [facility] OR       Physical Exam    /75 (BP Location: Left arm, Patient Position: Sitting, Cuff Size: Adult Regular)   Pulse 64   Temp 97.3  F (36.3  C) (Tympanic)   Resp 16   Ht 1.651 m (5' 5\")   Wt 71.1 kg (156 lb 12.8 oz)   SpO2 98%   BMI 26.09 kg/m      General: alert, awake, not in acute distress  HEENT: Head: Normal, normocephalic, atraumatic.  Eye: Normal external eye, conjunctiva, lids cornea, GABRIELA.  Pharynx: Normal buccal mucosa. Normal pharynx.  Neck / Thyroid: Supple, no masses, nodes, nodules or enlargement.  Abdomen: abdomen is soft without significant tenderness, masses, organomegaly or guarding  Extremities: normal strength, tone, and muscle mass  Skin: normal. no rash or abnormalities  CNS: non focal.    Lab Results    Recent Results (from the past 240 hours)   Comprehensive metabolic panel    Collection Time: 03/07/25  8:41 AM   Result Value Ref Range    Sodium 139 135 - 145 mmol/L    Potassium 4.3 3.4 - 5.3 mmol/L    Carbon Dioxide (CO2) 24 22 - 29 mmol/L    Anion Gap 11 7 - 15 mmol/L    Urea Nitrogen 15.8 6.0 - 20.0 mg/dL    Creatinine 0.82 0.51 - 0.95 mg/dL    GFR Estimate >90 >60 mL/min/1.73m2    Calcium 8.9 8.8 - 10.4 mg/dL    Chloride 104 98 - 107 mmol/L    Glucose 125 (H) 70 - 99 mg/dL    Alkaline Phosphatase 53 40 - 150 U/L    AST 56 (H) 0 - 45 U/L    ALT 85 (H) 0 - " 50 U/L    Protein Total 6.3 (L) 6.4 - 8.3 g/dL    Albumin 4.0 3.5 - 5.2 g/dL    Bilirubin Total 0.2 <=1.2 mg/dL   CBC with platelets and differential    Collection Time: 03/07/25  8:41 AM   Result Value Ref Range    WBC Count 3.7 (L) 4.0 - 11.0 10e3/uL    RBC Count 4.16 3.80 - 5.20 10e6/uL    Hemoglobin 13.0 11.7 - 15.7 g/dL    Hematocrit 36.8 35.0 - 47.0 %    MCV 89 78 - 100 fL    MCH 31.3 26.5 - 33.0 pg    MCHC 35.3 31.5 - 36.5 g/dL    RDW 12.0 10.0 - 15.0 %    Platelet Count 152 150 - 450 10e3/uL    % Neutrophils 74 %    % Lymphocytes 17 %    % Monocytes 8 %    % Eosinophils 0 %    % Basophils 1 %    % Immature Granulocytes 1 %    NRBCs per 100 WBC 0 <1 /100    Absolute Neutrophils 2.8 1.6 - 8.3 10e3/uL    Absolute Lymphocytes 0.6 (L) 0.8 - 5.3 10e3/uL    Absolute Monocytes 0.3 0.0 - 1.3 10e3/uL    Absolute Eosinophils 0.0 0.0 - 0.7 10e3/uL    Absolute Basophils 0.0 0.0 - 0.2 10e3/uL    Absolute Immature Granulocytes 0.0 <=0.4 10e3/uL    Absolute NRBCs 0.0 10e3/uL           Imaging    No results found.    The longitudinal plan of care for the diagnosis(es)/condition(s) as documented were addressed during this visit. Due to the added complexity in care, I will continue to support Vibha in the subsequent management and with ongoing continuity of care.     40 minutes spent by me on the date of the encounter doing chart review, history and exam, documentation and further activities as noted above.    Consent was obtained from the patient to use an AI documentation tool in the creation of this note.    Signed by: Rosalind Adams MD      Again, thank you for allowing me to participate in the care of your patient.        Sincerely,        Rosalind Adams MD    Electronically signed

## 2025-03-09 PROBLEM — Z79.810 LONG-TERM CURRENT USE OF TAMOXIFEN: Status: ACTIVE | Noted: 2025-03-09

## 2025-04-02 NOTE — PROGRESS NOTES
Name:  Vibha Kingston  PCP:  Brooklyn Bloomissa  Procedure Date:  April 3, 2025    Pre/Post-Procedure Diagnosis:  Port-a-cath in place    Procedure:  Port removal    Surgeon:  Aleena Real DO    Anesthesia Type:    Local    Estimated Blood Loss:   1 mL    Complications:    None    Indication for procedure:  This is a 34-year-old female who presents for removal of their chemotherapy port.  She no longer needs the central venous access and presents today for removal.    Procedure:    After informed consent was obtained, and the risks and benefits of the procedure were discussed, the patient was positioned on the office table in the supine position.  The skin over the port was prepped and draped in the usual sterile fashion.  The area was then infiltrated with local anesthetic.  An elliptical incision was made around the prior port placement site on the right upper chest.  The skin was discarded.  The scalpel was then utilized to dissect through the subcutaneous tissue down to the port pocket.  The port was freed from surrounding tissue.  The port was removed in one piece.  Hemostasis was assured within the cavity.  The subcutaneous tissue was reapproximated with interrupted 3-0 Vicryl followed by a running subcuticular 4-0 Monocryl for the skin and Dermabond.    Disposition:  The patient tolerated the procedure well.  Her exam on the left side of her chest is as expected.  Has some skin darkening inferior to her mastectomy scar.  No palpable masses.  She will continue with her annual screening mammogram and MRI on the right breast.  She can follow up with myself as needed.      Aleena Real DO  General Surgeon  [facility]  Surgery [address]  [place] [address]  Office: [phone]  Employed by - [facility]

## 2025-04-03 ENCOUNTER — OFFICE VISIT (OUTPATIENT)
Dept: SURGERY | Facility: CLINIC | Age: 35
End: 2025-04-03
Payer: COMMERCIAL

## 2025-04-03 VITALS — BODY MASS INDEX: 26.74 KG/M2 | WEIGHT: 160.5 LBS | HEIGHT: 65 IN

## 2025-04-03 DIAGNOSIS — Z95.828 PORT-A-CATH IN PLACE: Primary | ICD-10-CM

## 2025-04-03 NOTE — LETTER
4/3/2025      Vibha BARRAZA Kingston  [address] [place] [address]      Dear Colleague,    Thank you for referring your patient, Vibha Kingston, to the [facility] SURGERY [facility]. Please see a copy of my visit note below.    Name:  Vibha Kingston  PCP:  Zully Bloom  Procedure Date:  April 3, 2025    Pre/Post-Procedure Diagnosis:  Port-a-cath in place    Procedure:  Port removal    Surgeon:  Aleena Real DO    Anesthesia Type:    Local    Estimated Blood Loss:   1 mL    Complications:    None    Indication for procedure:  This is a 34-year-old female who presents for removal of their chemotherapy port.  She no longer needs the central venous access and presents today for removal.    Procedure:    After informed consent was obtained, and the risks and benefits of the procedure were discussed, the patient was positioned on the office table in the supine position.  The skin over the port was prepped and draped in the usual sterile fashion.  The area was then infiltrated with local anesthetic.  An elliptical incision was made around the prior port placement site on the right upper chest.  The skin was discarded.  The scalpel was then utilized to dissect through the subcutaneous tissue down to the port pocket.  The port was freed from surrounding tissue.  The port was removed in one piece.  Hemostasis was assured within the cavity.  The subcutaneous tissue was reapproximated with interrupted 3-0 Vicryl followed by a running subcuticular 4-0 Monocryl for the skin and Dermabond.    Disposition:  The patient tolerated the procedure well.  Her exam on the left side of her chest is as expected.  Has some skin darkening inferior to her mastectomy scar.  No palpable masses.  She will continue with her annual screening mammogram and MRI on the right breast.  She can follow up with myself as needed.      Aleena eRal DO  General Surgeon  [facility]  Surgery [facility]   [address] [address]  Office: [phone]  Employed by - [employer]      Again, thank you for allowing me to participate in the care of your patient.        Sincerely,        Aleena Real, DO    Electronically signed

## 2025-04-07 ENCOUNTER — PATIENT OUTREACH (OUTPATIENT)
Dept: CARE COORDINATION | Facility: CLINIC | Age: 35
End: 2025-04-07
Payer: COMMERCIAL

## 2025-04-22 ENCOUNTER — TRANSFERRED RECORDS (OUTPATIENT)
Dept: HEALTH INFORMATION MANAGEMENT | Facility: CLINIC | Age: 35
End: 2025-04-22
Payer: COMMERCIAL

## 2025-07-07 ENCOUNTER — HOSPITAL ENCOUNTER (OUTPATIENT)
Dept: MRI IMAGING | Facility: CLINIC | Age: 35
Discharge: HOME | End: 2025-07-07
Attending: INTERNAL MEDICINE | Admitting: INTERNAL MEDICINE
Payer: COMMERCIAL

## 2025-07-07 DIAGNOSIS — Z12.31 ENCOUNTER FOR SCREENING MAMMOGRAM FOR MALIGNANT NEOPLASM OF BREAST: ICD-10-CM

## 2025-07-07 PROCEDURE — A9585 GADOBUTROL INJECTION: HCPCS | Performed by: INTERNAL MEDICINE

## 2025-07-07 PROCEDURE — 255N000002 HC RX 255 OP 636: Performed by: INTERNAL MEDICINE

## 2025-07-07 PROCEDURE — 77049 MRI BREAST C-+ W/CAD BI: CPT

## 2025-07-07 RX ORDER — GADOBUTROL 604.72 MG/ML
7 INJECTION INTRAVENOUS ONCE
Status: COMPLETED | OUTPATIENT
Start: 2025-07-07 | End: 2025-07-07

## 2025-07-07 RX ADMIN — GADOBUTROL 7 ML: 604.72 INJECTION INTRAVENOUS at 14:40

## 2025-07-08 ENCOUNTER — PATIENT OUTREACH (OUTPATIENT)
Dept: CARE COORDINATION | Facility: CLINIC | Age: 35
End: 2025-07-08
Payer: COMMERCIAL

## (undated) DEVICE — PREP CHLORAPREP 26ML TINTED HI-LITE ORANGE 930815

## (undated) DEVICE — ELECTRODE PATIENT RETURN ADULT L10 FT 2 PLATE CORD 0855C

## (undated) DEVICE — DRAPE SHEET REV FOLD 3/4 9349

## (undated) DEVICE — TAPE TRANSPORE 2"X1.5YD 1534S-2

## (undated) DEVICE — SU ETHILON 2-0 FS 18" 664G

## (undated) DEVICE — GOWN IMPERVIOUS BREATHABLE SMART LG 89015

## (undated) DEVICE — BLADE KNIFE SURG 10 371110

## (undated) DEVICE — PROBE ELECTROSURGICAL TRUNODE GAMMA 120-807605

## (undated) DEVICE — ESU ELEC BLADE 2.75" COATED/INSULATED E1455

## (undated) DEVICE — HOLDER DRAINAGE BULB 0814-8220

## (undated) DEVICE — LIGACLIP MEDIUM AESCULAP B2180-1

## (undated) DEVICE — DRAIN BLAKE 15FR SIL 2229

## (undated) DEVICE — ESU PENCIL SMOKE EVAC W/ROCKER SWITCH 0703-047-000

## (undated) DEVICE — SOL WATER IRRIG 1000ML BOTTLE 2F7114

## (undated) DEVICE — SPONGE LAP 18X18" X8435

## (undated) DEVICE — SOL NACL 0.9% IRRIG 1000ML BOTTLE 2F7124

## (undated) DEVICE — PROBE ELECTROSURGICAL TRUNODE GAMMA 120-807637

## (undated) DEVICE — BNDG ELASTIC 6"X5YDS UNSTERILE 6611-60

## (undated) DEVICE — SUTURE VICRYL+ 2-0 27IN SH UND VCP417H

## (undated) DEVICE — SUCTION TIP YANKAUER W/O VENT K86

## (undated) DEVICE — BLADE KNIFE SURG 15 371115

## (undated) DEVICE — SUCTION MANIFOLD NEPTUNE 2 SYS 1 PORT 702-025-000

## (undated) DEVICE — SU SILK 2-0 SH 30" K833H

## (undated) DEVICE — SU DERMABOND PRINEO 22CM CLR222US

## (undated) DEVICE — DRAIN RESERVOIR 100ML JP 0070740

## (undated) DEVICE — DRAPE CHEST 100X72X124 89227

## (undated) DEVICE — GLOVE BIOGEL PI ULTRATOUCH G SZ 6.0 42160

## (undated) DEVICE — CUSTOM PACK GEN MAJOR SBA5BGMHEA

## (undated) DEVICE — DRSG GAUZE 4X4" 3033

## (undated) RX ORDER — ONDANSETRON 2 MG/ML
INJECTION INTRAMUSCULAR; INTRAVENOUS
Status: DISPENSED
Start: 2024-03-05

## (undated) RX ORDER — DEXAMETHASONE SODIUM PHOSPHATE 10 MG/ML
INJECTION, EMULSION INTRAMUSCULAR; INTRAVENOUS
Status: DISPENSED
Start: 2024-03-05

## (undated) RX ORDER — FENTANYL CITRATE 50 UG/ML
INJECTION, SOLUTION INTRAMUSCULAR; INTRAVENOUS
Status: DISPENSED
Start: 2024-03-05

## (undated) RX ORDER — PROPOFOL 10 MG/ML
INJECTION, EMULSION INTRAVENOUS
Status: DISPENSED
Start: 2024-03-05

## (undated) RX ORDER — LIDOCAINE HYDROCHLORIDE 10 MG/ML
INJECTION, SOLUTION EPIDURAL; INFILTRATION; INTRACAUDAL; PERINEURAL
Status: DISPENSED
Start: 2024-03-05